# Patient Record
Sex: FEMALE | Race: BLACK OR AFRICAN AMERICAN | Employment: OTHER | URBAN - METROPOLITAN AREA
[De-identification: names, ages, dates, MRNs, and addresses within clinical notes are randomized per-mention and may not be internally consistent; named-entity substitution may affect disease eponyms.]

---

## 2017-01-10 RX ORDER — LANSOPRAZOLE 30 MG/1
CAPSULE, DELAYED RELEASE ORAL
Qty: 60 CAPSULE | Refills: 5 | Status: SHIPPED | OUTPATIENT
Start: 2017-01-10 | End: 2017-01-18

## 2017-01-18 ENCOUNTER — TELEPHONE (OUTPATIENT)
Dept: FAMILY MEDICINE CLINIC | Age: 57
End: 2017-01-18

## 2017-01-18 RX ORDER — OMEPRAZOLE 20 MG/1
20 CAPSULE, DELAYED RELEASE ORAL DAILY
Qty: 30 CAPSULE | Refills: 11 | Status: SHIPPED | OUTPATIENT
Start: 2017-01-18 | End: 2017-05-15 | Stop reason: SDUPTHER

## 2017-01-31 DIAGNOSIS — R73.02 IGT (IMPAIRED GLUCOSE TOLERANCE): ICD-10-CM

## 2017-01-31 RX ORDER — ATORVASTATIN CALCIUM 10 MG/1
TABLET, FILM COATED ORAL
Qty: 30 TABLET | Refills: 0 | Status: SHIPPED | OUTPATIENT
Start: 2017-01-31 | End: 2017-02-16 | Stop reason: SDUPTHER

## 2017-01-31 RX ORDER — LEVOTHYROXINE SODIUM 88 MCG
TABLET ORAL
Qty: 30 TABLET | Refills: 0 | Status: SHIPPED | OUTPATIENT
Start: 2017-01-31 | End: 2017-02-16 | Stop reason: SDUPTHER

## 2017-02-16 ENCOUNTER — TELEPHONE (OUTPATIENT)
Dept: FAMILY MEDICINE CLINIC | Age: 57
End: 2017-02-16

## 2017-02-16 DIAGNOSIS — R73.02 IGT (IMPAIRED GLUCOSE TOLERANCE): ICD-10-CM

## 2017-02-16 RX ORDER — ALBUTEROL SULFATE 90 UG/1
AEROSOL, METERED RESPIRATORY (INHALATION)
Qty: 1 INHALER | Refills: 5 | Status: SHIPPED | OUTPATIENT
Start: 2017-02-16 | End: 2017-12-07 | Stop reason: SDUPTHER

## 2017-02-16 RX ORDER — LEVOTHYROXINE SODIUM 88 MCG
TABLET ORAL
Qty: 30 TABLET | Refills: 5 | Status: SHIPPED | OUTPATIENT
Start: 2017-02-16 | End: 2017-08-28 | Stop reason: SDUPTHER

## 2017-02-16 RX ORDER — ATORVASTATIN CALCIUM 10 MG/1
TABLET, FILM COATED ORAL
Qty: 30 TABLET | Refills: 5 | Status: SHIPPED | OUTPATIENT
Start: 2017-02-16 | End: 2017-09-01 | Stop reason: SDUPTHER

## 2017-03-27 RX ORDER — HYOSCYAMINE SULFATE EXTENDED-RELEASE 0.38 MG/1
TABLET ORAL
Qty: 60 TABLET | Refills: 2 | Status: SHIPPED | OUTPATIENT
Start: 2017-03-27 | End: 2017-04-17 | Stop reason: HOSPADM

## 2017-04-24 RX ORDER — FLUOCINONIDE TOPICAL SOLUTION USP, 0.05% 0.5 MG/ML
SOLUTION TOPICAL
Qty: 60 ML | Refills: 0 | Status: SHIPPED | OUTPATIENT
Start: 2017-04-24 | End: 2017-05-23 | Stop reason: SDUPTHER

## 2017-05-15 ENCOUNTER — TELEPHONE (OUTPATIENT)
Dept: FAMILY MEDICINE CLINIC | Age: 57
End: 2017-05-15

## 2017-05-15 RX ORDER — OMEPRAZOLE 20 MG/1
20 CAPSULE, DELAYED RELEASE ORAL 2 TIMES DAILY
Qty: 60 CAPSULE | Refills: 11 | Status: SHIPPED | OUTPATIENT
Start: 2017-05-15 | End: 2017-08-28

## 2017-05-23 ENCOUNTER — OFFICE VISIT (OUTPATIENT)
Dept: FAMILY MEDICINE CLINIC | Age: 57
End: 2017-05-23

## 2017-05-23 VITALS
RESPIRATION RATE: 14 BRPM | BODY MASS INDEX: 37.73 KG/M2 | HEIGHT: 62 IN | HEART RATE: 80 BPM | SYSTOLIC BLOOD PRESSURE: 124 MMHG | DIASTOLIC BLOOD PRESSURE: 68 MMHG | WEIGHT: 205 LBS | OXYGEN SATURATION: 96 %

## 2017-05-23 DIAGNOSIS — M79.7 FIBROMYALGIA: ICD-10-CM

## 2017-05-23 DIAGNOSIS — J44.9 CHRONIC OBSTRUCTIVE PULMONARY DISEASE, UNSPECIFIED COPD TYPE (HCC): ICD-10-CM

## 2017-05-23 DIAGNOSIS — Q07.00 ARNOLD-CHIARI MALFORMATION (HCC): ICD-10-CM

## 2017-05-23 DIAGNOSIS — E78.5 HYPERLIPIDEMIA, UNSPECIFIED HYPERLIPIDEMIA TYPE: ICD-10-CM

## 2017-05-23 DIAGNOSIS — E03.9 HYPOTHYROIDISM, UNSPECIFIED TYPE: Primary | ICD-10-CM

## 2017-05-23 DIAGNOSIS — E66.9 OBESITY, UNSPECIFIED OBESITY SEVERITY, UNSPECIFIED OBESITY TYPE: ICD-10-CM

## 2017-05-23 DIAGNOSIS — K21.9 GASTROESOPHAGEAL REFLUX DISEASE, ESOPHAGITIS PRESENCE NOT SPECIFIED: ICD-10-CM

## 2017-05-23 DIAGNOSIS — R73.02 IGT (IMPAIRED GLUCOSE TOLERANCE): ICD-10-CM

## 2017-05-23 DIAGNOSIS — Z72.0 TOBACCO ABUSE: ICD-10-CM

## 2017-05-23 DIAGNOSIS — E55.9 VITAMIN D DEFICIENCY: ICD-10-CM

## 2017-05-23 PROCEDURE — 99214 OFFICE O/P EST MOD 30 MIN: CPT | Performed by: FAMILY MEDICINE

## 2017-05-23 RX ORDER — FLUOCINONIDE TOPICAL SOLUTION USP, 0.05% 0.5 MG/ML
SOLUTION TOPICAL
Qty: 60 ML | Refills: 2 | Status: SHIPPED | OUTPATIENT
Start: 2017-05-23 | End: 2017-10-02 | Stop reason: SDUPTHER

## 2017-05-23 RX ORDER — ALBUTEROL SULFATE 90 MCG
2 HFA AEROSOL WITH ADAPTER (GRAM) INHALATION EVERY 6 HOURS PRN
Qty: 1 INHALER | Refills: 3 | Status: SHIPPED | OUTPATIENT
Start: 2017-05-23 | End: 2019-09-12 | Stop reason: SDUPTHER

## 2017-05-23 ASSESSMENT — ENCOUNTER SYMPTOMS
RESPIRATORY NEGATIVE: 1
GASTROINTESTINAL NEGATIVE: 1

## 2017-05-23 ASSESSMENT — PATIENT HEALTH QUESTIONNAIRE - PHQ9
1. LITTLE INTEREST OR PLEASURE IN DOING THINGS: 0
SUM OF ALL RESPONSES TO PHQ9 QUESTIONS 1 & 2: 0
SUM OF ALL RESPONSES TO PHQ QUESTIONS 1-9: 0
2. FEELING DOWN, DEPRESSED OR HOPELESS: 0

## 2017-08-08 ENCOUNTER — TELEPHONE (OUTPATIENT)
Dept: FAMILY MEDICINE CLINIC | Age: 57
End: 2017-08-08

## 2017-08-28 ENCOUNTER — TELEPHONE (OUTPATIENT)
Dept: FAMILY MEDICINE CLINIC | Age: 57
End: 2017-08-28

## 2017-08-28 RX ORDER — LANSOPRAZOLE 30 MG/1
30 CAPSULE, DELAYED RELEASE ORAL DAILY
Qty: 90 CAPSULE | Refills: 3 | Status: SHIPPED | OUTPATIENT
Start: 2017-08-28 | End: 2017-09-05 | Stop reason: SDUPTHER

## 2017-08-28 RX ORDER — PANTOPRAZOLE SODIUM 40 MG/1
40 TABLET, DELAYED RELEASE ORAL DAILY
Qty: 90 TABLET | Refills: 3 | Status: SHIPPED | OUTPATIENT
Start: 2017-08-28 | End: 2017-08-28

## 2017-08-28 RX ORDER — LEVOTHYROXINE SODIUM 88 MCG
TABLET ORAL
Qty: 30 TABLET | Refills: 5 | Status: SHIPPED | OUTPATIENT
Start: 2017-08-28 | End: 2018-03-13 | Stop reason: SDUPTHER

## 2017-08-28 RX ORDER — LANSOPRAZOLE 30 MG/1
30 CAPSULE, DELAYED RELEASE ORAL DAILY
Qty: 90 CAPSULE | Refills: 3 | Status: SHIPPED | OUTPATIENT
Start: 2017-08-28 | End: 2017-08-28 | Stop reason: SDUPTHER

## 2017-09-01 DIAGNOSIS — R73.02 IGT (IMPAIRED GLUCOSE TOLERANCE): ICD-10-CM

## 2017-09-05 ENCOUNTER — TELEPHONE (OUTPATIENT)
Dept: FAMILY MEDICINE CLINIC | Age: 57
End: 2017-09-05

## 2017-09-05 RX ORDER — LANSOPRAZOLE 30 MG/1
30 CAPSULE, DELAYED RELEASE ORAL 2 TIMES DAILY
Qty: 180 CAPSULE | Refills: 3 | Status: SHIPPED | OUTPATIENT
Start: 2017-09-05 | End: 2018-08-10

## 2017-09-05 RX ORDER — ATORVASTATIN CALCIUM 10 MG/1
TABLET, FILM COATED ORAL
Qty: 30 TABLET | Refills: 0 | Status: SHIPPED | OUTPATIENT
Start: 2017-09-05 | End: 2017-09-27 | Stop reason: SDUPTHER

## 2017-09-27 DIAGNOSIS — R73.02 IGT (IMPAIRED GLUCOSE TOLERANCE): ICD-10-CM

## 2017-09-27 RX ORDER — ATORVASTATIN CALCIUM 10 MG/1
TABLET, FILM COATED ORAL
Qty: 30 TABLET | Refills: 0 | Status: SHIPPED | OUTPATIENT
Start: 2017-09-27 | End: 2017-10-02 | Stop reason: SDUPTHER

## 2017-09-27 RX ORDER — HYOSCYAMINE SULFATE EXTENDED-RELEASE 0.38 MG/1
TABLET ORAL
Qty: 60 TABLET | Refills: 0 | Status: SHIPPED | OUTPATIENT
Start: 2017-09-27 | End: 2017-10-02 | Stop reason: SDUPTHER

## 2017-10-02 ENCOUNTER — TELEPHONE (OUTPATIENT)
Dept: FAMILY MEDICINE CLINIC | Age: 57
End: 2017-10-02

## 2017-10-02 DIAGNOSIS — R73.02 IGT (IMPAIRED GLUCOSE TOLERANCE): ICD-10-CM

## 2017-10-02 RX ORDER — HYOSCYAMINE SULFATE EXTENDED-RELEASE 0.38 MG/1
TABLET ORAL
Qty: 30 TABLET | Refills: 11 | Status: SHIPPED | OUTPATIENT
Start: 2017-10-02 | End: 2020-07-27 | Stop reason: SDUPTHER

## 2017-10-02 RX ORDER — FLUOCINONIDE TOPICAL SOLUTION USP, 0.05% 0.5 MG/ML
SOLUTION TOPICAL
Qty: 60 ML | Refills: 2 | Status: SHIPPED | OUTPATIENT
Start: 2017-10-02 | End: 2017-12-31 | Stop reason: SDUPTHER

## 2017-10-02 RX ORDER — ATORVASTATIN CALCIUM 10 MG/1
TABLET, FILM COATED ORAL
Qty: 30 TABLET | Refills: 11 | Status: SHIPPED | OUTPATIENT
Start: 2017-10-02 | End: 2019-02-21 | Stop reason: SDUPTHER

## 2017-10-02 RX ORDER — ALBUTEROL SULFATE 90 UG/1
AEROSOL, METERED RESPIRATORY (INHALATION)
Qty: 18 G | Refills: 2 | Status: SHIPPED | OUTPATIENT
Start: 2017-10-02 | End: 2018-02-04 | Stop reason: SDUPTHER

## 2017-10-04 ENCOUNTER — OFFICE VISIT (OUTPATIENT)
Dept: FAMILY MEDICINE CLINIC | Age: 57
End: 2017-10-04
Payer: COMMERCIAL

## 2017-10-04 VITALS
SYSTOLIC BLOOD PRESSURE: 120 MMHG | DIASTOLIC BLOOD PRESSURE: 70 MMHG | HEART RATE: 89 BPM | OXYGEN SATURATION: 98 % | TEMPERATURE: 97.9 F | WEIGHT: 200.8 LBS | BODY MASS INDEX: 37.91 KG/M2 | RESPIRATION RATE: 14 BRPM | HEIGHT: 61 IN

## 2017-10-04 DIAGNOSIS — A08.4 VIRAL GASTROENTERITIS: Primary | ICD-10-CM

## 2017-10-04 PROCEDURE — 99213 OFFICE O/P EST LOW 20 MIN: CPT | Performed by: FAMILY MEDICINE

## 2017-10-04 RX ORDER — LIDOCAINE 50 MG/G
OINTMENT TOPICAL PRN
COMMUNITY

## 2017-10-04 RX ORDER — ONDANSETRON 4 MG/1
4 TABLET, FILM COATED ORAL DAILY PRN
Qty: 10 TABLET | Refills: 0 | Status: SHIPPED | OUTPATIENT
Start: 2017-10-04 | End: 2018-01-23 | Stop reason: ALTCHOICE

## 2017-10-04 RX ORDER — MELOXICAM 15 MG/1
15 TABLET ORAL DAILY
COMMUNITY
End: 2019-07-31 | Stop reason: ALTCHOICE

## 2017-10-04 RX ORDER — HYDROCORTISONE ACETATE 25 MG/1
25 SUPPOSITORY RECTAL PRN
COMMUNITY

## 2017-10-04 RX ORDER — FLUTICASONE PROPIONATE 50 MCG
1 SPRAY, SUSPENSION (ML) NASAL DAILY
COMMUNITY
End: 2022-03-02

## 2017-10-04 RX ORDER — LIDOCAINE 50 MG/G
1 PATCH TOPICAL DAILY
COMMUNITY

## 2017-10-04 ASSESSMENT — ENCOUNTER SYMPTOMS
BACK PAIN: 1
COUGH: 1
DIARRHEA: 1
BLOOD IN STOOL: 0
ABDOMINAL PAIN: 1
NAUSEA: 1
VOMITING: 1

## 2017-10-04 NOTE — MR AVS SNAPSHOT
After Visit Summary             Carmen Anderson   10/4/2017 10:15 AM   Office Visit    Description:  Female : 1960   Provider:  Corrie Rea DO   Department:  [facility]              Your Follow-Up and Future Appointments         Below is a list of your follow-up and future appointments. This may not be a complete list as you may have made appointments directly with providers that we are not aware of or your providers may have made some for you. Please call your providers to confirm appointments. It is important to keep your appointments. Please bring your current insurance card, photo ID, co-pay, and all medication bottles to your appointment. If self-pay, payment is expected at the time of service. Your To-Do List     Future Appointments Provider Department Dept Phone    2018 7:45 AM Corrie Rea DO [address] [phone]    Please arrive 15 minutes prior to appointment, bring photo ID and insurance card. Please arrive 15 minutes prior to appointment, bring photo ID and insurance card. Follow-Up    Return if symptoms worsen or fail to improve. Information from Your Visit        Department     Name Address Phone Fax    [facility] [address] [place] [address]      You Were Seen for:         Comments    Viral gastroenteritis   [[id number]]         Vital Signs     Blood Pressure Pulse Temperature Respirations Height Weight    120/70 (Site: Left Arm, Position: Sitting, Cuff Size: Large Adult) 89 97.9 °F (36.6 °C) (Oral) 14 5' 1.42\" (1.56 m) 200 lb 12.8 oz (91.1 kg)    Last Menstrual Period Oxygen Saturation Breastfeeding? Body Mass Index Smoking Status       (Exact Date) 98% No 37.43 kg/m2 Current Every Day Smoker       Additional Information about your Body Mass Index (BMI)           Your BMI as listed above is considered obese (30 or more).  BMI is an estimate of body fat, calculated from your height and weight. The higher your BMI, the greater your risk of heart disease, high blood pressure, type 2 diabetes, stroke, gallstones, arthritis, sleep apnea, and certain cancers. BMI is not perfect. It may overestimate body fat in athletes and people who are more muscular. Even a small weight loss (between 5 and 10 percent of your current weight) by decreasing your calorie intake and becoming more physically active will help lower your risk of developing or worsening diseases associated with obesity. Learn more at: [employer]          Instructions    You may receive a survey about your visit with us today. The feedback from our patients helps us identify what is working well and where the service to all patients can be enhanced. Thank you! Gastroenteritis: Care Instructions  Your Care Instructions  Gastroenteritis is an illness that may cause nausea, vomiting, and diarrhea. It is sometimes called \"stomach flu. \" It can be caused by bacteria or a virus. You will probably begin to feel better in 1 to 2 days. In the meantime, get plenty of rest and make sure you do not become dehydrated. Dehydration occurs when your body loses too much fluid. Follow-up care is a key part of your treatment and safety. Be sure to make and go to all appointments, and call your doctor if you are having problems. Its also a good idea to know your test results and keep a list of the medicines you take. How can you care for yourself at home? · If your doctor prescribed antibiotics, take them as directed. Do not stop taking them just because you feel better. You need to take the full course of antibiotics. · Drink plenty of fluids to prevent dehydration, enough so that your urine is light yellow or clear like water. Choose water and other caffeine-free clear liquids until you feel better.  If you have kidney, heart, or liver disease and have to limit fluids, talk with your doctor before you increase your fluid intake. · Drink fluids slowly, in frequent, small amounts, because drinking too much too fast can cause vomiting. · Begin eating mild foods, such as dry toast, yogurt, applesauce, bananas, and rice. Avoid spicy, hot, or high-fat foods, and do not drink alcohol or caffeine for a day or two. Do not drink milk or eat ice cream until you are feeling better. How to prevent gastroenteritis  · Keep hot foods hot and cold foods cold. · Do not eat meats, dressings, salads, or other foods that have been kept at room temperature for more than 2 hours. · Use a thermometer to check your refrigerator. It should be between 34°F and 40°F.  · Defrost meats in the refrigerator or microwave, not on the kitchen counter. · Keep your hands and your kitchen clean. Wash your hands, cutting boards, and countertops with hot soapy water frequently. · Cook meat until it is well done. · Do not eat raw eggs or uncooked sauces made with raw eggs. · Do not take chances. If food looks or tastes spoiled, throw it out. When should you call for help? Call 911 anytime you think you may need emergency care. For example, call if:  · You vomit blood or what looks like coffee grounds. · You passed out (lost consciousness). · You pass maroon or very bloody stools. Call your doctor now or seek immediate medical care if:  · You have severe belly pain. · You have signs of needing more fluids. You have sunken eyes, a dry mouth, and pass only a little dark urine. · You feel like you are going to faint. · You have increased belly pain that does not go away in 1 to 2 days. · You have new or increased nausea, or you are vomiting. · You have a new or higher fever. · Your stools are black and tarlike or have streaks of blood. Watch closely for changes in your health, and be sure to contact your doctor if:  · You are dizzy or lightheaded. · You urinate less than usual, or your urine is dark yellow or brown. · You do not feel better with each day that goes by. Where can you learn more? Go to https://[employer]. org and sign in to your [employer] account. Enter N142 in the [facility] box to learn more about \"Gastroenteritis: Care Instructions. \"     If you do not have an account, please click on the \"Sign Up Now\" link. Current as of: March 3, 2017  Content Version: 11.3  © 6893-5396 [employer]. Care instructions adapted under license by [facility] ([facility]). If you have questions about a medical condition or this instruction, always ask your healthcare professional. [address] any warranty or liability for your use of this information. Today's Medication Changes          These changes are accurate as of: 10/4/17 11:59 PM.  If you have any questions, ask your nurse or doctor. START taking these medications           ondansetron 4 MG tablet   Commonly known as:  ZOFRAN   Instructions: Take 1 tablet by mouth daily as needed for Nausea or Vomiting   Quantity:  10 tablet   Refills:  0   Started by:  Mark Devi DO         STOP taking these medications           lidocaine   Commonly known as:  LIDODERM   Stopped by:  Mark Devi DO       VOLTAREN 1 % Gel   Generic drug:  diclofenac sodium   Stopped by:  Mark Devi DO            Where to Get Your Medications      These medications were sent to [address] [place] [address],[address] [place] [address]  [id number], [address] [place] [id number]     Phone:  [phone]     ondansetron 4 MG tablet               Your Current Medications Are              lidocaine (XYLOCAINE) 5 % ointment Apply topically as needed for Pain Apply topically as needed. conjugated estrogens (PREMARIN) 0.625 MG/GM vaginal cream Place vaginally as needed Place vaginally daily.

## 2017-10-04 NOTE — PROGRESS NOTES
Subjective:      Patient ID: José Luis Romo is a 60 y.o. female. HPI:    Chief Complaint   Patient presents with    Nausea & Vomiting     present for 6 days     Abdominal Pain    Fatigue    Cough    Chills    Sweats    Generalized Body Aches    Adenopathy     Pt here for above complaints for the last 6 days. The last time she vomited was on the 2nd. Still with fatigue, body aches, cough, chills. Admits to some loose stools at times, not sure if related to [facility] [address]. No fevers. Some swollen glands per pt. Patient Active Problem List   Diagnosis    Hypothyroid    Fibromyalgia    Obesity    Hyperlipemia    Interstitial cystitis    ETD (eustachian tube dysfunction)    Sinusitis, chronic    Vestibular dizziness    Nasal septal deviation    IGT (impaired glucose tolerance)    Vitamin D deficiency    Arnold-Chiari malformation (HCC)    Tobacco abuse    Esophageal reflux    COPD (chronic obstructive pulmonary disease) ([facility] [address]     Past Surgical History:   Procedure Laterality Date    APPENDECTOMY       SECTION      x2    COLONOSCOPY  [id number]    ECTOPIC PREGNANCY SURGERY      HEMICOLECTOMY      HERNIA REPAIR      inguinal     HYSTERECTOMY       Prior to Admission medications    Medication Sig Start Date End Date Taking? Authorizing Provider   lidocaine (XYLOCAINE) 5 % ointment Apply topically as needed for Pain Apply topically as needed. Yes Historical Provider, MD   conjugated estrogens (PREMARIN) 0.625 MG/GM vaginal cream Place vaginally as needed Place vaginally daily. Yes Historical Provider, MD   lidocaine (LIDODERM) 5 % Place 1 patch onto the skin daily 12 hours on, 12 hours off.    Yes Historical Provider, MD   hydrocortisone (ANUSOL-HC) 25 MG suppository Place 25 mg rectally as needed for Hemorrhoids   Yes Historical Provider, MD   fluticasone (FLONASE ALLERGY RELIEF) 50 MCG/ACT nasal spray 1 spray by Nasal route daily   Yes Historical Provider, MD Anuj Chisholm TO FIND    Yes Historical Provider, MD   fluocinonide (LIDEX) 0.05 % external solution APPLY  SOLUTION TOPICALLY TWICE DAILY 10/2/17  Yes Mohamud Reinoso DO   hyoscyamine (LEVBID) 375 MCG extended release tablet TAKE 1/2 TABLET BY MOUTH TWICE DAILY 10/2/17  Yes Mohamud Reinoso DO   albuterol sulfate HFA (VENTOLIN HFA) 108 (90 Base) MCG/ACT inhaler INHALE 2 PUFFS INTO THE LUNGS EVERY 6 HOURS AS NEEDED FOR SHORTNESS OF BREATH OR WHEEZING 10/2/17  Yes Mohamud Reinoso DO   atorvastatin (LIPITOR) 10 MG tablet TAKE 1 TABLET BY MOUTH EVERY DAY 10/2/17  Yes Mohamud Reinoso DO   fluocinonide (LIDEX) 0.05 % cream APPLY CREAM TOIPCALLY TO AFFECTED AREA TWICE DAILY 10/2/17  Yes Mohamud Reinoso DO   metFORMIN (GLUCOPHAGE) 500 MG tablet TAKE 1 TABLET BY MOUTH TWICE DAILY WITH MEALS 10/2/17  Yes Mohamud Reinoso DO   lansoprazole (PREVACID) 30 MG delayed release capsule Take 1 capsule by mouth 2 times daily 9/5/17  Yes Mohamud Reinoso DO   SYNTHROID 88 MCG tablet TAKE ONE TABLET BY MOUTH ONCE DAILY ON EMPTY STOMACH WITH WATER *WAIT 30 MINUTES BEFORE EATING OR TAKING OTHER MEDICATIONS 8/28/17  Yes Mohamud Reinoso DO   PROVENTIL  (90 BASE) MCG/ACT inhaler Inhale 2 puffs into the lungs every 6 hours as needed for Wheezing or Shortness of Breath 5/23/17  Yes Mohamud Reinoso DO   linaclotide Lonn Reaper) 145 MCG capsule Take 1 capsule by mouth every morning (before breakfast) 2/17/17  Yes Mohamud Reinoso DO   albuterol sulfate HFA (VENTOLIN HFA) 108 (90 BASE) MCG/ACT inhaler INHALE TWO PUFFS BY MOUTH EVERY 6 HOURS AS NEEDED FOR WHEEZING OR SHORTNESS OF BREATH 2/16/17  Yes Mohamud Reinoso DO   Fluconazole (DIFLUCAN PO) Take by mouth   Yes Historical Provider, MD   levomilnacipran (FETZIMA) 20 MG CP24 ER capsule Take 20 mg by mouth daily.    Yes Historical Provider, MD   HYDROcodone-acetaminophen (NORCO) 5-325 MG per tablet Take 1 tablet every 4-6 hours as needed for pain   Yes Ashlee Berman MD   Fexofenadine-Pseudoephedrine (ALLEGRA-D 12 HOUR PO) Take  by mouth. Yes Historical Provider, MD   promethazine (PHENERGAN) 25 MG tablet Take 25 mg by mouth every 6 hours as needed. Yes Historical Provider, MD   NONFORMULARY Immunotherapy allergy shot   Yes Historical Provider, MD   zinc 50 MG CAPS Take  by mouth daily. Yes Historical Provider, MD   Ascorbic Acid (VITAMIN C) 500 MG CAPS Take  by mouth. Yes Historical Provider, MD   orphenadrine (NORFLEX) 100 MG tablet Take 100 mg by mouth 2 times daily as needed. Yes Historical Provider, MD   polyethylene glycol (GLYCOLAX) powder Take 17 g by mouth daily. Yes Eugenio Luke CNP   tramadol (ULTRAM) 50 MG tablet Take 50 mg by mouth every 6 hours as needed. Take 1-2 tabs every 6 hrs prn    Yes Historical Provider, MD   trazodone (DESYREL) 150 MG tablet Take 150 mg by mouth nightly. Take 1/2 tab before bed    Yes Historical Provider, MD   gabapentin (NEURONTIN) 100 MG capsule Take 100 mg by mouth 2 times daily. Yes Ashlee Berman MD         Review of Systems   Constitutional: Positive for appetite change, chills and fatigue. Negative for fever. HENT: Negative. Respiratory: Positive for cough. Cardiovascular: Negative. Gastrointestinal: Positive for abdominal pain, diarrhea, nausea and vomiting. Negative for blood in stool. Genitourinary: Negative for difficulty urinating, dysuria, flank pain, frequency and hematuria. Musculoskeletal: Positive for back pain. All other systems reviewed and are negative. Objective:   Physical Exam   Constitutional: She is oriented to person, place, and time. She appears well-developed and well-nourished. HENT:   Head: Normocephalic and atraumatic. Right Ear: Tympanic membrane normal.   Left Ear: Tympanic membrane normal.   Mouth/Throat: Oropharynx is clear and moist and mucous membranes are normal.   Cardiovascular: Normal rate, regular rhythm and normal heart sounds. No murmur heard. Pulmonary/Chest: Effort normal and breath sounds normal.   Abdominal: Soft. Bowel sounds are normal. She exhibits no distension. There is generalized tenderness. There is no rigidity, no rebound, no guarding and no CVA tenderness. Musculoskeletal: She exhibits no edema. Neurological: She is alert and oriented to person, place, and time. Skin: Skin is warm and dry. Psychiatric: She has a normal mood and affect. Her behavior is normal.   Nursing note and vitals reviewed. Assessment:      1.  Viral gastroenteritis  ondansetron (ZOFRAN) 4 MG tablet           Plan:      -  Viral nature discussed  -  Rest, fluids, bland diet  -  rx Zofran prn  -  RTO if worsening symptmos

## 2017-12-07 ENCOUNTER — OFFICE VISIT (OUTPATIENT)
Dept: PSYCHIATRY | Age: 57
End: 2017-12-07
Payer: COMMERCIAL

## 2017-12-07 VITALS
WEIGHT: 205 LBS | SYSTOLIC BLOOD PRESSURE: 132 MMHG | HEART RATE: 80 BPM | HEIGHT: 62 IN | BODY MASS INDEX: 37.73 KG/M2 | DIASTOLIC BLOOD PRESSURE: 88 MMHG

## 2017-12-07 DIAGNOSIS — F33.0 MILD EPISODE OF RECURRENT MAJOR DEPRESSIVE DISORDER (HCC): Primary | ICD-10-CM

## 2017-12-07 DIAGNOSIS — F41.9 ANXIETY: ICD-10-CM

## 2017-12-07 PROCEDURE — 4004F PT TOBACCO SCREEN RCVD TLK: CPT | Performed by: NURSE PRACTITIONER

## 2017-12-07 PROCEDURE — 90792 PSYCH DIAG EVAL W/MED SRVCS: CPT | Performed by: NURSE PRACTITIONER

## 2017-12-07 NOTE — PROGRESS NOTES
ended. She also reports she had some sadness and depression after her mother's death, but it was not disruptive to her achievements in school or her behaviors to her recollection. Patient states she  for the first time in in 1985 or 1986. She was  for 10 years and had her children with her first . She reports she felt happy during this time and did not have any symptoms of depression during the marriage or following the births of her children. They  after 10 years of marriage. Patient states approximately 10-15 years after her first divorce she  her second . The were  for 5 years before their divorce. After 1-2 years she  her 2rd . Patient states they were  for 2-3 years before the divorce, which was finalized in 2017. 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 She states she went to college after her sons were older. She completed her  degree as well as travel management. Patient denies suicidal ideations, intent, plan. No homicidal ideations, intent, plan. No audiovisual hallucinations. HPI      PSYCHIATRIC HISTORY:    Patient has had prior care with the following:    [x] Psychiatrist    [] Psychologist    [x] Other Therapist    [] None    The patient has had 0 lifetime suicide attempts. Patient reports 0 psych hospital admissions    Past psychiatric medications include: \"numerous\" per patient report; she does not recall specific names other than the medications she was on most recently - Fetzima and Trazodone. Refer to Dr. Valenzuela Heads referral letter in media section of chart for list of medications she has tried previously.     Adverse reactions from psychotropic medications:  Doesn't recall      Lifetime Psychiatric Review of Systems         Soo or Hypomania:  no     Panic Attacks:  no     Phobias:  no     Obsessions and Compulsions:  no

## 2017-12-08 ENCOUNTER — TELEPHONE (OUTPATIENT)
Dept: PSYCHIATRY | Age: 57
End: 2017-12-08

## 2017-12-08 NOTE — TELEPHONE ENCOUNTER
PA was initiated for [facility] and determination returned was no PA required due to patient's \"Gold Card Status. \"

## 2018-01-02 RX ORDER — FLUOCINONIDE TOPICAL SOLUTION USP, 0.05% 0.5 MG/ML
SOLUTION TOPICAL
Qty: 60 ML | Refills: 0 | Status: SHIPPED | OUTPATIENT
Start: 2018-01-02 | End: 2018-02-04 | Stop reason: SDUPTHER

## 2018-01-16 ENCOUNTER — HOSPITAL ENCOUNTER (OUTPATIENT)
Age: 58
Discharge: HOME | End: 2018-01-16
Payer: COMMERCIAL

## 2018-01-16 DIAGNOSIS — F33.0 MILD EPISODE OF RECURRENT MAJOR DEPRESSIVE DISORDER (HCC): ICD-10-CM

## 2018-01-16 DIAGNOSIS — F41.9 ANXIETY: ICD-10-CM

## 2018-01-16 LAB
ALBUMIN SERPL-MCNC: 4.6 G/DL (ref 3.5–5.1)
ALP BLD-CCNC: 128 U/L (ref 38–126)
ALT SERPL-CCNC: 11 U/L (ref 11–66)
ANION GAP SERPL CALCULATED.3IONS-SCNC: 17 MEQ/L (ref 8–16)
AST SERPL-CCNC: 14 U/L (ref 5–40)
BASOPHILS # BLD: 1.2 %
BASOPHILS ABSOLUTE: 0.1 THOU/MM3 (ref 0–0.1)
BILIRUB SERPL-MCNC: 0.5 MG/DL (ref 0.3–1.2)
BUN BLDV-MCNC: 9 MG/DL (ref 7–22)
CALCIUM SERPL-MCNC: 10 MG/DL (ref 8.5–10.5)
CHLORIDE BLD-SCNC: 99 MEQ/L (ref 98–111)
CO2: 25 MEQ/L (ref 23–33)
CREAT SERPL-MCNC: 0.5 MG/DL (ref 0.4–1.2)
EOSINOPHIL # BLD: 4.3 %
EOSINOPHILS ABSOLUTE: 0.3 THOU/MM3 (ref 0–0.4)
GFR SERPL CREATININE-BSD FRML MDRD: > 90 ML/MIN/1.73M2
GLUCOSE BLD-MCNC: 101 MG/DL (ref 70–108)
HCT VFR BLD CALC: 42.5 % (ref 37–47)
HEMOGLOBIN: 14.1 GM/DL (ref 12–16)
LYMPHOCYTES # BLD: 30.7 %
LYMPHOCYTES ABSOLUTE: 2.1 THOU/MM3 (ref 1–4.8)
MCH RBC QN AUTO: 30.1 PG (ref 27–31)
MCHC RBC AUTO-ENTMCNC: 33.3 GM/DL (ref 33–37)
MCV RBC AUTO: 90.4 FL (ref 81–99)
MONOCYTES # BLD: 4.3 %
MONOCYTES ABSOLUTE: 0.3 THOU/MM3 (ref 0.4–1.3)
NUCLEATED RED BLOOD CELLS: 0 /100 WBC
PDW BLD-RTO: 14.5 % (ref 11.5–14.5)
PLATELET # BLD: 284 THOU/MM3 (ref 130–400)
PMV BLD AUTO: 9.2 MCM (ref 7.4–10.4)
POTASSIUM SERPL-SCNC: 4.3 MEQ/L (ref 3.5–5.2)
RBC # BLD: 4.7 MILL/MM3 (ref 4.2–5.4)
SEG NEUTROPHILS: 59.5 %
SEGMENTED NEUTROPHILS ABSOLUTE COUNT: 4.2 THOU/MM3 (ref 1.8–7.7)
SODIUM BLD-SCNC: 141 MEQ/L (ref 135–145)
T4 FREE: 1.13 NG/DL (ref 0.93–1.76)
TOTAL PROTEIN: 8.1 G/DL (ref 6.1–8)
TSH SERPL DL<=0.05 MIU/L-ACNC: 1.38 UIU/ML (ref 0.4–4.2)
WBC # BLD: 7 THOU/MM3 (ref 4.8–10.8)

## 2018-01-16 PROCEDURE — 84443 ASSAY THYROID STIM HORMONE: CPT

## 2018-01-16 PROCEDURE — 85025 COMPLETE CBC W/AUTO DIFF WBC: CPT

## 2018-01-16 PROCEDURE — 80053 COMPREHEN METABOLIC PANEL: CPT

## 2018-01-16 PROCEDURE — 84439 ASSAY OF FREE THYROXINE: CPT

## 2018-01-16 PROCEDURE — 36415 COLL VENOUS BLD VENIPUNCTURE: CPT

## 2018-01-17 ENCOUNTER — TELEPHONE (OUTPATIENT)
Dept: FAMILY MEDICINE CLINIC | Age: 58
End: 2018-01-17

## 2018-01-17 NOTE — TELEPHONE ENCOUNTER
Patient called was reviewing her chart with Brent Mcintyre saw on problem list that Gall stones were on it. Patient asking for you to review her chart as she was never told she ever had gall stones. Patient would like a call with advice, ok to leave a message.

## 2018-01-23 ENCOUNTER — OFFICE VISIT (OUTPATIENT)
Dept: PSYCHIATRY | Age: 58
End: 2018-01-23
Payer: COMMERCIAL

## 2018-01-23 VITALS — WEIGHT: 205 LBS | BODY MASS INDEX: 37.73 KG/M2 | HEIGHT: 62 IN

## 2018-01-23 DIAGNOSIS — F41.9 ANXIETY: ICD-10-CM

## 2018-01-23 DIAGNOSIS — F33.0 MILD EPISODE OF RECURRENT MAJOR DEPRESSIVE DISORDER (HCC): Primary | ICD-10-CM

## 2018-01-23 PROCEDURE — 3017F COLORECTAL CA SCREEN DOC REV: CPT | Performed by: NURSE PRACTITIONER

## 2018-01-23 PROCEDURE — 3014F SCREEN MAMMO DOC REV: CPT | Performed by: NURSE PRACTITIONER

## 2018-01-23 PROCEDURE — G8417 CALC BMI ABV UP PARAM F/U: HCPCS | Performed by: NURSE PRACTITIONER

## 2018-01-23 PROCEDURE — G8427 DOCREV CUR MEDS BY ELIG CLIN: HCPCS | Performed by: NURSE PRACTITIONER

## 2018-01-23 PROCEDURE — 99213 OFFICE O/P EST LOW 20 MIN: CPT | Performed by: NURSE PRACTITIONER

## 2018-01-23 PROCEDURE — 4004F PT TOBACCO SCREEN RCVD TLK: CPT | Performed by: NURSE PRACTITIONER

## 2018-01-23 PROCEDURE — G8484 FLU IMMUNIZE NO ADMIN: HCPCS | Performed by: NURSE PRACTITIONER
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
## 2018-02-05 RX ORDER — FLUOCINONIDE TOPICAL SOLUTION USP, 0.05% 0.5 MG/ML
SOLUTION TOPICAL
Qty: 60 ML | Refills: 0 | Status: SHIPPED | OUTPATIENT
Start: 2018-02-05 | End: 2018-03-04 | Stop reason: SDUPTHER

## 2018-02-07 ENCOUNTER — TELEPHONE (OUTPATIENT)
Dept: FAMILY MEDICINE CLINIC | Age: 58
End: 2018-02-07

## 2018-02-07 NOTE — TELEPHONE ENCOUNTER
Rena GORDON fax received PA required for Synthroid 88 mcg. Cover My Meds/ [employer] PA started office will receive determination within the next 24 hours.

## 2018-02-08 ENCOUNTER — OFFICE VISIT (OUTPATIENT)
Dept: FAMILY MEDICINE CLINIC | Age: 58
End: 2018-02-08
Payer: COMMERCIAL

## 2018-02-08 VITALS
SYSTOLIC BLOOD PRESSURE: 136 MMHG | DIASTOLIC BLOOD PRESSURE: 72 MMHG | HEIGHT: 65 IN | RESPIRATION RATE: 20 BRPM | BODY MASS INDEX: 33.92 KG/M2 | WEIGHT: 203.6 LBS | HEART RATE: 100 BPM | TEMPERATURE: 97.7 F

## 2018-02-08 DIAGNOSIS — B34.9 VIRAL ILLNESS: Primary | ICD-10-CM

## 2018-02-08 PROCEDURE — 3014F SCREEN MAMMO DOC REV: CPT | Performed by: FAMILY MEDICINE

## 2018-02-08 PROCEDURE — G8484 FLU IMMUNIZE NO ADMIN: HCPCS | Performed by: FAMILY MEDICINE

## 2018-02-08 PROCEDURE — G8417 CALC BMI ABV UP PARAM F/U: HCPCS | Performed by: FAMILY MEDICINE

## 2018-02-08 PROCEDURE — G8427 DOCREV CUR MEDS BY ELIG CLIN: HCPCS | Performed by: FAMILY MEDICINE

## 2018-02-08 PROCEDURE — 3017F COLORECTAL CA SCREEN DOC REV: CPT | Performed by: FAMILY MEDICINE

## 2018-02-08 PROCEDURE — 99213 OFFICE O/P EST LOW 20 MIN: CPT | Performed by: FAMILY MEDICINE

## 2018-02-08 PROCEDURE — 4004F PT TOBACCO SCREEN RCVD TLK: CPT | Performed by: FAMILY MEDICINE

## 2018-02-08 RX ORDER — DEXTROMETHORPHAN HYDROBROMIDE AND PROMETHAZINE HYDROCHLORIDE 15; 6.25 MG/5ML; MG/5ML
5 SYRUP ORAL 4 TIMES DAILY PRN
Qty: 120 ML | Refills: 0 | Status: SHIPPED | OUTPATIENT
Start: 2018-02-08 | End: 2018-02-15

## 2018-02-08 ASSESSMENT — ENCOUNTER SYMPTOMS
VOICE CHANGE: 1
NAUSEA: 1
VOMITING: 0
RHINORRHEA: 1
WHEEZING: 0
DIARRHEA: 1
SORE THROAT: 1
SINUS PRESSURE: 1
COUGH: 1
SHORTNESS OF BREATH: 0

## 2018-02-08 NOTE — PROGRESS NOTES
Immunotherapy allergy shot      zinc 50 MG CAPS Take  by mouth daily.  Ascorbic Acid (VITAMIN C) 500 MG CAPS Take  by mouth.  orphenadrine (NORFLEX) 100 MG tablet Take 100 mg by mouth 2 times daily as needed.  polyethylene glycol (GLYCOLAX) powder Take 17 g by mouth daily.  tramadol (ULTRAM) 50 MG tablet Take 50 mg by mouth every 6 hours as needed. Take 1-2 tabs every 6 hrs prn       trazodone (DESYREL) 150 MG tablet Take 150 mg by mouth nightly. Take 1/2 tab before bed       gabapentin (NEURONTIN) 100 MG capsule Take 100 mg by mouth 2 times daily.  Fluconazole (DIFLUCAN PO) Take by mouth       No current facility-administered medications on file prior to visit. Review of Systems   Constitutional: Positive for chills and fatigue. Negative for fever. HENT: Positive for congestion, ear pain, rhinorrhea, sinus pressure, sore throat and voice change. Negative for ear discharge. Respiratory: Positive for cough. Negative for shortness of breath and wheezing. Cardiovascular: Negative. Gastrointestinal: Positive for diarrhea and nausea. Negative for vomiting. Musculoskeletal: Negative. All other systems reviewed and are negative. Objective:   Physical Exam   Constitutional: She is oriented to person, place, and time. She appears well-developed and well-nourished. HENT:   Head: Normocephalic and atraumatic. Right Ear: A middle ear effusion is present. Left Ear: A middle ear effusion is present. Nose: Mucosal edema and rhinorrhea present. Mouth/Throat: Oropharynx is clear and moist and mucous membranes are normal. No oropharyngeal exudate or posterior oropharyngeal erythema. Cardiovascular: Normal rate, regular rhythm and normal heart sounds. No murmur heard. Pulmonary/Chest: Effort normal and breath sounds normal. She has no decreased breath sounds. She has no wheezes. She has no rhonchi. Abdominal: Soft.  Bowel sounds are normal.   Musculoskeletal:

## 2018-03-05 ENCOUNTER — TELEPHONE (OUTPATIENT)
Dept: FAMILY MEDICINE CLINIC | Age: 58
End: 2018-03-05

## 2018-03-05 DIAGNOSIS — L21.0 SEBORRHEA CAPITIS: Primary | ICD-10-CM

## 2018-03-05 RX ORDER — FLUOCINONIDE TOPICAL SOLUTION USP, 0.05% 0.5 MG/ML
SOLUTION TOPICAL
Qty: 60 ML | Refills: 0 | Status: SHIPPED | OUTPATIENT
Start: 2018-03-05 | End: 2018-04-01 | Stop reason: SDUPTHER

## 2018-03-05 NOTE — TELEPHONE ENCOUNTER
Would like her to see Dermatology. Have her call [facility] for preferred provider.

## 2018-03-06 NOTE — TELEPHONE ENCOUNTER
Patient called back and said [facility] [phone] is covered by her insurance. Please advise.

## 2018-03-13 RX ORDER — LEVOTHYROXINE SODIUM 88 MCG
TABLET ORAL
Qty: 30 TABLET | Refills: 0 | Status: SHIPPED | OUTPATIENT
Start: 2018-03-13 | End: 2018-04-01 | Stop reason: SDUPTHER

## 2018-04-02 RX ORDER — FLUOCINONIDE TOPICAL SOLUTION USP, 0.05% 0.5 MG/ML
SOLUTION TOPICAL
Qty: 60 ML | Refills: 0 | Status: SHIPPED | OUTPATIENT
Start: 2018-04-02 | End: 2018-05-11 | Stop reason: SDUPTHER

## 2018-04-02 RX ORDER — LEVOTHYROXINE SODIUM 88 MCG
TABLET ORAL
Qty: 30 TABLET | Refills: 0 | Status: SHIPPED | OUTPATIENT
Start: 2018-04-02 | End: 2018-05-06 | Stop reason: SDUPTHER

## 2018-04-09 ENCOUNTER — TELEPHONE (OUTPATIENT)
Dept: FAMILY MEDICINE CLINIC | Age: 58
End: 2018-04-09

## 2018-04-18 ENCOUNTER — OFFICE VISIT (OUTPATIENT)
Dept: PSYCHIATRY | Age: 58
End: 2018-04-18
Payer: COMMERCIAL

## 2018-04-18 VITALS
DIASTOLIC BLOOD PRESSURE: 90 MMHG | WEIGHT: 201 LBS | HEIGHT: 65 IN | SYSTOLIC BLOOD PRESSURE: 144 MMHG | HEART RATE: 89 BPM | BODY MASS INDEX: 33.49 KG/M2

## 2018-04-18 DIAGNOSIS — F33.1 MODERATE EPISODE OF RECURRENT MAJOR DEPRESSIVE DISORDER (HCC): Primary | ICD-10-CM

## 2018-04-18 PROCEDURE — G8427 DOCREV CUR MEDS BY ELIG CLIN: HCPCS | Performed by: NURSE PRACTITIONER

## 2018-04-18 PROCEDURE — 99214 OFFICE O/P EST MOD 30 MIN: CPT | Performed by: NURSE PRACTITIONER

## 2018-04-18 PROCEDURE — G8417 CALC BMI ABV UP PARAM F/U: HCPCS | Performed by: NURSE PRACTITIONER

## 2018-04-18 PROCEDURE — 3017F COLORECTAL CA SCREEN DOC REV: CPT | Performed by: NURSE PRACTITIONER

## 2018-04-18 PROCEDURE — 4004F PT TOBACCO SCREEN RCVD TLK: CPT | Performed by: NURSE PRACTITIONER

## 2018-05-07 RX ORDER — LEVOTHYROXINE SODIUM 88 MCG
TABLET ORAL
Qty: 30 TABLET | Refills: 0 | Status: SHIPPED | OUTPATIENT
Start: 2018-05-07 | End: 2018-06-06 | Stop reason: SDUPTHER

## 2018-05-11 RX ORDER — FLUOCINONIDE TOPICAL SOLUTION USP, 0.05% 0.5 MG/ML
SOLUTION TOPICAL
Qty: 60 ML | Refills: 0 | Status: SHIPPED | OUTPATIENT
Start: 2018-05-11 | End: 2018-05-29 | Stop reason: ALTCHOICE

## 2018-05-14 ENCOUNTER — TELEPHONE (OUTPATIENT)
Dept: FAMILY MEDICINE CLINIC | Age: 58
End: 2018-05-14

## 2018-05-29 ENCOUNTER — TELEPHONE (OUTPATIENT)
Dept: FAMILY MEDICINE CLINIC | Age: 58
End: 2018-05-29

## 2018-05-29 ENCOUNTER — OFFICE VISIT (OUTPATIENT)
Dept: FAMILY MEDICINE CLINIC | Age: 58
End: 2018-05-29
Payer: COMMERCIAL

## 2018-05-29 VITALS
DIASTOLIC BLOOD PRESSURE: 82 MMHG | HEIGHT: 64 IN | RESPIRATION RATE: 24 BRPM | BODY MASS INDEX: 33.43 KG/M2 | HEART RATE: 96 BPM | WEIGHT: 195.8 LBS | OXYGEN SATURATION: 98 % | SYSTOLIC BLOOD PRESSURE: 122 MMHG

## 2018-05-29 DIAGNOSIS — E78.49 OTHER HYPERLIPIDEMIA: ICD-10-CM

## 2018-05-29 DIAGNOSIS — M79.7 FIBROMYALGIA: ICD-10-CM

## 2018-05-29 DIAGNOSIS — E03.9 HYPOTHYROIDISM, UNSPECIFIED TYPE: ICD-10-CM

## 2018-05-29 DIAGNOSIS — Q07.00 ARNOLD-CHIARI MALFORMATION (HCC): ICD-10-CM

## 2018-05-29 DIAGNOSIS — R73.02 IGT (IMPAIRED GLUCOSE TOLERANCE): ICD-10-CM

## 2018-05-29 DIAGNOSIS — E55.9 VITAMIN D DEFICIENCY: ICD-10-CM

## 2018-05-29 DIAGNOSIS — K21.9 GASTROESOPHAGEAL REFLUX DISEASE, ESOPHAGITIS PRESENCE NOT SPECIFIED: ICD-10-CM

## 2018-05-29 DIAGNOSIS — J44.9 CHRONIC OBSTRUCTIVE PULMONARY DISEASE, UNSPECIFIED COPD TYPE (HCC): ICD-10-CM

## 2018-05-29 DIAGNOSIS — E66.9 OBESITY (BMI 30.0-34.9): ICD-10-CM

## 2018-05-29 DIAGNOSIS — I10 ESSENTIAL HYPERTENSION: Primary | ICD-10-CM

## 2018-05-29 PROCEDURE — 4004F PT TOBACCO SCREEN RCVD TLK: CPT | Performed by: FAMILY MEDICINE

## 2018-05-29 PROCEDURE — G8926 SPIRO NO PERF OR DOC: HCPCS | Performed by: FAMILY MEDICINE

## 2018-05-29 PROCEDURE — 3017F COLORECTAL CA SCREEN DOC REV: CPT | Performed by: FAMILY MEDICINE

## 2018-05-29 PROCEDURE — 3023F SPIROM DOC REV: CPT | Performed by: FAMILY MEDICINE

## 2018-05-29 PROCEDURE — G8427 DOCREV CUR MEDS BY ELIG CLIN: HCPCS | Performed by: FAMILY MEDICINE

## 2018-05-29 PROCEDURE — G8417 CALC BMI ABV UP PARAM F/U: HCPCS | Performed by: FAMILY MEDICINE

## 2018-05-29 PROCEDURE — 99214 OFFICE O/P EST MOD 30 MIN: CPT | Performed by: FAMILY MEDICINE

## 2018-05-29 RX ORDER — LISINOPRIL 5 MG/1
5 TABLET ORAL DAILY
Qty: 90 TABLET | Refills: 3 | Status: SHIPPED | OUTPATIENT
Start: 2018-05-29 | End: 2019-05-20 | Stop reason: SINTOL

## 2018-05-29 ASSESSMENT — ENCOUNTER SYMPTOMS
RESPIRATORY NEGATIVE: 1
GASTROINTESTINAL NEGATIVE: 1

## 2018-05-29 ASSESSMENT — PATIENT HEALTH QUESTIONNAIRE - PHQ9
1. LITTLE INTEREST OR PLEASURE IN DOING THINGS: 0
2. FEELING DOWN, DEPRESSED OR HOPELESS: 0
SUM OF ALL RESPONSES TO PHQ QUESTIONS 1-9: 0
SUM OF ALL RESPONSES TO PHQ9 QUESTIONS 1 & 2: 0

## 2018-06-07 RX ORDER — FLUOCINONIDE TOPICAL SOLUTION USP, 0.05% 0.5 MG/ML
SOLUTION TOPICAL
Qty: 60 ML | Refills: 0 | Status: SHIPPED | OUTPATIENT
Start: 2018-06-07 | End: 2018-06-11 | Stop reason: ALTCHOICE

## 2018-06-07 RX ORDER — LEVOTHYROXINE SODIUM 88 MCG
TABLET ORAL
Qty: 30 TABLET | Refills: 0 | Status: SHIPPED | OUTPATIENT
Start: 2018-06-07 | End: 2018-07-08 | Stop reason: SDUPTHER

## 2018-06-11 ENCOUNTER — OFFICE VISIT (OUTPATIENT)
Dept: DERMATOLOGY | Age: 58
End: 2018-06-11
Payer: COMMERCIAL

## 2018-06-11 VITALS
HEIGHT: 64 IN | SYSTOLIC BLOOD PRESSURE: 138 MMHG | WEIGHT: 194 LBS | DIASTOLIC BLOOD PRESSURE: 80 MMHG | BODY MASS INDEX: 33.12 KG/M2 | HEART RATE: 84 BPM | RESPIRATION RATE: 16 BRPM

## 2018-06-11 DIAGNOSIS — L66.9 CENTRAL CENTRIFUGAL SCARRING ALOPECIA: Primary | ICD-10-CM

## 2018-06-11 PROCEDURE — 4004F PT TOBACCO SCREEN RCVD TLK: CPT | Performed by: DERMATOLOGY

## 2018-06-11 PROCEDURE — G8417 CALC BMI ABV UP PARAM F/U: HCPCS | Performed by: DERMATOLOGY

## 2018-06-11 PROCEDURE — G8427 DOCREV CUR MEDS BY ELIG CLIN: HCPCS | Performed by: DERMATOLOGY

## 2018-06-11 PROCEDURE — 99202 OFFICE O/P NEW SF 15 MIN: CPT | Performed by: DERMATOLOGY

## 2018-06-11 PROCEDURE — 3017F COLORECTAL CA SCREEN DOC REV: CPT | Performed by: DERMATOLOGY

## 2018-06-11 RX ORDER — FLUCONAZOLE 150 MG/1
TABLET ORAL
COMMUNITY
Start: 2018-06-08 | End: 2019-07-11 | Stop reason: ALTCHOICE

## 2018-06-11 RX ORDER — CLOBETASOL PROPIONATE 0.46 MG/ML
SOLUTION TOPICAL
Qty: 60 ML | Refills: 3 | Status: SHIPPED | OUTPATIENT
Start: 2018-06-11 | End: 2019-01-21 | Stop reason: SDUPTHER

## 2018-06-11 RX ORDER — MELOXICAM 7.5 MG/1
TABLET ORAL
Refills: 4 | COMMUNITY
Start: 2018-05-12

## 2018-06-11 RX ORDER — LINACLOTIDE 290 UG/1
290 CAPSULE, GELATIN COATED ORAL
COMMUNITY
Start: 2018-06-08

## 2018-06-12 ENCOUNTER — TELEPHONE (OUTPATIENT)
Dept: FAMILY MEDICINE CLINIC | Age: 58
End: 2018-06-12

## 2018-06-12 RX ORDER — ONDANSETRON 4 MG/1
4 TABLET, FILM COATED ORAL EVERY 8 HOURS PRN
Qty: 15 TABLET | Refills: 0 | Status: SHIPPED | OUTPATIENT
Start: 2018-06-12 | End: 2018-07-09 | Stop reason: SDUPTHER

## 2018-06-14 ENCOUNTER — HOSPITAL ENCOUNTER (OUTPATIENT)
Age: 58
Discharge: HOME | End: 2018-06-14
Payer: COMMERCIAL

## 2018-06-14 DIAGNOSIS — E55.9 VITAMIN D DEFICIENCY: ICD-10-CM

## 2018-06-14 DIAGNOSIS — R73.02 IGT (IMPAIRED GLUCOSE TOLERANCE): ICD-10-CM

## 2018-06-14 DIAGNOSIS — E78.49 OTHER HYPERLIPIDEMIA: ICD-10-CM

## 2018-06-14 LAB
AVERAGE GLUCOSE: 114 MG/DL (ref 70–126)
CHOLESTEROL, TOTAL: 160 MG/DL (ref 100–199)
HBA1C MFR BLD: 5.8 % (ref 4.4–6.4)
HDLC SERPL-MCNC: 42 MG/DL
LDL CHOLESTEROL CALCULATED: 85 MG/DL
TRIGL SERPL-MCNC: 165 MG/DL (ref 0–199)
VITAMIN D 25-HYDROXY: 22 NG/ML (ref 30–100)

## 2018-06-14 PROCEDURE — 82306 VITAMIN D 25 HYDROXY: CPT

## 2018-06-14 PROCEDURE — 36415 COLL VENOUS BLD VENIPUNCTURE: CPT

## 2018-06-14 PROCEDURE — 83036 HEMOGLOBIN GLYCOSYLATED A1C: CPT

## 2018-06-14 PROCEDURE — 80061 LIPID PANEL: CPT

## 2018-07-09 RX ORDER — LEVOTHYROXINE SODIUM 88 MCG
TABLET ORAL
Qty: 30 TABLET | Refills: 0 | Status: SHIPPED | OUTPATIENT
Start: 2018-07-09 | End: 2018-07-09 | Stop reason: SDUPTHER

## 2018-07-10 ENCOUNTER — TELEPHONE (OUTPATIENT)
Dept: FAMILY MEDICINE CLINIC | Age: 58
End: 2018-07-10

## 2018-07-10 ENCOUNTER — OFFICE VISIT (OUTPATIENT)
Dept: FAMILY MEDICINE CLINIC | Age: 58
End: 2018-07-10
Payer: COMMERCIAL

## 2018-07-10 VITALS
BODY MASS INDEX: 32.93 KG/M2 | TEMPERATURE: 97.6 F | HEIGHT: 64 IN | WEIGHT: 192.9 LBS | OXYGEN SATURATION: 97 % | HEART RATE: 92 BPM | SYSTOLIC BLOOD PRESSURE: 120 MMHG | RESPIRATION RATE: 14 BRPM | DIASTOLIC BLOOD PRESSURE: 70 MMHG

## 2018-07-10 DIAGNOSIS — B35.3 TINEA PEDIS OF BOTH FEET: ICD-10-CM

## 2018-07-10 DIAGNOSIS — L30.1 DYSHIDROTIC ECZEMA: Primary | ICD-10-CM

## 2018-07-10 PROCEDURE — 3017F COLORECTAL CA SCREEN DOC REV: CPT | Performed by: FAMILY MEDICINE

## 2018-07-10 PROCEDURE — G8427 DOCREV CUR MEDS BY ELIG CLIN: HCPCS | Performed by: FAMILY MEDICINE

## 2018-07-10 PROCEDURE — 4004F PT TOBACCO SCREEN RCVD TLK: CPT | Performed by: FAMILY MEDICINE

## 2018-07-10 PROCEDURE — 99213 OFFICE O/P EST LOW 20 MIN: CPT | Performed by: FAMILY MEDICINE

## 2018-07-10 PROCEDURE — G8417 CALC BMI ABV UP PARAM F/U: HCPCS | Performed by: FAMILY MEDICINE

## 2018-07-10 RX ORDER — LEVOTHYROXINE SODIUM 88 MCG
TABLET ORAL
Qty: 30 TABLET | Refills: 0 | Status: SHIPPED | OUTPATIENT
Start: 2018-07-10 | End: 2018-09-06 | Stop reason: SDUPTHER

## 2018-07-10 RX ORDER — FLUOCINONIDE TOPICAL SOLUTION USP, 0.05% 0.5 MG/ML
SOLUTION TOPICAL
Qty: 60 ML | Refills: 0 | Status: SHIPPED | OUTPATIENT
Start: 2018-07-10 | End: 2018-08-09 | Stop reason: SDUPTHER

## 2018-07-10 RX ORDER — ONDANSETRON 4 MG/1
4 TABLET, FILM COATED ORAL EVERY 8 HOURS PRN
Qty: 15 TABLET | Refills: 0 | Status: SHIPPED | OUTPATIENT
Start: 2018-07-10 | End: 2018-08-09 | Stop reason: SDUPTHER

## 2018-07-10 ASSESSMENT — ENCOUNTER SYMPTOMS
RESPIRATORY NEGATIVE: 1
GASTROINTESTINAL NEGATIVE: 1

## 2018-07-10 NOTE — PATIENT INSTRUCTIONS
respiratory therapist.  The four appointments span over three weeks. The respiratory therapist schedules one of the appointments to occur 48 hours after the patients quit date.  Cost $100 total for the four sessions.   Tobacco cessation products are not included in the cost and are not provided by [facility].     [facility]

## 2018-07-10 NOTE — PROGRESS NOTES
Subjective:      Patient ID: Umair Wild is a 59 y.o. female. HPI:    Chief Complaint   Patient presents with    Other     itching to bilateral feet has this during summer months DC in winter using Lotrimin Spray, Loprox Topical suspension     Rash     redness to bileral hands     Discuss Medications     Lidex can be used for hands and feet      Pt here for rash on hands that comes and goes. Worse in the summer. Very itchy when it flares. Pt also c/o bilateral itching to her feet and toes. Was given Loprox in the past and worked well. Patient Active Problem List   Diagnosis    Hypothyroid    Fibromyalgia    Obesity    Hyperlipemia    Interstitial cystitis    ETD (eustachian tube dysfunction)    Sinusitis, chronic    Vestibular dizziness    Nasal septal deviation    IGT (impaired glucose tolerance)    Vitamin D deficiency    Arnold-Chiari malformation (HCC)    Tobacco abuse    Esophageal reflux    COPD (chronic obstructive pulmonary disease) ([facility] [address]     Past Surgical History:   Procedure Laterality Date    APPENDECTOMY       SECTION      x2    COLONOSCOPY  [id number]    CSF SHUNT  2007    Cervical syrinx to subarachnoid shunt; thoracic syrinx to subarachnoid shunt (2 separate dural openings)    CYST REMOVAL      extradural cysts at thoracic 2-3    ECTOPIC PREGNANCY SURGERY      HEMICOLECTOMY      HERNIA REPAIR      inguinal     HYSTERECTOMY      SPINAL CORD DECOMPRESSION  2007    C4-5-6-7; T1-2-3 laminectomies    TOOTH EXTRACTION  2017     Prior to Admission medications    Medication Sig Start Date End Date Taking? Authorizing Provider   VENTOLIN  (90 Base) MCG/ACT inhaler INHALE 2 PUFFS INTO THE LUNGS EVERY 6 HOURS AS NEEDED FOR SHORTNESS OF BREATH OR WHEEZING 7/10/18  Yes Elysa Hashimoto,    SYNTHROID 88 MCG tablet TAKE 1 TABLET BY MOUTH EVERY DAY ON AN EMPTY STOMACH WITH WATER. WAIT 30 MINUTES BEFORE EATING OR TAKING OTHER MEDICATIONS. 7/10/18  Yes [place] Sycamore, DO   ondansetron (ZOFRAN) 4 MG tablet TAKE 1 TABLET BY MOUTH EVERY 8 HOURS AS NEEDED FOR NAUSEA OR VOMITING 7/10/18  Yes [place] Sycamore, DO   fluocinonide (LIDEX) 0.05 % cream APPLY CREAM TOPICALLY TO AFFECTED AREA TWICE DAILY 7/10/18  Yes [place] Sycamore, DO   fluocinonide (LIDEX) 0.05 % external solution APPLY EXTERNALLY TO THE AFFECTED AREA TWICE DAILY 7/10/18  Yes [place] Sycamore, DO   meloxicam (MOBIC) 7.5 MG tablet TK 1 T PO BID 5/12/18  Yes Historical Provider, MD [address] MCG CAPS capsule  6/8/18  Yes Historical Provider, MD   fluconazole (DIFLUCAN) 150 MG tablet  6/8/18  Yes Historical Provider, MD   clobetasol (TEMOVATE) 0.05 % external solution Apply daily to scaly or itchy areas on scalp 6/11/18  Yes Swati Elizabeth MD   Polyethylene Glycol 3350 (GLYCOLAX PO) Take by mouth   Yes Historical Provider, MD   lisinopril (PRINIVIL;ZESTRIL) 5 MG tablet Take 1 tablet by mouth daily 5/29/18  Yes [place] Sycamore, DO   levomilnacipran VENEGAS [facility]) 40 MG CP24 extended release capsule Take 1 capsule by mouth daily 4/18/18  Yes COREEN Steve CNP   lidocaine (XYLOCAINE) 5 % ointment Apply topically as needed for Pain Apply topically as needed. Yes Historical Provider, MD   conjugated estrogens (PREMARIN) 0.625 MG/GM vaginal cream Place vaginally as needed Place vaginally daily. Yes Historical Provider, MD   lidocaine (LIDODERM) 5 % Place 1 patch onto the skin daily 12 hours on, 12 hours off.    Yes Historical Provider, MD   hydrocortisone (ANUSOL-HC) 25 MG suppository Place 25 mg rectally as needed for Hemorrhoids   Yes Historical Provider, MD   fluticasone (FLONASE ALLERGY RELIEF) 50 MCG/ACT nasal spray 1 spray by Nasal route daily   Yes Historical Provider, MD   UNABLE TO FIND    Yes Historical Provider, MD   meloxicam (MOBIC) 15 MG tablet Take 15 mg by mouth daily   Yes Historical Provider, MD   hyoscyamine (LEVBID) 375 MCG extended release tablet TAKE 1/2 TABLET BY MOUTH TWICE DAILY 10/2/17  Yes Melissa Metz, DO   atorvastatin (LIPITOR) 10 MG tablet TAKE 1 TABLET BY MOUTH EVERY DAY 10/2/17  Yes Melissa Metz, DO   metFORMIN (GLUCOPHAGE) 500 MG tablet TAKE 1 TABLET BY MOUTH TWICE DAILY WITH MEALS 10/2/17  Yes Melissa Button, DO   lansoprazole (PREVACID) 30 MG delayed release capsule Take 1 capsule by mouth 2 times daily 9/5/17  Yes Melissa Metz, DO   PROVENTIL  (90 BASE) MCG/ACT inhaler Inhale 2 puffs into the lungs every 6 hours as needed for Wheezing or Shortness of Breath 5/23/17  Yes Melissa Metz, DO   Fluconazole (DIFLUCAN PO) Take by mouth   Yes Historical Provider, MD   HYDROcodone-acetaminophen (NORCO) 5-325 MG per tablet Take 1 tablet every 4-6 hours as needed for pain   Yes Doron Prabhakar MD   Fexofenadine-Pseudoephedrine (ALLEGRA-D 12 HOUR PO) Take  by mouth. Yes Historical Provider, MD   NONFORMULARY Immunotherapy allergy shot   Yes Historical Provider, MD   zinc 50 MG CAPS Take  by mouth daily. Yes Historical Provider, MD   Ascorbic Acid (VITAMIN C) 500 MG CAPS Take  by mouth. Yes Historical Provider, MD   orphenadrine (NORFLEX) 100 MG tablet Take 100 mg by mouth 2 times daily as needed. Yes Historical Provider, MD   tramadol (ULTRAM) 50 MG tablet Take 50 mg by mouth every 6 hours as needed. Take 1-2 tabs every 6 hrs prn    Yes Historical Provider, MD   trazodone (DESYREL) 150 MG tablet Take 150 mg by mouth nightly. Take 1/2 tab before bed    Yes Historical Provider, MD   gabapentin (NEURONTIN) 100 MG capsule Take 100 mg by mouth 2 times daily. Yes oDron Prabhakar MD         Review of Systems   Constitutional: Negative. HENT: Negative. Respiratory: Negative. Cardiovascular: Negative. Gastrointestinal: Negative. Musculoskeletal: Negative. Skin: Positive for rash (hands and feet). All other systems reviewed and are negative.       Objective:   Physical Exam   Constitutional: She is oriented to person, place, and time. She appears well-developed and well-nourished. HENT:   Head: Normocephalic and atraumatic. Right Ear: Tympanic membrane normal.   Left Ear: Tympanic membrane normal.   Mouth/Throat: Oropharynx is clear and moist and mucous membranes are normal.   Cardiovascular: Normal rate, regular rhythm and normal heart sounds. No murmur heard. Pulmonary/Chest: Effort normal and breath sounds normal.   Abdominal: Soft. Bowel sounds are normal.   Musculoskeletal: She exhibits no edema. Neurological: She is alert and oriented to person, place, and time. Skin: Skin is warm and dry. Psychiatric: She has a normal mood and affect. Her behavior is normal.   Nursing note and vitals reviewed. Assessment:       Diagnosis Orders   1. Dyshidrotic eczema     2.  Tinea pedis of both feet  ciclopirox (LOPROX) 0.77 % cream           Plan:      -  Continue Lidex for #1  -  Aveeno eczema cream  -  rx Loprox prn for #2  -  RTO prn

## 2018-07-16 ENCOUNTER — OFFICE VISIT (OUTPATIENT)
Dept: PSYCHIATRY | Age: 58
End: 2018-07-16
Payer: COMMERCIAL

## 2018-07-16 VITALS — HEIGHT: 64 IN | WEIGHT: 192 LBS | BODY MASS INDEX: 32.78 KG/M2

## 2018-07-16 DIAGNOSIS — F33.1 MODERATE EPISODE OF RECURRENT MAJOR DEPRESSIVE DISORDER (HCC): Primary | ICD-10-CM

## 2018-07-16 PROCEDURE — 4004F PT TOBACCO SCREEN RCVD TLK: CPT | Performed by: NURSE PRACTITIONER

## 2018-07-16 PROCEDURE — G8417 CALC BMI ABV UP PARAM F/U: HCPCS | Performed by: NURSE PRACTITIONER

## 2018-07-16 PROCEDURE — 99213 OFFICE O/P EST LOW 20 MIN: CPT | Performed by: NURSE PRACTITIONER

## 2018-07-16 PROCEDURE — G8427 DOCREV CUR MEDS BY ELIG CLIN: HCPCS | Performed by: NURSE PRACTITIONER

## 2018-07-16 PROCEDURE — 3017F COLORECTAL CA SCREEN DOC REV: CPT | Performed by: NURSE PRACTITIONER

## 2018-07-16 NOTE — PROGRESS NOTES
[facility] PROFESSIONAL SERVS  [facility]  [address]  [address] [place] [address]  Dept: [phone]  Dept Fax: [phone]  Loc: [phone]    Visit Date: 2018    SUBJECTIVE DATA     CHIEF COMPLAINT:    Chief Complaint   Patient presents with    Depression    Medication Refill    3 Month Follow-Up       History obtained from: patient    HISTORY OF PRESENT ILLNESS:    Keri Hawley is a 59 y.o. female who presents to the office for follow-up on her depression symptoms. Mood is good  Pain is well controlled  Keeping busy throuhgout the day  Not as tearful  -easier to control   Able to initiate sleep well  Less distracted  Focus/concentration are better  Not overwhelmed    States she has a difficult time trusting others and states \"I don't like people\"  -Not yet in counseling  -doesn't get along well with most of her family members, but does get along well with her brother    Brother wants her to move to [place] so he can help care for her  -considering moving to his home    Denies suicidal ideations, intent, plan. No homicidal ideations, intent, plan. No audiovisual hallucinations. HPI    Adverse reactions from psychotropic medications:  None at this time. Current Psychiatric Review of Systems         Soo or Hypomania:  no     Panic Attacks:  no     Phobias:  no     Obsessions and Compulsions:  no     Body or Vocal Tics:  no     Hallucinations:  no     Delusions:  no    SOCIAL HISTORY:  Patient was born in [place], [place] and raised by her mother and step-father until mother  when patient was age 4. Then her step-father raised her. Social History     Social History    Marital status:      Spouse name: N/A    Number of children: 2    Years of education: N/A     Occupational History    Not on file.      Social History Main Topics    Smoking status: Current Every Day Smoker     Packs/day: 0.50     Years: 25.00     Types: Cigarettes    Smokeless of complication, not stated as uncontrolled        PAST SURGICAL HISTORY:    Past Surgical History:   Procedure Laterality Date    APPENDECTOMY       SECTION      x2    COLONOSCOPY  [id number]    CSF SHUNT  2007    Cervical syrinx to subarachnoid shunt; thoracic syrinx to subarachnoid shunt (2 separate dural openings)    CYST REMOVAL      extradural cysts at thoracic 2-3    ECTOPIC PREGNANCY SURGERY      HEMICOLECTOMY      HERNIA REPAIR      inguinal     HYSTERECTOMY      SPINAL CORD DECOMPRESSION  2007    C4-5-6-7; T1-2-3 laminectomies    TOOTH EXTRACTION  2017       PREVIOUS MEDICATIONS:  Previous Medications    ASCORBIC ACID (VITAMIN C) 500 MG CAPS    Take  by mouth. ATORVASTATIN (LIPITOR) 10 MG TABLET    TAKE 1 TABLET BY MOUTH EVERY DAY    CHOLECALCIFEROL (D3 HIGH POTENCY) 2000 UNITS CAPS    Take 1 capsule daily    CICLOPIROX (LOPROX) 0.77 % CREAM    Apply topically 2 times daily. CLOBETASOL (TEMOVATE) 0.05 % EXTERNAL SOLUTION    Apply daily to scaly or itchy areas on scalp    CONJUGATED ESTROGENS (PREMARIN) 0.625 MG/GM VAGINAL CREAM    Place vaginally as needed Place vaginally daily. FEXOFENADINE-PSEUDOEPHEDRINE (ALLEGRA-D 12 HOUR PO)    Take  by mouth. FLUCONAZOLE (DIFLUCAN PO)    Take by mouth    FLUCONAZOLE (DIFLUCAN) 150 MG TABLET        FLUOCINONIDE (LIDEX) 0.05 % CREAM    APPLY CREAM TOPICALLY TO AFFECTED AREA TWICE DAILY    FLUOCINONIDE (LIDEX) 0.05 % EXTERNAL SOLUTION    APPLY EXTERNALLY TO THE AFFECTED AREA TWICE DAILY    FLUTICASONE (FLONASE ALLERGY RELIEF) 50 MCG/ACT NASAL SPRAY    1 spray by Nasal route daily    GABAPENTIN (NEURONTIN) 100 MG CAPSULE    Take 100 mg by mouth 2 times daily.     HYDROCODONE-ACETAMINOPHEN (NORCO) 5-325 MG PER TABLET    Take 1 tablet every 4-6 hours as needed for pain    HYDROCORTISONE (ANUSOL-HC) 25 MG SUPPOSITORY    Place 25 mg rectally as needed for Hemorrhoids    HYOSCYAMINE (LEVBID) 375 MCG EXTENDED RELEASE TABLET    TAKE care provider. SPECIALISTS: Dr. Jackie Taylor, pain management; Dr. Blas Cat     Ht 5' 4\" (1.626 m)   Wt 192 lb (87.1 kg)   LMP  (Exact Date)   BMI 32.96 kg/m²         Physical Exam    Mental Status Evaluation:   Orientation: Alert, oriented, thought content appropriate   Mood:. euthymic      Affect:  euthymic      Appearance:  age appropriate and casually dressed   Activity:  Within Normal Limits   Gait/Posture: Normal   Speech:  normal pitch, normal volume and clear, linear, age appropriate   Thought Process:  within normal limits   Thought Content:  within normal limits   Cognition:  grossly intact   Memory: intact   Insight:  good   Judgment: good   Suicidal Ideations: denies suicidal ideation   Homicidal Ideations: Negative for homicidal ideation      Medication Side Effects: absent       Attention Span attention span and concentration were age appropriate     Screenings Completed in This Encounter:     Anxiety and Depression:                    DIAGNOSIS AND ASSESSMENT DATA     DIAGNOSIS:   1. Moderate episode of recurrent major depressive disorder ([facility] [address]      Rule out OCD  PLAN   Follow-up:  Return in about 3 months (around 10/16/2018), or if symptoms worsen or fail to improve, for follow-up and medication management. Prescriptions for this encounter:  New Prescriptions    No medications on file       Orders Placed This Encounter   Medications    levomilnacipran (FETZIMA) 40 MG CP24 extended release capsule     Sig: Take 1 capsule by mouth daily     Dispense:  30 capsule     Refill:  2       Medications Discontinued During This Encounter   Medication Reason    levomilnacipran (FETZIMA) 40 MG CP24 extended release capsule REORDER       Additional orders:  No orders of the defined types were placed in this encounter. Patient is doing well on her current medications. She will continue her medication without changes. She is encouraged to actively participate in counseling.  Sleep hygiene

## 2018-07-18 ENCOUNTER — TELEPHONE (OUTPATIENT)
Dept: PSYCHIATRY | Age: 58
End: 2018-07-18

## 2018-07-31 ENCOUNTER — TELEPHONE (OUTPATIENT)
Dept: PSYCHIATRY | Age: 58
End: 2018-07-31

## 2018-07-31 NOTE — TELEPHONE ENCOUNTER
Gely called the office to report that she has been trying to open her blinds even with activity going on outside and she understands what you mean with, \"the sun coming in versus having my blinds closed. \"  She has found this helpful and is going to try to put this into practice. She notes that this is a big deal for her. She is scheduled to return September 25th.

## 2018-08-10 ENCOUNTER — TELEPHONE (OUTPATIENT)
Dept: FAMILY MEDICINE CLINIC | Age: 58
End: 2018-08-10

## 2018-08-10 RX ORDER — FLUOCINONIDE TOPICAL SOLUTION USP, 0.05% 0.5 MG/ML
SOLUTION TOPICAL
Qty: 60 ML | Refills: 0 | Status: SHIPPED | OUTPATIENT
Start: 2018-08-10 | End: 2019-12-09 | Stop reason: ALTCHOICE

## 2018-08-10 RX ORDER — PANTOPRAZOLE SODIUM 40 MG/1
40 TABLET, DELAYED RELEASE ORAL
Qty: 90 TABLET | Refills: 3 | Status: SHIPPED | OUTPATIENT
Start: 2018-08-10 | End: 2019-09-12

## 2018-08-10 RX ORDER — ONDANSETRON 4 MG/1
4 TABLET, FILM COATED ORAL EVERY 8 HOURS PRN
Qty: 15 TABLET | Refills: 0 | Status: SHIPPED | OUTPATIENT
Start: 2018-08-10 | End: 2018-09-06 | Stop reason: SDUPTHER

## 2018-09-06 ENCOUNTER — TELEPHONE (OUTPATIENT)
Dept: FAMILY MEDICINE CLINIC | Age: 58
End: 2018-09-06

## 2018-09-06 DIAGNOSIS — B35.3 TINEA PEDIS OF BOTH FEET: ICD-10-CM

## 2018-09-06 RX ORDER — ONDANSETRON 4 MG/1
4 TABLET, FILM COATED ORAL EVERY 8 HOURS PRN
Qty: 15 TABLET | Refills: 0 | Status: SHIPPED | OUTPATIENT
Start: 2018-09-06 | End: 2018-10-01 | Stop reason: SDUPTHER

## 2018-09-06 RX ORDER — LEVOTHYROXINE SODIUM 88 MCG
TABLET ORAL
Qty: 30 TABLET | Refills: 11 | Status: SHIPPED | OUTPATIENT
Start: 2018-09-06 | End: 2019-09-20 | Stop reason: SDUPTHER

## 2018-09-06 NOTE — TELEPHONE ENCOUNTER
9/6/18 Patient will be moving in October and is requesting refills for Sept/October until she can get PCP. Requesting Ciclopirox 77% cream, Ventolin Inhaler 90 mcg and Zofran 4 mg prn to [facility] on [address].   Thanks/blm  Dolv: 7/10/18

## 2018-09-25 ENCOUNTER — OFFICE VISIT (OUTPATIENT)
Dept: PSYCHIATRY | Age: 58
End: 2018-09-25
Payer: COMMERCIAL

## 2018-09-25 VITALS — BODY MASS INDEX: 32.78 KG/M2 | WEIGHT: 192 LBS | HEIGHT: 64 IN

## 2018-09-25 DIAGNOSIS — F33.1 MODERATE EPISODE OF RECURRENT MAJOR DEPRESSIVE DISORDER (HCC): Primary | ICD-10-CM

## 2018-09-25 PROCEDURE — G8417 CALC BMI ABV UP PARAM F/U: HCPCS | Performed by: NURSE PRACTITIONER

## 2018-09-25 PROCEDURE — 3017F COLORECTAL CA SCREEN DOC REV: CPT | Performed by: NURSE PRACTITIONER

## 2018-09-25 PROCEDURE — 4004F PT TOBACCO SCREEN RCVD TLK: CPT | Performed by: NURSE PRACTITIONER

## 2018-09-25 PROCEDURE — G8427 DOCREV CUR MEDS BY ELIG CLIN: HCPCS | Performed by: NURSE PRACTITIONER

## 2018-09-25 PROCEDURE — 99213 OFFICE O/P EST LOW 20 MIN: CPT | Performed by: NURSE PRACTITIONER

## 2018-09-25 RX ORDER — TRAZODONE HYDROCHLORIDE 150 MG/1
150 TABLET ORAL NIGHTLY
Qty: 15 TABLET | Refills: 2 | Status: SHIPPED | OUTPATIENT
Start: 2018-09-25 | End: 2019-03-23 | Stop reason: SDUPTHER

## 2018-10-01 RX ORDER — ONDANSETRON 4 MG/1
4 TABLET, FILM COATED ORAL EVERY 8 HOURS PRN
Qty: 15 TABLET | Refills: 0 | Status: SHIPPED | OUTPATIENT
Start: 2018-10-01

## 2019-01-21 ENCOUNTER — OFFICE VISIT (OUTPATIENT)
Dept: FAMILY MEDICINE CLINIC | Age: 59
End: 2019-01-21
Payer: COMMERCIAL

## 2019-01-21 VITALS
WEIGHT: 193.1 LBS | TEMPERATURE: 97.8 F | BODY MASS INDEX: 32.17 KG/M2 | SYSTOLIC BLOOD PRESSURE: 106 MMHG | HEIGHT: 65 IN | DIASTOLIC BLOOD PRESSURE: 60 MMHG | RESPIRATION RATE: 20 BRPM | HEART RATE: 88 BPM

## 2019-01-21 DIAGNOSIS — M79.7 FIBROMYALGIA: ICD-10-CM

## 2019-01-21 DIAGNOSIS — R73.02 IGT (IMPAIRED GLUCOSE TOLERANCE): ICD-10-CM

## 2019-01-21 DIAGNOSIS — R05.9 COUGH: Primary | ICD-10-CM

## 2019-01-21 DIAGNOSIS — Z72.0 TOBACCO ABUSE: ICD-10-CM

## 2019-01-21 DIAGNOSIS — Q07.00 ARNOLD-CHIARI MALFORMATION (HCC): ICD-10-CM

## 2019-01-21 DIAGNOSIS — Z91.09 ENVIRONMENTAL ALLERGIES: ICD-10-CM

## 2019-01-21 DIAGNOSIS — K21.9 GASTROESOPHAGEAL REFLUX DISEASE, ESOPHAGITIS PRESENCE NOT SPECIFIED: ICD-10-CM

## 2019-01-21 DIAGNOSIS — J44.9 CHRONIC OBSTRUCTIVE PULMONARY DISEASE, UNSPECIFIED COPD TYPE (HCC): ICD-10-CM

## 2019-01-21 DIAGNOSIS — E03.9 HYPOTHYROIDISM, UNSPECIFIED TYPE: ICD-10-CM

## 2019-01-21 DIAGNOSIS — E78.00 PURE HYPERCHOLESTEROLEMIA: ICD-10-CM

## 2019-01-21 DIAGNOSIS — M53.3 SACROILIAC PAIN: ICD-10-CM

## 2019-01-21 PROCEDURE — 99214 OFFICE O/P EST MOD 30 MIN: CPT | Performed by: FAMILY MEDICINE

## 2019-01-21 PROCEDURE — 3017F COLORECTAL CA SCREEN DOC REV: CPT | Performed by: FAMILY MEDICINE

## 2019-01-21 PROCEDURE — G8417 CALC BMI ABV UP PARAM F/U: HCPCS | Performed by: FAMILY MEDICINE

## 2019-01-21 PROCEDURE — G8926 SPIRO NO PERF OR DOC: HCPCS | Performed by: FAMILY MEDICINE

## 2019-01-21 PROCEDURE — 3023F SPIROM DOC REV: CPT | Performed by: FAMILY MEDICINE

## 2019-01-21 PROCEDURE — G8427 DOCREV CUR MEDS BY ELIG CLIN: HCPCS | Performed by: FAMILY MEDICINE

## 2019-01-21 PROCEDURE — 4004F PT TOBACCO SCREEN RCVD TLK: CPT | Performed by: FAMILY MEDICINE

## 2019-01-21 PROCEDURE — G8484 FLU IMMUNIZE NO ADMIN: HCPCS | Performed by: FAMILY MEDICINE

## 2019-01-21 RX ORDER — CLOBETASOL PROPIONATE 0.46 MG/ML
SOLUTION TOPICAL
Qty: 60 ML | Refills: 2 | Status: SHIPPED | OUTPATIENT
Start: 2019-01-21 | End: 2019-12-09 | Stop reason: SDUPTHER

## 2019-01-21 ASSESSMENT — ENCOUNTER SYMPTOMS
COUGH: 1
GASTROINTESTINAL NEGATIVE: 1

## 2019-02-21 ENCOUNTER — TELEPHONE (OUTPATIENT)
Dept: FAMILY MEDICINE CLINIC | Age: 59
End: 2019-02-21

## 2019-02-21 DIAGNOSIS — R73.02 IGT (IMPAIRED GLUCOSE TOLERANCE): ICD-10-CM

## 2019-02-21 RX ORDER — ATORVASTATIN CALCIUM 10 MG/1
TABLET, FILM COATED ORAL
Qty: 30 TABLET | Refills: 11 | Status: SHIPPED | OUTPATIENT
Start: 2019-02-21 | End: 2020-02-25

## 2019-03-07 ENCOUNTER — TELEPHONE (OUTPATIENT)
Dept: FAMILY MEDICINE CLINIC | Age: 59
End: 2019-03-07

## 2019-03-07 RX ORDER — PREDNISONE 20 MG/1
20 TABLET ORAL 2 TIMES DAILY
Qty: 10 TABLET | Refills: 0 | Status: SHIPPED | OUTPATIENT
Start: 2019-03-07 | End: 2019-03-12

## 2019-03-25 RX ORDER — TRAZODONE HYDROCHLORIDE 150 MG/1
TABLET ORAL
Qty: 15 TABLET | Refills: 0 | Status: SHIPPED | OUTPATIENT
Start: 2019-03-25 | End: 2019-07-31 | Stop reason: ALTCHOICE

## 2019-04-17 ENCOUNTER — OFFICE VISIT (OUTPATIENT)
Dept: PSYCHIATRY | Age: 59
End: 2019-04-17
Payer: COMMERCIAL

## 2019-04-17 DIAGNOSIS — F33.1 MODERATE EPISODE OF RECURRENT MAJOR DEPRESSIVE DISORDER (HCC): Primary | ICD-10-CM

## 2019-04-17 PROCEDURE — 99213 OFFICE O/P EST LOW 20 MIN: CPT | Performed by: NURSE PRACTITIONER

## 2019-04-17 PROCEDURE — 4004F PT TOBACCO SCREEN RCVD TLK: CPT | Performed by: NURSE PRACTITIONER

## 2019-04-17 PROCEDURE — G8417 CALC BMI ABV UP PARAM F/U: HCPCS | Performed by: NURSE PRACTITIONER

## 2019-04-17 PROCEDURE — 3017F COLORECTAL CA SCREEN DOC REV: CPT | Performed by: NURSE PRACTITIONER

## 2019-04-17 PROCEDURE — G8427 DOCREV CUR MEDS BY ELIG CLIN: HCPCS | Performed by: NURSE PRACTITIONER
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
## 2019-05-20 ENCOUNTER — TELEPHONE (OUTPATIENT)
Dept: FAMILY MEDICINE CLINIC | Age: 59
End: 2019-05-20

## 2019-05-20 RX ORDER — AMLODIPINE BESYLATE 5 MG/1
5 TABLET ORAL DAILY
Qty: 30 TABLET | Refills: 2 | Status: SHIPPED | OUTPATIENT
Start: 2019-05-20 | End: 2019-08-17 | Stop reason: SDUPTHER

## 2019-05-20 NOTE — TELEPHONE ENCOUNTER
Patient calling with c/o hives with lisinopril. Patient has been on this medication for 1 year but just figured out this was causing her hives. She stopped medication on Saturday and hives went away. She is seeing Dr. Nir Patrick office and was taking Allergra D which elevated her B/P. They recently started her on vistaril and had her D/C her Allegra D. Last time her B/P was taken was at [facility] on 5/2/19 by a wrist automatic cuff and reading was 157/98. If she needs an appt she will need to arrange transportation. Pharmacy is [facility] on [address].   Please advise

## 2019-05-22 ENCOUNTER — TELEPHONE (OUTPATIENT)
Dept: FAMILY MEDICINE CLINIC | Age: 59
End: 2019-05-22

## 2019-05-22 DIAGNOSIS — Z91.09 ENVIRONMENTAL ALLERGIES: Primary | ICD-10-CM

## 2019-05-22 NOTE — TELEPHONE ENCOUNTER
Patient is needing to have a referral to allergist/asthma clinic.   Dr Britton Edwards Fax: [phone]

## 2019-05-22 NOTE — TELEPHONE ENCOUNTER
Called Dr. Gin Pitts office and spoke with Reagan Costa, she stated that she had already received referral information and is going to call the patient to get her scheduled.

## 2019-06-06 ENCOUNTER — OFFICE VISIT (OUTPATIENT)
Dept: FAMILY MEDICINE CLINIC | Age: 59
End: 2019-06-06
Payer: COMMERCIAL

## 2019-06-06 VITALS
WEIGHT: 188.1 LBS | BODY MASS INDEX: 31.79 KG/M2 | DIASTOLIC BLOOD PRESSURE: 72 MMHG | RESPIRATION RATE: 14 BRPM | SYSTOLIC BLOOD PRESSURE: 118 MMHG | HEART RATE: 93 BPM | OXYGEN SATURATION: 98 %

## 2019-06-06 DIAGNOSIS — J44.9 CHRONIC OBSTRUCTIVE PULMONARY DISEASE, UNSPECIFIED COPD TYPE (HCC): ICD-10-CM

## 2019-06-06 DIAGNOSIS — H69.81 ETD (EUSTACHIAN TUBE DYSFUNCTION), RIGHT: ICD-10-CM

## 2019-06-06 DIAGNOSIS — Z72.0 TOBACCO ABUSE: ICD-10-CM

## 2019-06-06 DIAGNOSIS — E78.00 PURE HYPERCHOLESTEROLEMIA: ICD-10-CM

## 2019-06-06 DIAGNOSIS — Z91.09 ENVIRONMENTAL ALLERGIES: ICD-10-CM

## 2019-06-06 DIAGNOSIS — R73.02 IGT (IMPAIRED GLUCOSE TOLERANCE): ICD-10-CM

## 2019-06-06 DIAGNOSIS — Q07.00 ARNOLD-CHIARI MALFORMATION (HCC): ICD-10-CM

## 2019-06-06 DIAGNOSIS — E55.9 VITAMIN D INSUFFICIENCY: ICD-10-CM

## 2019-06-06 DIAGNOSIS — K21.9 GASTROESOPHAGEAL REFLUX DISEASE, ESOPHAGITIS PRESENCE NOT SPECIFIED: ICD-10-CM

## 2019-06-06 DIAGNOSIS — E03.9 HYPOTHYROIDISM, UNSPECIFIED TYPE: ICD-10-CM

## 2019-06-06 DIAGNOSIS — H92.01 OTALGIA OF RIGHT EAR: Primary | ICD-10-CM

## 2019-06-06 DIAGNOSIS — M79.7 FIBROMYALGIA: ICD-10-CM

## 2019-06-06 DIAGNOSIS — L85.3 XEROSIS OF SKIN: ICD-10-CM

## 2019-06-06 PROCEDURE — 3017F COLORECTAL CA SCREEN DOC REV: CPT | Performed by: FAMILY MEDICINE

## 2019-06-06 PROCEDURE — G8427 DOCREV CUR MEDS BY ELIG CLIN: HCPCS | Performed by: FAMILY MEDICINE

## 2019-06-06 PROCEDURE — G8417 CALC BMI ABV UP PARAM F/U: HCPCS | Performed by: FAMILY MEDICINE

## 2019-06-06 PROCEDURE — 3023F SPIROM DOC REV: CPT | Performed by: FAMILY MEDICINE

## 2019-06-06 PROCEDURE — G8926 SPIRO NO PERF OR DOC: HCPCS | Performed by: FAMILY MEDICINE

## 2019-06-06 PROCEDURE — 99214 OFFICE O/P EST MOD 30 MIN: CPT | Performed by: FAMILY MEDICINE

## 2019-06-06 PROCEDURE — 4004F PT TOBACCO SCREEN RCVD TLK: CPT | Performed by: FAMILY MEDICINE

## 2019-06-06 RX ORDER — HYDROCORTISONE 25 MG/ML
LOTION TOPICAL
Qty: 120 ML | Refills: 2 | Status: SHIPPED | OUTPATIENT
Start: 2019-06-06 | End: 2020-06-19

## 2019-06-06 ASSESSMENT — ENCOUNTER SYMPTOMS
RHINORRHEA: 1
GASTROINTESTINAL NEGATIVE: 1
SINUS PRESSURE: 1
RESPIRATORY NEGATIVE: 1

## 2019-06-06 NOTE — PROGRESS NOTES
Subjective:      Patient ID: Kianna Benites is a 59 y.o. female. HPI:    Chief Complaint   Patient presents with    Otalgia     right side on going since last October using [employer]     Adenopathy     right side     Other     order for BP Cuff      Annual eval.    Pt c/o right ear pain off and on for the last few months. No drainage. She does have some sinus pressure and congestion. No drainage. Pt sees an Allergist currently, on Flonase and Allegra. BP well controlled. BP Readings from Last 3 Encounters:   19 118/72   19 106/60   07/10/18 120/70     Weight is down a few lbs. Wt Readings from Last 3 Encounters:   19 188 lb 1.6 oz (85.3 kg)   19 193 lb 1.6 oz (87.6 kg)   18 192 lb (87.1 kg)     GERD well controlled on the Protonix. Bilateral feet are dry and bumpy. She is requesting a cream or lotion for this. Sleeping well on the trazodone. Mood controlled on the Fetzima.     Patient Active Problem List   Diagnosis    Hypothyroid    Fibromyalgia    Obesity    Hyperlipemia    Interstitial cystitis    ETD (eustachian tube dysfunction)    Sinusitis, chronic    Vestibular dizziness    Nasal septal deviation    IGT (impaired glucose tolerance)    Vitamin D deficiency    Arnold-Chiari malformation (HCC)    Tobacco abuse    Esophageal reflux    COPD (chronic obstructive pulmonary disease) ([facility] [address]     Past Surgical History:   Procedure Laterality Date    APPENDECTOMY       SECTION      x2    COLONOSCOPY  [id number]    CSF SHUNT  2007    Cervical syrinx to subarachnoid shunt; thoracic syrinx to subarachnoid shunt (2 separate dural openings)    CYST REMOVAL      extradural cysts at thoracic 2-3    ECTOPIC PREGNANCY SURGERY      HEMICOLECTOMY      HERNIA REPAIR      inguinal     HYSTERECTOMY      SPINAL CORD DECOMPRESSION  2007    C4-5-6-7; T1-2-3 laminectomies    TOOTH EXTRACTION  2017     Prior to Admission medications    Medication Sig Start Date End Date Taking? Authorizing Provider   amLODIPine (NORVASC) 5 MG tablet Take 1 tablet by mouth daily 5/20/19  Yes Wendy Fernandez DO   levomilnacipran [facility]) 40 MG CP24 extended release capsule Take 1 capsule by mouth daily 4/17/19  Yes COREEN Galarza CNP   traZODone (DESYREL) 150 MG tablet TAKE 1/2 (ONE-HALF) TABLET BY MOUTH ONCE DAILY BEFORE BEDTIME AS DIRECTED. 3/25/19  Yes COREEN Galarza CNP   metFORMIN (GLUCOPHAGE) 500 MG tablet TAKE 1 TABLET BY MOUTH TWICE DAILY WITH MEALS 2/21/19  Yes Wendy Fernandez DO   atorvastatin (LIPITOR) 10 MG tablet TAKE 1 TABLET BY MOUTH EVERY DAY 2/21/19  Yes Wendy Fernandez DO   BIOTIN 5000 PO Take by mouth   Yes Historical Provider, MD   clobetasol (TEMOVATE) 0.05 % external solution Apply daily to scaly or itchy areas on scalp 1/21/19  Yes Wendy Fernandez DO   fluocinonide (LIDEX) 0.05 % cream APPLY CREAM TOPICALLY TO AFFECTED AREA TWICE DAILY 1/21/19  Yes Wendy Fernandez DO   VENTOLIN  (90 Base) MCG/ACT inhaler INHALE 2 PUFFS INTO THE LUNGS EVERY 6 HOURS AS NEEDED FOR SHORTNESS OF BREATH OR WHEEZING 10/1/18  Yes Wendy Fernandez DO   ondansetron (ZOFRAN) 4 MG tablet TAKE 1 TABLET BY MOUTH EVERY 8 HOURS AS NEEDED FOR NAUSEA OR VOMITING 10/1/18  Yes Wendy Fernandez DO   SYNTHROID 88 MCG tablet TAKE 1 TABLET BY MOUTH EVERY DAY ON AN EMPTY STOMACH WITH WATER. WAIT 30 MINUTES BEFORE EATING OR TAKING OTHER MEDICATIONS. 9/6/18  Yes Wendy Fernandez DO   ciclopirox (LOPROX) 0.77 % cream Apply topically 2 times daily.  9/6/18  Yes Wendy Fernandez DO   fluocinonide (LIDEX) 0.05 % external solution APPLY EXTERNALLY TO THE AFFECTED AREA TWICE DAILY 8/10/18  Yes Wendy Fernandez DO   pantoprazole (PROTONIX) 40 MG tablet Take 1 tablet by mouth daily (with breakfast) 8/10/18  Yes Wendy Fernandez DO   Cholecalciferol (D3 HIGH POTENCY) 2000 units CAPS Take 1 capsule daily 7/10/18  Yes Angelo Nava DO   meloxicam (MOBIC) 7.5 MG tablet TK 1 T PO BID 5/12/18  Yes Historical Provider, MD Carlos Esposito 290 MCG CAPS capsule  6/8/18  Yes Historical Provider, MD   fluconazole (DIFLUCAN) 150 MG tablet  6/8/18  Yes Historical Provider, MD   Polyethylene Glycol 3350 (GLYCOLAX PO) Take by mouth   Yes Historical Provider, MD   lidocaine (XYLOCAINE) 5 % ointment Apply topically as needed for Pain Apply topically as needed. Yes Historical Provider, MD   conjugated estrogens (PREMARIN) 0.625 MG/GM vaginal cream Place vaginally as needed Place vaginally daily. Yes Historical Provider, MD   lidocaine (LIDODERM) 5 % Place 1 patch onto the skin daily 12 hours on, 12 hours off. Yes Historical Provider, MD   hydrocortisone (ANUSOL-HC) 25 MG suppository Place 25 mg rectally as needed for Hemorrhoids   Yes Historical Provider, MD   fluticasone (FLONASE ALLERGY RELIEF) 50 MCG/ACT nasal spray 1 spray by Nasal route daily   Yes Historical Provider, MD   UNABLE TO FIND    Yes Historical Provider, MD   meloxicam (MOBIC) 15 MG tablet Take 15 mg by mouth daily   Yes Historical Provider, MD   hyoscyamine (LEVBID) 375 MCG extended release tablet TAKE 1/2 TABLET BY MOUTH TWICE DAILY 10/2/17  Yes Angelo Nava DO   PROVENTIL  (90 BASE) MCG/ACT inhaler Inhale 2 puffs into the lungs every 6 hours as needed for Wheezing or Shortness of Breath 5/23/17  Yes Angelo Nava DO   HYDROcodone-acetaminophen [facility]) 5-325 MG per tablet Take 1 tablet every 4-6 hours as needed for pain   Yes Marva Vora MD   NONFORMULARY Immunotherapy allergy shot   Yes Historical Provider, MD   zinc 50 MG CAPS Take  by mouth daily. Yes Historical Provider, MD   Ascorbic Acid (VITAMIN C) 500 MG CAPS Take  by mouth. Yes Historical Provider, MD   orphenadrine (NORFLEX) 100 MG tablet Take 100 mg by mouth 2 times daily as needed.      Yes Historical Provider, MD   tramadol (ULTRAM) 50 MG tablet Take 50 mg by mouth every 6 hours as needed. Take 1-2 tabs every 6 hrs prn    Yes Historical Provider, MD   gabapentin (NEURONTIN) 100 MG capsule Take 100 mg by mouth 2 times daily.    Yes Florian Buitrago MD       Lab Results   Component Value Date    LABA1C 5.8 06/14/2018     No results found for: EAG    No components found for: CHLPL  Lab Results   Component Value Date    TRIG 165 06/14/2018    TRIG 283 (H) 06/07/2017    TRIG 203 (H) 06/15/2016     Lab Results   Component Value Date    HDL 42 06/14/2018    HDL 44 06/07/2017    HDL 54 06/15/2016     Lab Results   Component Value Date    LDLCALC 85 06/14/2018    LDLCALC 97 06/07/2017    LDLCALC 79 06/15/2016     No results found for: LABVLDL      Chemistry        Component Value Date/Time     01/16/2018 0827    K 4.3 01/16/2018 0827    CL 99 01/16/2018 0827    CO2 25 01/16/2018 0827    BUN 9 01/16/2018 0827    CREATININE 0.5 01/16/2018 0827        Component Value Date/Time    CALCIUM 10.0 01/16/2018 0827    ALKPHOS 128 (H) 01/16/2018 0827    AST 14 01/16/2018 0827    ALT 11 01/16/2018 0827    BILITOT 0.5 01/16/2018 0827            Lab Results   Component Value Date    TSH 1.380 01/16/2018       Lab Results   Component Value Date    WBC 7.0 01/16/2018    HGB 14.1 01/16/2018    HCT 42.5 01/16/2018    MCV 90.4 01/16/2018     01/16/2018         Health Maintenance   Topic Date Due    DTaP/Tdap/Td vaccine (1 - Tdap) 11/10/1977    Shingles Vaccine (1 of 2) 11/10/2008    Pneumococcal 0-64 years Vaccine (1 of 1 - PPSV23) 06/27/2016    Breast cancer screen  09/23/2017    Cervical cancer screen  09/23/2018    TSH testing  01/16/2019    A1C test (Diabetic or Prediabetic)  06/14/2019    Flu vaccine (Season Ended) 09/01/2019    Lipid screen  06/14/2023    Colon cancer screen colonoscopy  04/17/2027    Hepatitis C screen  Addressed    HIV screen  Addressed       Immunization History   Administered Date(s) Administered    Influenza Virus Vaccine 11/13/2013    Pneumococcal 13-valent Conjugate Wojciech Snyder) 05/02/2016           Review of Systems   Constitutional: Negative. HENT: Positive for congestion, rhinorrhea and sinus pressure. Negative for ear discharge. Respiratory: Negative. Cardiovascular: Negative. Gastrointestinal: Negative. Musculoskeletal: Negative. Skin: Positive for rash (on both feet). All other systems reviewed and are negative. Objective:   Physical Exam   Constitutional: She is oriented to person, place, and time. She appears well-developed and well-nourished. HENT:   Head: Normocephalic and atraumatic. Right Ear: No drainage, swelling or tenderness. Tympanic membrane is retracted. Tympanic membrane is not erythematous. Left Ear: Tympanic membrane normal.   Mouth/Throat: Oropharynx is clear and moist and mucous membranes are normal.   Neck: Carotid bruit is not present. Cardiovascular: Normal rate, regular rhythm and normal heart sounds. No murmur heard. Pulmonary/Chest: Effort normal and breath sounds normal.   Abdominal: Soft. Bowel sounds are normal.   Musculoskeletal: She exhibits no edema. Neurological: She is alert and oriented to person, place, and time. Skin: Skin is warm and dry. Psychiatric: She has a normal mood and affect. Her behavior is normal.   Nursing note and vitals reviewed. Assessment:       Diagnosis Orders   1. Otalgia of right ear     2. ETD (Eustachian tube dysfunction), right     3. Environmental allergies     4. IGT (impaired glucose tolerance)  Comprehensive Metabolic Panel    Hemoglobin A1C   5. Chronic obstructive pulmonary disease, unspecified COPD type ([address]     6. Pure hypercholesterolemia  Lipid Panel    Comprehensive Metabolic Panel   7. Fibromyalgia     8. Hypothyroidism, unspecified type  TSH with Reflex   9. Gastroesophageal reflux disease, esophagitis presence not specified  CBC Auto Differential    Magnesium   10. Arnold-Chiari malformation ([address]     11. Tobacco abuse     12. Xerosis of skin  hydrocortisone (HYTONE) 2.5 % lotion   13.  Vitamin D insufficiency  Vitamin D 25 Hydroxy           Plan:      -  Chronic medical problems stable  -  Continue current medications  -  Follow up with specialists as scheduled  -  Check labs, will call  -  rx for hydrocortisone cream sent  -  RTO annually        Anita Puente DO

## 2019-06-06 NOTE — PATIENT INSTRUCTIONS
You may receive a survey regarding the care you received during your visit. Your input is valuable to us. We encourage you to complete and return your survey. We hope you will choose us in the future for your healthcare needs. Tobacco Cessation Programs     Telephonic behavior modification  Erica Hooker ([phone])   Counseling service for those who are ready to quit using tobacco.     Available for uninsured [place] residents, [place] recipients, pregnant women, or patients whose health plans or employers are members of the [address] [place] [address] behavior modification   http://[place]. Quitlogix. org   Online support program to help patients through each step of the quitting process. Available 24 hours a day 7 days a week. Provides up to date researched based tool, step-by-step guides, and motivational messages. Online behavior modification   www.lungusa.org/stop-smoking/how-to-quit   HelpLine: 1-800-LUNG-[place] (695-6499)   Email questions to:  Guero@[employer]. [employer]    Website offers resources to help tobacco users figure out their reasons for quitting and then take the big step of quitting for good. Hypnosis   Location: [address], [facility], [place]   Contact: Lico Givens, PhD at [phone]   Hypnosis for tobacco cessation   Cost $225 for the initial session and $175 for each session afterwards. Most patients require 6-8 sessions. There is the option to submit through the patients insurance. Hypnosis and behavior modification   Location: [place] [address],  [address] [facility], [place]   Contact: Abdulaziz Villasenor, PhD at [phone]  [facility] and hypnosis for nicotine addition   Cost: For uninsured patients:  Please call above phone number  Cost for insured patients depends on patients insurance plan.     Behavior modification   Location: [facility], [address] [facility], [address]: [place] [address] include four one-on-one

## 2019-06-25 ENCOUNTER — TELEPHONE (OUTPATIENT)
Dept: FAMILY MEDICINE CLINIC | Age: 59
End: 2019-06-25

## 2019-06-25 DIAGNOSIS — H92.01 OTALGIA OF RIGHT EAR: Primary | ICD-10-CM

## 2019-06-25 DIAGNOSIS — H69.81 ETD (EUSTACHIAN TUBE DYSFUNCTION), RIGHT: ICD-10-CM

## 2019-06-25 NOTE — TELEPHONE ENCOUNTER
Patient called back with 2 more symptoms to add to her message. She also has dizziness and watery eyes. Please advise.

## 2019-06-25 NOTE — TELEPHONE ENCOUNTER
Patient has on going right ear and gland/jaw pain, headache, nasal congestion. Medication prescribed by allergist started patient on Allegra (plain), Flonase and Astelin nasal spray. Getting no relief of symptoms. Asking if a referral to ENT is warranted. Patient would like a call back with advice.

## 2019-06-25 NOTE — TELEPHONE ENCOUNTER
Patient states she will be doing labs within the next week or so. Noted for chart. Advised will call with results once received.

## 2019-07-09 ENCOUNTER — OFFICE VISIT (OUTPATIENT)
Dept: ENT CLINIC | Age: 59
End: 2019-07-09
Payer: COMMERCIAL

## 2019-07-09 VITALS
SYSTOLIC BLOOD PRESSURE: 118 MMHG | DIASTOLIC BLOOD PRESSURE: 78 MMHG | RESPIRATION RATE: 14 BRPM | HEIGHT: 65 IN | HEART RATE: 82 BPM | BODY MASS INDEX: 31.64 KG/M2 | WEIGHT: 189.9 LBS | TEMPERATURE: 98.4 F

## 2019-07-09 DIAGNOSIS — J32.9 CHRONIC SINUSITIS, UNSPECIFIED LOCATION: Primary | ICD-10-CM

## 2019-07-09 DIAGNOSIS — M26.609 TMJ (TEMPOROMANDIBULAR JOINT SYNDROME): ICD-10-CM

## 2019-07-09 DIAGNOSIS — T48.5X5A RHINITIS MEDICAMENTOSA: ICD-10-CM

## 2019-07-09 DIAGNOSIS — H69.83 ETD (EUSTACHIAN TUBE DYSFUNCTION), BILATERAL: ICD-10-CM

## 2019-07-09 DIAGNOSIS — J31.0 RHINITIS MEDICAMENTOSA: ICD-10-CM

## 2019-07-09 DIAGNOSIS — J30.9 ALLERGIC RHINITIS, UNSPECIFIED SEASONALITY, UNSPECIFIED TRIGGER: ICD-10-CM

## 2019-07-09 PROCEDURE — 3017F COLORECTAL CA SCREEN DOC REV: CPT | Performed by: PHYSICIAN ASSISTANT

## 2019-07-09 PROCEDURE — 99204 OFFICE O/P NEW MOD 45 MIN: CPT | Performed by: PHYSICIAN ASSISTANT

## 2019-07-09 PROCEDURE — G8427 DOCREV CUR MEDS BY ELIG CLIN: HCPCS | Performed by: PHYSICIAN ASSISTANT

## 2019-07-09 PROCEDURE — 4004F PT TOBACCO SCREEN RCVD TLK: CPT | Performed by: PHYSICIAN ASSISTANT

## 2019-07-09 PROCEDURE — G8417 CALC BMI ABV UP PARAM F/U: HCPCS | Performed by: PHYSICIAN ASSISTANT

## 2019-07-09 RX ORDER — 1.1% SODIUM FLUORIDE PRESCRIPTION DENTAL CREAM 5 MG/G
CREAM DENTAL
Refills: 3 | COMMUNITY
Start: 2019-05-19 | End: 2021-05-24

## 2019-07-09 RX ORDER — FLUTICASONE PROPIONATE 0.05 MG/G
OINTMENT TOPICAL
COMMUNITY
End: 2019-07-31 | Stop reason: ALTCHOICE

## 2019-07-09 RX ORDER — CEFDINIR 300 MG/1
300 CAPSULE ORAL 2 TIMES DAILY
Qty: 56 CAPSULE | Refills: 0 | Status: SHIPPED | OUTPATIENT
Start: 2019-07-09 | End: 2019-08-06

## 2019-07-09 RX ORDER — LIDOCAINE 5 G/100G
CREAM RECTAL; TOPICAL
COMMUNITY
Start: 2016-05-26 | End: 2019-07-31 | Stop reason: ALTCHOICE

## 2019-07-09 RX ORDER — ALBUTEROL SULFATE 90 UG/1
AEROSOL, METERED RESPIRATORY (INHALATION)
COMMUNITY
End: 2019-07-31 | Stop reason: ALTCHOICE

## 2019-07-09 RX ORDER — TIZANIDINE 4 MG/1
TABLET ORAL
COMMUNITY
Start: 2015-02-04 | End: 2019-07-31 | Stop reason: ALTCHOICE

## 2019-07-09 RX ORDER — LISINOPRIL 5 MG/1
TABLET ORAL
COMMUNITY
End: 2019-07-31 | Stop reason: ALTCHOICE

## 2019-07-09 RX ORDER — ACETAMINOPHEN 160 MG
TABLET,DISINTEGRATING ORAL
COMMUNITY
End: 2020-07-27 | Stop reason: SDUPTHER

## 2019-07-09 ASSESSMENT — ENCOUNTER SYMPTOMS
RHINORRHEA: 1
WHEEZING: 0
ABDOMINAL PAIN: 0
APNEA: 0
COLOR CHANGE: 0
SORE THROAT: 0
SHORTNESS OF BREATH: 0
STRIDOR: 0
SINUS PRESSURE: 1
CHEST TIGHTNESS: 0
NAUSEA: 0
DIARRHEA: 0
CHOKING: 0
FACIAL SWELLING: 1
VOICE CHANGE: 0
TROUBLE SWALLOWING: 0
COUGH: 1
VOMITING: 0

## 2019-07-09 NOTE — PROGRESS NOTES
pressure, sneezing and tinnitus. Negative for ear discharge, hearing loss, mouth sores, nosebleeds, sore throat, trouble swallowing and voice change. Eyes: Negative for visual disturbance. Respiratory: Positive for cough. Negative for apnea, choking, chest tightness, shortness of breath, wheezing and stridor. Cardiovascular: Negative for chest pain, palpitations and leg swelling. Gastrointestinal: Negative for abdominal pain, diarrhea, nausea and vomiting. Endocrine: Negative for cold intolerance, heat intolerance, polydipsia and polyuria. Genitourinary: Negative for difficulty urinating, dysuria, enuresis, hematuria and urgency. Musculoskeletal: Positive for gait problem and joint swelling. Negative for arthralgias, neck pain and neck stiffness. Skin: Negative for color change and rash. Allergic/Immunologic: Positive for environmental allergies and food allergies. Negative for immunocompromised state. Neurological: Positive for dizziness, light-headedness and headaches. Negative for syncope, facial asymmetry and speech difficulty. Hematological: Positive for adenopathy. Does not bruise/bleed easily. Psychiatric/Behavioral: Negative for confusion and sleep disturbance. The patient is nervous/anxious. Social History:    TOBACCO:   reports that she has been smoking cigarettes. She has a 12.50 pack-year smoking history. She has never used smokeless tobacco.  ETOH:   reports that she drinks alcohol. DRUGS:   reports that she has current or past drug history. Drug: Marijuana. Family History:       Problem Relation Age of Onset    Cancer Other         colon    Mental Illness Other     High Blood Pressure Other     Diabetes Other     High Blood Pressure Sister        Objective: This is a 59 y.o. female. Patient is alert and oriented to person, place and time. Patient appears well developed, well nourished. Mood is happy. Not obviously hearing impaired.     /78 (Site: Left Upper Arm, Position: Sitting)   Pulse 82   Temp 98.4 °F (36.9 °C) (Oral)   Resp 14   Ht 5' 4.5\" (1.638 m)   Wt 189 lb 14.4 oz (86.1 kg)   LMP  (Exact Date)   BMI 32.09 kg/m²     Head:   Normocephalic, atraumatic. No obvious masses or lesions noted. Ears:  External ears: Normal: no scars, lesions or masses. Mastoid process: No erythema noted. No tenderness to palpation. R External auditory canal: clear and free of any pathology  L External auditory canal: clear and free of any pathology   Tympanic membranes:  R intact, translucent                                                  L intact, translucent  Nose:    External nose: Appears midline. No obvious deformity or masses. Septum:  Deviated to the left. No septal hematoma. No perforation. Mucosa:  inflamed  Turbinates: red and congested            Discharge:  clear    Mouth/Throat:  Lips, tongue and oral cavity: Normal. No masses or lesions noted   Dentition: Poor. No upper teeth and multiple caries on the lower teeth. Reports she is about to get the lower teeth removed  Oral mucosa: moist  Tonsils: present, not acutely enlarged and no erythema or exudates  Oropharynx: normal-appearing mucosa and no pharyngitis, no exudate  Hard and soft palates: symmetrical and intact. Salivary glands: not enlarged and no tenderness to palpation. Uvula: midline, no obvious lesions    Neck: Trachea midline. Thyroid not enlarged, no palpable masses or tenderness  Lymphatic: No cervical lymphadenopathy noted. Eyes: DOMENICA, EOM intact. Conjunctiva moist without discharge. Lungs: Normal effort of breathing, not obviously distressed. Assessment/Plan:     Diagnosis Orders   1. Chronic sinusitis, unspecified location  CT Facial Bones WO Contrast   2. Allergic rhinitis, unspecified seasonality, unspecified trigger  [address] [facility], [place], [address], Allergy, [address]   3. ETD (Eustachian tube dysfunction), bilateral     4.  TMJ (temporomandibular joint syndrome)         The

## 2019-07-11 ENCOUNTER — TELEPHONE (OUTPATIENT)
Dept: ENT CLINIC | Age: 59
End: 2019-07-11

## 2019-07-11 RX ORDER — FLUCONAZOLE 150 MG/1
150 TABLET ORAL ONCE
Qty: 1 TABLET | Refills: 0 | Status: SHIPPED | OUTPATIENT
Start: 2019-07-11 | End: 2019-07-11

## 2019-07-15 RX ORDER — EPINEPHRINE 0.3 MG/.3ML
INJECTION INTRAMUSCULAR
Refills: 0 | COMMUNITY
Start: 2019-06-14 | End: 2021-03-04 | Stop reason: SDUPTHER

## 2019-07-31 ENCOUNTER — OFFICE VISIT (OUTPATIENT)
Dept: ALLERGY | Age: 59
End: 2019-07-31
Payer: COMMERCIAL

## 2019-07-31 VITALS
SYSTOLIC BLOOD PRESSURE: 126 MMHG | HEART RATE: 80 BPM | TEMPERATURE: 98 F | RESPIRATION RATE: 16 BRPM | WEIGHT: 197 LBS | HEIGHT: 65 IN | DIASTOLIC BLOOD PRESSURE: 76 MMHG | BODY MASS INDEX: 32.82 KG/M2

## 2019-07-31 DIAGNOSIS — R51.9 ACHING HEADACHE: ICD-10-CM

## 2019-07-31 DIAGNOSIS — Z88.8 ASPIRIN ALLERGY: ICD-10-CM

## 2019-07-31 DIAGNOSIS — J30.9 ALLERGIC RHINOCONJUNCTIVITIS: ICD-10-CM

## 2019-07-31 DIAGNOSIS — H10.10 ALLERGIC RHINOCONJUNCTIVITIS: ICD-10-CM

## 2019-07-31 DIAGNOSIS — Z91.018 NUT ALLERGY: ICD-10-CM

## 2019-07-31 DIAGNOSIS — Z91.018 ALLERGY TO CHOCOLATE: ICD-10-CM

## 2019-07-31 DIAGNOSIS — J43.1 PANLOBULAR EMPHYSEMA (HCC): ICD-10-CM

## 2019-07-31 DIAGNOSIS — Q07.00 ARNOLD-CHIARI MALFORMATION (HCC): Primary | ICD-10-CM

## 2019-07-31 DIAGNOSIS — J32.4 CHRONIC PANSINUSITIS: ICD-10-CM

## 2019-07-31 DIAGNOSIS — J30.89 NON-SEASONAL ALLERGIC RHINITIS, UNSPECIFIED TRIGGER: ICD-10-CM

## 2019-07-31 DIAGNOSIS — R06.02 SHORT OF BREATH ON EXERTION: ICD-10-CM

## 2019-07-31 PROCEDURE — 4004F PT TOBACCO SCREEN RCVD TLK: CPT | Performed by: NURSE PRACTITIONER

## 2019-07-31 PROCEDURE — G8926 SPIRO NO PERF OR DOC: HCPCS | Performed by: NURSE PRACTITIONER

## 2019-07-31 PROCEDURE — G8417 CALC BMI ABV UP PARAM F/U: HCPCS | Performed by: NURSE PRACTITIONER

## 2019-07-31 PROCEDURE — 99204 OFFICE O/P NEW MOD 45 MIN: CPT | Performed by: NURSE PRACTITIONER

## 2019-07-31 PROCEDURE — 3023F SPIROM DOC REV: CPT | Performed by: NURSE PRACTITIONER

## 2019-07-31 PROCEDURE — 3017F COLORECTAL CA SCREEN DOC REV: CPT | Performed by: NURSE PRACTITIONER

## 2019-07-31 PROCEDURE — G8427 DOCREV CUR MEDS BY ELIG CLIN: HCPCS | Performed by: NURSE PRACTITIONER

## 2019-07-31 RX ORDER — AZELASTINE HCL 205.5 UG/1
2 SPRAY NASAL DAILY
Refills: 6 | COMMUNITY
Start: 2019-07-14 | End: 2019-12-05

## 2019-07-31 ASSESSMENT — ENCOUNTER SYMPTOMS
SINUS PAIN: 1
RHINORRHEA: 1
SINUS PRESSURE: 1
SHORTNESS OF BREATH: 1
EYE REDNESS: 1
EYE ITCHING: 1

## 2019-07-31 NOTE — PROGRESS NOTES
when the last time she had a chest x-ray. She has been told that she has emphysema. Patient does have high blood pressure. She complains of skin infections and frequent heartburn. She is currently see an ENT for a right ear infection. Patient reports that she does have asthma as well. She lives in a house which is rental.  The house is approximately 48years old. It has carpet in the bedrooms. Patient does have central air-conditioning and ceiling fan. She does not have any pets. She is disabled. Patient is . To help with her allergies patient is currently on Astelin, Flonase, and Allegra. She was on Allegra-D which she prefers but she was taken off of that. She states she has had allergies as far back as she can remember they have just progressively gotten worse. She denies any nausea vomiting fever today. Severity of symptoms are severe. Headache is moderate. She does states sometimes that the headache does get better whenever she uses her Flonase nasal spray but it is typically always present      Review of Systems:  Review of Systems   Constitutional: Positive for fatigue. HENT: Positive for congestion, ear pain, postnasal drip, rhinorrhea, sinus pressure, sinus pain and sneezing. Eyes: Positive for redness and itching. Respiratory: Positive for shortness of breath. Allergic/Immunologic: Positive for environmental allergies and food allergies. Neurological: Positive for headaches. All other systems reviewed and are negative.       Past Medical History:    Past Medical History:   Diagnosis Date    Allergic rhinitis     Anxiety     Arnold-Chiari malformation (HCC)     Chronic bronchitis (HCC)     Fibromyalgia     GERD (gastroesophageal reflux disease)     Headache(784.0)     Hyperlipidemia     Neuropathy     Osteoarthritis     Sinusitis     Type II or unspecified type diabetes mellitus without mention of complication, not stated as uncontrolled        Past Metabolic Panel; Future  -     CBC Auto Differential; Future  -     IgA; Future  -     IgE; Future  -     IgG; Future  -     IgM; Future  -     Miscellaneous Sendout 1; Future  -     Macadamia nut IgE; Future  -     Allergen Pistachio IGE; Future  -     Nuts panel IgE; Future  -     Allergen Chocolate IgE; Future  -     ALLERGEN INHALANT MIDWEST COMP 1; Future    Nut allergy  -     GFR Calculated; Future  -     Comprehensive Metabolic Panel; Future  -     CBC Auto Differential; Future  -     IgA; Future  -     IgE; Future  -     IgG; Future  -     IgM; Future  -     Miscellaneous Sendout 1; Future  -     Macadamia nut IgE; Future  -     Allergen Pistachio IGE; Future  -     Nuts panel IgE; Future  -     Allergen Chocolate IgE; Future  -     ALLERGEN INHALANT MIDWEST COMP 1; Future    Allergy to chocolate  -     GFR Calculated; Future  -     Comprehensive Metabolic Panel; Future  -     CBC Auto Differential; Future  -     IgA; Future  -     IgE; Future  -     IgG; Future  -     IgM; Future  -     Miscellaneous Sendout 1; Future  -     Macadamia nut IgE; Future  -     Allergen Pistachio IGE; Future  -     Nuts panel IgE; Future  -     Allergen Chocolate IgE; Future  -     ALLERGEN INHALANT MIDWEST COMP 1; Future    Short of breath on exertion  -     XR Chest PA and Lateral; Future    Panlobular emphysema (HCC)  -     XR Chest PA and Lateral; Future    Aspirin allergy  -     Miscellaneous Sendout 1; Future      Patient to have MRI and MRA of the brain with contrast to evaluate her shunt to find out if this is causing her headaches or if it strictly just allergy related    Will place patient on Singulair at next visit 10 mg at night    Patient to follow-up with me in 2 weeks which gives her time to arrange for transportation to get her blood work and to get her chest x-ray along with her MRI and MRA    Patient to get chest x-ray for probable emphysema.   Patient has smoked three fourths of a pack to pack of cigarettes a

## 2019-08-07 ENCOUNTER — HOSPITAL ENCOUNTER (OUTPATIENT)
Age: 59
Discharge: HOME | End: 2019-08-07
Payer: COMMERCIAL

## 2019-08-07 DIAGNOSIS — Q07.00 ARNOLD-CHIARI MALFORMATION (HCC): ICD-10-CM

## 2019-08-07 DIAGNOSIS — E78.00 PURE HYPERCHOLESTEROLEMIA: ICD-10-CM

## 2019-08-07 DIAGNOSIS — E03.9 HYPOTHYROIDISM, UNSPECIFIED TYPE: ICD-10-CM

## 2019-08-07 DIAGNOSIS — R73.02 IGT (IMPAIRED GLUCOSE TOLERANCE): ICD-10-CM

## 2019-08-07 DIAGNOSIS — Z91.018 NUT ALLERGY: ICD-10-CM

## 2019-08-07 DIAGNOSIS — J30.89 NON-SEASONAL ALLERGIC RHINITIS, UNSPECIFIED TRIGGER: ICD-10-CM

## 2019-08-07 DIAGNOSIS — Z88.8 ASPIRIN ALLERGY: ICD-10-CM

## 2019-08-07 DIAGNOSIS — H10.10 ALLERGIC RHINOCONJUNCTIVITIS: ICD-10-CM

## 2019-08-07 DIAGNOSIS — E55.9 VITAMIN D INSUFFICIENCY: ICD-10-CM

## 2019-08-07 DIAGNOSIS — K21.9 GASTROESOPHAGEAL REFLUX DISEASE, ESOPHAGITIS PRESENCE NOT SPECIFIED: ICD-10-CM

## 2019-08-07 DIAGNOSIS — R51.9 ACHING HEADACHE: ICD-10-CM

## 2019-08-07 DIAGNOSIS — Z91.018 ALLERGY TO CHOCOLATE: ICD-10-CM

## 2019-08-07 DIAGNOSIS — J32.4 CHRONIC PANSINUSITIS: ICD-10-CM

## 2019-08-07 DIAGNOSIS — J30.9 ALLERGIC RHINOCONJUNCTIVITIS: ICD-10-CM

## 2019-08-07 LAB
ALBUMIN SERPL-MCNC: 4.5 G/DL (ref 3.5–5.1)
ALP BLD-CCNC: 123 U/L (ref 38–126)
ALT SERPL-CCNC: 11 U/L (ref 11–66)
ANION GAP SERPL CALCULATED.3IONS-SCNC: 11 MEQ/L (ref 8–16)
AST SERPL-CCNC: 12 U/L (ref 5–40)
AVERAGE GLUCOSE: 114 MG/DL (ref 70–126)
BASOPHILS # BLD: 0.5 %
BASOPHILS ABSOLUTE: 0 THOU/MM3 (ref 0–0.1)
BILIRUB SERPL-MCNC: 0.3 MG/DL (ref 0.3–1.2)
BUN BLDV-MCNC: 8 MG/DL (ref 7–22)
CALCIUM SERPL-MCNC: 9.5 MG/DL (ref 8.5–10.5)
CHLORIDE BLD-SCNC: 105 MEQ/L (ref 98–111)
CHOLESTEROL, TOTAL: 199 MG/DL (ref 100–199)
CO2: 26 MEQ/L (ref 23–33)
CREAT SERPL-MCNC: 0.6 MG/DL (ref 0.4–1.2)
EOSINOPHIL # BLD: 3.4 %
EOSINOPHILS ABSOLUTE: 0.2 THOU/MM3 (ref 0–0.4)
ERYTHROCYTE [DISTWIDTH] IN BLOOD BY AUTOMATED COUNT: 13.6 % (ref 11.5–14.5)
ERYTHROCYTE [DISTWIDTH] IN BLOOD BY AUTOMATED COUNT: 46.3 FL (ref 35–45)
GFR SERPL CREATININE-BSD FRML MDRD: > 90 ML/MIN/1.73M2
GLUCOSE BLD-MCNC: 97 MG/DL (ref 70–108)
HBA1C MFR BLD: 5.8 % (ref 4.4–6.4)
HCT VFR BLD CALC: 39.4 % (ref 37–47)
HDLC SERPL-MCNC: 53 MG/DL
HEMOGLOBIN: 12.6 GM/DL (ref 12–16)
IGA: 163 MG/DL (ref 70–400)
IGE: 266 IU/ML
IGG: 983 MG/DL (ref 700–1600)
IGM: 210 MG/DL (ref 40–230)
IMMATURE GRANS (ABS): 0.02 THOU/MM3 (ref 0–0.07)
IMMATURE GRANULOCYTES: 0 %
LDL CHOLESTEROL CALCULATED: 119 MG/DL
LYMPHOCYTES # BLD: 27 %
LYMPHOCYTES ABSOLUTE: 1.8 THOU/MM3 (ref 1–4.8)
MAGNESIUM: 1.9 MG/DL (ref 1.6–2.4)
MCH RBC QN AUTO: 29.5 PG (ref 26–33)
MCHC RBC AUTO-ENTMCNC: 32 GM/DL (ref 32.2–35.5)
MCV RBC AUTO: 92.3 FL (ref 81–99)
MONOCYTES # BLD: 4.9 %
MONOCYTES ABSOLUTE: 0.3 THOU/MM3 (ref 0.4–1.3)
NUCLEATED RED BLOOD CELLS: 0 /100 WBC
PLATELET # BLD: 320 THOU/MM3 (ref 130–400)
PMV BLD AUTO: 10.1 FL (ref 9.4–12.4)
POTASSIUM SERPL-SCNC: 3.6 MEQ/L (ref 3.5–5.2)
RBC # BLD: 4.27 MILL/MM3 (ref 4.2–5.4)
SEG NEUTROPHILS: 63.9 %
SEGMENTED NEUTROPHILS ABSOLUTE COUNT: 4.2 THOU/MM3 (ref 1.8–7.7)
SODIUM BLD-SCNC: 142 MEQ/L (ref 135–145)
TOTAL PROTEIN: 7.5 G/DL (ref 6.1–8)
TRIGL SERPL-MCNC: 135 MG/DL (ref 0–199)
TSH SERPL DL<=0.05 MIU/L-ACNC: 0.67 UIU/ML (ref 0.4–4.2)
VITAMIN D 25-HYDROXY: 25 NG/ML (ref 30–100)
WBC # BLD: 6.6 THOU/MM3 (ref 4.8–10.8)

## 2019-08-07 PROCEDURE — 83735 ASSAY OF MAGNESIUM: CPT

## 2019-08-07 PROCEDURE — 84443 ASSAY THYROID STIM HORMONE: CPT

## 2019-08-07 PROCEDURE — 80053 COMPREHEN METABOLIC PANEL: CPT

## 2019-08-07 PROCEDURE — 86003 ALLG SPEC IGE CRUDE XTRC EA: CPT

## 2019-08-07 PROCEDURE — 82306 VITAMIN D 25 HYDROXY: CPT

## 2019-08-07 PROCEDURE — 85025 COMPLETE CBC W/AUTO DIFF WBC: CPT

## 2019-08-07 PROCEDURE — 83036 HEMOGLOBIN GLYCOSYLATED A1C: CPT

## 2019-08-07 PROCEDURE — 80061 LIPID PANEL: CPT

## 2019-08-07 PROCEDURE — 36415 COLL VENOUS BLD VENIPUNCTURE: CPT

## 2019-08-07 PROCEDURE — 82784 ASSAY IGA/IGD/IGG/IGM EACH: CPT

## 2019-08-07 PROCEDURE — 82785 ASSAY OF IGE: CPT

## 2019-08-09 LAB
ALLERGEN BERMUDA GRASS IGE: < 0.1 KU/L
ALLERGEN BIRCH IGE: < 0.1 KU/L
ALLERGEN BOX ELDER: < 0.1 KU/L
ALLERGEN CAT DANDER IGE: < 0.1 KU/L
ALLERGEN COMMON SHORT RAGWEED IGE: < 0.1 KU/L
ALLERGEN COTTONWOOD: < 0.1 KU/L
ALLERGEN D. FARINAE (DUST MITE): < 0.1 KU/L
ALLERGEN DOG DANDER IGE: < 0.1 KU/L
ALLERGEN ELM IGE: < 0.1 KU/L
ALLERGEN FUNGI/MOLD A. ALTERNATA IGE: < 0.1 KU/L
ALLERGEN FUNGI/MOLD A. FUMIGATUS IGE: < 0.1 KU/L
ALLERGEN FUNGI/MOLD M.RACEMOSUS IGE: < 0.1 KU/L
ALLERGEN FUNGI/MOLD P. NOTATUM IGE: < 0.1 KU/L
ALLERGEN GERMAN COCKROACH IGE: < 0.1 KU/L
ALLERGEN INTERPRETATION/SCORE: NORMAL
ALLERGEN MILK IGE: < 0.1 KU/L
ALLERGEN MITE DUST PTERONYSSINUS IGE: < 0.1 KU/L
ALLERGEN MOUNTAIN CEDAR: < 0.1 KU/L
ALLERGEN MOUSE EPITHELIA IGE: < 0.1 KU/L
ALLERGEN PECAN TREE IGE: < 0.1 KU/L
ALLERGEN RUSSIAN THISTLE IGE: < 0.1 KU/L
ALLERGEN SHEEP SORREL (W18) IGE: < 0.1 KU/L
ALLERGEN TIMOTHY GRASS: < 0.1 KU/L
ALLERGEN TREE SYCAMORE: < 0.1 KU/L
ALLERGEN WALNUT TREE IGE: < 0.1 KU/L
ALLERGEN WEED, PIGWEED IGE: < 0.1 KU/L
ALLERGEN WHITE ASH: < 0.1 KU/L
ALLERGEN WHITE MULBERRY TREE, IGE: < 0.1 KU/L
ALLERGEN, FUNGI/MOLD: < 0.1 KU/L
ALLERGEN, TREE, OAK: < 0.1 KU/L
ARA H1: < 0.1 KU/L
ARA H2: < 0.1 KU/L
ARA H3: < 0.1 KU/L
ARA H8: < 0.1 KU/L
ARA H9: < 0.1 KU/L
EER PEANUT ALLERGEN: NORMAL
IGE: < 0.1 KU/L
IMMUNOGLOBULIN E: 294 KU/L
INTERPRETATION: NORMAL
MISC #3 REFERENCE REPORT: NORMAL
MISC REFERENCE: NORMAL
MISC. #2 REFERENCE REPORT: NORMAL

## 2019-08-11 LAB — MISC. #1 REFERENCE GROUP TEST: NORMAL

## 2019-08-12 ENCOUNTER — TELEPHONE (OUTPATIENT)
Dept: ENT CLINIC | Age: 59
End: 2019-08-12

## 2019-08-13 ENCOUNTER — HOSPITAL ENCOUNTER (OUTPATIENT)
Dept: GENERAL RADIOLOGY | Age: 59
Discharge: HOME | End: 2019-08-13
Payer: COMMERCIAL

## 2019-08-13 ENCOUNTER — HOSPITAL ENCOUNTER (OUTPATIENT)
Age: 59
Discharge: HOME | End: 2019-08-13
Payer: COMMERCIAL

## 2019-08-13 ENCOUNTER — HOSPITAL ENCOUNTER (OUTPATIENT)
Dept: CT IMAGING | Age: 59
Discharge: HOME | End: 2019-08-13
Payer: COMMERCIAL

## 2019-08-13 DIAGNOSIS — J32.9 CHRONIC SINUSITIS, UNSPECIFIED LOCATION: ICD-10-CM

## 2019-08-13 DIAGNOSIS — R06.02 SHORT OF BREATH ON EXERTION: ICD-10-CM

## 2019-08-13 DIAGNOSIS — J43.1 PANLOBULAR EMPHYSEMA (HCC): ICD-10-CM

## 2019-08-13 PROCEDURE — 71046 X-RAY EXAM CHEST 2 VIEWS: CPT

## 2019-08-13 PROCEDURE — 70486 CT MAXILLOFACIAL W/O DYE: CPT

## 2019-08-14 ENCOUNTER — TELEPHONE (OUTPATIENT)
Dept: ENT CLINIC | Age: 59
End: 2019-08-14

## 2019-08-14 RX ORDER — FLUCONAZOLE 150 MG/1
150 TABLET ORAL ONCE
Qty: 1 TABLET | Refills: 1 | Status: SHIPPED | OUTPATIENT
Start: 2019-08-14 | End: 2019-08-14

## 2019-08-19 RX ORDER — AMLODIPINE BESYLATE 5 MG/1
TABLET ORAL
Qty: 30 TABLET | Refills: 2 | Status: SHIPPED | OUTPATIENT
Start: 2019-08-19 | End: 2019-11-04 | Stop reason: SDUPTHER

## 2019-08-21 ENCOUNTER — PROCEDURE VISIT (OUTPATIENT)
Dept: ALLERGY | Age: 59
End: 2019-08-21
Payer: COMMERCIAL

## 2019-08-21 ENCOUNTER — TELEPHONE (OUTPATIENT)
Dept: ENT CLINIC | Age: 59
End: 2019-08-21

## 2019-08-21 VITALS
HEIGHT: 65 IN | SYSTOLIC BLOOD PRESSURE: 122 MMHG | HEART RATE: 88 BPM | BODY MASS INDEX: 33.09 KG/M2 | DIASTOLIC BLOOD PRESSURE: 76 MMHG | RESPIRATION RATE: 16 BRPM | WEIGHT: 198.6 LBS

## 2019-08-21 DIAGNOSIS — J30.9 ALLERGIC RHINOCONJUNCTIVITIS OF BOTH EYES: ICD-10-CM

## 2019-08-21 DIAGNOSIS — Q07.00 ARNOLD-CHIARI MALFORMATION (HCC): ICD-10-CM

## 2019-08-21 DIAGNOSIS — R06.02 SHORT OF BREATH ON EXERTION: Primary | ICD-10-CM

## 2019-08-21 DIAGNOSIS — J30.9 ALLERGIC RHINITIS, UNSPECIFIED SEASONALITY, UNSPECIFIED TRIGGER: ICD-10-CM

## 2019-08-21 DIAGNOSIS — D72.10 EOSINOPHILIA: ICD-10-CM

## 2019-08-21 DIAGNOSIS — Q30.9: ICD-10-CM

## 2019-08-21 DIAGNOSIS — R76.8 ELEVATED IGE LEVEL: ICD-10-CM

## 2019-08-21 DIAGNOSIS — H10.13 ALLERGIC RHINOCONJUNCTIVITIS OF BOTH EYES: ICD-10-CM

## 2019-08-21 DIAGNOSIS — J45.50 SEVERE PERSISTENT ASTHMA WITHOUT COMPLICATION: ICD-10-CM

## 2019-08-21 DIAGNOSIS — Z71.6 TOBACCO ABUSE COUNSELING: ICD-10-CM

## 2019-08-21 PROCEDURE — 94664 DEMO&/EVAL PT USE INHALER: CPT | Performed by: NURSE PRACTITIONER

## 2019-08-21 PROCEDURE — G8417 CALC BMI ABV UP PARAM F/U: HCPCS | Performed by: NURSE PRACTITIONER

## 2019-08-21 PROCEDURE — 99214 OFFICE O/P EST MOD 30 MIN: CPT | Performed by: NURSE PRACTITIONER

## 2019-08-21 PROCEDURE — 3017F COLORECTAL CA SCREEN DOC REV: CPT | Performed by: NURSE PRACTITIONER

## 2019-08-21 PROCEDURE — G8427 DOCREV CUR MEDS BY ELIG CLIN: HCPCS | Performed by: NURSE PRACTITIONER

## 2019-08-21 PROCEDURE — 94060 EVALUATION OF WHEEZING: CPT | Performed by: NURSE PRACTITIONER

## 2019-08-21 PROCEDURE — 4004F PT TOBACCO SCREEN RCVD TLK: CPT | Performed by: NURSE PRACTITIONER

## 2019-08-21 RX ORDER — ALBUTEROL SULFATE 90 UG/1
2 AEROSOL, METERED RESPIRATORY (INHALATION) 4 TIMES DAILY PRN
Qty: 1 INHALER | Refills: 1 | Status: SHIPPED | OUTPATIENT
Start: 2019-08-21 | End: 2019-10-20 | Stop reason: SDUPTHER

## 2019-08-21 RX ORDER — ALBUTEROL SULFATE 2.5 MG/3ML
2.5 SOLUTION RESPIRATORY (INHALATION) EVERY 6 HOURS PRN
Qty: 120 EACH | Refills: 3 | Status: SHIPPED | OUTPATIENT
Start: 2019-08-21 | End: 2019-12-23

## 2019-08-21 RX ORDER — FLUCONAZOLE 150 MG/1
TABLET ORAL
COMMUNITY
Start: 2019-08-20 | End: 2021-02-10

## 2019-08-21 ASSESSMENT — ENCOUNTER SYMPTOMS
NAUSEA: 0
SORE THROAT: 0
EYE REDNESS: 0
COUGH: 1
RHINORRHEA: 1
WHEEZING: 0
ABDOMINAL PAIN: 0
BACK PAIN: 0
CONSTIPATION: 0
DIARRHEA: 0
STRIDOR: 0
SINUS PAIN: 0
EYE DISCHARGE: 0
EYE PAIN: 0
VOMITING: 0
SHORTNESS OF BREATH: 1
SINUS PRESSURE: 1

## 2019-08-21 NOTE — TELEPHONE ENCOUNTER
Pt. Called and stated that [facility] denied the breathing machine and that her insurance requested that we send the office note and new Rx order to   Fax # [phone]  Ph:# [phone] CASIMIRO Laboy. I told her I would take care of this 1st thing in the morning once I talked to you.      Thanks, Ruben Rhodes

## 2019-08-21 NOTE — PROGRESS NOTES
status: Current Every Day Smoker     Packs/day: 0.50     Years: 25.00     Pack years: 12.50     Types: Cigarettes    Smokeless tobacco: Never Used   Substance Use Topics    Alcohol use: Yes     Comment: social        Allergies:  Bactrim [sulfamethoxazole-trimethoprim]; Morphine; Peanut-containing drug products; Tessalon [benzonatate]; Amoxicillin-pot clavulanate; Ibuprofen [ibuprofen]; Lisinopril; Macrobid [nitrofurantoin monohydrate macrocrystals]; Macrodantin [nitrofurantoin]; and Ranitidine    CurrentMedications:     Current Outpatient Medications:     fluconazole (DIFLUCAN) 150 MG tablet, , Disp: , Rfl:     amLODIPine (NORVASC) 5 MG tablet, TAKE 1 TABLET BY MOUTH ONCE DAILY, Disp: 30 tablet, Rfl: 2    Omeprazole Magnesium (PRILOSEC OTC PO), Take by mouth daily, Disp: , Rfl:     Multiple Vitamins-Minerals (MULTIVITAMIN PO), daily, Disp: , Rfl:     nicotine (NICOTROL) 10 MG inhaler, daily, Disp: , Rfl:     Probiotic Product (PROBIOTIC PO), daily, Disp: , Rfl:     Cyanocobalamin (B-12 PO), daily, Disp: , Rfl:     azelastine HCl 0.15 % SOLN, 2 sprays daily, Disp: , Rfl: 6    EPIPEN 2-VENESSA 0.3 MG/0.3ML SOAJ injection, INJECT 1 PEN IM AS A SINGLE DOSE PRN, Disp: , Rfl: 0    SF 5000 PLUS 1.1 % CREA, BRUSH TEETH TWICE DAILY FOR 2 MINUTES, SPIT EXCESS AFTER BRUSHING.  DO NOT RINSE WITH WATER , WAIT 30 MINUTES  FOR ANY FOOD OR DRINK, Disp: , Rfl: 3    Cholecalciferol (VITAMIN D3) 2000 units CAPS, Vitamin D3 2,000 unit capsule, Disp: , Rfl:     hydrocortisone (HYTONE) 2.5 % lotion, Apply topically daily, Disp: 120 mL, Rfl: 2    levomilnacipran (FETZIMA) 40 MG CP24 extended release capsule, Take 1 capsule by mouth daily, Disp: 30 capsule, Rfl: 2    metFORMIN (GLUCOPHAGE) 500 MG tablet, TAKE 1 TABLET BY MOUTH TWICE DAILY WITH MEALS, Disp: 60 tablet, Rfl: 11    atorvastatin (LIPITOR) 10 MG tablet, TAKE 1 TABLET BY MOUTH EVERY DAY, Disp: 30 tablet, Rfl: 11    BIOTIN 5000 PO, Take by mouth daily , Disp: , Rfl: Skin is warm and dry. Psychiatric: She has a normal mood and affect. Her behavior is normal. Judgment and thought content normal.   Nursing note and vitals reviewed. DATA:  Lab Review:    CBC:   Lab Results   Component Value Date    WBC 6.6 08/07/2019    RBC 4.27 08/07/2019    RBC 4.36 01/17/2012    HGB 12.6 08/07/2019    HCT 39.4 08/07/2019    MCV 92.3 08/07/2019    MCH 29.5 08/07/2019    MCHC 32.0 08/07/2019    RDW 14.5 01/16/2018     08/07/2019          IgE   Date/Time Value Ref Range Status   08/07/2019 09:40  (H) <101 IU/mL Final     Comment:     [address] [phone]   08/07/2019 09:40 AM < 0.10 <=0.34 kU/L Final     Comment:     Allergen results of 0.10-0.34 kU/L for whole peanut are intended  for specialist use as the clinical relevance is undetermined. Even  though increasing ranges are reflective of increasing  concentrations of allergen-specific IgE, these concentrations may  not correlate with the degree of clinical response or skin testing  results when challenged with a specific allergen. The correlation  of allergy laboratory results with clinical history and in vivo  reactivity to specific allergens is essential. A negative test may  not rule out clinical allergy or even anaphylaxis. Immunoglobulin E   Date/Time Value Ref Range Status   08/07/2019 09:40  (H) <=214 kU/L Final     Comment:     REFERENCE INTERVAL: Immunoglobulin E, Serum  Access complete set of age- and/or gender-specific reference  intervals for this test in the [facility] Directory  ([email or web address]).         IgG   Date/Time Value Ref Range Status   08/07/2019 09:40  700 - 1600 mg/dL Final     Comment:     [address] [phone]     IgA   Date/Time Value Ref Range Status   08/07/2019 09:40  70 - 400 mg/dL Final     Comment:     [address] [phone]

## 2019-08-22 ENCOUNTER — TELEPHONE (OUTPATIENT)
Dept: FAMILY MEDICINE CLINIC | Age: 59
End: 2019-08-22

## 2019-08-22 DIAGNOSIS — J45.50 SEVERE PERSISTENT ASTHMA WITHOUT COMPLICATION: ICD-10-CM

## 2019-08-23 ENCOUNTER — TELEPHONE (OUTPATIENT)
Dept: ENT CLINIC | Age: 59
End: 2019-08-23

## 2019-08-23 LAB — MISC REFERENCE: NORMAL

## 2019-08-27 ENCOUNTER — TELEPHONE (OUTPATIENT)
Dept: ENT CLINIC | Age: 59
End: 2019-08-27

## 2019-08-27 ENCOUNTER — OFFICE VISIT (OUTPATIENT)
Dept: ENT CLINIC | Age: 59
End: 2019-08-27
Payer: COMMERCIAL

## 2019-08-27 ENCOUNTER — NURSE ONLY (OUTPATIENT)
Dept: LAB | Age: 59
End: 2019-08-27

## 2019-08-27 VITALS
TEMPERATURE: 97.6 F | HEIGHT: 65 IN | WEIGHT: 199.3 LBS | BODY MASS INDEX: 33.2 KG/M2 | DIASTOLIC BLOOD PRESSURE: 82 MMHG | HEART RATE: 84 BPM | RESPIRATION RATE: 16 BRPM | SYSTOLIC BLOOD PRESSURE: 146 MMHG

## 2019-08-27 DIAGNOSIS — D72.10 EOSINOPHILIA: ICD-10-CM

## 2019-08-27 DIAGNOSIS — R06.02 SHORT OF BREATH ON EXERTION: ICD-10-CM

## 2019-08-27 DIAGNOSIS — M26.629 TMJ SYNDROME: ICD-10-CM

## 2019-08-27 DIAGNOSIS — H69.83 ETD (EUSTACHIAN TUBE DYSFUNCTION), BILATERAL: ICD-10-CM

## 2019-08-27 DIAGNOSIS — J30.9 ALLERGIC RHINITIS, UNSPECIFIED SEASONALITY, UNSPECIFIED TRIGGER: Primary | ICD-10-CM

## 2019-08-27 DIAGNOSIS — R76.8 ELEVATED IGE LEVEL: ICD-10-CM

## 2019-08-27 LAB
BACTERIA: NORMAL
BILIRUBIN URINE: NEGATIVE
BLOOD, URINE: NEGATIVE
CASTS: NORMAL /LPF
CASTS: NORMAL /LPF
CHARACTER, URINE: CLEAR
COLOR: YELLOW
CRYSTALS: NORMAL
EPITHELIAL CELLS, UA: NORMAL /HPF
GLUCOSE, URINE: NEGATIVE MG/DL
KETONES, URINE: NEGATIVE
LEUKOCYTE ESTERASE, URINE: NEGATIVE
MISCELLANEOUS LAB TEST RESULT: NORMAL
NITRITE, URINE: NEGATIVE
PH UA: 7 (ref 5–9)
PROTEIN UA: NEGATIVE MG/DL
RBC URINE: NORMAL /HPF
RENAL EPITHELIAL, UA: NORMAL
SPECIFIC GRAVITY UA: 1.01 (ref 1–1.03)
UROBILINOGEN, URINE: 1 EU/DL (ref 0–1)
WBC UA: NORMAL /HPF
YEAST: NORMAL

## 2019-08-27 PROCEDURE — 3017F COLORECTAL CA SCREEN DOC REV: CPT | Performed by: OTOLARYNGOLOGY

## 2019-08-27 PROCEDURE — G8417 CALC BMI ABV UP PARAM F/U: HCPCS | Performed by: OTOLARYNGOLOGY

## 2019-08-27 PROCEDURE — 4004F PT TOBACCO SCREEN RCVD TLK: CPT | Performed by: OTOLARYNGOLOGY

## 2019-08-27 PROCEDURE — G8427 DOCREV CUR MEDS BY ELIG CLIN: HCPCS | Performed by: OTOLARYNGOLOGY

## 2019-08-27 PROCEDURE — 99213 OFFICE O/P EST LOW 20 MIN: CPT | Performed by: OTOLARYNGOLOGY

## 2019-08-27 RX ORDER — FEXOFENADINE HYDROCHLORIDE 180 MG/1
TABLET, FILM COATED ORAL
Refills: 3 | COMMUNITY
Start: 2019-08-21 | End: 2019-09-12

## 2019-08-27 ASSESSMENT — ENCOUNTER SYMPTOMS
VOMITING: 0
ABDOMINAL PAIN: 0
TROUBLE SWALLOWING: 0
FACIAL SWELLING: 0
SORE THROAT: 1
NAUSEA: 0
COUGH: 0
RHINORRHEA: 1
SINUS PRESSURE: 0
CHEST TIGHTNESS: 0
SHORTNESS OF BREATH: 0
APNEA: 0
STRIDOR: 0
VOICE CHANGE: 0
DIARRHEA: 0
CHOKING: 0
WHEEZING: 0
COLOR CHANGE: 0

## 2019-08-27 NOTE — TELEPHONE ENCOUNTER
I do not deal with the prior authorizations. Please check with , or maybe [id number]? Patient is here today for appt with Dr Irish Cortez.

## 2019-08-29 ENCOUNTER — NURSE ONLY (OUTPATIENT)
Dept: LAB | Age: 59
End: 2019-08-29

## 2019-08-29 DIAGNOSIS — R06.02 SHORT OF BREATH ON EXERTION: ICD-10-CM

## 2019-08-29 DIAGNOSIS — R76.8 ELEVATED IGE LEVEL: ICD-10-CM

## 2019-08-29 DIAGNOSIS — D72.10 EOSINOPHILIA: ICD-10-CM

## 2019-08-29 LAB
ANA SCREEN: NORMAL
NEUTROPHIL CYTOPLASMIC AB IGG: NORMAL

## 2019-08-30 LAB
ASPERGILLUS ANTIBODY CF: NORMAL
MISC. #1 REFERENCE GROUP TEST: ABNORMAL
OVA AND PARASITES: NORMAL

## 2019-09-03 ENCOUNTER — TELEPHONE (OUTPATIENT)
Dept: ENT CLINIC | Age: 59
End: 2019-09-03

## 2019-09-03 NOTE — TELEPHONE ENCOUNTER
Patient called again asking us to fax the letter to [phone]: Ruth Serra. Informed patient Yara Cadena said we could do the letter and he would sign it. Informed patient we would get it faxed tomorrow. Patient verbalized understanding and thanked me.

## 2019-09-04 ENCOUNTER — OFFICE VISIT (OUTPATIENT)
Dept: ALLERGY | Age: 59
End: 2019-09-04
Payer: COMMERCIAL

## 2019-09-04 VITALS
WEIGHT: 193 LBS | SYSTOLIC BLOOD PRESSURE: 132 MMHG | BODY MASS INDEX: 32.15 KG/M2 | HEIGHT: 65 IN | HEART RATE: 80 BPM | DIASTOLIC BLOOD PRESSURE: 82 MMHG | RESPIRATION RATE: 16 BRPM

## 2019-09-04 DIAGNOSIS — J30.1 ALLERGY TO TREES: ICD-10-CM

## 2019-09-04 DIAGNOSIS — J30.89 ALLERGY TO DUST: ICD-10-CM

## 2019-09-04 DIAGNOSIS — H10.10 SEASONAL AND PERENNIAL ALLERGIC RHINOCONJUNCTIVITIS: ICD-10-CM

## 2019-09-04 DIAGNOSIS — J45.50 ASTHMA IN ADULT, SEVERE PERSISTENT, UNCOMPLICATED: Primary | ICD-10-CM

## 2019-09-04 DIAGNOSIS — H10.10 ALLERGIC RHINOCONJUNCTIVITIS: ICD-10-CM

## 2019-09-04 DIAGNOSIS — Z91.038 ALLERGY TO COCKROACHES: ICD-10-CM

## 2019-09-04 DIAGNOSIS — J30.9 ALLERGIC RHINOCONJUNCTIVITIS: ICD-10-CM

## 2019-09-04 DIAGNOSIS — J45.40 MODERATE PERSISTENT EXTRINSIC ASTHMA WITHOUT COMPLICATION: ICD-10-CM

## 2019-09-04 DIAGNOSIS — Z91.048 ALLERGY TO MOLD: ICD-10-CM

## 2019-09-04 DIAGNOSIS — J82.83 EOSINOPHILIC ASTHMA: ICD-10-CM

## 2019-09-04 DIAGNOSIS — J30.89 SEASONAL AND PERENNIAL ALLERGIC RHINOCONJUNCTIVITIS: ICD-10-CM

## 2019-09-04 DIAGNOSIS — J30.1 ACUTE SEASONAL ALLERGIC RHINITIS DUE TO POLLEN: ICD-10-CM

## 2019-09-04 DIAGNOSIS — Z51.6 ALLERGY DESENSITIZATION THERAPY: ICD-10-CM

## 2019-09-04 DIAGNOSIS — R76.8 ELEVATED IGE LEVEL: ICD-10-CM

## 2019-09-04 DIAGNOSIS — J30.2 SEASONAL AND PERENNIAL ALLERGIC RHINOCONJUNCTIVITIS: ICD-10-CM

## 2019-09-04 PROCEDURE — S8096 PORTABLE PEAK FLOW METER: HCPCS | Performed by: NURSE PRACTITIONER

## 2019-09-04 PROCEDURE — 4004F PT TOBACCO SCREEN RCVD TLK: CPT | Performed by: NURSE PRACTITIONER

## 2019-09-04 PROCEDURE — 3017F COLORECTAL CA SCREEN DOC REV: CPT | Performed by: NURSE PRACTITIONER

## 2019-09-04 PROCEDURE — G8417 CALC BMI ABV UP PARAM F/U: HCPCS | Performed by: NURSE PRACTITIONER

## 2019-09-04 PROCEDURE — 99213 OFFICE O/P EST LOW 20 MIN: CPT | Performed by: NURSE PRACTITIONER

## 2019-09-04 PROCEDURE — G8427 DOCREV CUR MEDS BY ELIG CLIN: HCPCS | Performed by: NURSE PRACTITIONER

## 2019-09-04 PROCEDURE — 95165 ANTIGEN THERAPY SERVICES: CPT | Performed by: NURSE PRACTITIONER

## 2019-09-04 PROCEDURE — 95004 PERQ TESTS W/ALRGNC XTRCS: CPT | Performed by: NURSE PRACTITIONER

## 2019-09-04 ASSESSMENT — ENCOUNTER SYMPTOMS
VOMITING: 0
SINUS PAIN: 0
CONSTIPATION: 0
NAUSEA: 0
RHINORRHEA: 1
ABDOMINAL PAIN: 0
EYE ITCHING: 1
DIARRHEA: 0
EYE DISCHARGE: 0
SHORTNESS OF BREATH: 1
STRIDOR: 0
EYE REDNESS: 1
EYE PAIN: 0
SORE THROAT: 0
WHEEZING: 0
BACK PAIN: 0
COUGH: 0

## 2019-09-04 NOTE — PROGRESS NOTES
dizziness and headaches. Hematological: Does not bruise/bleed easily. Psychiatric/Behavioral: The patient is not nervous/anxious. All other systems reviewed and are negative. Past MedicalHistory:    Past Medical History:   Diagnosis Date    Allergic rhinitis     Anxiety     Arnold-Chiari malformation (HCC)     Asthma     Chronic bronchitis (HCC)     Fibromyalgia     GERD (gastroesophageal reflux disease)     Headache(784.0)     Hyperlipidemia     Neuropathy     Osteoarthritis     Sinusitis     Type II or unspecified type diabetes mellitus without mention of complication, not stated as uncontrolled        Past Surgical History:  Past Surgical History:   Procedure Laterality Date    APPENDECTOMY       SECTION      x2    COLONOSCOPY  [id number]    CSF SHUNT  2007    Cervical syrinx to subarachnoid shunt; thoracic syrinx to subarachnoid shunt (2 separate dural openings)    CYST REMOVAL      extradural cysts at thoracic 2-3    ECTOPIC PREGNANCY SURGERY      HEMICOLECTOMY      HERNIA REPAIR      inguinal     HYSTERECTOMY      SPINAL CORD DECOMPRESSION  2007    C4-5-6-7; T1-2-3 laminectomies    TOOTH EXTRACTION  2017       Family History:   Family History   Problem Relation Age of Onset    Cancer Other         colon    Mental Illness Other     High Blood Pressure Other     Diabetes Other     High Blood Pressure Sister        Social History:   Social History     Tobacco Use    Smoking status: Current Every Day Smoker     Packs/day: 0.50     Years: 25.00     Pack years: 12.50     Types: Cigarettes    Smokeless tobacco: Never Used   Substance Use Topics    Alcohol use: Yes     Comment: social        Allergies:  Bactrim [sulfamethoxazole-trimethoprim]; Morphine; Peanut-containing drug products; Tessalon [benzonatate]; Amoxicillin-pot clavulanate; Ibuprofen [ibuprofen]; Lisinopril; Macrobid [nitrofurantoin monohydrate macrocrystals];  Macrodantin may not correlate with   the degree of clinical response or skin testing results when   challenged with a specific allergen. The correlation of allergy   laboratory results with clinical history and in vivo reactivity to   specific allergens is essential. A negative test may not rule out   clinical allergy or even anaphylaxis. Performed by Isaac Ricketts , [address] [facility] [phone]   www. Bette Ybarra MD - Lab.  Director    Testing Performed By     [facility] [place] Name Director Address Valid Date Range   [address]  MD Isaac Shirley [address] [place] [address] Inc [id number] 08/31/17 0933-Present   Lab and Collection     Miscellaneous Reference Test - 8/7/2019   Result History     Miscellaneous Reference Test on 8/9/2019   Result Information     Status: Final result (Collected: 8/7/2019 09:40) Provider Status: Reviewed   All Reviewers List     Sandra Prescott DO on 8/12/2019 07:40   Sandra Prescott DO on 8/12/2019 07:40   Routing History     Priority Sent On From To Message Type    8/23/2019  9:38 AM Edson, Yuridia Hall Incoming Lab Results From [address], APRN - CNP Results    8/23/2019  9:38 AM Edson, Yuridia Hall Incoming Lab Results From [address] [place] [address], DO CC'd Results    8/7/2019 10:12 AM Edson, Yuridia Hall Incoming Lab Results From [address] [place] [address], DO Results    8/7/2019 10:01 AM Edson, Yuridia Hall Incoming Lab Results From [address], APRN - CNP Results    8/7/2019 10:01 AM Edson, Yuridia Hall Incoming Lab Results From Soft Sandra Prescott DO CC'd Results   Click to Print Result   View [address]     Miscellaneous Reference Test (Order #[id number]) on 8/7/19   Order Report     Order Details   8/29/2019  9:14 AM - EdsonYuridia Incoming Lab Results From Soft     Component Value Ref Range & Units Status Collected Lab   LOSI SCREEN None Detected  None Detec Final 08/27/2019 11:17 AM [facility]   If suspicion of connective tissue disease is strong and LOIS EIA is   negative, COREEN Monroe CNP Results   Click to Print Result   View [email or web address] Info     Miscellaneous Lab Test #1 ([facility] #[id number]) on 8/27/19   Order Report     Order Details             PROCEDURES:       PFT PERFORMED VIA [facility] SPIR[employer]ETER FOR SHORTNESS OF BREATH SYMPTOMS   8/21/2019   FVC PREDICTED:87%                          FEV1 PREDICTED:67%                          FEV1/FVC % PREDICTED: 51%                               ALBUTEROL 0.083% BREATHING TREATMENT GIVEN. WAITED 20 MINUTES AND REPEATED PFT  8/21/2019   FVC PREDICTED:88%                          FEV1 PREDICTED:96%                          FEV1/FVC % PREDICTED: 83%     Improvement of greater than 12%    Skin Testing performed on: 09/04/19        Last use of antihistamine (or other medication affecting response to histamine): greater than one week    Patient informed of risks of skin allergy testing mild, moderate, and severe including potential for anaphylaxis. Patient wishes to proceed. Consent signed: Yes    Location: Back  Testing preformed: Intradermal    Panel A Epictuaneous   Panel A  Epictuaneous    Site Allergen  W (mm) F  Site Allergen  W (mm) F   A1 Histamine postive control 9.6 14  A6 Cockroach Mix 5.4 6.5   A2 Glycerin negative control 0 3  A7 Feather Mix 0 3   A3 Dust mite mix 0 4  A8 Mouse epithelia 0 3   A4 Cat hair 0 3  A9 [place].  Red/green Tree 0 3   A5 Dog epithelia 0 3  A10 [place], east Tree 6.2 8              Panel B Epictuaneous   Panel B  Epictuaneous    Site Allergen  W (mm) F  Site Allergen  W (mm) F   B11 Birch mix tree 0 3  B16 [place], red Tree 4.4 6   B12 [place] mix, [place] Tree 0 3  B17 [place], white/eastern Tree 0 3   B13 Elm mix tree 0 3  B18 [place], eastern Tree 0 3   B14 [place] Tree 0 3  B19 [place], black Tree 0 3   B15 [place], shagbark Tree 0 3  B20 [place], black Tree 4.6 7              Panel C Epictuaneous   Panel C  Epictuaneous    Site Allergen  W (mm) F  Site Allergen  W (mm) F   C21 [place] 5.6 6  C26 This will not decrease the effectiveness of the injection. It is important that you carry a bottle of liquid antihistamine with you when you come for an injection-in case you start to have a reaction on the way home. Zyrtec (cetirizine) and Claritin (loratidine) syrup are available over the counter. 7. You should not participate in any strenuous physical activity 60 minutes before or after an injection. 8. After your first allergy injection, you need to make a 3 month follow appointment with Enrique Ontiveros DNP, APRN-BC and then been seen and have labs done at least annually and as needed for follow up. The total length of time you will receive allergy injections will be a minimum of three to five years. Some allergic patients may be advised to stay on shots indefinitely depending upon your symptoms and the changes in your labs over time. 9.  You will begin allergy shots by doing a \"building phase. \"  Once the building phase is completed you will then be placed on maintenance injections. Maintenance injections are given every 1-4 weeks depending on symptoms. 10.  Missed or delayed injections will delay reaching and maintaining maintenance injections. 11.  You must notify this office should you become pregnant or nursing. Allergy injections will be stopped at that time until after delivery and/or cessation of breast feeding. 12.  Epi-pens are prescribed and it is the patient's responsibility to always have an epi-pen available incase of anaphylaxis during or after immunotherapy. These medications typically have a one year expiration. Inform the provider you need another prescription prior to the medication expiring. Patient was instructed on use of EpiPen. In case of severe allergic reaction the patient had any angioedema, scratchy throat, trouble breathing, chest tightness they're to use an EpiPen.   Patient to use the EpiPen by injecting the vial into the lateral thigh the green vial and placed in the Silver vial with dilutant to achieve a 1:10,000 concentration. TOTAL DOSES 50     Patient vials were labeled with name, date-of-birth, vial number, concentration, and expiration. When injecting from a new  vial the first injections is 0.05 ml and are increased by 0.05 increments until 0.5ml from the vial is achieved or the patient reaches maximum tolerated dose. Injections are given once ot three times weekly and at least 24 hours apart, according to provider orders. If patient has complications or \"knots\" or maximum tolerance the dose building is reduced, stopped, or maintained according to patient reaction and provider orders. This is documented on the injection log. Patients on build-up plan should be seen every 3 months or sooner if necessary. The injection record and serum is reviewed and documented at every injection appointment. When down to the last 1/3 of the bottle of the red 1:1 concentration the patient should be scheduled an appointment for new orders for allergy maintenance concentrations. If it is the patients preference to receive and injection at an outside medical office, is is allowed only after the patient receives the first dose from each vial at this practice. Patients requesting refills that receive injections at outside medical facilities must include the patient's demographic sheet, current insurance information and the injection records. Allow 2-3 weeks for delivery after the refill has been requested. PROTOCOL FOR LATE INJECTIONS  Days late determined from the due date of the injection    Shot Frequency 5-10 Days Late 11-16 Days Late 17-30 Days Late 31-60 Days Late 61+ Days Late   Twice Weekly Repeat last dose Reduce last dose . 2 Reduce last dose . 4 Dilute back 1 vial, start at .05 Patient must start over     Weekly Repeat last dose Reduce last dose . 2 Reduce last dose . 4 Dilute back 1 vial, start at .05 Patient must start over Every 2 Weeks Repeat last dose Reduce last dose . 2. Reduce last dose . 4 Ask provider for instruction Ask provider for instruction   Every 3 Weeks Repeat last dose Reduce last dose . 2 Reduce last dose . 4 Ask provider for instruction Ask provider for instruction   Every 4 Weeks Repeat last dose Reduce last dose . 2 Reduce last dose . 4 Ask provider for instruction Ask provider for instruction     Patient/gaurdian made aware of potential anaphylaxis risks associated with receiving allergy injections and wishes to proceed. SHOT REACTION TREATMENT INSTRUCTIONS    During the 30 minute wait after an allergy injection the following symptoms should be reported:    Itching other than at the injection site  Hives or swelling other than at the injection site  Redness other than at the injection site  Difficulty breathing  Chest tightness  Difficulty swallowing  Throat tightness    If these symptoms occur, NOTIFY PROVIDER and the following treatment should be administered:    1. Epinephrine 1:1000 IM - 0.3 ml if > 66 lbs or more, 0.15 ml if 33 - 63 lbs, or 0.1 ml if <33 lbs   2. Diphenhydramine - give all intramuscular:     2 to <6 years (off-label use): 6.25 mg,    6 to <12 years: 12.5 to 25 mg;    ?12 years: 25-50 mg.  3.  Famotidine:  Adults 40 mg oral    Adolescents age 12 years and >88 lbs: 40 mg    Children and Adolescents ? 12years of age: Initial: 0.25 mg/kg/dose   every 12 hours (maximum daily dose: 40 mg/day)    Epi dose may me repeated in 5-15 minutes if adequate resolution of symptoms does not occur    Patient should be observed for at least one hour after final epi dose and must be seen by provider. Patients cannot drive themselves if they have received diphenhydramine.

## 2019-09-04 NOTE — PATIENT INSTRUCTIONS
home?  · Do not smoke or allow others to smoke around you. Smoking makes allergies worse. If you need help quitting, talk to your doctor about stop-smoking programs and medicines. These can increase your chances of quitting for good. · If there is a lot of pollution, pollen, or dust outside, stay inside and keep the windows closed. Use an air conditioner when it's hot outside, and use an air filter in your home. · If dust or dust mites trigger your asthma, decrease the dust around your bed:  ? Wash sheets, pillowcases, and other bedding in hot water every week. ? Use dust-proof covers for pillows, duvets, and mattresses. Avoid plastic covers, because they tear easily and do not \"breathe. \" Wash as instructed on the label. ? Do not use any blankets and pillows that you do not need. ? Use blankets that you can wash in your washing machine. ? Consider removing drapes and carpets, which attract and hold dust, from your bedroom. · If mold triggers your allergies, get rid of furniture, rugs, and drapes that smell musty. Check for mold under sinks and in the bathroom, attic, and basement. Use a dehumidifier to control mold in these areas. · If pet dander triggers your allergies, keep pets outside or out of your bedroom. Old carpet and cloth furniture can hold a lot of animal dander. You may need to replace them. · If your allergies are triggered by cold air, wear a scarf around your face, and breathe through your nose. · Avoid colds and flu. Get a pneumococcal vaccine shot. If you have had one before, ask your doctor whether you need another dose. Get a flu vaccine every year. If you must be around people with colds or the flu, wash your hands often. When should you call for help? Give an epinephrine shot if:    · You think you are having a severe allergic reaction.    After giving an epinephrine shot call 911, even if you feel better.   Call 911 if:    · You have symptoms of a severe allergic reaction.  These

## 2019-09-09 ENCOUNTER — NURSE ONLY (OUTPATIENT)
Dept: ALLERGY | Age: 59
End: 2019-09-09
Payer: COMMERCIAL

## 2019-09-09 VITALS
HEART RATE: 80 BPM | RESPIRATION RATE: 16 BRPM | SYSTOLIC BLOOD PRESSURE: 126 MMHG | WEIGHT: 197.8 LBS | TEMPERATURE: 98.1 F | DIASTOLIC BLOOD PRESSURE: 72 MMHG | BODY MASS INDEX: 33.43 KG/M2

## 2019-09-09 DIAGNOSIS — Z91.038 ALLERGY TO COCKROACHES: ICD-10-CM

## 2019-09-09 DIAGNOSIS — Z91.09 ENCOUNTER FOR DESENSITIZATION TO POLLEN ALLERGEN: Primary | ICD-10-CM

## 2019-09-09 DIAGNOSIS — J30.89 ALLERGY TO DUST: ICD-10-CM

## 2019-09-09 DIAGNOSIS — Z51.6 ENCOUNTER FOR DESENSITIZATION TO POLLEN ALLERGEN: Primary | ICD-10-CM

## 2019-09-09 PROBLEM — J82.83 EOSINOPHILIC ASTHMA: Status: ACTIVE | Noted: 2019-09-09

## 2019-09-09 PROCEDURE — 95117 IMMUNOTHERAPY INJECTIONS: CPT | Performed by: NURSE PRACTITIONER

## 2019-09-12 ENCOUNTER — NURSE ONLY (OUTPATIENT)
Dept: ALLERGY | Age: 59
End: 2019-09-12
Payer: COMMERCIAL

## 2019-09-12 ENCOUNTER — OFFICE VISIT (OUTPATIENT)
Dept: PSYCHIATRY | Age: 59
End: 2019-09-12
Payer: COMMERCIAL

## 2019-09-12 VITALS
HEART RATE: 76 BPM | SYSTOLIC BLOOD PRESSURE: 112 MMHG | TEMPERATURE: 97.5 F | HEIGHT: 65 IN | RESPIRATION RATE: 14 BRPM | DIASTOLIC BLOOD PRESSURE: 68 MMHG | BODY MASS INDEX: 32.24 KG/M2 | WEIGHT: 193.5 LBS

## 2019-09-12 DIAGNOSIS — F33.1 MODERATE EPISODE OF RECURRENT MAJOR DEPRESSIVE DISORDER (HCC): Primary | ICD-10-CM

## 2019-09-12 DIAGNOSIS — J45.40 MODERATE PERSISTENT EXTRINSIC ASTHMA WITHOUT COMPLICATION: ICD-10-CM

## 2019-09-12 DIAGNOSIS — Z91.09 ENCOUNTER FOR DESENSITIZATION TO POLLEN ALLERGEN: Primary | ICD-10-CM

## 2019-09-12 DIAGNOSIS — Z51.6 ENCOUNTER FOR DESENSITIZATION TO POLLEN ALLERGEN: Primary | ICD-10-CM

## 2019-09-12 DIAGNOSIS — J30.1 ACUTE SEASONAL ALLERGIC RHINITIS DUE TO POLLEN: ICD-10-CM

## 2019-09-12 DIAGNOSIS — J45.50 ASTHMA IN ADULT, SEVERE PERSISTENT, UNCOMPLICATED: ICD-10-CM

## 2019-09-12 DIAGNOSIS — Z51.6 ALLERGY DESENSITIZATION THERAPY: ICD-10-CM

## 2019-09-12 DIAGNOSIS — J30.9 ALLERGIC RHINITIS, UNSPECIFIED SEASONALITY, UNSPECIFIED TRIGGER: ICD-10-CM

## 2019-09-12 DIAGNOSIS — F17.200 TOBACCO DEPENDENCY: ICD-10-CM

## 2019-09-12 DIAGNOSIS — J82.83 EOSINOPHILIC ASTHMA: ICD-10-CM

## 2019-09-12 PROCEDURE — 95117 IMMUNOTHERAPY INJECTIONS: CPT | Performed by: NURSE PRACTITIONER

## 2019-09-12 PROCEDURE — 4004F PT TOBACCO SCREEN RCVD TLK: CPT | Performed by: NURSE PRACTITIONER

## 2019-09-12 PROCEDURE — G8427 DOCREV CUR MEDS BY ELIG CLIN: HCPCS | Performed by: NURSE PRACTITIONER

## 2019-09-12 PROCEDURE — 3017F COLORECTAL CA SCREEN DOC REV: CPT | Performed by: NURSE PRACTITIONER

## 2019-09-12 PROCEDURE — G8417 CALC BMI ABV UP PARAM F/U: HCPCS | Performed by: NURSE PRACTITIONER

## 2019-09-12 PROCEDURE — 99213 OFFICE O/P EST LOW 20 MIN: CPT | Performed by: NURSE PRACTITIONER
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
## 2019-09-16 ENCOUNTER — NURSE ONLY (OUTPATIENT)
Dept: ALLERGY | Age: 59
End: 2019-09-16
Payer: COMMERCIAL

## 2019-09-16 VITALS
HEIGHT: 65 IN | BODY MASS INDEX: 33.42 KG/M2 | RESPIRATION RATE: 16 BRPM | SYSTOLIC BLOOD PRESSURE: 122 MMHG | DIASTOLIC BLOOD PRESSURE: 86 MMHG | WEIGHT: 200.6 LBS | TEMPERATURE: 98.6 F | HEART RATE: 64 BPM

## 2019-09-16 DIAGNOSIS — Z91.038 ALLERGY TO COCKROACHES: ICD-10-CM

## 2019-09-16 DIAGNOSIS — Z91.018 ALLERGY TO CHOCOLATE: ICD-10-CM

## 2019-09-16 DIAGNOSIS — Z91.09 ENCOUNTER FOR DESENSITIZATION TO POLLEN ALLERGEN: Primary | ICD-10-CM

## 2019-09-16 DIAGNOSIS — Z91.048 ALLERGY TO MOLD: ICD-10-CM

## 2019-09-16 DIAGNOSIS — J30.1 ALLERGY TO TREES: ICD-10-CM

## 2019-09-16 DIAGNOSIS — Z51.6 ENCOUNTER FOR DESENSITIZATION TO POLLEN ALLERGEN: Primary | ICD-10-CM

## 2019-09-16 DIAGNOSIS — Z91.018 NUT ALLERGY: ICD-10-CM

## 2019-09-16 DIAGNOSIS — J30.89 ALLERGY TO DUST: ICD-10-CM

## 2019-09-16 PROCEDURE — 95117 IMMUNOTHERAPY INJECTIONS: CPT | Performed by: NURSE PRACTITIONER

## 2019-09-18 ENCOUNTER — TELEPHONE (OUTPATIENT)
Dept: FAMILY MEDICINE CLINIC | Age: 59
End: 2019-09-18

## 2019-09-19 ENCOUNTER — NURSE ONLY (OUTPATIENT)
Dept: ALLERGY | Age: 59
End: 2019-09-19
Payer: COMMERCIAL

## 2019-09-19 VITALS
DIASTOLIC BLOOD PRESSURE: 80 MMHG | RESPIRATION RATE: 20 BRPM | SYSTOLIC BLOOD PRESSURE: 120 MMHG | BODY MASS INDEX: 33.19 KG/M2 | WEIGHT: 199.2 LBS | HEIGHT: 65 IN | TEMPERATURE: 97.4 F | HEART RATE: 88 BPM

## 2019-09-19 DIAGNOSIS — Z51.6 NEED FOR DESENSITIZATION TO ALLERGENS: ICD-10-CM

## 2019-09-19 PROCEDURE — 95117 IMMUNOTHERAPY INJECTIONS: CPT | Performed by: NURSE PRACTITIONER

## 2019-09-20 RX ORDER — LEVOTHYROXINE SODIUM 88 MCG
TABLET ORAL
Qty: 30 TABLET | Refills: 9 | Status: SHIPPED | OUTPATIENT
Start: 2019-09-20 | End: 2020-08-26

## 2019-09-23 ENCOUNTER — NURSE ONLY (OUTPATIENT)
Dept: ALLERGY | Age: 59
End: 2019-09-23
Payer: COMMERCIAL

## 2019-09-23 VITALS
SYSTOLIC BLOOD PRESSURE: 112 MMHG | WEIGHT: 202 LBS | BODY MASS INDEX: 33.66 KG/M2 | TEMPERATURE: 98 F | RESPIRATION RATE: 16 BRPM | HEIGHT: 65 IN | DIASTOLIC BLOOD PRESSURE: 76 MMHG | HEART RATE: 68 BPM

## 2019-09-23 DIAGNOSIS — Z91.018 NUT ALLERGY: ICD-10-CM

## 2019-09-23 DIAGNOSIS — Z91.038 ALLERGY TO COCKROACHES: ICD-10-CM

## 2019-09-23 DIAGNOSIS — Z91.048 ALLERGY TO MOLD: ICD-10-CM

## 2019-09-23 DIAGNOSIS — J30.1 ALLERGY TO TREES: ICD-10-CM

## 2019-09-23 DIAGNOSIS — Z91.018 ALLERGY TO CHOCOLATE: ICD-10-CM

## 2019-09-23 DIAGNOSIS — J30.89 ALLERGY TO DUST: ICD-10-CM

## 2019-09-23 DIAGNOSIS — Z51.6 NEED FOR DESENSITIZATION TO ALLERGENS: Primary | ICD-10-CM

## 2019-09-23 PROCEDURE — 95117 IMMUNOTHERAPY INJECTIONS: CPT | Performed by: NURSE PRACTITIONER

## 2019-10-03 ENCOUNTER — NURSE ONLY (OUTPATIENT)
Dept: ALLERGY | Age: 59
End: 2019-10-03
Payer: COMMERCIAL

## 2019-10-03 VITALS
TEMPERATURE: 98.1 F | DIASTOLIC BLOOD PRESSURE: 76 MMHG | HEART RATE: 84 BPM | WEIGHT: 200 LBS | BODY MASS INDEX: 33.8 KG/M2 | SYSTOLIC BLOOD PRESSURE: 124 MMHG | RESPIRATION RATE: 16 BRPM

## 2019-10-03 DIAGNOSIS — Z51.6 ENCOUNTER FOR DESENSITIZATION TO POLLEN ALLERGEN: Primary | ICD-10-CM

## 2019-10-03 DIAGNOSIS — Z91.018 NUT ALLERGY: ICD-10-CM

## 2019-10-03 DIAGNOSIS — J30.89 ALLERGY TO DUST: ICD-10-CM

## 2019-10-03 DIAGNOSIS — J30.1 ALLERGY TO TREES: ICD-10-CM

## 2019-10-03 DIAGNOSIS — Z91.09 ENCOUNTER FOR DESENSITIZATION TO POLLEN ALLERGEN: Primary | ICD-10-CM

## 2019-10-03 PROCEDURE — 95117 IMMUNOTHERAPY INJECTIONS: CPT | Performed by: NURSE PRACTITIONER

## 2019-10-03 RX ORDER — LEVOCETIRIZINE DIHYDROCHLORIDE 5 MG/1
5 TABLET, FILM COATED ORAL NIGHTLY
Qty: 30 TABLET | Refills: 11 | Status: SHIPPED | OUTPATIENT
Start: 2019-10-03 | End: 2019-12-05 | Stop reason: SDUPTHER

## 2019-10-07 ENCOUNTER — NURSE ONLY (OUTPATIENT)
Dept: ALLERGY | Age: 59
End: 2019-10-07
Payer: COMMERCIAL

## 2019-10-07 VITALS
RESPIRATION RATE: 16 BRPM | SYSTOLIC BLOOD PRESSURE: 132 MMHG | DIASTOLIC BLOOD PRESSURE: 80 MMHG | BODY MASS INDEX: 33.41 KG/M2 | TEMPERATURE: 97.5 F | HEART RATE: 64 BPM | WEIGHT: 197.7 LBS

## 2019-10-07 DIAGNOSIS — J30.89 ALLERGY TO DUST: ICD-10-CM

## 2019-10-07 DIAGNOSIS — J30.1 ALLERGY TO TREES: ICD-10-CM

## 2019-10-07 DIAGNOSIS — Z91.09 ENCOUNTER FOR DESENSITIZATION TO POLLEN ALLERGEN: Primary | ICD-10-CM

## 2019-10-07 DIAGNOSIS — Z91.048 ALLERGY TO MOLD: ICD-10-CM

## 2019-10-07 DIAGNOSIS — Z51.6 ENCOUNTER FOR DESENSITIZATION TO POLLEN ALLERGEN: Primary | ICD-10-CM

## 2019-10-07 PROCEDURE — 95117 IMMUNOTHERAPY INJECTIONS: CPT | Performed by: NURSE PRACTITIONER

## 2019-10-10 ENCOUNTER — TELEPHONE (OUTPATIENT)
Dept: PSYCHIATRY | Age: 59
End: 2019-10-10

## 2019-10-10 ENCOUNTER — NURSE ONLY (OUTPATIENT)
Dept: ALLERGY | Age: 59
End: 2019-10-10
Payer: COMMERCIAL

## 2019-10-10 VITALS
BODY MASS INDEX: 32.64 KG/M2 | HEART RATE: 64 BPM | WEIGHT: 195.9 LBS | DIASTOLIC BLOOD PRESSURE: 80 MMHG | RESPIRATION RATE: 16 BRPM | TEMPERATURE: 98 F | HEIGHT: 65 IN | SYSTOLIC BLOOD PRESSURE: 122 MMHG

## 2019-10-10 DIAGNOSIS — Z91.038 ALLERGY TO COCKROACHES: ICD-10-CM

## 2019-10-10 DIAGNOSIS — Z91.018 ALLERGY TO CHOCOLATE: ICD-10-CM

## 2019-10-10 DIAGNOSIS — Z51.6 NEED FOR DESENSITIZATION TO ALLERGENS: Primary | ICD-10-CM

## 2019-10-10 DIAGNOSIS — J45.50 ASTHMA IN ADULT, SEVERE PERSISTENT, UNCOMPLICATED: ICD-10-CM

## 2019-10-10 PROCEDURE — 95117 IMMUNOTHERAPY INJECTIONS: CPT | Performed by: NURSE PRACTITIONER

## 2019-10-14 ENCOUNTER — NURSE ONLY (OUTPATIENT)
Dept: ALLERGY | Age: 59
End: 2019-10-14
Payer: COMMERCIAL

## 2019-10-14 VITALS
BODY MASS INDEX: 33.15 KG/M2 | HEIGHT: 65 IN | DIASTOLIC BLOOD PRESSURE: 88 MMHG | WEIGHT: 199 LBS | TEMPERATURE: 97.6 F | RESPIRATION RATE: 14 BRPM | HEART RATE: 66 BPM | SYSTOLIC BLOOD PRESSURE: 140 MMHG

## 2019-10-14 DIAGNOSIS — Z51.6 ENCOUNTER FOR DESENSITIZATION TO POLLEN ALLERGEN: ICD-10-CM

## 2019-10-14 DIAGNOSIS — Z91.038 ALLERGY TO COCKROACHES: ICD-10-CM

## 2019-10-14 DIAGNOSIS — J45.50 ASTHMA IN ADULT, SEVERE PERSISTENT, UNCOMPLICATED: ICD-10-CM

## 2019-10-14 DIAGNOSIS — Z51.6 NEED FOR DESENSITIZATION TO ALLERGENS: Primary | ICD-10-CM

## 2019-10-14 DIAGNOSIS — J82.83 EOSINOPHILIC ASTHMA: ICD-10-CM

## 2019-10-14 DIAGNOSIS — Z91.09 ENCOUNTER FOR DESENSITIZATION TO POLLEN ALLERGEN: ICD-10-CM

## 2019-10-14 DIAGNOSIS — Z51.6 ALLERGY DESENSITIZATION THERAPY: ICD-10-CM

## 2019-10-14 PROCEDURE — 95117 IMMUNOTHERAPY INJECTIONS: CPT | Performed by: NURSE PRACTITIONER

## 2019-10-17 ENCOUNTER — NURSE ONLY (OUTPATIENT)
Dept: ALLERGY | Age: 59
End: 2019-10-17
Payer: COMMERCIAL

## 2019-10-17 ENCOUNTER — TELEPHONE (OUTPATIENT)
Dept: ENT CLINIC | Age: 59
End: 2019-10-17

## 2019-10-17 VITALS
SYSTOLIC BLOOD PRESSURE: 126 MMHG | DIASTOLIC BLOOD PRESSURE: 72 MMHG | BODY MASS INDEX: 33.99 KG/M2 | TEMPERATURE: 98 F | RESPIRATION RATE: 16 BRPM | HEART RATE: 72 BPM | WEIGHT: 201.1 LBS

## 2019-10-17 DIAGNOSIS — Z51.6 ENCOUNTER FOR DESENSITIZATION TO POLLEN ALLERGEN: Primary | ICD-10-CM

## 2019-10-17 DIAGNOSIS — Z91.09 ENCOUNTER FOR DESENSITIZATION TO POLLEN ALLERGEN: Primary | ICD-10-CM

## 2019-10-17 DIAGNOSIS — Z91.048 ALLERGY TO MOLD: ICD-10-CM

## 2019-10-17 DIAGNOSIS — J30.89 ALLERGY TO DUST: ICD-10-CM

## 2019-10-17 DIAGNOSIS — J30.1 ALLERGY TO TREES: ICD-10-CM

## 2019-10-17 PROCEDURE — 95117 IMMUNOTHERAPY INJECTIONS: CPT | Performed by: NURSE PRACTITIONER

## 2019-10-20 DIAGNOSIS — J45.50 SEVERE PERSISTENT ASTHMA WITHOUT COMPLICATION: ICD-10-CM

## 2019-10-21 ENCOUNTER — NURSE ONLY (OUTPATIENT)
Dept: ALLERGY | Age: 59
End: 2019-10-21
Payer: COMMERCIAL

## 2019-10-21 VITALS
SYSTOLIC BLOOD PRESSURE: 122 MMHG | HEART RATE: 68 BPM | RESPIRATION RATE: 16 BRPM | DIASTOLIC BLOOD PRESSURE: 76 MMHG | WEIGHT: 200.3 LBS | TEMPERATURE: 97.6 F | BODY MASS INDEX: 33.85 KG/M2

## 2019-10-21 DIAGNOSIS — J30.89 ALLERGY TO DUST: ICD-10-CM

## 2019-10-21 DIAGNOSIS — Z51.6 ENCOUNTER FOR DESENSITIZATION TO POLLEN ALLERGEN: Primary | ICD-10-CM

## 2019-10-21 DIAGNOSIS — J30.1 ALLERGY TO TREES: ICD-10-CM

## 2019-10-21 DIAGNOSIS — Z91.09 ENCOUNTER FOR DESENSITIZATION TO POLLEN ALLERGEN: Primary | ICD-10-CM

## 2019-10-21 PROCEDURE — 95117 IMMUNOTHERAPY INJECTIONS: CPT | Performed by: NURSE PRACTITIONER

## 2019-10-21 RX ORDER — PANTOPRAZOLE SODIUM 40 MG/1
TABLET, DELAYED RELEASE ORAL
Qty: 30 TABLET | Refills: 8 | Status: SHIPPED | OUTPATIENT
Start: 2019-10-21 | End: 2019-11-04 | Stop reason: SDUPTHER

## 2019-10-21 RX ORDER — ALBUTEROL SULFATE 90 UG/1
AEROSOL, METERED RESPIRATORY (INHALATION)
Qty: 1 INHALER | Refills: 5 | Status: SHIPPED | OUTPATIENT
Start: 2019-10-21 | End: 2019-12-05 | Stop reason: SDUPTHER

## 2019-10-24 ENCOUNTER — NURSE ONLY (OUTPATIENT)
Dept: ALLERGY | Age: 59
End: 2019-10-24
Payer: COMMERCIAL

## 2019-10-24 VITALS
BODY MASS INDEX: 33.77 KG/M2 | DIASTOLIC BLOOD PRESSURE: 72 MMHG | HEART RATE: 80 BPM | RESPIRATION RATE: 16 BRPM | WEIGHT: 199.8 LBS | SYSTOLIC BLOOD PRESSURE: 134 MMHG | TEMPERATURE: 97.6 F

## 2019-10-24 DIAGNOSIS — Z91.048 ALLERGY TO MOLD: ICD-10-CM

## 2019-10-24 DIAGNOSIS — Z91.09 ENCOUNTER FOR DESENSITIZATION TO POLLEN ALLERGEN: Primary | ICD-10-CM

## 2019-10-24 DIAGNOSIS — J30.89 ALLERGY TO DUST: ICD-10-CM

## 2019-10-24 DIAGNOSIS — J30.1 ALLERGY TO TREES: ICD-10-CM

## 2019-10-24 DIAGNOSIS — Z51.6 ENCOUNTER FOR DESENSITIZATION TO POLLEN ALLERGEN: Primary | ICD-10-CM

## 2019-10-24 PROCEDURE — 95117 IMMUNOTHERAPY INJECTIONS: CPT | Performed by: NURSE PRACTITIONER

## 2019-10-28 ENCOUNTER — NURSE ONLY (OUTPATIENT)
Dept: ALLERGY | Age: 59
End: 2019-10-28
Payer: COMMERCIAL

## 2019-10-28 ENCOUNTER — PATIENT MESSAGE (OUTPATIENT)
Dept: FAMILY MEDICINE CLINIC | Age: 59
End: 2019-10-28

## 2019-10-28 VITALS
TEMPERATURE: 97.6 F | BODY MASS INDEX: 34.36 KG/M2 | WEIGHT: 203.3 LBS | HEART RATE: 68 BPM | DIASTOLIC BLOOD PRESSURE: 76 MMHG | SYSTOLIC BLOOD PRESSURE: 118 MMHG | RESPIRATION RATE: 12 BRPM

## 2019-10-28 DIAGNOSIS — Z91.048 ALLERGY TO MOLD: ICD-10-CM

## 2019-10-28 DIAGNOSIS — Z51.6 ENCOUNTER FOR DESENSITIZATION TO POLLEN ALLERGEN: Primary | ICD-10-CM

## 2019-10-28 DIAGNOSIS — J30.89 ALLERGY TO DUST: ICD-10-CM

## 2019-10-28 DIAGNOSIS — J30.1 ALLERGY TO TREES: ICD-10-CM

## 2019-10-28 DIAGNOSIS — Z91.09 ENCOUNTER FOR DESENSITIZATION TO POLLEN ALLERGEN: Primary | ICD-10-CM

## 2019-10-28 PROCEDURE — 95117 IMMUNOTHERAPY INJECTIONS: CPT | Performed by: NURSE PRACTITIONER

## 2019-10-31 ENCOUNTER — NURSE ONLY (OUTPATIENT)
Dept: ALLERGY | Age: 59
End: 2019-10-31
Payer: COMMERCIAL

## 2019-10-31 VITALS
RESPIRATION RATE: 12 BRPM | TEMPERATURE: 98.3 F | SYSTOLIC BLOOD PRESSURE: 124 MMHG | HEART RATE: 76 BPM | DIASTOLIC BLOOD PRESSURE: 80 MMHG | WEIGHT: 202.6 LBS | BODY MASS INDEX: 34.24 KG/M2

## 2019-10-31 DIAGNOSIS — Z91.09 ENCOUNTER FOR DESENSITIZATION TO POLLEN ALLERGEN: Primary | ICD-10-CM

## 2019-10-31 DIAGNOSIS — J30.89 ALLERGY TO DUST: ICD-10-CM

## 2019-10-31 DIAGNOSIS — Z91.048 ALLERGY TO MOLD: ICD-10-CM

## 2019-10-31 DIAGNOSIS — Z51.6 ENCOUNTER FOR DESENSITIZATION TO POLLEN ALLERGEN: Primary | ICD-10-CM

## 2019-10-31 DIAGNOSIS — J30.1 ALLERGY TO TREES: ICD-10-CM

## 2019-10-31 PROCEDURE — 95117 IMMUNOTHERAPY INJECTIONS: CPT | Performed by: NURSE PRACTITIONER

## 2019-11-04 ENCOUNTER — NURSE ONLY (OUTPATIENT)
Dept: ALLERGY | Age: 59
End: 2019-11-04
Payer: COMMERCIAL

## 2019-11-04 VITALS
WEIGHT: 204.3 LBS | SYSTOLIC BLOOD PRESSURE: 122 MMHG | RESPIRATION RATE: 16 BRPM | BODY MASS INDEX: 34.53 KG/M2 | HEART RATE: 76 BPM | DIASTOLIC BLOOD PRESSURE: 80 MMHG | TEMPERATURE: 97.7 F

## 2019-11-04 DIAGNOSIS — Z91.048 ALLERGY TO MOLD: ICD-10-CM

## 2019-11-04 DIAGNOSIS — J30.1 ALLERGY TO TREES: ICD-10-CM

## 2019-11-04 DIAGNOSIS — J30.89 ALLERGY TO DUST: ICD-10-CM

## 2019-11-04 DIAGNOSIS — Z91.09 ENCOUNTER FOR DESENSITIZATION TO POLLEN ALLERGEN: Primary | ICD-10-CM

## 2019-11-04 DIAGNOSIS — Z51.6 ENCOUNTER FOR DESENSITIZATION TO POLLEN ALLERGEN: Primary | ICD-10-CM

## 2019-11-04 PROCEDURE — 95117 IMMUNOTHERAPY INJECTIONS: CPT | Performed by: NURSE PRACTITIONER

## 2019-11-04 RX ORDER — AMLODIPINE BESYLATE 5 MG/1
TABLET ORAL
Qty: 90 TABLET | Refills: 3 | Status: SHIPPED | OUTPATIENT
Start: 2019-11-04 | End: 2021-01-18 | Stop reason: SDUPTHER

## 2019-11-04 RX ORDER — PANTOPRAZOLE SODIUM 40 MG/1
TABLET, DELAYED RELEASE ORAL
Qty: 90 TABLET | Refills: 3 | Status: SHIPPED | OUTPATIENT
Start: 2019-11-04 | End: 2019-12-23

## 2019-11-07 ENCOUNTER — NURSE ONLY (OUTPATIENT)
Dept: ALLERGY | Age: 59
End: 2019-11-07
Payer: COMMERCIAL

## 2019-11-07 VITALS — HEART RATE: 68 BPM | SYSTOLIC BLOOD PRESSURE: 128 MMHG | RESPIRATION RATE: 16 BRPM | DIASTOLIC BLOOD PRESSURE: 78 MMHG

## 2019-11-07 DIAGNOSIS — Z51.6 ENCOUNTER FOR DESENSITIZATION TO POLLEN ALLERGEN: Primary | ICD-10-CM

## 2019-11-07 DIAGNOSIS — Z91.09 ENCOUNTER FOR DESENSITIZATION TO POLLEN ALLERGEN: Primary | ICD-10-CM

## 2019-11-07 DIAGNOSIS — J30.1 ALLERGY TO TREES: ICD-10-CM

## 2019-11-07 DIAGNOSIS — J30.89 ALLERGY TO DUST: ICD-10-CM

## 2019-11-07 DIAGNOSIS — Z91.048 ALLERGY TO MOLD: ICD-10-CM

## 2019-11-07 PROCEDURE — 95117 IMMUNOTHERAPY INJECTIONS: CPT | Performed by: NURSE PRACTITIONER

## 2019-11-14 ENCOUNTER — NURSE ONLY (OUTPATIENT)
Dept: ALLERGY | Age: 59
End: 2019-11-14
Payer: COMMERCIAL

## 2019-11-14 ENCOUNTER — TELEPHONE (OUTPATIENT)
Dept: ENT CLINIC | Age: 59
End: 2019-11-14

## 2019-11-14 VITALS — RESPIRATION RATE: 16 BRPM | HEART RATE: 72 BPM | SYSTOLIC BLOOD PRESSURE: 128 MMHG | DIASTOLIC BLOOD PRESSURE: 82 MMHG

## 2019-11-14 DIAGNOSIS — J30.1 ALLERGY TO TREES: ICD-10-CM

## 2019-11-14 DIAGNOSIS — Z91.048 ALLERGY TO MOLD: ICD-10-CM

## 2019-11-14 DIAGNOSIS — J30.89 ALLERGY TO DUST: ICD-10-CM

## 2019-11-14 DIAGNOSIS — Z91.09 ENCOUNTER FOR DESENSITIZATION TO POLLEN ALLERGEN: Primary | ICD-10-CM

## 2019-11-14 DIAGNOSIS — Z51.6 ENCOUNTER FOR DESENSITIZATION TO POLLEN ALLERGEN: Primary | ICD-10-CM

## 2019-11-14 PROCEDURE — 95117 IMMUNOTHERAPY INJECTIONS: CPT | Performed by: NURSE PRACTITIONER

## 2019-11-20 ENCOUNTER — TELEPHONE (OUTPATIENT)
Dept: ENT CLINIC | Age: 59
End: 2019-11-20

## 2019-11-25 ENCOUNTER — NURSE ONLY (OUTPATIENT)
Dept: ALLERGY | Age: 59
End: 2019-11-25
Payer: COMMERCIAL

## 2019-11-25 VITALS — SYSTOLIC BLOOD PRESSURE: 124 MMHG | DIASTOLIC BLOOD PRESSURE: 70 MMHG | HEART RATE: 72 BPM | RESPIRATION RATE: 16 BRPM

## 2019-11-25 DIAGNOSIS — Z51.6 ENCOUNTER FOR DESENSITIZATION TO POLLEN ALLERGEN: Primary | ICD-10-CM

## 2019-11-25 DIAGNOSIS — Z91.048 ALLERGY TO MOLD: ICD-10-CM

## 2019-11-25 DIAGNOSIS — Z91.09 ENCOUNTER FOR DESENSITIZATION TO POLLEN ALLERGEN: Primary | ICD-10-CM

## 2019-11-25 DIAGNOSIS — J30.1 ALLERGY TO TREES: ICD-10-CM

## 2019-11-25 DIAGNOSIS — J30.89 ALLERGY TO DUST: ICD-10-CM

## 2019-11-25 PROCEDURE — 95117 IMMUNOTHERAPY INJECTIONS: CPT | Performed by: NURSE PRACTITIONER

## 2019-11-26 ENCOUNTER — TELEPHONE (OUTPATIENT)
Dept: ENT CLINIC | Age: 59
End: 2019-11-26

## 2019-12-02 ENCOUNTER — NURSE ONLY (OUTPATIENT)
Dept: ALLERGY | Age: 59
End: 2019-12-02
Payer: COMMERCIAL

## 2019-12-02 VITALS — HEART RATE: 68 BPM | DIASTOLIC BLOOD PRESSURE: 80 MMHG | RESPIRATION RATE: 12 BRPM | SYSTOLIC BLOOD PRESSURE: 134 MMHG

## 2019-12-02 DIAGNOSIS — J30.89 ALLERGY TO DUST: ICD-10-CM

## 2019-12-02 DIAGNOSIS — J30.1 ALLERGY TO TREES: ICD-10-CM

## 2019-12-02 DIAGNOSIS — Z51.6 ENCOUNTER FOR DESENSITIZATION TO POLLEN ALLERGEN: Primary | ICD-10-CM

## 2019-12-02 DIAGNOSIS — Z91.048 ALLERGY TO MOLD: ICD-10-CM

## 2019-12-02 DIAGNOSIS — Z91.09 ENCOUNTER FOR DESENSITIZATION TO POLLEN ALLERGEN: Primary | ICD-10-CM

## 2019-12-02 PROCEDURE — 95117 IMMUNOTHERAPY INJECTIONS: CPT | Performed by: NURSE PRACTITIONER

## 2019-12-05 ENCOUNTER — OFFICE VISIT (OUTPATIENT)
Dept: ALLERGY | Age: 59
End: 2019-12-05
Payer: COMMERCIAL

## 2019-12-05 VITALS
DIASTOLIC BLOOD PRESSURE: 76 MMHG | SYSTOLIC BLOOD PRESSURE: 124 MMHG | BODY MASS INDEX: 34.34 KG/M2 | WEIGHT: 203.2 LBS | HEART RATE: 68 BPM | RESPIRATION RATE: 12 BRPM

## 2019-12-05 DIAGNOSIS — Z72.0 TOBACCO USE: Primary | ICD-10-CM

## 2019-12-05 DIAGNOSIS — Z91.038 ALLERGY TO COCKROACHES: ICD-10-CM

## 2019-12-05 DIAGNOSIS — J30.1 ALLERGY TO TREES: ICD-10-CM

## 2019-12-05 DIAGNOSIS — Z91.048 ALLERGY TO MOLD: ICD-10-CM

## 2019-12-05 DIAGNOSIS — Z51.6 NEED FOR DESENSITIZATION TO ALLERGENS: ICD-10-CM

## 2019-12-05 DIAGNOSIS — J45.41 MODERATE PERSISTENT ASTHMA WITH EXACERBATION: ICD-10-CM

## 2019-12-05 DIAGNOSIS — Z51.6 ENCOUNTER FOR DESENSITIZATION TO POLLEN ALLERGEN: ICD-10-CM

## 2019-12-05 DIAGNOSIS — D72.10 EOSINOPHILIA: ICD-10-CM

## 2019-12-05 DIAGNOSIS — Z91.09 ENCOUNTER FOR DESENSITIZATION TO POLLEN ALLERGEN: ICD-10-CM

## 2019-12-05 DIAGNOSIS — J45.50 SEVERE PERSISTENT ASTHMA WITHOUT COMPLICATION: ICD-10-CM

## 2019-12-05 DIAGNOSIS — Z51.6 ALLERGY DESENSITIZATION THERAPY: ICD-10-CM

## 2019-12-05 DIAGNOSIS — R76.8 ELEVATED IGE LEVEL: ICD-10-CM

## 2019-12-05 DIAGNOSIS — J30.89 ALLERGY TO DUST: ICD-10-CM

## 2019-12-05 PROCEDURE — 4004F PT TOBACCO SCREEN RCVD TLK: CPT | Performed by: NURSE PRACTITIONER

## 2019-12-05 PROCEDURE — 3017F COLORECTAL CA SCREEN DOC REV: CPT | Performed by: NURSE PRACTITIONER

## 2019-12-05 PROCEDURE — 99213 OFFICE O/P EST LOW 20 MIN: CPT | Performed by: NURSE PRACTITIONER

## 2019-12-05 PROCEDURE — G8482 FLU IMMUNIZE ORDER/ADMIN: HCPCS | Performed by: NURSE PRACTITIONER

## 2019-12-05 PROCEDURE — G8427 DOCREV CUR MEDS BY ELIG CLIN: HCPCS | Performed by: NURSE PRACTITIONER

## 2019-12-05 PROCEDURE — 94010 BREATHING CAPACITY TEST: CPT | Performed by: NURSE PRACTITIONER

## 2019-12-05 PROCEDURE — G8417 CALC BMI ABV UP PARAM F/U: HCPCS | Performed by: NURSE PRACTITIONER

## 2019-12-05 PROCEDURE — 95117 IMMUNOTHERAPY INJECTIONS: CPT | Performed by: NURSE PRACTITIONER

## 2019-12-05 RX ORDER — ALBUTEROL SULFATE 90 UG/1
2 AEROSOL, METERED RESPIRATORY (INHALATION) EVERY 4 HOURS PRN
Qty: 1 INHALER | Refills: 11 | Status: SHIPPED | OUTPATIENT
Start: 2019-12-05 | End: 2019-12-05 | Stop reason: SDUPTHER

## 2019-12-05 RX ORDER — LEVOCETIRIZINE DIHYDROCHLORIDE 5 MG/1
5 TABLET, FILM COATED ORAL NIGHTLY
Qty: 30 TABLET | Refills: 11 | Status: SHIPPED | OUTPATIENT
Start: 2019-12-05 | End: 2020-11-09 | Stop reason: ALTCHOICE

## 2019-12-05 RX ORDER — AZELASTINE 1 MG/ML
1 SPRAY, METERED NASAL 2 TIMES DAILY
Qty: 1 BOTTLE | Refills: 11 | Status: SHIPPED | OUTPATIENT
Start: 2019-12-05 | End: 2020-11-09 | Stop reason: SDUPTHER

## 2019-12-05 RX ORDER — AZELASTINE HYDROCHLORIDE 0.5 MG/ML
1 SOLUTION/ DROPS OPHTHALMIC 2 TIMES DAILY
Qty: 1 BOTTLE | Refills: 11 | Status: SHIPPED | OUTPATIENT
Start: 2019-12-05 | End: 2020-11-09 | Stop reason: SDUPTHER

## 2019-12-05 RX ORDER — METHYLPREDNISOLONE 4 MG/1
TABLET ORAL
Qty: 21 TABLET | Refills: 0 | Status: SHIPPED | OUTPATIENT
Start: 2019-12-05 | End: 2020-01-27

## 2019-12-05 RX ORDER — ALBUTEROL SULFATE 90 UG/1
2 AEROSOL, METERED RESPIRATORY (INHALATION) EVERY 4 HOURS PRN
Qty: 1 INHALER | Refills: 11 | Status: SHIPPED | OUTPATIENT
Start: 2019-12-05 | End: 2020-06-22

## 2019-12-05 ASSESSMENT — ENCOUNTER SYMPTOMS
SHORTNESS OF BREATH: 1
DIARRHEA: 0
SORE THROAT: 0
CONSTIPATION: 0
EYE REDNESS: 1
BACK PAIN: 0
SINUS PAIN: 0
EYE ITCHING: 1
NAUSEA: 0
ABDOMINAL PAIN: 0
RHINORRHEA: 1
VOMITING: 0
SINUS PRESSURE: 1
WHEEZING: 0
COUGH: 0
EYE DISCHARGE: 1
EYE PAIN: 0
STRIDOR: 0

## 2019-12-09 ENCOUNTER — TELEPHONE (OUTPATIENT)
Dept: ENT CLINIC | Age: 59
End: 2019-12-09

## 2019-12-09 ENCOUNTER — OFFICE VISIT (OUTPATIENT)
Dept: DERMATOLOGY | Age: 59
End: 2019-12-09
Payer: COMMERCIAL

## 2019-12-09 VITALS
DIASTOLIC BLOOD PRESSURE: 74 MMHG | HEIGHT: 65 IN | SYSTOLIC BLOOD PRESSURE: 124 MMHG | WEIGHT: 202 LBS | BODY MASS INDEX: 33.66 KG/M2

## 2019-12-09 DIAGNOSIS — L92.0 GRANULOMA ANNULARE: ICD-10-CM

## 2019-12-09 DIAGNOSIS — L66.9 CENTRAL CENTRIFUGAL SCARRING ALOPECIA: Primary | ICD-10-CM

## 2019-12-09 PROCEDURE — G8417 CALC BMI ABV UP PARAM F/U: HCPCS | Performed by: DERMATOLOGY

## 2019-12-09 PROCEDURE — 4004F PT TOBACCO SCREEN RCVD TLK: CPT | Performed by: DERMATOLOGY

## 2019-12-09 PROCEDURE — 3017F COLORECTAL CA SCREEN DOC REV: CPT | Performed by: DERMATOLOGY

## 2019-12-09 PROCEDURE — 99214 OFFICE O/P EST MOD 30 MIN: CPT | Performed by: DERMATOLOGY

## 2019-12-09 PROCEDURE — G8482 FLU IMMUNIZE ORDER/ADMIN: HCPCS | Performed by: DERMATOLOGY

## 2019-12-09 PROCEDURE — G8427 DOCREV CUR MEDS BY ELIG CLIN: HCPCS | Performed by: DERMATOLOGY

## 2019-12-09 RX ORDER — CLOBETASOL PROPIONATE 0.46 MG/ML
SOLUTION TOPICAL
Qty: 60 ML | Refills: 11 | Status: SHIPPED | OUTPATIENT
Start: 2019-12-09

## 2019-12-09 RX ORDER — KETOCONAZOLE 20 MG/ML
SHAMPOO TOPICAL
Qty: 120 ML | Refills: 11 | Status: SHIPPED | OUTPATIENT
Start: 2019-12-09 | End: 2021-03-22 | Stop reason: SDUPTHER

## 2019-12-09 NOTE — TELEPHONE ENCOUNTER
Dai from [facility] left message on clinical voicemail stating that [facility] MUST be listed on the PA in order to them to fill the Rx. She states we need to call [facility] and have them update the PA to list [facility]. Call back number for [facility] is [phone] EXT: [phone].

## 2019-12-11 ENCOUNTER — NURSE ONLY (OUTPATIENT)
Dept: ALLERGY | Age: 59
End: 2019-12-11
Payer: COMMERCIAL

## 2019-12-11 VITALS — SYSTOLIC BLOOD PRESSURE: 120 MMHG | DIASTOLIC BLOOD PRESSURE: 76 MMHG | RESPIRATION RATE: 12 BRPM | HEART RATE: 68 BPM

## 2019-12-11 DIAGNOSIS — J30.1 ALLERGY TO TREES: ICD-10-CM

## 2019-12-11 DIAGNOSIS — Z51.6 ENCOUNTER FOR DESENSITIZATION TO POLLEN ALLERGEN: Primary | ICD-10-CM

## 2019-12-11 DIAGNOSIS — Z91.09 ENCOUNTER FOR DESENSITIZATION TO POLLEN ALLERGEN: Primary | ICD-10-CM

## 2019-12-11 DIAGNOSIS — Z91.048 ALLERGY TO MOLD: ICD-10-CM

## 2019-12-11 DIAGNOSIS — J30.89 ALLERGY TO DUST: ICD-10-CM

## 2019-12-11 PROCEDURE — 95117 IMMUNOTHERAPY INJECTIONS: CPT | Performed by: NURSE PRACTITIONER

## 2019-12-16 ENCOUNTER — NURSE ONLY (OUTPATIENT)
Dept: ALLERGY | Age: 59
End: 2019-12-16
Payer: COMMERCIAL

## 2019-12-16 VITALS — DIASTOLIC BLOOD PRESSURE: 68 MMHG | SYSTOLIC BLOOD PRESSURE: 124 MMHG | HEART RATE: 72 BPM | RESPIRATION RATE: 16 BRPM

## 2019-12-16 DIAGNOSIS — Z51.6 ENCOUNTER FOR DESENSITIZATION TO POLLEN ALLERGEN: Primary | ICD-10-CM

## 2019-12-16 DIAGNOSIS — J30.1 ALLERGY TO TREES: ICD-10-CM

## 2019-12-16 DIAGNOSIS — J30.89 ALLERGY TO DUST: ICD-10-CM

## 2019-12-16 DIAGNOSIS — Z91.09 ENCOUNTER FOR DESENSITIZATION TO POLLEN ALLERGEN: Primary | ICD-10-CM

## 2019-12-16 DIAGNOSIS — Z91.048 ALLERGY TO MOLD: ICD-10-CM

## 2019-12-16 PROCEDURE — 95117 IMMUNOTHERAPY INJECTIONS: CPT | Performed by: NURSE PRACTITIONER

## 2019-12-19 ENCOUNTER — NURSE ONLY (OUTPATIENT)
Dept: ALLERGY | Age: 59
End: 2019-12-19
Payer: COMMERCIAL

## 2019-12-19 VITALS — SYSTOLIC BLOOD PRESSURE: 114 MMHG | RESPIRATION RATE: 16 BRPM | HEART RATE: 70 BPM | DIASTOLIC BLOOD PRESSURE: 74 MMHG

## 2019-12-19 DIAGNOSIS — J30.89 ALLERGY TO DUST: ICD-10-CM

## 2019-12-19 DIAGNOSIS — J30.1 ALLERGY TO TREES: ICD-10-CM

## 2019-12-19 DIAGNOSIS — Z91.048 ALLERGY TO MOLD: ICD-10-CM

## 2019-12-19 DIAGNOSIS — Z91.09 ENCOUNTER FOR DESENSITIZATION TO POLLEN ALLERGEN: Primary | ICD-10-CM

## 2019-12-19 DIAGNOSIS — Z51.6 ENCOUNTER FOR DESENSITIZATION TO POLLEN ALLERGEN: Primary | ICD-10-CM

## 2019-12-19 PROCEDURE — 95117 IMMUNOTHERAPY INJECTIONS: CPT | Performed by: NURSE PRACTITIONER

## 2019-12-23 ENCOUNTER — TELEPHONE (OUTPATIENT)
Dept: FAMILY MEDICINE CLINIC | Age: 59
End: 2019-12-23

## 2019-12-23 ENCOUNTER — PATIENT MESSAGE (OUTPATIENT)
Dept: FAMILY MEDICINE CLINIC | Age: 59
End: 2019-12-23

## 2019-12-23 RX ORDER — ESOMEPRAZOLE MAGNESIUM 40 MG/1
40 CAPSULE, DELAYED RELEASE ORAL DAILY
Qty: 90 CAPSULE | Refills: 3 | Status: SHIPPED | OUTPATIENT
Start: 2019-12-23 | End: 2020-01-20 | Stop reason: SDUPTHER

## 2019-12-24 ENCOUNTER — HOSPITAL ENCOUNTER (OUTPATIENT)
Dept: CT IMAGING | Age: 59
Discharge: HOME | End: 2019-12-24
Payer: COMMERCIAL

## 2019-12-24 DIAGNOSIS — Z72.0 TOBACCO USE: ICD-10-CM

## 2019-12-24 PROCEDURE — G0297 LDCT FOR LUNG CA SCREEN: HCPCS

## 2020-01-06 ENCOUNTER — CLINICAL DOCUMENTATION (OUTPATIENT)
Dept: ALLERGY | Age: 60
End: 2020-01-06

## 2020-01-06 ENCOUNTER — NURSE ONLY (OUTPATIENT)
Dept: ALLERGY | Age: 60
End: 2020-01-06
Payer: COMMERCIAL

## 2020-01-06 VITALS — SYSTOLIC BLOOD PRESSURE: 118 MMHG | DIASTOLIC BLOOD PRESSURE: 74 MMHG | RESPIRATION RATE: 16 BRPM | HEART RATE: 70 BPM

## 2020-01-06 PROCEDURE — 95117 IMMUNOTHERAPY INJECTIONS: CPT | Performed by: NURSE PRACTITIONER

## 2020-01-09 ENCOUNTER — NURSE ONLY (OUTPATIENT)
Dept: ALLERGY | Age: 60
End: 2020-01-09
Payer: COMMERCIAL

## 2020-01-09 VITALS
SYSTOLIC BLOOD PRESSURE: 124 MMHG | BODY MASS INDEX: 34.14 KG/M2 | HEART RATE: 86 BPM | RESPIRATION RATE: 14 BRPM | DIASTOLIC BLOOD PRESSURE: 78 MMHG | HEIGHT: 65 IN

## 2020-01-09 PROCEDURE — 95117 IMMUNOTHERAPY INJECTIONS: CPT | Performed by: NURSE PRACTITIONER

## 2020-01-13 ENCOUNTER — NURSE ONLY (OUTPATIENT)
Dept: ALLERGY | Age: 60
End: 2020-01-13
Payer: COMMERCIAL

## 2020-01-13 VITALS — RESPIRATION RATE: 14 BRPM | DIASTOLIC BLOOD PRESSURE: 74 MMHG | SYSTOLIC BLOOD PRESSURE: 122 MMHG | HEART RATE: 72 BPM

## 2020-01-13 PROCEDURE — 95117 IMMUNOTHERAPY INJECTIONS: CPT | Performed by: NURSE PRACTITIONER

## 2020-01-20 ENCOUNTER — PATIENT MESSAGE (OUTPATIENT)
Dept: FAMILY MEDICINE CLINIC | Age: 60
End: 2020-01-20

## 2020-01-20 RX ORDER — ESOMEPRAZOLE MAGNESIUM 40 MG/1
40 CAPSULE, DELAYED RELEASE ORAL 2 TIMES DAILY
Qty: 180 CAPSULE | Refills: 3 | Status: SHIPPED | OUTPATIENT
Start: 2020-01-20 | End: 2021-04-01 | Stop reason: SDUPTHER

## 2020-01-20 NOTE — TELEPHONE ENCOUNTER
From: Vergia Severance  To: Maya Nugent DO  Sent: 1/20/2020 9:08 AM EST  Subject: Non-Urgent Medical Question    Hi Dr. Cindi Alegria,  May I try 2 capsules of ESOMEPRAZOLES instead of 1? The RX is written for 1 capsule a day. I do not know who put me on LANSOPRAZOLE initially but I was required to take 2 capsules per day. Thank you Sir. Sommer Bates

## 2020-01-23 ENCOUNTER — NURSE ONLY (OUTPATIENT)
Dept: ALLERGY | Age: 60
End: 2020-01-23
Payer: COMMERCIAL

## 2020-01-23 VITALS — RESPIRATION RATE: 14 BRPM | DIASTOLIC BLOOD PRESSURE: 72 MMHG | SYSTOLIC BLOOD PRESSURE: 118 MMHG | HEART RATE: 68 BPM

## 2020-01-23 PROCEDURE — 95117 IMMUNOTHERAPY INJECTIONS: CPT | Performed by: NURSE PRACTITIONER

## 2020-01-27 ENCOUNTER — TELEPHONE (OUTPATIENT)
Dept: PULMONOLOGY | Age: 60
End: 2020-01-27

## 2020-01-27 ENCOUNTER — OFFICE VISIT (OUTPATIENT)
Dept: ALLERGY | Age: 60
End: 2020-01-27
Payer: COMMERCIAL

## 2020-01-27 VITALS — DIASTOLIC BLOOD PRESSURE: 76 MMHG | RESPIRATION RATE: 14 BRPM | SYSTOLIC BLOOD PRESSURE: 118 MMHG | HEART RATE: 70 BPM

## 2020-01-27 PROCEDURE — G8427 DOCREV CUR MEDS BY ELIG CLIN: HCPCS | Performed by: NURSE PRACTITIONER

## 2020-01-27 PROCEDURE — 4004F PT TOBACCO SCREEN RCVD TLK: CPT | Performed by: NURSE PRACTITIONER

## 2020-01-27 PROCEDURE — G8482 FLU IMMUNIZE ORDER/ADMIN: HCPCS | Performed by: NURSE PRACTITIONER

## 2020-01-27 PROCEDURE — 99214 OFFICE O/P EST MOD 30 MIN: CPT | Performed by: NURSE PRACTITIONER

## 2020-01-27 PROCEDURE — G8417 CALC BMI ABV UP PARAM F/U: HCPCS | Performed by: NURSE PRACTITIONER

## 2020-01-27 PROCEDURE — 3023F SPIROM DOC REV: CPT | Performed by: NURSE PRACTITIONER

## 2020-01-27 PROCEDURE — 95117 IMMUNOTHERAPY INJECTIONS: CPT | Performed by: NURSE PRACTITIONER

## 2020-01-27 PROCEDURE — 3017F COLORECTAL CA SCREEN DOC REV: CPT | Performed by: NURSE PRACTITIONER

## 2020-01-27 PROCEDURE — G8926 SPIRO NO PERF OR DOC: HCPCS | Performed by: NURSE PRACTITIONER

## 2020-01-27 RX ORDER — NICOTINE 21 MG/24HR
1 PATCH, TRANSDERMAL 24 HOURS TRANSDERMAL EVERY 24 HOURS
Qty: 30 PATCH | Refills: 3 | Status: SHIPPED | OUTPATIENT
Start: 2020-01-27 | End: 2020-06-22

## 2020-01-27 RX ORDER — BUPROPION HYDROCHLORIDE 150 MG/1
150 TABLET ORAL EVERY MORNING
Qty: 30 TABLET | Refills: 3 | Status: SHIPPED | OUTPATIENT
Start: 2020-01-27 | End: 2021-02-10

## 2020-01-27 RX ORDER — BUDESONIDE AND FORMOTEROL FUMARATE DIHYDRATE 160; 4.5 UG/1; UG/1
AEROSOL RESPIRATORY (INHALATION)
Qty: 1 INHALER | Refills: 2 | Status: SHIPPED | OUTPATIENT
Start: 2020-01-27 | End: 2020-02-13 | Stop reason: SDUPTHER

## 2020-01-27 ASSESSMENT — ENCOUNTER SYMPTOMS
SHORTNESS OF BREATH: 1
WHEEZING: 0
EYE REDNESS: 1
SINUS PAIN: 0
EYE PAIN: 0
EYE ITCHING: 1
STRIDOR: 0
DIARRHEA: 0
SINUS PRESSURE: 1
CONSTIPATION: 0
NAUSEA: 0
RHINORRHEA: 1
VOMITING: 0
SORE THROAT: 0
BACK PAIN: 0
ABDOMINAL PAIN: 0
EYE DISCHARGE: 0
COUGH: 1

## 2020-01-27 NOTE — PROGRESS NOTES
[email or web address]    Allergy & Asthma   [address] [facility], [address]  Ph:   [phone]  Fax:[phone]    Provider:  Dr. Colton Wheat:   Chief Complaint   Patient presents with    Follow-up     Patient is here for CT results and allergy shots    Immunotherapy           HISTORY OF PRESENT ILLNESS: ESTABLISHED PATIENT HERE FOR EVALUATION   47-year-old female here today for evaluation of allergies. Patient brought in her inhalers including her Asmanex and her albuterol. Patient peak flow average 300 or in the yellow. Has been performing her asthma action control plan. Patient has been having some difficulty on using her inhalers. Patient states she continues to have allergy and hayfever-like symptoms including allergic rhinoconjunctivitis. She also has some shortness of breath. Patient would like to get results from a recent CT scan. Patient has had asthma for several years. She has recently progressed over the last year and gotten more severe. Patient rates it as moderate to severe in severity. She denies any nausea vomiting fever today. Patient states the inhalers have helped her some. She states that she has been off her allergy medicine for allergy testing without the medication her symptoms of congestion postnasal drip and rhinorrhea have gotten a lot worse. Patient to start on Xolair Thursday for allergies and asthma             Review of Systems:  Review of Systems   Constitutional: Negative for fever. HENT: Positive for congestion, postnasal drip, rhinorrhea and sinus pressure. Negative for ear discharge, ear pain, hearing loss, sinus pain, sore throat and tinnitus. Eyes: Positive for redness and itching. Negative for pain and discharge. Respiratory: Positive for cough and shortness of breath. Negative for wheezing and stridor. Cardiovascular: Negative for chest pain and palpitations.    Gastrointestinal: Negative for abdominal pain, constipation, diarrhea, nausea and vomiting. Musculoskeletal: Negative for back pain, myalgias and neck pain. Skin: Negative for rash. Allergic/Immunologic: Positive for environmental allergies and immunocompromised state. Neurological: Negative for dizziness and headaches. Hematological: Does not bruise/bleed easily. Psychiatric/Behavioral: The patient is not nervous/anxious. All other systems reviewed and are negative.         Past MedicalHistory:    Past Medical History:   Diagnosis Date    Allergic rhinitis     Anxiety     Arnold-Chiari malformation (HCC)     Asthma     Chronic bronchitis (HCC)     Fibromyalgia     GERD (gastroesophageal reflux disease)     Headache(784.0)     Hyperlipidemia     Neuropathy     Osteoarthritis     Sinusitis     Type II or unspecified type diabetes mellitus without mention of complication, not stated as uncontrolled        Past Surgical History:  Past Surgical History:   Procedure Laterality Date    APPENDECTOMY       SECTION      x2    COLONOSCOPY  [id number]    CSF SHUNT  2007    Cervical syrinx to subarachnoid shunt; thoracic syrinx to subarachnoid shunt (2 separate dural openings)    CYST REMOVAL      extradural cysts at thoracic 2-3    ECTOPIC PREGNANCY SURGERY      HEMICOLECTOMY      HERNIA REPAIR      inguinal     HYSTERECTOMY      SPINAL CORD DECOMPRESSION  2007    C4-5-6-7; T1-2-3 laminectomies    TOOTH EXTRACTION  2017       Family History:   Family History   Problem Relation Age of Onset    Cancer Other         colon    Mental Illness Other     High Blood Pressure Other     Diabetes Other     High Blood Pressure Sister        Social History:   Social History     Tobacco Use    Smoking status: Current Every Day Smoker     Packs/day: 0.75     Years: 41.00     Pack years: 30.75     Types: Cigarettes    Smokeless tobacco: Never Used   Substance Use Topics    Alcohol use: Yes     Comment: social Place 1 drop into both eyes 2 times daily, Disp: 1 Bottle, Rfl: 11    methylPREDNISolone (MEDROL DOSEPACK) 4 MG tablet, Take by mouth., Disp: 21 tablet, Rfl: 0    amLODIPine (NORVASC) 5 MG tablet, TAKE 1 TABLET BY MOUTH ONCE DAILY, Disp: 90 tablet, Rfl: 3    SYNTHROID 88 MCG tablet, TAKE 1 TABLET BY MOUTH ONCE DAILY ON AN EMPTY STOMACH WITH WATER. DO NOT EAT OR TAKE ANY OTHER MEDICATIONS FOR 30 MINUTES., Disp: 30 tablet, Rfl: 9    levomilnacipran (FETZIMA) 40 MG CP24 extended release capsule, Take 1 capsule by mouth daily, Disp: 30 capsule, Rfl: 2    fluconazole (DIFLUCAN) 150 MG tablet, , Disp: , Rfl:     Omeprazole Magnesium (PRILOSEC OTC PO), Take by mouth daily, Disp: , Rfl:     Multiple Vitamins-Minerals (MULTIVITAMIN PO), daily, Disp: , Rfl:     Probiotic Product (PROBIOTIC PO), daily, Disp: , Rfl:     Cyanocobalamin (B-12 PO), daily, Disp: , Rfl:     EPIPEN 2-VENESSA 0.3 MG/0.3ML SOAJ injection, INJECT 1 PEN IM AS A SINGLE DOSE PRN, Disp: , Rfl: 0    SF 5000 PLUS 1.1 % CREA, BRUSH TEETH TWICE DAILY FOR 2 MINUTES, SPIT EXCESS AFTER BRUSHING.  DO NOT RINSE WITH WATER , WAIT 30 MINUTES  FOR ANY FOOD OR DRINK, Disp: , Rfl: 3    Cholecalciferol (VITAMIN D3) 2000 units CAPS, Vitamin D3 2,000 unit capsule, Disp: , Rfl:     hydrocortisone (HYTONE) 2.5 % lotion, Apply topically daily, Disp: 120 mL, Rfl: 2    metFORMIN (GLUCOPHAGE) 500 MG tablet, TAKE 1 TABLET BY MOUTH TWICE DAILY WITH MEALS, Disp: 60 tablet, Rfl: 11    atorvastatin (LIPITOR) 10 MG tablet, TAKE 1 TABLET BY MOUTH EVERY DAY, Disp: 30 tablet, Rfl: 11    BIOTIN 5000 PO, Take by mouth daily , Disp: , Rfl:     ondansetron (ZOFRAN) 4 MG tablet, TAKE 1 TABLET BY MOUTH EVERY 8 HOURS AS NEEDED FOR NAUSEA OR VOMITING, Disp: 15 tablet, Rfl: 0    meloxicam (MOBIC) 7.5 MG tablet, TK 1 T PO BID, Disp: , Rfl: 4    LINZESS 290 MCG CAPS capsule, , Disp: , Rfl:     lidocaine (XYLOCAINE) 5 % ointment, Apply topically as needed for Pain Apply topically as needed. , Disp: , Rfl:     conjugated estrogens (PREMARIN) 0.625 MG/GM vaginal cream, Place vaginally as needed Place vaginally daily. , Disp: , Rfl:     lidocaine (LIDODERM) 5 %, Place 1 patch onto the skin daily 12 hours on, 12 hours off., Disp: , Rfl:     hydrocortisone (ANUSOL-HC) 25 MG suppository, Place 25 mg rectally as needed for Hemorrhoids, Disp: , Rfl:     fluticasone (FLONASE ALLERGY RELIEF) 50 MCG/ACT nasal spray, 1 spray by Nasal route daily, Disp: , Rfl:     hyoscyamine (LEVBID) 375 MCG extended release tablet, TAKE 1/2 TABLET BY MOUTH TWICE DAILY, Disp: 30 tablet, Rfl: 11    HYDROcodone-acetaminophen (NORCO) 5-325 MG per tablet, 1 tablet nightly as needed for Pain. Take 1 tablet every 4-6 hours as needed for pain , Disp: , Rfl:     NONFORMULARY, Immunotherapy allergy shot, Disp: , Rfl:     zinc 50 MG CAPS, Take  by mouth daily. , Disp: , Rfl:     Ascorbic Acid (VITAMIN C) 500 MG CAPS, Take  by mouth., Disp: , Rfl:     orphenadrine (NORFLEX) 100 MG tablet, Take 100 mg by mouth 2 times daily as needed. , Disp: , Rfl:     tramadol (ULTRAM) 50 MG tablet, Take 50 mg by mouth every 6 hours as needed. Take 1-2 tabs every 6 hrs prn , Disp: , Rfl:     gabapentin (NEURONTIN) 100 MG capsule, Take 100 mg by mouth 2 times daily. , Disp: , Rfl:     Respiratory Therapy Supplies (NEBULIZER COMPRESSOR) KIT, 1 kit by Does not apply route once for 1 dose, Disp: 360 kit, Rfl: 0      Physical Exam:      Vitals:    Vitals:    01/27/20 0804   BP: 118/76   Pulse: 70   Resp: 14                   I @FLOWSTAT(6)@ IPulse: 70 I @FLOWSTAT(8)@ I BP: 118/76 I @MQXRIW(29)@; @YBEGUC(89)@ I Resp: 14 I @FLOWSTAT(9)@ I   I @FLOWSTAT(10)@ I   I   I   I Facility age limit for growth percentiles is 20 years. I     Facility age limit for growth percentiles is 20 years. Facility age limit for growth percentiles is 20 years. Facility age limit for growth percentiles is 20 years.   No height and weight on file for this encounter. Physical Exam:    Physical Exam  Vitals signs and nursing note reviewed. Constitutional:       Appearance: Normal appearance. HENT:      Head: Normocephalic and atraumatic. Right Ear: Tympanic membrane, ear canal and external ear normal.      Left Ear: Tympanic membrane, ear canal and external ear normal.      Nose: Congestion and rhinorrhea present. Mouth/Throat:      Mouth: Mucous membranes are moist.   Eyes:      Extraocular Movements: Extraocular movements intact. Conjunctiva/sclera: Conjunctivae normal.      Pupils: Pupils are equal, round, and reactive to light. Neck:      Musculoskeletal: Normal range of motion and neck supple. Cardiovascular:      Rate and Rhythm: Normal rate and regular rhythm. Heart sounds: Normal heart sounds. Pulmonary:      Effort: Pulmonary effort is normal.      Breath sounds: Normal breath sounds. Comments: Breath sounds diminished lower bases  Musculoskeletal: Normal range of motion. Skin:     General: Skin is warm and dry. Neurological:      General: No focal deficit present. Mental Status: She is alert and oriented to person, place, and time. Mental status is at baseline. Psychiatric:         Mood and Affect: Mood normal.         Behavior: Behavior normal.         Thought Content:  Thought content normal.         Judgment: Judgment normal.             DATA:  Lab Review:    CBC:   Lab Results   Component Value Date    WBC 6.6 08/07/2019    RBC 4.27 08/07/2019    RBC 4.36 01/17/2012    HGB 12.6 08/07/2019    HCT 39.4 08/07/2019    MCV 92.3 08/07/2019    MCH 29.5 08/07/2019    MCHC 32.0 08/07/2019    RDW 14.5 01/16/2018     08/07/2019          IgE   Date/Time Value Ref Range Status   08/07/2019 09:40  (H) <101 IU/mL Final     Comment:     [facility] [address] [facility], [address] [place] [address] [phone]   08/07/2019 09:40 AM < 0.10 <=0.34 kU/L Final     Comment:     Allergen results of 0.10-0.34 kU/L for whole

## 2020-01-28 NOTE — TELEPHONE ENCOUNTER
Phoned patient. Appointments and testing scheduled. Patient reported not able to come in before 3-10-20.

## 2020-01-28 NOTE — TELEPHONE ENCOUNTER
Patient called back to schedule but unable to reach office. She will call back. She also has a referral for sleep.

## 2020-01-30 ENCOUNTER — NURSE ONLY (OUTPATIENT)
Dept: ALLERGY | Age: 60
End: 2020-01-30
Payer: COMMERCIAL

## 2020-01-30 VITALS — SYSTOLIC BLOOD PRESSURE: 118 MMHG | DIASTOLIC BLOOD PRESSURE: 80 MMHG | RESPIRATION RATE: 14 BRPM | HEART RATE: 70 BPM

## 2020-01-30 PROCEDURE — 96372 THER/PROPH/DIAG INJ SC/IM: CPT | Performed by: NURSE PRACTITIONER

## 2020-01-30 PROCEDURE — 96401 CHEMO ANTI-NEOPL SQ/IM: CPT | Performed by: NURSE PRACTITIONER

## 2020-01-30 PROCEDURE — 95117 IMMUNOTHERAPY INJECTIONS: CPT | Performed by: NURSE PRACTITIONER

## 2020-02-13 RX ORDER — BUDESONIDE AND FORMOTEROL FUMARATE DIHYDRATE 160; 4.5 UG/1; UG/1
AEROSOL RESPIRATORY (INHALATION)
Qty: 1 INHALER | Refills: 2 | Status: SHIPPED | OUTPATIENT
Start: 2020-02-13 | End: 2020-08-26 | Stop reason: SDUPTHER

## 2020-02-25 RX ORDER — ATORVASTATIN CALCIUM 10 MG/1
TABLET, FILM COATED ORAL
Qty: 30 TABLET | Refills: 0 | Status: SHIPPED | OUTPATIENT
Start: 2020-02-25 | End: 2020-03-24

## 2020-03-02 ENCOUNTER — NURSE ONLY (OUTPATIENT)
Dept: ALLERGY | Age: 60
End: 2020-03-02
Payer: COMMERCIAL

## 2020-03-02 VITALS — HEART RATE: 72 BPM | DIASTOLIC BLOOD PRESSURE: 78 MMHG | RESPIRATION RATE: 16 BRPM | SYSTOLIC BLOOD PRESSURE: 128 MMHG

## 2020-03-02 PROCEDURE — 95117 IMMUNOTHERAPY INJECTIONS: CPT | Performed by: NURSE PRACTITIONER

## 2020-03-03 ENCOUNTER — HOSPITAL ENCOUNTER (OUTPATIENT)
Dept: PULMONOLOGY | Age: 60
Discharge: HOME | End: 2020-03-03
Payer: COMMERCIAL

## 2020-03-03 ENCOUNTER — HOSPITAL ENCOUNTER (OUTPATIENT)
Dept: RESPIRATORY THERAPY | Age: 60
Discharge: HOME | End: 2020-03-03
Payer: COMMERCIAL

## 2020-03-03 ENCOUNTER — TELEPHONE (OUTPATIENT)
Dept: ENT CLINIC | Age: 60
End: 2020-03-03

## 2020-03-03 PROCEDURE — 94729 DIFFUSING CAPACITY: CPT

## 2020-03-03 PROCEDURE — 94060 EVALUATION OF WHEEZING: CPT

## 2020-03-03 PROCEDURE — 94726 PLETHYSMOGRAPHY LUNG VOLUMES: CPT

## 2020-03-03 PROCEDURE — 94761 N-INVAS EAR/PLS OXIMETRY MLT: CPT

## 2020-03-03 NOTE — PROGRESS NOTES
[facility], informed that if she wants overnight nocturnal sleep study as third step of home O2 eval that she should contact us

## 2020-03-03 NOTE — TELEPHONE ENCOUNTER
Sam Mendez called and stated that patient had a home O2 eval completed and she was 96% at rest and 97% with walking. She stated that she did not send patient home with the over night pulse ox as her oxygen levels were good. She stated however if we would like the patient to have the over night then we can call Sam Mendez and let her know.  [phone]

## 2020-03-05 ENCOUNTER — NURSE ONLY (OUTPATIENT)
Dept: ALLERGY | Age: 60
End: 2020-03-05
Payer: COMMERCIAL

## 2020-03-05 VITALS — HEART RATE: 70 BPM | RESPIRATION RATE: 14 BRPM | DIASTOLIC BLOOD PRESSURE: 76 MMHG | SYSTOLIC BLOOD PRESSURE: 124 MMHG

## 2020-03-05 PROCEDURE — 95117 IMMUNOTHERAPY INJECTIONS: CPT | Performed by: NURSE PRACTITIONER

## 2020-03-09 ENCOUNTER — NURSE ONLY (OUTPATIENT)
Dept: ALLERGY | Age: 60
End: 2020-03-09
Payer: COMMERCIAL

## 2020-03-09 VITALS — RESPIRATION RATE: 16 BRPM | SYSTOLIC BLOOD PRESSURE: 120 MMHG | HEART RATE: 80 BPM | DIASTOLIC BLOOD PRESSURE: 72 MMHG

## 2020-03-09 PROCEDURE — 95117 IMMUNOTHERAPY INJECTIONS: CPT | Performed by: NURSE PRACTITIONER

## 2020-03-09 PROCEDURE — 96372 THER/PROPH/DIAG INJ SC/IM: CPT | Performed by: NURSE PRACTITIONER

## 2020-03-10 NOTE — PROGRESS NOTES
DECOMPRESSION  01/09/2007    C4-5-6-7; T1-2-3 laminectomies    TOOTH EXTRACTION  09/2017       Allergies   Allergen Reactions    Bactrim [Sulfamethoxazole-Trimethoprim]     Morphine Other (See Comments)     \"I hallucinate\"    Peanut-Containing Drug Products     Tessalon [Benzonatate] Hives    Amoxicillin-Pot Clavulanate Rash    Ibuprofen [Ibuprofen] Rash    Lisinopril Rash    Macrobid [Nitrofurantoin Monohydrate Macrocrystals] Rash    Macrodantin [Nitrofurantoin] Rash    Ranitidine Rash       Current Outpatient Medications   Medication Sig Dispense Refill    metFORMIN (GLUCOPHAGE) 500 MG tablet TAKE 1 TABLET BY MOUTH TWICE DAILY WITH MEALS 180 tablet 3    atorvastatin (LIPITOR) 10 MG tablet Take 1 tablet by mouth once daily 90 tablet 3    budesonide-formoterol (SYMBICORT) 160-4.5 MCG/ACT AERO 2 puffs inhaled twice daily. Rinse mouth well after use.  1 Inhaler 2    nicotine (NICODERM CQ) 14 MG/24HR Place 1 patch onto the skin every 24 hours 30 patch 3    buPROPion (WELLBUTRIN XL) 150 MG extended release tablet Take 1 tablet by mouth every morning 30 tablet 3    esomeprazole (NEXIUM) 40 MG delayed release capsule Take 1 capsule by mouth 2 times daily 180 capsule 3    albuterol (PROVENTIL) (2.5 MG/3ML) 0.083% nebulizer solution USE 1 VIAL IN NEBULIZER EVERY 6 HOURS AS NEEDED FOR WHEEZING (FOR ASTHMA) 120 vial 11    omalizumab (OMALIZUMAB) 150 MG injection Inject 300 mg into the skin every 28 days Weight 92 kg 300 mg 11    clobetasol (TEMOVATE) 0.05 % external solution Apply daily to scaly or itchy areas on scalp 60 mL 11    ketoconazole (NIZORAL) 2 % shampoo Apply 3-4 times weekly to scalp, leave on for five minutes prior to washing off 120 mL 11    fluocinonide (LIDEX) 0.05 % cream Apply topically 2 times daily to rash on hands a needed 30 g 11    levocetirizine (XYZAL ALLERGY 24HR) 5 MG tablet Take 1 tablet by mouth nightly 30 tablet 11    albuterol sulfate  (90 Base) MCG/ACT inhaler needed for Hemorrhoids      fluticasone (FLONASE ALLERGY RELIEF) 50 MCG/ACT nasal spray 1 spray by Nasal route daily      hyoscyamine (LEVBID) 375 MCG extended release tablet TAKE 1/2 TABLET BY MOUTH TWICE DAILY 30 tablet 11    HYDROcodone-acetaminophen (NORCO) 5-325 MG per tablet 1 tablet nightly as needed for Pain. Take 1 tablet every 4-6 hours as needed for pain       NONFORMULARY Immunotherapy allergy shot      zinc 50 MG CAPS Take  by mouth daily.  Ascorbic Acid (VITAMIN C) 500 MG CAPS Take  by mouth.  orphenadrine (NORFLEX) 100 MG tablet Take 100 mg by mouth 2 times daily as needed.  tramadol (ULTRAM) 50 MG tablet Take 50 mg by mouth every 6 hours as needed. Take 1-2 tabs every 6 hrs prn       gabapentin (NEURONTIN) 100 MG capsule Take 100 mg by mouth 2 times daily.       Respiratory Therapy Supplies (NEBULIZER COMPRESSOR) KIT 1 kit by Does not apply route once for 1 dose 360 kit 0     Current Facility-Administered Medications   Medication Dose Route Frequency Provider Last Rate Last Dose    omalizumab Brittani Gruber) injection 150 mg  150 mg Subcutaneous Q28 Days Ursula Rodrigues APRN - CNP   150 mg at 03/09/20 1603    omalizumab (XOLAIR) injection 150 mg  150 mg Subcutaneous Q28 Days COREEN Alvarez - CNP   150 mg at 03/09/20 1604    omalizumab (XOLAIR) injection 150 mg  150 mg Subcutaneous Q28 Days Ursula Rodrigues APRN - CNP   150 mg at 01/30/20 5728    omalizumab Brittani Gruber) injection 150 mg  150 mg Subcutaneous Q28 Days Ursula Rodrigues APRN - CNP   150 mg at 01/30/20 2511       Family History   Problem Relation Age of Onset    Cancer Other         colon    Mental Illness Other     High Blood Pressure Other     Diabetes Other     High Blood Pressure Sister            Physical Exam:    VITALS:  /76 (Site: Right Upper Arm, Position: Sitting)   Pulse 92   Temp 96.4 °F (35.8 °C)   Ht 5' 4\" (1.626 m)   Wt 205 lb (93 kg)   LMP  (Exact Date)   SpO2 98% Comment: on RA  BMI 35.19 back)  -Fibromyalgia. -GERD (gastroesophageal reflux disease)  -Anxiety.  -Allergic rhinitis. -Type II or unspecified type diabetes mellitus without mention of complication, not stated as uncontrolled      Recommendations/Plan:  - Patient educated to quit smoking as soon as possible. Patient verbalizes understanding of adverse consequences of tobacco smoking (3 minutes). -Schedule patient for low dose CT scan of chest with out IV contrast as recommended by the  U.S. Preventive Services Task Force and the NCCN for lung Cancer Screening in December, 2020.  -Send Serum for Antinuclear antibody (LOIS), Rheumatoid factor (RA), Angiotensin converting enzyme level (ACE) and ESR (Erythrocyte sedimentation rate). -Send serum for fungal serologies.  -Send serum for Quantiferon gold test to evaluate for ? Latent TB infection.  -She was advised to keep scheduled follow up appointment with Ms. Narcisa Hsieh CNP regarding management of her asthma.  - Schedule patient for nocturnal polysomnogram (Sleep study) at [facility] sleep lab. Patient educated to not to drive any motor vehicles or operate heavy equipment until sleep symptoms are under control. Patient verbalizes understanding. Patient to follow with my clinic at [address] [place] [address] 1week after sleep study. - Schedule patient for follow up with my clinic in 2months to go over the above labs and 9months with low dose CT chest. Patient advised to make early appointment if needed.  -She was advised to loose weight by controlling diet and doing exercise once cleared by her family physician.   -Mk Lowe was advised to make early appointment with my clinic if she develops any constitutional symptoms including loss of weight, poor appetite or hemoptysis.  She verbalizes under standing.  - Patient educated to update his pneumococcal vaccine with family physician and take influenza vaccine in coming season with out fail.  -Mk Lowe educated about environmental safety precautions she need to practice to prevent exacerbation ofher Asthma. Gely Archer verbalizes understanding. Low Dose CT (LDCT) Lung Screening criteria met   Age 50-69   Pack year smoking >30   Still smoking or less than 15 year since quit   No sign or symptoms of lung cancer   > 11 months since last LDCT     Risks and benefits of lung cancer screening with LDCT scans discussed:    Significance of positive screen - False-positive LDCT results often occur. 95% of all positive results do not lead to a diagnosis of cancer. Usually further imaging can resolve most false-positive results; however, some patients may require invasive procedures. Over diagnosis risk - 10% to 12% of screen-detected lung cancer cases are over diagnosed--that is, the cancer would not have been detected in the patient's lifetime without the screening. Need for follow up screens annually to continue lung cancer screening effectiveness     Risks associated with radiation from annual LDCT- Radiation exposure is about the same as for a mammogram, which is about 1/3 of the annual background radiation exposure from everyday life. Starting screening at age 54 is not likely to increase cancer risk from radiation exposure. Patients with comorbidities resulting in life expectancy of < 10 years, or that would preclude treatment of an abnormality identified on CT, should not be screened due to lack of benefit.     To obtain maximal benefit from this screening, smoking cessation and long-term abstinence from smoking is critical

## 2020-03-12 ENCOUNTER — NURSE ONLY (OUTPATIENT)
Dept: ALLERGY | Age: 60
End: 2020-03-12
Payer: COMMERCIAL

## 2020-03-12 VITALS — SYSTOLIC BLOOD PRESSURE: 110 MMHG | DIASTOLIC BLOOD PRESSURE: 80 MMHG | RESPIRATION RATE: 24 BRPM | HEART RATE: 88 BPM

## 2020-03-12 PROCEDURE — 95117 IMMUNOTHERAPY INJECTIONS: CPT | Performed by: NURSE PRACTITIONER

## 2020-03-16 ENCOUNTER — NURSE ONLY (OUTPATIENT)
Dept: ALLERGY | Age: 60
End: 2020-03-16
Payer: COMMERCIAL

## 2020-03-16 VITALS
SYSTOLIC BLOOD PRESSURE: 126 MMHG | HEART RATE: 80 BPM | TEMPERATURE: 97.7 F | DIASTOLIC BLOOD PRESSURE: 78 MMHG | RESPIRATION RATE: 16 BRPM

## 2020-03-16 PROCEDURE — 95117 IMMUNOTHERAPY INJECTIONS: CPT | Performed by: NURSE PRACTITIONER

## 2020-04-06 ENCOUNTER — NURSE ONLY (OUTPATIENT)
Dept: ALLERGY | Age: 60
End: 2020-04-06
Payer: COMMERCIAL

## 2020-04-06 VITALS
RESPIRATION RATE: 14 BRPM | HEART RATE: 70 BPM | DIASTOLIC BLOOD PRESSURE: 80 MMHG | TEMPERATURE: 98 F | SYSTOLIC BLOOD PRESSURE: 122 MMHG

## 2020-04-06 PROCEDURE — 95117 IMMUNOTHERAPY INJECTIONS: CPT | Performed by: NURSE PRACTITIONER

## 2020-04-07 ENCOUNTER — OFFICE VISIT (OUTPATIENT)
Dept: PULMONOLOGY | Age: 60
End: 2020-04-07
Payer: COMMERCIAL

## 2020-04-07 ENCOUNTER — TELEPHONE (OUTPATIENT)
Dept: ENT CLINIC | Age: 60
End: 2020-04-07

## 2020-04-07 ENCOUNTER — NURSE ONLY (OUTPATIENT)
Dept: LAB | Age: 60
End: 2020-04-07

## 2020-04-07 VITALS
DIASTOLIC BLOOD PRESSURE: 76 MMHG | WEIGHT: 205 LBS | BODY MASS INDEX: 35 KG/M2 | SYSTOLIC BLOOD PRESSURE: 134 MMHG | HEART RATE: 92 BPM | OXYGEN SATURATION: 98 % | HEIGHT: 64 IN | TEMPERATURE: 96.4 F

## 2020-04-07 LAB
RHEUMATOID FACTOR: 13 IU/ML (ref 0–13)
SEDIMENTATION RATE, ERYTHROCYTE: 17 MM/HR (ref 0–20)

## 2020-04-07 PROCEDURE — 99205 OFFICE O/P NEW HI 60 MIN: CPT | Performed by: INTERNAL MEDICINE

## 2020-04-07 PROCEDURE — G8427 DOCREV CUR MEDS BY ELIG CLIN: HCPCS | Performed by: INTERNAL MEDICINE

## 2020-04-07 PROCEDURE — G8417 CALC BMI ABV UP PARAM F/U: HCPCS | Performed by: INTERNAL MEDICINE

## 2020-04-07 PROCEDURE — 4004F PT TOBACCO SCREEN RCVD TLK: CPT | Performed by: INTERNAL MEDICINE

## 2020-04-07 PROCEDURE — G0296 VISIT TO DETERM LDCT ELIG: HCPCS | Performed by: INTERNAL MEDICINE

## 2020-04-07 PROCEDURE — 3017F COLORECTAL CA SCREEN DOC REV: CPT | Performed by: INTERNAL MEDICINE

## 2020-04-07 NOTE — PATIENT INSTRUCTIONS
The chance is 20%-60% that the LDCT result will show abnormalities. This would require additional testing which could include repeat imaging or even invasive procedures. Most (about 95%) of \"abnormal\" LDCT results are false in the sense that no lung cancer is ultimately found. Additionally, some (about 10%) of the cancers found would not affect your life expectancy, even if undetected and untreated. If you are still smoking, the single most important thing that you can do to reduce your risk of dying of lung cancer is to quit. For this screening to be covered by Medicare and most other insurers, strict criteria must be met. If you do not meet these criteria, but still wish to undergo LDCT testing, you will be required to sign a waiver indicating your willingness to pay for the scan.

## 2020-04-08 LAB
ANA SCREEN: NORMAL
ANGIOTENSIN CONVERTING ENZYME: 23 U/L (ref 9–67)

## 2020-04-09 LAB
QUANTI TB GOLD PLUS: NEGATIVE
QUANTI TB1 MINUS NIL: 0 IU/ML (ref 0–0.34)
QUANTI TB2 MINUS NIL: 0 IU/ML (ref 0–0.34)
QUANTIFERON MITOGEN MINUS NIL: 7.96 IU/ML
QUANTIFERON NIL: 0.01 IU/ML

## 2020-04-10 LAB
ASPERGILLUS ANTIBODY CF: NORMAL
BLASTOMYCES ANTIBODY CF: 0.2 IV
COCCIDIODES AB CF: NORMAL
HISTOPLASMA ANTIBODY MYCELIAL CF: NORMAL
HISTOPLASMA ANTIBODY YEAST CF: NORMAL

## 2020-04-13 ENCOUNTER — NURSE ONLY (OUTPATIENT)
Dept: ALLERGY | Age: 60
End: 2020-04-13
Payer: COMMERCIAL

## 2020-04-13 ENCOUNTER — TELEPHONE (OUTPATIENT)
Dept: ENT CLINIC | Age: 60
End: 2020-04-13

## 2020-04-13 VITALS
RESPIRATION RATE: 14 BRPM | TEMPERATURE: 98.2 F | DIASTOLIC BLOOD PRESSURE: 76 MMHG | HEART RATE: 70 BPM | SYSTOLIC BLOOD PRESSURE: 120 MMHG

## 2020-04-13 PROCEDURE — 95117 IMMUNOTHERAPY INJECTIONS: CPT | Performed by: NURSE PRACTITIONER

## 2020-04-15 ENCOUNTER — VIRTUAL VISIT (OUTPATIENT)
Dept: PSYCHIATRY | Age: 60
End: 2020-04-15
Payer: COMMERCIAL

## 2020-04-15 PROCEDURE — 99443 PR PHYS/QHP TELEPHONE EVALUATION 21-30 MIN: CPT | Performed by: NURSE PRACTITIONER

## 2020-04-15 PROCEDURE — 95165 ANTIGEN THERAPY SERVICES: CPT | Performed by: NURSE PRACTITIONER

## 2020-04-15 NOTE — PROGRESS NOTES
[facility] PROFESSIONAL SERVS  [facility]  [address] [facility]  [address]  Dept: [phone]  Dept Fax: [phone]: [phone]    Visit Date: 4/15/2020      TELEPSYCHIATRY VISIT -- Audio Only (During XWTSF-17 public health emergency)     This session was conducted as a telepsychiatry visit due to one or more of the following COVID-19 risk factors being present in this patient:  · The patient is aged 61 or older  · The patient reports chronic health problems  · The patient reports immune suppressed or immune compromised status  · The patient reports being at risk or potentially exposed to the virus     Meredith Julio is a 62 y.o. female being evaluated by a Virtual Visit (audio only visit) encounter to address concerns as mentioned  below. A caregiver was present when appropriate. Pursuant to the emergency declaration under the [address] [place] [address] and the [employer] and [employer] Act, this Virtual Visit was conducted with patient's (and/or legal guardian's) consent, to reduce the patient's risk of exposure to COVID-19 and provide necessary medical care. The patient (and/or legal guardian) has also been advised to contact this office for worsening conditions or problems, and seek emergency medical treatment and/or call 911 if deemed necessary. Services were provided through a audio synchronous discussion virtually to substitute for in-person clinic visit. Patient and provider were located at their individual homes. --Nani Biswas, COREEN - CNP on 4/15/2020 at 8:46 AM    An electronic signature was used to authenticate this note.       SUBJECTIVE DATA     CHIEF COMPLAINT:    Chief Complaint   Patient presents with   [address] Problem    Follow-up       History obtained from: patient    HISTORY OF PRESENT ILLNESS:    Meredith Julio is a 62 y.o. female who presents via Denies feeling down, sad, depressed. Denies anxiety. Denies suicidal ideations, intent, plan. No homicidal ideations, intent, plan. No audiovisual hallucinations. HPI    Adverse reactions from psychotropic medications:  None at this time. Current Psychiatric Review of Systems         Soo or Hypomania:  no     Panic Attacks:  no     Phobias:  no     Obsessions and Compulsions:  no     Body or Vocal Tics:  no     Hallucinations:  no     Delusions:  no    SOCIAL HISTORY:  Patient was born in [place], [address] [facility] [address] [facility] and raised by her mother and step-father until mother  when patient was age 4. Then her step-father raised her.       Social History     Socioeconomic History    Marital status:      Spouse name: Not on file    Number of children: 2    Years of education: Not on file    Highest education level: Not on file   Occupational History    Not on file   Social Needs    Financial resource strain: Not on file    Food insecurity     Worry: Not on file     Inability: Not on file   [employer] needs     Medical: Not on file     Non-medical: Not on file   Tobacco Use    Smoking status: Current Every Day Smoker     Packs/day: 0.75     Years: 41.00     Pack years: 30.75     Types: Cigarettes    Smokeless tobacco: Never Used   Substance and Sexual Activity    Alcohol use: Yes     Comment: social    Drug use: Yes     Types: Marijuana     Comment: smokes marijuana occasionally    Sexual activity: Never   Lifestyle    Physical activity     Days per week: Not on file     Minutes per session: Not on file    Stress: Not on file   Relationships    Social connections     Talks on phone: Not on file     Gets together: Not on file     Attends Sabianism service: Not on file     Active member of club or organization: Not on file     Attends meetings of clubs or organizations: Not on file     Relationship status: Not on file    Intimate partner violence     Fear of current or ex partner: Not on file HISTORY:    Past Surgical History:   Procedure Laterality Date    APPENDECTOMY       SECTION      x2    COLONOSCOPY  [id number]    CSF SHUNT  2007    Cervical syrinx to subarachnoid shunt; thoracic syrinx to subarachnoid shunt (2 separate dural openings)    CYST REMOVAL      extradural cysts at thoracic 2-3    ECTOPIC PREGNANCY SURGERY      HEMICOLECTOMY      HERNIA REPAIR      inguinal     HYSTERECTOMY      SPINAL CORD DECOMPRESSION  2007    C4-5-6-7; T1-2-3 laminectomies    TOOTH EXTRACTION  2017       PREVIOUS MEDICATIONS:  Previous Medications    ALBUTEROL (PROVENTIL) (2.5 MG/3ML) 0.083% NEBULIZER SOLUTION    USE 1 VIAL IN NEBULIZER EVERY 6 HOURS AS NEEDED FOR WHEEZING (FOR ASTHMA)    ALBUTEROL SULFATE  (90 BASE) MCG/ACT INHALER    Inhale 2 puffs into the lungs every 4 hours as needed for Wheezing    AMLODIPINE (NORVASC) 5 MG TABLET    TAKE 1 TABLET BY MOUTH ONCE DAILY    ASCORBIC ACID (VITAMIN C) 500 MG CAPS    Take  by mouth. ATORVASTATIN (LIPITOR) 10 MG TABLET    Take 1 tablet by mouth once daily    AZELASTINE (ASTELIN) 0.1 % NASAL SPRAY    1 spray by Nasal route 2 times daily Use in each nostril as directed    AZELASTINE (OPTIVAR) 0.05 % OPHTHALMIC SOLUTION    Place 1 drop into both eyes 2 times daily    BIOTIN 5000 PO    Take by mouth daily     BUDESONIDE-FORMOTEROL (SYMBICORT) 160-4.5 MCG/ACT AERO    2 puffs inhaled twice daily. Rinse mouth well after use. BUPROPION (WELLBUTRIN XL) 150 MG EXTENDED RELEASE TABLET    Take 1 tablet by mouth every morning    CHOLECALCIFEROL (VITAMIN D3) 2000 UNITS CAPS    Vitamin D3 2,000 unit capsule    CLOBETASOL (TEMOVATE) 0.05 % EXTERNAL SOLUTION    Apply daily to scaly or itchy areas on scalp    CONJUGATED ESTROGENS (PREMARIN) 0.625 MG/GM VAGINAL CREAM    Place vaginally as needed Place vaginally daily.     CYANOCOBALAMIN (B-12 PO)    daily    EPIPEN 2-VENESSA 0.3 MG/0.3ML SOAJ INJECTION    INJECT 1 PEN IM AS A SINGLE DOSE PRN

## 2020-04-20 ENCOUNTER — NURSE ONLY (OUTPATIENT)
Dept: ALLERGY | Age: 60
End: 2020-04-20
Payer: COMMERCIAL

## 2020-04-20 VITALS
TEMPERATURE: 98.6 F | DIASTOLIC BLOOD PRESSURE: 80 MMHG | HEART RATE: 72 BPM | SYSTOLIC BLOOD PRESSURE: 120 MMHG | RESPIRATION RATE: 14 BRPM

## 2020-04-20 PROCEDURE — 95117 IMMUNOTHERAPY INJECTIONS: CPT | Performed by: NURSE PRACTITIONER

## 2020-04-27 ENCOUNTER — VIRTUAL VISIT (OUTPATIENT)
Dept: ALLERGY | Age: 60
End: 2020-04-27
Payer: COMMERCIAL

## 2020-04-27 PROCEDURE — 99212 OFFICE O/P EST SF 10 MIN: CPT | Performed by: NURSE PRACTITIONER

## 2020-04-27 PROCEDURE — G8427 DOCREV CUR MEDS BY ELIG CLIN: HCPCS | Performed by: NURSE PRACTITIONER

## 2020-04-27 PROCEDURE — 3017F COLORECTAL CA SCREEN DOC REV: CPT | Performed by: NURSE PRACTITIONER

## 2020-04-27 ASSESSMENT — ENCOUNTER SYMPTOMS
RHINORRHEA: 1
COUGH: 0
EYE REDNESS: 1
EYE ITCHING: 1
DIARRHEA: 0
NAUSEA: 0
SHORTNESS OF BREATH: 1
CHOKING: 0

## 2020-04-27 NOTE — PROGRESS NOTES
chronic depression        Review of Systems:  Review of Systems   HENT: Positive for congestion, postnasal drip and rhinorrhea. Eyes: Positive for redness and itching. Respiratory: Positive for shortness of breath. Negative for cough and choking. Cardiovascular: Negative for chest pain and leg swelling. Gastrointestinal: Negative for diarrhea and nausea. Allergic/Immunologic: Positive for environmental allergies. All other systems reviewed and are negative.         Past MedicalHistory:    Past Medical History:   Diagnosis Date    Allergic rhinitis     Anxiety     Arnold-Chiari malformation (HCC)     Asthma     Chronic bronchitis (HCC)     Fibromyalgia     GERD (gastroesophageal reflux disease)     Headache(784.0)     Hyperlipidemia     Neuropathy     Osteoarthritis     Sinusitis     Type II or unspecified type diabetes mellitus without mention of complication, not stated as uncontrolled        Past Surgical History:  Past Surgical History:   Procedure Laterality Date    APPENDECTOMY       SECTION      x2    COLONOSCOPY  [id number]    CSF SHUNT  2007    Cervical syrinx to subarachnoid shunt; thoracic syrinx to subarachnoid shunt (2 separate dural openings)    CYST REMOVAL      extradural cysts at thoracic 2-3    ECTOPIC PREGNANCY SURGERY      HEMICOLECTOMY      HERNIA REPAIR      inguinal     HYSTERECTOMY      SPINAL CORD DECOMPRESSION  2007    C4-5-6-7; T1-2-3 laminectomies    TOOTH EXTRACTION  2017       Family History:   Family History   Problem Relation Age of Onset    Cancer Other         colon    Mental Illness Other     High Blood Pressure Other     Diabetes Other     High Blood Pressure Sister        Social History:   Social History     Tobacco Use    Smoking status: Current Every Day Smoker     Packs/day: 0.75     Years: 41.00     Pack years: 30.75     Types: Cigarettes    Smokeless tobacco: Never Used   Substance Use Topics    Alcohol use: follow-up with their office for further discussion of the labs. Patient expressed understanding. She is also going to see psychiatry May the night to discuss Wellbutrin with them versus going on a former antidepressant. Patient has exhibited no signs and symptoms of seizure since she has been on Wellbutrin for the last month        Sarah Crouch is a 62 y.o. female being evaluated by a Virtual Visit (video visit) encounter to address concerns as mentioned above. A caregiver was present when appropriate. Due to this being a TeleHealth encounter (During IVZNQ-08 public health emergency), evaluation of the following organ systems was limited: Vitals/Constitutional/EENT/Resp/CV/GI//MS/Neuro/Skin/Heme-Lymph-Imm. Pursuant to the emergency declaration under the [address] [place] [address], [address] [place] [address] authority and the [employer] and [employer] Act, this Virtual Visit was conducted with patient's (and/or legal guardian's) consent, to reduce the patient's risk of exposure to COVID-19 and provide necessary medical care. The patient (and/or legal guardian) has also been advised to contact this office for worsening conditions or problems, and seek emergency medical treatment and/or call 911 if deemed necessary. Services were provided through a video synchronous discussion virtually to substitute for in-person clinic visit. Patient was located at their home while the provider was in their medical practice office. The patient gave verbal consent to participate in the telehealth visit. Total time of the visit was approximately 20 minutes. --COREEN Downs CNP on 4/27/2020 at 11:01 AM    An electronic signature was used to authenticate this note.     Total time sent with patient 30 minutes with greater than 50% in patient education    (Please note that portions of this note may have been completed with a voice recognition program.  Efforts were

## 2020-05-04 ENCOUNTER — TELEPHONE (OUTPATIENT)
Dept: ENT CLINIC | Age: 60
End: 2020-05-04

## 2020-05-04 ENCOUNTER — NURSE ONLY (OUTPATIENT)
Dept: ALLERGY | Age: 60
End: 2020-05-04
Payer: COMMERCIAL

## 2020-05-04 VITALS
SYSTOLIC BLOOD PRESSURE: 118 MMHG | TEMPERATURE: 98.1 F | DIASTOLIC BLOOD PRESSURE: 72 MMHG | HEART RATE: 60 BPM | RESPIRATION RATE: 14 BRPM

## 2020-05-04 PROCEDURE — 95117 IMMUNOTHERAPY INJECTIONS: CPT | Performed by: NURSE PRACTITIONER

## 2020-05-07 ENCOUNTER — TELEPHONE (OUTPATIENT)
Dept: ENT CLINIC | Age: 60
End: 2020-05-07

## 2020-05-11 ENCOUNTER — NURSE ONLY (OUTPATIENT)
Dept: ALLERGY | Age: 60
End: 2020-05-11
Payer: COMMERCIAL

## 2020-05-11 VITALS
SYSTOLIC BLOOD PRESSURE: 124 MMHG | TEMPERATURE: 98.1 F | HEART RATE: 66 BPM | RESPIRATION RATE: 12 BRPM | DIASTOLIC BLOOD PRESSURE: 72 MMHG

## 2020-05-11 PROCEDURE — 96401 CHEMO ANTI-NEOPL SQ/IM: CPT | Performed by: NURSE PRACTITIONER

## 2020-05-11 PROCEDURE — 95117 IMMUNOTHERAPY INJECTIONS: CPT | Performed by: NURSE PRACTITIONER

## 2020-05-11 PROCEDURE — 96372 THER/PROPH/DIAG INJ SC/IM: CPT | Performed by: NURSE PRACTITIONER

## 2020-05-13 ENCOUNTER — HOSPITAL ENCOUNTER (OUTPATIENT)
Dept: SLEEP CENTER | Age: 60
Discharge: HOME | End: 2020-05-15
Payer: COMMERCIAL

## 2020-05-13 PROCEDURE — 95810 POLYSOM 6/> YRS 4/> PARAM: CPT

## 2020-05-15 LAB — STATUS: NORMAL

## 2020-05-16 NOTE — PROGRESS NOTES
Procedure Laterality Date    APPENDECTOMY       SECTION      x2    COLONOSCOPY  [id number]    CSF SHUNT  2007    Cervical syrinx to subarachnoid shunt; thoracic syrinx to subarachnoid shunt (2 separate dural openings)    CYST REMOVAL      extradural cysts at thoracic 2-3    ECTOPIC PREGNANCY SURGERY      HEMICOLECTOMY      HERNIA REPAIR      inguinal     HYSTERECTOMY      SPINAL CORD DECOMPRESSION  2007    C4-5-6-7; T1-2-3 laminectomies    TOOTH EXTRACTION  2017       Allergies   Allergen Reactions    Bactrim [Sulfamethoxazole-Trimethoprim]     Morphine Other (See Comments)     \"I hallucinate\"    Peanut-Containing Drug Products     Tessalon [Benzonatate] Hives    Amoxicillin-Pot Clavulanate Rash    Ibuprofen [Ibuprofen] Rash    Lisinopril Rash    Macrobid [Nitrofurantoin Monohydrate Macrocrystals] Rash    Macrodantin [Nitrofurantoin] Rash    Ranitidine Rash       Current Outpatient Medications   Medication Sig Dispense Refill    albuterol (PROVENTIL) (2.5 MG/3ML) 0.083% nebulizer solution USE 1 VIAL IN NEBULIZER EVERY 6 HOURS AS NEEDED FOR WHEEZING (FOR ASTHMA) 375 mL 5    omalizumab (OMALIZUMAB) 150 MG injection Inject 300 mg into the skin every 28 days Weight 92 kg 300 mg 11    metFORMIN (GLUCOPHAGE) 500 MG tablet TAKE 1 TABLET BY MOUTH TWICE DAILY WITH MEALS 180 tablet 3    atorvastatin (LIPITOR) 10 MG tablet Take 1 tablet by mouth once daily 90 tablet 3    budesonide-formoterol (SYMBICORT) 160-4.5 MCG/ACT AERO 2 puffs inhaled twice daily. Rinse mouth well after use.  1 Inhaler 2    nicotine (NICODERM CQ) 14 MG/24HR Place 1 patch onto the skin every 24 hours 30 patch 3    buPROPion (WELLBUTRIN XL) 150 MG extended release tablet Take 1 tablet by mouth every morning 30 tablet 3    esomeprazole (NEXIUM) 40 MG delayed release capsule Take 1 capsule by mouth 2 times daily 180 capsule 3    clobetasol (TEMOVATE) 0.05 % external solution Apply daily to scaly or itchy 290 MCG CAPS capsule       lidocaine (XYLOCAINE) 5 % ointment Apply topically as needed for Pain Apply topically as needed.  conjugated estrogens (PREMARIN) 0.625 MG/GM vaginal cream Place vaginally as needed Place vaginally daily.  lidocaine (LIDODERM) 5 % Place 1 patch onto the skin daily 12 hours on, 12 hours off.  hydrocortisone (ANUSOL-HC) 25 MG suppository Place 25 mg rectally as needed for Hemorrhoids      fluticasone (FLONASE ALLERGY RELIEF) 50 MCG/ACT nasal spray 1 spray by Nasal route daily      hyoscyamine (LEVBID) 375 MCG extended release tablet TAKE 1/2 TABLET BY MOUTH TWICE DAILY 30 tablet 11    HYDROcodone-acetaminophen (NORCO) 5-325 MG per tablet 1 tablet nightly as needed for Pain. Take 1 tablet every 4-6 hours as needed for pain       NONFORMULARY Immunotherapy allergy shot      zinc 50 MG CAPS Take  by mouth daily.  Ascorbic Acid (VITAMIN C) 500 MG CAPS Take  by mouth.  orphenadrine (NORFLEX) 100 MG tablet Take 100 mg by mouth 2 times daily as needed.  tramadol (ULTRAM) 50 MG tablet Take 50 mg by mouth every 6 hours as needed. Take 1-2 tabs every 6 hrs prn       gabapentin (NEURONTIN) 100 MG capsule Take 100 mg by mouth 2 times daily.       Respiratory Therapy Supplies (NEBULIZER COMPRESSOR) KIT 1 kit by Does not apply route once for 1 dose 360 kit 0     Current Facility-Administered Medications   Medication Dose Route Frequency Provider Last Rate Last Dose    omalizumab Evan Crowe injection 150 mg  150 mg Subcutaneous Q28 Days Pervis Records, APRN - CNP   150 mg at 05/11/20 5774    omalizumab Evan Lyons) injection 150 mg  150 mg Subcutaneous Q28 Days Pervis Records, APRN - CNP   150 mg at 05/11/20 8592       Family History   Problem Relation Age of Onset    Cancer Other         colon    Mental Illness Other     High Blood Pressure Other     Diabetes Other     High Blood Pressure Sister            Physical Exam:    VITALS:  /66 (Site: Right Upper Arm,

## 2020-05-18 ENCOUNTER — NURSE ONLY (OUTPATIENT)
Dept: ALLERGY | Age: 60
End: 2020-05-18
Payer: COMMERCIAL

## 2020-05-18 ENCOUNTER — TELEPHONE (OUTPATIENT)
Dept: ENT CLINIC | Age: 60
End: 2020-05-18

## 2020-05-18 VITALS
SYSTOLIC BLOOD PRESSURE: 124 MMHG | TEMPERATURE: 98.1 F | DIASTOLIC BLOOD PRESSURE: 82 MMHG | HEART RATE: 70 BPM | RESPIRATION RATE: 12 BRPM

## 2020-05-18 PROCEDURE — 95117 IMMUNOTHERAPY INJECTIONS: CPT | Performed by: NURSE PRACTITIONER

## 2020-05-19 ENCOUNTER — OFFICE VISIT (OUTPATIENT)
Dept: PULMONOLOGY | Age: 60
End: 2020-05-19
Payer: COMMERCIAL

## 2020-05-19 VITALS
TEMPERATURE: 98.1 F | SYSTOLIC BLOOD PRESSURE: 110 MMHG | OXYGEN SATURATION: 98 % | BODY MASS INDEX: 34.66 KG/M2 | HEART RATE: 78 BPM | WEIGHT: 203 LBS | HEIGHT: 64 IN | DIASTOLIC BLOOD PRESSURE: 66 MMHG

## 2020-05-19 PROCEDURE — 3017F COLORECTAL CA SCREEN DOC REV: CPT | Performed by: INTERNAL MEDICINE

## 2020-05-19 PROCEDURE — 99214 OFFICE O/P EST MOD 30 MIN: CPT | Performed by: INTERNAL MEDICINE

## 2020-05-19 PROCEDURE — G8417 CALC BMI ABV UP PARAM F/U: HCPCS | Performed by: INTERNAL MEDICINE

## 2020-05-19 PROCEDURE — G8427 DOCREV CUR MEDS BY ELIG CLIN: HCPCS | Performed by: INTERNAL MEDICINE

## 2020-05-19 PROCEDURE — 4004F PT TOBACCO SCREEN RCVD TLK: CPT | Performed by: INTERNAL MEDICINE

## 2020-05-19 NOTE — PROGRESS NOTES
Chief Complaint: Follow up after sleep study done on 5/13/2020    Mallampati airway Class: 4  Neck Circumference:14Inches    Hines sleepiness score 5/19/20 12

## 2020-05-19 NOTE — PATIENT INSTRUCTIONS
Recommendations/Plan:  - Patient educated to quit smoking as soon as possible. Patient verbalizes understanding of adverse consequences of tobacco smoking (4 minutes). -Keep scheduled appointment for low dose CT scan of chest with out IV contrast as recommended by the  U.S. Preventive Services Task Force and the NCCN for lung Cancer Screening in December, 2020.   -She was advised to keep scheduled follow up appointment with Ms. Narcisa Hsieh CNP regarding management of her asthma.  - Schedule patient for follow up with my clinic on 1/5/2021 with low dose CT chest. Patient advised to make early appointment if needed.  -She was advised to loose weight by controlling diet and doing exercise once cleared by her family physician.   -Mk Lowe was advised to make early appointment with my clinic if she develops any constitutional symptoms including loss of weight, poor appetite or hemoptysis. She verbalizes under standing.  -Cancel her follow up appt on 6/16/2020.  -She was advised to discuss with MD Diogo- her pain management physician to consider alternative non sedative pain medication/Medicatin regimen to improve her Hypersomnia ( Excessive daytime sleepiness). -She was instructed to not to drive any motor vehicles or operate heavy equipment if she feels sleepy. - Patient educated to update his pneumococcal vaccine with family physician and take influenza vaccine in coming season with out fail.  -Mk Lowe educated about environmental safety precautions she need to practice to prevent exacerbation ofher Asthma. Gely Archer verbalizes understanding.

## 2020-05-27 ENCOUNTER — NURSE ONLY (OUTPATIENT)
Dept: ALLERGY | Age: 60
End: 2020-05-27
Payer: COMMERCIAL

## 2020-05-27 VITALS
RESPIRATION RATE: 16 BRPM | HEART RATE: 80 BPM | SYSTOLIC BLOOD PRESSURE: 132 MMHG | DIASTOLIC BLOOD PRESSURE: 84 MMHG | TEMPERATURE: 97.1 F

## 2020-05-27 PROCEDURE — 95117 IMMUNOTHERAPY INJECTIONS: CPT | Performed by: NURSE PRACTITIONER

## 2020-06-01 ENCOUNTER — NURSE ONLY (OUTPATIENT)
Dept: ALLERGY | Age: 60
End: 2020-06-01
Payer: COMMERCIAL

## 2020-06-01 VITALS
HEART RATE: 60 BPM | SYSTOLIC BLOOD PRESSURE: 122 MMHG | DIASTOLIC BLOOD PRESSURE: 72 MMHG | RESPIRATION RATE: 12 BRPM | TEMPERATURE: 98.3 F

## 2020-06-01 PROCEDURE — 95117 IMMUNOTHERAPY INJECTIONS: CPT | Performed by: NURSE PRACTITIONER

## 2020-06-01 NOTE — PROGRESS NOTES
After consent obtained/verified, allergy injection given in back of R/L arm(s). Documentation of vial injection specific to arm(s) noted on Allergy Immunotherapy Administration Form. Patient waited 30 minutes for observation. Patient tolerated yellow vials well at 0.1ml  without adverse reaction. SHOT REACTION TREATMENT INSTRUCTIONS    During the 30 minute wait after an allergy injection the following symptoms should be reported:    Itching other than at the injection site  Hives or swelling other than at the injection site  Redness other than at the injection site  Difficulty breathing  Chest tightness  Difficulty swallowing  Throat tightness    If these symptoms occur, NOTIFY PROVIDER and the following treatment should be administered:    1. Epinephrine 1:1000 IM - 0.3 ml if > 66 lbs or more, 0.15 ml if 33 - 63 lbs, or 0.1 ml if <33 lbs   2. Diphenhydramine - give all intramuscular:     2 to <6 years (off-label use): 6.25 mg,    6 to <12 years: 12.5 to 25 mg;    ?12 years: 25-50 mg.  3.  Famotidine:  Adults 40 mg oral    Adolescents age 12 years and >88 lbs: 40 mg    Children and Adolescents ? 12years of age: Initial: 0.25 mg/kg/dose   every 12 hours (maximum daily dose: 40 mg/day)    Epi dose may me repeated in 5-15 minutes if adequate resolution of symptoms does not occur    Patient should be observed for at least one hour after final epi dose and must be seen by provider. Patients cannot drive themselves if they have received diphenhydramine.

## 2020-06-08 ENCOUNTER — NURSE ONLY (OUTPATIENT)
Dept: ALLERGY | Age: 60
End: 2020-06-08
Payer: COMMERCIAL

## 2020-06-08 VITALS
HEART RATE: 66 BPM | SYSTOLIC BLOOD PRESSURE: 118 MMHG | TEMPERATURE: 98.7 F | DIASTOLIC BLOOD PRESSURE: 72 MMHG | RESPIRATION RATE: 14 BRPM

## 2020-06-08 PROCEDURE — 96401 CHEMO ANTI-NEOPL SQ/IM: CPT | Performed by: NURSE PRACTITIONER

## 2020-06-08 PROCEDURE — 95117 IMMUNOTHERAPY INJECTIONS: CPT | Performed by: NURSE PRACTITIONER

## 2020-06-08 RX ORDER — NICOTINE POLACRILEX 2 MG
1 GUM BUCCAL DAILY
COMMUNITY

## 2020-06-08 NOTE — PROGRESS NOTES
After consent obtained/verified, allergy injection given in back of R/L arm(s). Documentation of vial injection specific to arm(s) noted on Allergy Immunotherapy Administration Form. Patient waited 30 minutes for observation. Patient tolerated well without adverse reaction. SHOT REACTION TREATMENT INSTRUCTIONS    During the 30 minute wait after an allergy injection the following symptoms should be reported:    Itching other than at the injection site  Hives or swelling other than at the injection site  Redness other than at the injection site  Difficulty breathing  Chest tightness  Difficulty swallowing  Throat tightness    If these symptoms occur, NOTIFY PROVIDER and the following treatment should be administered:    1. Epinephrine 1:1000 IM - 0.3 ml if > 66 lbs or more, 0.15 ml if 33 - 63 lbs, or 0.1 ml if <33 lbs   2. Diphenhydramine - give all intramuscular:     2 to <6 years (off-label use): 6.25 mg,    6 to <12 years: 12.5 to 25 mg;    ?12 years: 25-50 mg.  3.  Famotidine:  Adults 40 mg oral    Adolescents age 12 years and >88 lbs: 40 mg    Children and Adolescents ? 12years of age: Initial: 0.25 mg/kg/dose   every 12 hours (maximum daily dose: 40 mg/day)    Epi dose may me repeated in 5-15 minutes if adequate resolution of symptoms does not occur    Patient should be observed for at least one hour after final epi dose and must be seen by provider. Patients cannot drive themselves if they have received diphenhydramine.
site  Redness other than at the injection site  Difficulty breathing  Chest tightness  Difficulty swallowing  Throat tightness    If these symptoms occur, NOTIFY PROVIDER and the following treatment should be administered:    1. Epinephrine 1:1000 IM - 0.3 ml if > 66 lbs or more, 0.15 ml if 33 - 63 lbs, or 0.1 ml if <33 lbs   2. Diphenhydramine - give all intramuscular:     2 to <6 years (off-label use): 6.25 mg,    6 to <12 years: 12.5 to 25 mg;    ?12 years: 25-50 mg.  3.  Famotidine:  Adults 40 mg oral    Adolescents age 12 years and >88 lbs: 40 mg    Children and Adolescents ? 12years of age: Initial: 0.25 mg/kg/dose   every 12 hours (maximum daily dose: 40 mg/day)    Epipen dose may be repeated in 5-15 minutes if adequate resolution of symptoms does not occur    Patient should be observed for at least one hour after final epi dose and must be seen by provider. Patients cannot drive themselves if they have received diphenhydramine.

## 2020-06-09 ENCOUNTER — VIRTUAL VISIT (OUTPATIENT)
Dept: FAMILY MEDICINE CLINIC | Age: 60
End: 2020-06-09
Payer: COMMERCIAL

## 2020-06-09 PROCEDURE — 99213 OFFICE O/P EST LOW 20 MIN: CPT | Performed by: FAMILY MEDICINE

## 2020-06-09 ASSESSMENT — ENCOUNTER SYMPTOMS
GASTROINTESTINAL NEGATIVE: 1
RESPIRATORY NEGATIVE: 1

## 2020-06-09 NOTE — PROGRESS NOTES
obstructive pulmonary disease) ([facility] [address]    Eosinophilic asthma ([facility] [address]     Past Surgical History:   Procedure Laterality Date    APPENDECTOMY       SECTION      x2    COLONOSCOPY  [id number]    CSF SHUNT  2007    Cervical syrinx to subarachnoid shunt; thoracic syrinx to subarachnoid shunt (2 separate dural openings)    CYST REMOVAL      extradural cysts at thoracic 2-3    ECTOPIC PREGNANCY SURGERY      HEMICOLECTOMY      HERNIA REPAIR      inguinal     HYSTERECTOMY      SPINAL CORD DECOMPRESSION  2007    C4-5-6-7; T1-2-3 laminectomies    TOOTH EXTRACTION  2017     Prior to Admission medications    Medication Sig Start Date End Date Taking? Authorizing Provider   Biotin 1 MG CAPS Take by mouth    Historical Provider, MD   albuterol (PROVENTIL) (2.5 MG/3ML) 0.083% nebulizer solution USE 1 VIAL IN NEBULIZER EVERY 6 HOURS AS NEEDED FOR WHEEZING (FOR ASTHMA) 20   Dara Pod, APRN - CNP   omalizumab (OMALIZUMAB) 150 MG injection Inject 300 mg into the skin every 28 days Weight 92 kg 20 Pod, APRN - CNP   metFORMIN (GLUCOPHAGE) 500 MG tablet TAKE 1 TABLET BY MOUTH TWICE DAILY WITH MEALS 3/24/20   Cristela Bertrandi, DO   atorvastatin (LIPITOR) 10 MG tablet Take 1 tablet by mouth once daily 3/24/20   Cristela Contreras, DO   budesonide-formoterol (SYMBICORT) 160-4.5 MCG/ACT AERO 2 puffs inhaled twice daily.   Rinse mouth well after use. 20   Dartha Pod, APRN - CNP   nicotine (NICODERM CQ) 14 MG/24HR Place 1 patch onto the skin every 24 hours 20  Dartha Pod, APRN - CNP   buPROPion (WELLBUTRIN XL) 150 MG extended release tablet Take 1 tablet by mouth every morning 20a Pod, APRN - CNP   esomeprazole (NEXIUM) 40 MG delayed release capsule Take 1 capsule by mouth 2 times daily 20   Cristela Contreras,    clobetasol (TEMOVATE) 0.05 % external solution Apply daily to scaly or itchy areas on scalp 19   Jose Martin Delong MD ketoconazole (NIZORAL) 2 % shampoo Apply 3-4 times weekly to scalp, leave on for five minutes prior to washing off 12/9/19   Ronni Jordan MD   fluocinonide (LIDEX) 0.05 % cream Apply topically 2 times daily to rash on hands a needed 12/9/19   Ronni Jordan MD   levocetirizine Bruce Mouse ALLERGY 24HR) 5 MG tablet Take 1 tablet by mouth nightly 12/5/19   COREEN Edgar CNP   albuterol sulfate  (90 Base) MCG/ACT inhaler Inhale 2 puffs into the lungs every 4 hours as needed for Wheezing 12/5/19   COREEN Edgar CNP   azelastine (ASTELIN) 0.1 % nasal spray 1 spray by Nasal route 2 times daily Use in each nostril as directed 12/5/19   COREEN Edgar CNP   azelastine (OPTIVAR) 0.05 % ophthalmic solution Place 1 drop into both eyes 2 times daily 12/5/19   COREEN Edgar CNP   amLODIPine (NORVASC) 5 MG tablet TAKE 1 TABLET BY MOUTH ONCE DAILY 11/4/19   Baltazar Marinelli DO   SYNTHROID 88 MCG tablet TAKE 1 TABLET BY MOUTH ONCE DAILY ON AN EMPTY STOMACH WITH WATER.  DO NOT EAT OR TAKE ANY OTHER MEDICATIONS FOR 30 MINUTES. 9/20/19   Baltazar Marinelli DO   levomilnacipran [facility]) 40 MG CP24 extended release capsule Take 1 capsule by mouth daily 9/12/19   COREEN Crooks CNP   Respiratory Therapy Supplies (NEBULIZER COMPRESSOR) KIT 1 kit by Does not apply route once for 1 dose 8/22/19 8/22/19  COREEN Edgar CNP   fluconazole (DIFLUCAN) 150 MG tablet  8/20/19   Historical Provider, MD   Omeprazole Magnesium (PRILOSEC OTC PO) Take by mouth daily    Historical Provider, MD   Multiple Vitamins-Minerals (MULTIVITAMIN PO) daily    Historical Provider, MD   Probiotic Product (PROBIOTIC PO) daily    Historical Provider, MD   Cyanocobalamin (B-12 PO) daily    Historical Provider, MD   EPIPEN 2-VENESSA 0.3 MG/0.3ML SOAJ injection INJECT 1 PEN IM AS A SINGLE DOSE PRN 6/14/19   Historical Provider, MD   SF 5000 PLUS 1.1 % CREA BRUSH TEETH TWICE DAILY FOR 2 MINUTES, SPIT EXCESS AFTER mouth every 6 hours as needed. Take 1-2 tabs every 6 hrs prn     Historical Provider, MD   gabapentin (NEURONTIN) 100 MG capsule Take 100 mg by mouth 2 times daily.     Lisandro Kruse MD       Lab Results   Component Value Date    LABA1C 5.8 08/07/2019     No results found for: EAG    No components found for: CHLPL  Lab Results   Component Value Date    TRIG 135 08/07/2019    TRIG 165 06/14/2018    TRIG 283 (H) 06/07/2017     Lab Results   Component Value Date    HDL 53 08/07/2019    HDL 42 06/14/2018    HDL 44 06/07/2017     Lab Results   Component Value Date    LDLCALC 119 08/07/2019    LDLCALC 85 06/14/2018    LDLCALC 97 06/07/2017     No results found for: LABVLDL      Chemistry        Component Value Date/Time     08/07/2019 0940    K 3.6 08/07/2019 0940     08/07/2019 0940    CO2 26 08/07/2019 0940    BUN 8 08/07/2019 0940    CREATININE 0.6 08/07/2019 0940        Component Value Date/Time    CALCIUM 9.5 08/07/2019 0940    ALKPHOS 123 08/07/2019 0940    AST 12 08/07/2019 0940    ALT 11 08/07/2019 0940    BILITOT 0.3 08/07/2019 0940            Lab Results   Component Value Date    TSH 0.665 08/07/2019       Lab Results   Component Value Date    WBC 6.6 08/07/2019    HGB 12.6 08/07/2019    HCT 39.4 08/07/2019    MCV 92.3 08/07/2019     08/07/2019         Health Maintenance   Topic Date Due    Breast cancer screen  09/23/2017    Cervical cancer screen  09/23/2018    A1C test (Diabetic or Prediabetic)  08/07/2020    Lipid screen  08/07/2020    TSH testing  08/07/2020    Low dose CT lung screening  12/24/2020    DTaP/Tdap/Td vaccine (2 - Tdap) 10/12/2026    Colon cancer screen colonoscopy  04/17/2027    Flu vaccine  Completed    Shingles Vaccine  Completed    Pneumococcal 0-64 years Vaccine  Completed    Hepatitis C screen  Addressed    HIV screen  Addressed    Hepatitis A vaccine  Aged Out    Hepatitis B vaccine  Aged Out    Hib vaccine  Aged Out    Meningococcal (ACWY) vaccine

## 2020-06-15 ENCOUNTER — NURSE ONLY (OUTPATIENT)
Dept: ALLERGY | Age: 60
End: 2020-06-15
Payer: COMMERCIAL

## 2020-06-15 VITALS
SYSTOLIC BLOOD PRESSURE: 126 MMHG | RESPIRATION RATE: 16 BRPM | HEART RATE: 76 BPM | DIASTOLIC BLOOD PRESSURE: 80 MMHG | TEMPERATURE: 97.3 F

## 2020-06-15 PROCEDURE — 95117 IMMUNOTHERAPY INJECTIONS: CPT | Performed by: NURSE PRACTITIONER

## 2020-06-19 ENCOUNTER — TELEPHONE (OUTPATIENT)
Dept: ENT CLINIC | Age: 60
End: 2020-06-19

## 2020-06-19 RX ORDER — HYDROCORTISONE 25 MG/ML
LOTION TOPICAL
Qty: 59 ML | Refills: 0 | Status: SHIPPED | OUTPATIENT
Start: 2020-06-19 | End: 2020-07-24

## 2020-06-22 ENCOUNTER — NURSE ONLY (OUTPATIENT)
Dept: ALLERGY | Age: 60
End: 2020-06-22
Payer: COMMERCIAL

## 2020-06-22 VITALS
TEMPERATURE: 97 F | SYSTOLIC BLOOD PRESSURE: 118 MMHG | DIASTOLIC BLOOD PRESSURE: 74 MMHG | HEART RATE: 66 BPM | RESPIRATION RATE: 14 BRPM

## 2020-06-22 PROCEDURE — 95117 IMMUNOTHERAPY INJECTIONS: CPT | Performed by: NURSE PRACTITIONER

## 2020-06-22 RX ORDER — ALBUTEROL SULFATE 90 UG/1
AEROSOL, METERED RESPIRATORY (INHALATION)
Qty: 9 G | Refills: 5 | Status: SHIPPED | OUTPATIENT
Start: 2020-06-22 | End: 2020-11-09 | Stop reason: SDUPTHER

## 2020-06-22 RX ORDER — NICOTINE 21 MG/24HR
1 PATCH, TRANSDERMAL 24 HOURS TRANSDERMAL EVERY 24 HOURS
Qty: 30 PATCH | Refills: 1 | Status: SHIPPED | OUTPATIENT
Start: 2020-06-22 | End: 2021-02-10

## 2020-06-22 RX ORDER — ALBUTEROL SULFATE 2.5 MG/3ML
SOLUTION RESPIRATORY (INHALATION)
Qty: 375 ML | Refills: 5 | Status: SHIPPED | OUTPATIENT
Start: 2020-06-22 | End: 2021-03-04 | Stop reason: SDUPTHER

## 2020-06-22 NOTE — TELEPHONE ENCOUNTER
Patient called and requested medication refill. She has been compliant with medication which has been effective in symptom management.   Medications refilled per patients request.  Patient is aware not to smoke related to health risks when wearing the patch which includes heart attack and stroke

## 2020-06-29 ENCOUNTER — NURSE ONLY (OUTPATIENT)
Dept: ALLERGY | Age: 60
End: 2020-06-29
Payer: COMMERCIAL

## 2020-06-29 VITALS
TEMPERATURE: 97.3 F | RESPIRATION RATE: 14 BRPM | DIASTOLIC BLOOD PRESSURE: 80 MMHG | HEART RATE: 66 BPM | SYSTOLIC BLOOD PRESSURE: 118 MMHG

## 2020-06-29 PROCEDURE — 95117 IMMUNOTHERAPY INJECTIONS: CPT | Performed by: NURSE PRACTITIONER

## 2020-07-06 ENCOUNTER — NURSE ONLY (OUTPATIENT)
Dept: ALLERGY | Age: 60
End: 2020-07-06
Payer: COMMERCIAL

## 2020-07-06 VITALS
TEMPERATURE: 97.7 F | RESPIRATION RATE: 14 BRPM | SYSTOLIC BLOOD PRESSURE: 124 MMHG | HEART RATE: 72 BPM | DIASTOLIC BLOOD PRESSURE: 82 MMHG

## 2020-07-06 PROCEDURE — 96372 THER/PROPH/DIAG INJ SC/IM: CPT | Performed by: NURSE PRACTITIONER

## 2020-07-06 PROCEDURE — 95117 IMMUNOTHERAPY INJECTIONS: CPT | Performed by: NURSE PRACTITIONER

## 2020-07-16 ENCOUNTER — TELEPHONE (OUTPATIENT)
Dept: ENT CLINIC | Age: 60
End: 2020-07-16

## 2020-07-16 NOTE — TELEPHONE ENCOUNTER
[facility] [address] call to schedule delivery of patient's Xolair. Delivery will be on Friday, July 31, 2020.

## 2020-07-24 RX ORDER — HYDROCORTISONE 25 MG/ML
LOTION TOPICAL
Qty: 59 ML | Refills: 0 | Status: SHIPPED | OUTPATIENT
Start: 2020-07-24 | End: 2020-08-26

## 2020-07-27 ENCOUNTER — HOSPITAL ENCOUNTER (OUTPATIENT)
Age: 60
Discharge: HOME | End: 2020-07-27
Payer: COMMERCIAL

## 2020-07-27 ENCOUNTER — HOSPITAL ENCOUNTER (OUTPATIENT)
Dept: GENERAL RADIOLOGY | Age: 60
Discharge: HOME | End: 2020-07-27
Payer: COMMERCIAL

## 2020-07-27 LAB
ANION GAP SERPL CALCULATED.3IONS-SCNC: 13 MEQ/L (ref 8–16)
BASOPHILS # BLD: 0.7 %
BASOPHILS ABSOLUTE: 0.1 THOU/MM3 (ref 0–0.1)
BUN BLDV-MCNC: 14 MG/DL (ref 7–22)
CALCIUM SERPL-MCNC: 9.5 MG/DL (ref 8.5–10.5)
CHLORIDE BLD-SCNC: 103 MEQ/L (ref 98–111)
CO2: 23 MEQ/L (ref 23–33)
CREAT SERPL-MCNC: 0.8 MG/DL (ref 0.4–1.2)
EKG ATRIAL RATE: 78 BPM
EKG P AXIS: 74 DEGREES
EKG P-R INTERVAL: 168 MS
EKG Q-T INTERVAL: 348 MS
EKG QRS DURATION: 70 MS
EKG QTC CALCULATION (BAZETT): 396 MS
EKG R AXIS: 75 DEGREES
EKG T AXIS: 47 DEGREES
EKG VENTRICULAR RATE: 78 BPM
EOSINOPHIL # BLD: 3.7 %
EOSINOPHILS ABSOLUTE: 0.3 THOU/MM3 (ref 0–0.4)
ERYTHROCYTE [DISTWIDTH] IN BLOOD BY AUTOMATED COUNT: 13.9 % (ref 11.5–14.5)
ERYTHROCYTE [DISTWIDTH] IN BLOOD BY AUTOMATED COUNT: 47 FL (ref 35–45)
GLUCOSE BLD-MCNC: 122 MG/DL (ref 70–108)
HCT VFR BLD CALC: 41.8 % (ref 37–47)
HEMOGLOBIN: 13 GM/DL (ref 12–16)
IMMATURE GRANS (ABS): 0.03 THOU/MM3 (ref 0–0.07)
IMMATURE GRANULOCYTES: 0.4 %
LYMPHOCYTES # BLD: 24.7 %
LYMPHOCYTES ABSOLUTE: 1.8 THOU/MM3 (ref 1–4.8)
MCH RBC QN AUTO: 29 PG (ref 26–33)
MCHC RBC AUTO-ENTMCNC: 31.1 GM/DL (ref 32.2–35.5)
MCV RBC AUTO: 93.1 FL (ref 81–99)
MONOCYTES # BLD: 5.1 %
MONOCYTES ABSOLUTE: 0.4 THOU/MM3 (ref 0.4–1.3)
NUCLEATED RED BLOOD CELLS: 0 /100 WBC
PLATELET # BLD: 304 THOU/MM3 (ref 130–400)
PMV BLD AUTO: 10.8 FL (ref 9.4–12.4)
POTASSIUM SERPL-SCNC: 3.9 MEQ/L (ref 3.5–5.2)
RBC # BLD: 4.49 MILL/MM3 (ref 4.2–5.4)
SEG NEUTROPHILS: 65.4 %
SEGMENTED NEUTROPHILS ABSOLUTE COUNT: 4.7 THOU/MM3 (ref 1.8–7.7)
SODIUM BLD-SCNC: 139 MEQ/L (ref 135–145)
WBC # BLD: 7.2 THOU/MM3 (ref 4.8–10.8)

## 2020-07-27 PROCEDURE — 71046 X-RAY EXAM CHEST 2 VIEWS: CPT

## 2020-07-27 PROCEDURE — 85025 COMPLETE CBC W/AUTO DIFF WBC: CPT

## 2020-07-27 PROCEDURE — 93005 ELECTROCARDIOGRAM TRACING: CPT | Performed by: PODIATRIST

## 2020-07-27 PROCEDURE — 36415 COLL VENOUS BLD VENIPUNCTURE: CPT

## 2020-07-27 PROCEDURE — 80048 BASIC METABOLIC PNL TOTAL CA: CPT

## 2020-07-27 RX ORDER — ACETAMINOPHEN 160 MG
TABLET,DISINTEGRATING ORAL
Qty: 30 CAPSULE | Refills: 11 | Status: SHIPPED | OUTPATIENT
Start: 2020-07-27 | End: 2021-08-12

## 2020-07-27 RX ORDER — HYOSCYAMINE SULFATE EXTENDED-RELEASE 0.38 MG/1
TABLET ORAL
Qty: 30 TABLET | Refills: 11 | Status: SHIPPED | OUTPATIENT
Start: 2020-07-27 | End: 2021-08-11

## 2020-07-27 NOTE — TELEPHONE ENCOUNTER
Antoine Jordan called requesting a refill on the following medications:  Requested Prescriptions     Pending Prescriptions Disp Refills    hyoscyamine (LEVBID) 375 MCG extended release tablet 30 tablet 11     Sig: TAKE 1/2 TABLET BY MOUTH TWICE DAILY    Cholecalciferol (VITAMIN D3) 50 MCG (2000 UT) CAPS 30 capsule      Pharmacy verified: to [facility] on [employer]  . pv      Date of last visit: 6/9/20  Date of next visit (if applicable): Visit date not found

## 2020-08-03 ENCOUNTER — NURSE ONLY (OUTPATIENT)
Dept: ALLERGY | Age: 60
End: 2020-08-03
Payer: COMMERCIAL

## 2020-08-03 VITALS
TEMPERATURE: 97 F | SYSTOLIC BLOOD PRESSURE: 132 MMHG | HEART RATE: 80 BPM | DIASTOLIC BLOOD PRESSURE: 82 MMHG | RESPIRATION RATE: 12 BRPM

## 2020-08-03 PROCEDURE — 95117 IMMUNOTHERAPY INJECTIONS: CPT | Performed by: NURSE PRACTITIONER

## 2020-08-03 PROCEDURE — 96372 THER/PROPH/DIAG INJ SC/IM: CPT | Performed by: NURSE PRACTITIONER

## 2020-08-03 NOTE — PROGRESS NOTES
Patient here today to receive Xolair injection. Patient does not report any previous reaction from Xolair injections. Denies any nausea vomiting fever urticaria or angioedema. Denies any recent exacerbation of asthma or asthma-like symptoms. Patient was explained benefits and potential risks including anaphylaxis to patient. Following obtaining verbal and written consent (on file)  Xolair injection was prepared 30 minutes prior to injection according to manufacture guidelines. LYOPHILIZED POWDER (VIAL); reconstituted with 1.4 ml injectable sterile water per Vial.      Patient has been explained the risk and benefits including delayed anaphylaxis. Patient has EpiPen and understands how to appropriately use. Xolair 150 mg given subcutaneously in RT & LT Thigh   for a total combined dose of 300mg  using a 25-gauge needle and 3 ML syringe. Site injections may include the following sites:  RIGHT  / LEFT UPPER ARM,  RIGHT  / LEFT FRONT OR MIDDLE OF THIGH, OR STOMACH        Xolair  NDC 42399-755-69(1)   Lot [id number] (2)   Expires 06/2021 (2)    Prior to patient receiving Xolair area was cleansed with alcohol swabs. Following the administration no evidence of bleeding or bruising. Patient  observed for 2 hours following first three injections and then 30 minutes following each subsequent administration. No adverse reactions reported. Patient tolerated well without adverse reactions or side effects. Patient to continue to receive Xolair injections monthly. Will report any problems to this office.     Patient to return to clinic monthly for Xolair injections and will follow-up with provider a minimum of every 6 months for evaluation and labs including CMP, CBC with diff, and IGE    SHOT REACTION TREATMENT INSTRUCTIONS    During the 30 minute wait after an allergy injection the following symptoms should be reported:    Itching other than at the injection site  Hives or swelling other than at the injection site  Redness other than at the injection site  Difficulty breathing  Chest tightness  Difficulty swallowing  Throat tightness    If these symptoms occur, NOTIFY PROVIDER and the following treatment should be administered:    1. Epinephrine 1:1000 IM - 0.3 ml if > 66 lbs or more, 0.15 ml if 33 - 63 lbs, or 0.1 ml if <33 lbs   2. Diphenhydramine - give all intramuscular:     2 to <6 years (off-label use): 6.25 mg,    6 to <12 years: 12.5 to 25 mg;    ?12 years: 25-50 mg.  3.  Famotidine:  Adults 40 mg oral    Adolescents age 12 years and >88 lbs: 40 mg    Children and Adolescents ? 12years of age: Initial: 0.25 mg/kg/dose   every 12 hours (maximum daily dose: 40 mg/day)    Epipen dose may be repeated in 5-15 minutes if adequate resolution of symptoms does not occur    Patient should be observed for at least one hour after final epi dose and must be seen by provider. Patients cannot drive themselves if they have received diphenhydramine.

## 2020-08-10 ENCOUNTER — NURSE ONLY (OUTPATIENT)
Dept: ALLERGY | Age: 60
End: 2020-08-10
Payer: COMMERCIAL

## 2020-08-10 VITALS
RESPIRATION RATE: 14 BRPM | DIASTOLIC BLOOD PRESSURE: 80 MMHG | TEMPERATURE: 96.6 F | HEART RATE: 70 BPM | SYSTOLIC BLOOD PRESSURE: 122 MMHG

## 2020-08-10 PROCEDURE — 95117 IMMUNOTHERAPY INJECTIONS: CPT | Performed by: NURSE PRACTITIONER

## 2020-08-10 NOTE — PROGRESS NOTES
After consent obtained/verified, allergy injection given in back of R/L arm(s). Documentation of vial injection specific to arm(s) noted on Allergy Immunotherapy Administration Form. Patient waited 30 minutes for observation. Patient tolerated Yellow vials well at 0.20ml without adverse reaction. SHOT REACTION TREATMENT INSTRUCTIONS    During the 30 minute wait after an allergy injection the following symptoms should be reported:    Itching other than at the injection site  Hives or swelling other than at the injection site  Redness other than at the injection site  Difficulty breathing  Chest tightness  Difficulty swallowing  Throat tightness    If these symptoms occur, NOTIFY PROVIDER and the following treatment should be administered:    1. Epinephrine 1:1000 IM - 0.3 ml if > 66 lbs or more, 0.15 ml if 33 - 63 lbs, or 0.1 ml if <33 lbs   2. Diphenhydramine - give all intramuscular:     2 to <6 years (off-label use): 6.25 mg,    6 to <12 years: 12.5 to 25 mg;    ?12 years: 25-50 mg.  3.  Famotidine:  Adults 40 mg oral    Adolescents age 12 years and >88 lbs: 40 mg    Children and Adolescents ? 12years of age: Initial: 0.25 mg/kg/dose   every 12 hours (maximum daily dose: 40 mg/day)    Epi dose may me repeated in 5-15 minutes if adequate resolution of symptoms does not occur    Patient should be observed for at least one hour after final epi dose and must be seen by provider. Patients cannot drive themselves if they have received diphenhydramine.

## 2020-08-17 ENCOUNTER — NURSE ONLY (OUTPATIENT)
Dept: ALLERGY | Age: 60
End: 2020-08-17
Payer: COMMERCIAL

## 2020-08-17 VITALS
HEART RATE: 70 BPM | SYSTOLIC BLOOD PRESSURE: 120 MMHG | DIASTOLIC BLOOD PRESSURE: 74 MMHG | TEMPERATURE: 97.6 F | RESPIRATION RATE: 14 BRPM

## 2020-08-17 PROCEDURE — 95117 IMMUNOTHERAPY INJECTIONS: CPT | Performed by: NURSE PRACTITIONER

## 2020-08-17 NOTE — PROGRESS NOTES
After consent obtained/verified, allergy injection given in back of R/L arm(s). Documentation of vial injection specific to arm(s) noted on Allergy Immunotherapy Administration Form. Patient waited 30 minutes for observation. Patient tolerated Blue vials well at 0.25ml without adverse reaction. SHOT REACTION TREATMENT INSTRUCTIONS    During the 30 minute wait after an allergy injection the following symptoms should be reported:    Itching other than at the injection site  Hives or swelling other than at the injection site  Redness other than at the injection site  Difficulty breathing  Chest tightness  Difficulty swallowing  Throat tightness    If these symptoms occur, NOTIFY PROVIDER and the following treatment should be administered:    1. Epinephrine 1:1000 IM - 0.3 ml if > 66 lbs or more, 0.15 ml if 33 - 63 lbs, or 0.1 ml if <33 lbs   2. Diphenhydramine - give all intramuscular:     2 to <6 years (off-label use): 6.25 mg,    6 to <12 years: 12.5 to 25 mg;    ?12 years: 25-50 mg.  3.  Famotidine:  Adults 40 mg oral    Adolescents age 12 years and >88 lbs: 40 mg    Children and Adolescents ? 12years of age: Initial: 0.25 mg/kg/dose   every 12 hours (maximum daily dose: 40 mg/day)    Epi dose may me repeated in 5-15 minutes if adequate resolution of symptoms does not occur    Patient should be observed for at least one hour after final epi dose and must be seen by provider. Patients cannot drive themselves if they have received diphenhydramine.

## 2020-08-19 ENCOUNTER — HOSPITAL ENCOUNTER (OUTPATIENT)
Age: 60
Discharge: HOME | End: 2020-08-19
Payer: COMMERCIAL

## 2020-08-19 LAB
ALBUMIN SERPL-MCNC: 4.5 G/DL (ref 3.5–5.1)
ALP BLD-CCNC: 132 U/L (ref 38–126)
ALT SERPL-CCNC: 13 U/L (ref 11–66)
ANION GAP SERPL CALCULATED.3IONS-SCNC: 11 MEQ/L (ref 8–16)
AST SERPL-CCNC: 13 U/L (ref 5–40)
AVERAGE GLUCOSE: 114 MG/DL (ref 70–126)
BASOPHILS # BLD: 0.8 %
BASOPHILS ABSOLUTE: 0.1 THOU/MM3 (ref 0–0.1)
BILIRUB SERPL-MCNC: 0.4 MG/DL (ref 0.3–1.2)
BUN BLDV-MCNC: 8 MG/DL (ref 7–22)
CALCIUM SERPL-MCNC: 9.8 MG/DL (ref 8.5–10.5)
CHLORIDE BLD-SCNC: 104 MEQ/L (ref 98–111)
CHOLESTEROL, TOTAL: 211 MG/DL (ref 100–199)
CO2: 26 MEQ/L (ref 23–33)
CREAT SERPL-MCNC: 0.5 MG/DL (ref 0.4–1.2)
EOSINOPHIL # BLD: 2.9 %
EOSINOPHILS ABSOLUTE: 0.2 THOU/MM3 (ref 0–0.4)
ERYTHROCYTE [DISTWIDTH] IN BLOOD BY AUTOMATED COUNT: 13.9 % (ref 11.5–14.5)
ERYTHROCYTE [DISTWIDTH] IN BLOOD BY AUTOMATED COUNT: 47.1 FL (ref 35–45)
GFR SERPL CREATININE-BSD FRML MDRD: > 90 ML/MIN/1.73M2
GLUCOSE BLD-MCNC: 103 MG/DL (ref 70–108)
HBA1C MFR BLD: 5.8 % (ref 4.4–6.4)
HCT VFR BLD CALC: 43.8 % (ref 37–47)
HDLC SERPL-MCNC: 44 MG/DL
HEMOGLOBIN: 14.1 GM/DL (ref 12–16)
IMMATURE GRANS (ABS): 0.02 THOU/MM3 (ref 0–0.07)
IMMATURE GRANULOCYTES: 0.3 %
LDL CHOLESTEROL CALCULATED: 119 MG/DL
LYMPHOCYTES # BLD: 29.7 %
LYMPHOCYTES ABSOLUTE: 1.9 THOU/MM3 (ref 1–4.8)
MAGNESIUM: 2.2 MG/DL (ref 1.6–2.4)
MCH RBC QN AUTO: 29.8 PG (ref 26–33)
MCHC RBC AUTO-ENTMCNC: 32.2 GM/DL (ref 32.2–35.5)
MCV RBC AUTO: 92.6 FL (ref 81–99)
MONOCYTES # BLD: 4.2 %
MONOCYTES ABSOLUTE: 0.3 THOU/MM3 (ref 0.4–1.3)
NUCLEATED RED BLOOD CELLS: 0 /100 WBC
PLATELET # BLD: 337 THOU/MM3 (ref 130–400)
PMV BLD AUTO: 10.5 FL (ref 9.4–12.4)
POTASSIUM SERPL-SCNC: 4.3 MEQ/L (ref 3.5–5.2)
RBC # BLD: 4.73 MILL/MM3 (ref 4.2–5.4)
SEG NEUTROPHILS: 62.1 %
SEGMENTED NEUTROPHILS ABSOLUTE COUNT: 4 THOU/MM3 (ref 1.8–7.7)
SODIUM BLD-SCNC: 141 MEQ/L (ref 135–145)
TOTAL PROTEIN: 7.7 G/DL (ref 6.1–8)
TRIGL SERPL-MCNC: 238 MG/DL (ref 0–199)
TSH SERPL DL<=0.05 MIU/L-ACNC: 0.7 UIU/ML (ref 0.4–4.2)
VITAMIN D 25-HYDROXY: 22 NG/ML (ref 30–100)
WBC # BLD: 6.5 THOU/MM3 (ref 4.8–10.8)

## 2020-08-19 PROCEDURE — 84443 ASSAY THYROID STIM HORMONE: CPT

## 2020-08-19 PROCEDURE — 82306 VITAMIN D 25 HYDROXY: CPT

## 2020-08-19 PROCEDURE — 36415 COLL VENOUS BLD VENIPUNCTURE: CPT

## 2020-08-19 PROCEDURE — 80061 LIPID PANEL: CPT

## 2020-08-19 PROCEDURE — 83036 HEMOGLOBIN GLYCOSYLATED A1C: CPT

## 2020-08-19 PROCEDURE — 80053 COMPREHEN METABOLIC PANEL: CPT

## 2020-08-19 PROCEDURE — 85025 COMPLETE CBC W/AUTO DIFF WBC: CPT

## 2020-08-19 PROCEDURE — 83735 ASSAY OF MAGNESIUM: CPT

## 2020-08-20 ENCOUNTER — OFFICE VISIT (OUTPATIENT)
Dept: FAMILY MEDICINE CLINIC | Age: 60
End: 2020-08-20
Payer: COMMERCIAL

## 2020-08-20 VITALS — TEMPERATURE: 97 F | WEIGHT: 199.6 LBS | BODY MASS INDEX: 34.26 KG/M2

## 2020-08-20 LAB — GFR SERPL CREATININE-BSD FRML MDRD: 73 ML/MIN/1.73M2

## 2020-08-20 PROCEDURE — 3017F COLORECTAL CA SCREEN DOC REV: CPT | Performed by: FAMILY MEDICINE

## 2020-08-20 PROCEDURE — 99214 OFFICE O/P EST MOD 30 MIN: CPT | Performed by: FAMILY MEDICINE

## 2020-08-20 PROCEDURE — 4004F PT TOBACCO SCREEN RCVD TLK: CPT | Performed by: FAMILY MEDICINE

## 2020-08-20 PROCEDURE — G8427 DOCREV CUR MEDS BY ELIG CLIN: HCPCS | Performed by: FAMILY MEDICINE

## 2020-08-20 PROCEDURE — G8417 CALC BMI ABV UP PARAM F/U: HCPCS | Performed by: FAMILY MEDICINE

## 2020-08-20 ASSESSMENT — ENCOUNTER SYMPTOMS
GASTROINTESTINAL NEGATIVE: 1
RESPIRATORY NEGATIVE: 1

## 2020-08-20 NOTE — PATIENT INSTRUCTIONS
You may receive a survey regarding the care you received during your visit. Your input is valuable to us. We encourage you to complete and return your survey. We hope you will choose us in the future for your healthcare needs. Tobacco Cessation Programs     Telephonic behavior modification  Harvey Hess ([phone])   Counseling service for those who are ready to quit using tobacco.     Available for uninsured [place] residents, [place] recipients, pregnant women, or patients whose health plans or employers are members of the [address] [place] [address] behavior modification   http://[place]. Quitlogix. org   Online support program to help patients through each step of the quitting process. Available 24 hours a day 7 days a week. Provides up to date researched based tool, step-by-step guides, and motivational messages. Online behavior modification   www.lungusa.org/stop-smoking/how-to-quit   HelpLine: [facility] (508-8427)   Email questions to:  Jean Marie@[email or web address]. org    Website offers resources to help tobacco users figure out their reasons for quitting and then take the big step of quitting for good. Hypnosis   Location: [facility] [address]   Contact: Sanjay Hale, PhD at [phone]   Hypnosis for tobacco cessation   Cost $225 for the initial session and $175 for each session afterwards. Most patients require 6-8 sessions. There is the option to submit through the patients insurance. Hypnosis and behavior modification   Location: [place] [address],  [facility] [place] [address]   Contact: Aria Baires, PhD at [phone]  [facility] Counseling and hypnosis for nicotine addition   Cost: For uninsured patients:  Please call above phone number  Cost for insured patients depends on patients insurance plan.     Behavior modification   Location: [facility], [address] [facility] [address] [place] [address], [address] [place] [address] Road: [place] [address] include four one-on-one

## 2020-08-20 NOTE — PROGRESS NOTES
Subjective:      Patient ID: Delroy Loya is a 62 y.o. female. HPI:   Chief Complaint   Patient presents with   Jonni Collet Dr. Linn Sorrow first, second and fifth toenails removed 2020     Pre-op eval.  Scheduled for removal of 2nd and 5th toenails on  with Dr. Renetta Preciado. Pt has constant CP but this is m/s in nature, it is a constant pain that is related to her fibromyalgia. Able to perform 4 METs with no increase in cardiac symptoms. Hx of COPD, controlled on inhalers. She has been cleared by Pulmonary, Dr. Mendel Ronde. BP well controlled. BP Readings from Last 3 Encounters:   20 120/74   08/10/20 122/80   20 132/82     Surgery will be done under \"twilight\". No hx of general anesthesia complications. Sugars very well controlled. Lab Results   Component Value Date    LABA1C 5.8 2020         Patient Active Problem List   Diagnosis    Hypothyroid    Fibromyalgia    Obesity    Hyperlipemia    Interstitial cystitis    ETD (eustachian tube dysfunction)    Sinusitis, chronic    Vestibular dizziness    Nasal septal deviation    IGT (impaired glucose tolerance)    Vitamin D deficiency    Arnold-Chiari malformation (HCC)    Tobacco abuse    Esophageal reflux    COPD (chronic obstructive pulmonary disease) ([address]    Eosinophilic asthma ([address]     Past Surgical History:   Procedure Laterality Date    APPENDECTOMY       SECTION      x2    COLONOSCOPY  [id number]    CSF SHUNT  2007    Cervical syrinx to subarachnoid shunt; thoracic syrinx to subarachnoid shunt (2 separate dural openings)    CYST REMOVAL      extradural cysts at thoracic 2-3    ECTOPIC PREGNANCY SURGERY      HEMICOLECTOMY      HERNIA REPAIR      inguinal     HYSTERECTOMY      SPINAL CORD DECOMPRESSION  2007    C4-5-6-7; T1-2-3 laminectomies    TOOTH EXTRACTION  2017     Prior to Admission medications    Medication Sig Start Date End Date Taking?  Authorizing Provider   hyoscyamine (LEVBID) 375 MCG extended release tablet TAKE 1/2 TABLET BY MOUTH TWICE DAILY 7/27/20  Yes Oh Bowles DO   Cholecalciferol (VITAMIN D3) 50 MCG (2000 UT) CAPS Vitamin D3 2,000 unit capsule 7/27/20  Yes Oh Bowles DO   hydrocortisone (HYTONE) 2.5 % lotion APPLY TOPICALLY DAILY 7/24/20  Yes Oh Bowles DO   albuterol (PROVENTIL) (2.5 MG/3ML) 0.083% nebulizer solution USE 1 VIAL IN NEBULIZER EVERY 6 HOURS AS NEEDED FOR WHEEZING (FOR ASTHMA) 6/22/20  Yes Larue Merlin, APRN - CNP   nicotine (EQ NICOTINE) 14 MG/24HR Place 1 patch onto the skin every 24 hours 1 patch on skin every 24 hours DO NOT SMOKE WITH PATCH ON! 6/22/20  Yes Larue Merlin, APRN - CNP   albuterol sulfate  (90 Base) MCG/ACT inhaler INHALE 2 PUFFS BY MOUTH 4 TIMES DAILY AS NEEDED FOR WHEEZING 6/22/20  Yes Larue Merlin, APRN - CNP   Biotin 1 MG CAPS Take by mouth   Yes Historical Provider, MD   metFORMIN (GLUCOPHAGE) 500 MG tablet TAKE 1 TABLET BY MOUTH TWICE DAILY WITH MEALS 3/24/20  Yes Oh Bowles DO   atorvastatin (LIPITOR) 10 MG tablet Take 1 tablet by mouth once daily 3/24/20  Yes Oh Bowles DO   budesonide-formoterol (SYMBICORT) 160-4.5 MCG/ACT AERO 2 puffs inhaled twice daily.   Rinse mouth well after use. 2/13/20  Yes Larue Merlin, APRN - CNP   buPROPion (WELLBUTRIN XL) 150 MG extended release tablet Take 1 tablet by mouth every morning 1/27/20  Yes Larue Merlin, APRN - CNP   esomeprazole (NEXIUM) 40 MG delayed release capsule Take 1 capsule by mouth 2 times daily 1/20/20  Yes Oh Bowles DO   clobetasol (TEMOVATE) 0.05 % external solution Apply daily to scaly or itchy areas on scalp 12/9/19  Yes Bridget Good MD   ketoconazole (NIZORAL) 2 % shampoo Apply 3-4 times weekly to scalp, leave on for five minutes prior to washing off 12/9/19  Yes Bridget Good MD   fluocinonide (LIDEX) 0.05 % cream Apply topically 2 times daily to rash on hands a needed 12/9/19 Yes Zo Frazier MD   levocetirizine Melyssa Birkenhead ALLERGY 24HR) 5 MG tablet Take 1 tablet by mouth nightly 12/5/19  Yes COREEN Stein CNP   azelastine (ASTELIN) 0.1 % nasal spray 1 spray by Nasal route 2 times daily Use in each nostril as directed 12/5/19  Yes COREEN Stein CNP   azelastine (OPTIVAR) 0.05 % ophthalmic solution Place 1 drop into both eyes 2 times daily 12/5/19  Yes COREEN Stein CNP   amLODIPine (NORVASC) 5 MG tablet TAKE 1 TABLET BY MOUTH ONCE DAILY 11/4/19  Yes Jerel Collins DO   SYNTHROID 88 MCG tablet TAKE 1 TABLET BY MOUTH ONCE DAILY ON AN EMPTY STOMACH WITH WATER. DO NOT EAT OR TAKE ANY OTHER MEDICATIONS FOR 30 MINUTES. 9/20/19  Yes Jerel Collins DO   levomilnacipran VENEGAS [facility]) 40 MG CP24 extended release capsule Take 1 capsule by mouth daily 9/12/19  Yes COREEN Garcia CNP   fluconazole (DIFLUCAN) 150 MG tablet  8/20/19  Yes Historical Provider, MD   Omeprazole Magnesium (PRILOSEC OTC PO) Take by mouth daily   Yes Historical Provider, MD   Multiple Vitamins-Minerals (MULTIVITAMIN PO) daily   Yes Historical Provider, MD   Probiotic Product (PROBIOTIC PO) daily   Yes Historical Provider, MD   Cyanocobalamin (B-12 PO) daily   Yes Historical Provider, MD   EPIPEN 2-VENESSA 0.3 MG/0.3ML SOAJ injection INJECT 1 PEN IM AS A SINGLE DOSE PRN 6/14/19  Yes Historical Provider, MD   SF 5000 PLUS 1.1 % CREA BRUSH TEETH TWICE DAILY FOR 2 MINUTES, SPIT EXCESS AFTER BRUSHING.  DO NOT RINSE WITH WATER , WAIT 30 MINUTES  FOR ANY FOOD OR DRINK 5/19/19  Yes Historical Provider, MD   BIOTIN 5000 PO Take by mouth daily    Yes Historical Provider, MD   ondansetron (ZOFRAN) 4 MG tablet TAKE 1 TABLET BY MOUTH EVERY 8 HOURS AS NEEDED FOR NAUSEA OR VOMITING 10/1/18  Yes Jerel Collins DO   meloxicam (MOBIC) 7.5 MG tablet TK 1 T PO BID 5/12/18  Yes Historical Provider, MD Álvarez Son 290 MCG CAPS capsule  6/8/18  Yes Historical Provider, MD   lidocaine (XYLOCAINE) 5 % ointment Apply topically as needed for Pain Apply topically as needed. Yes Historical Provider, MD   conjugated estrogens (PREMARIN) 0.625 MG/GM vaginal cream Place vaginally as needed Place vaginally daily. Yes Historical Provider, MD   lidocaine (LIDODERM) 5 % Place 1 patch onto the skin daily 12 hours on, 12 hours off. Yes Historical Provider, MD   hydrocortisone (ANUSOL-HC) 25 MG suppository Place 25 mg rectally as needed for Hemorrhoids   Yes Historical Provider, MD   fluticasone (FLONASE ALLERGY RELIEF) 50 MCG/ACT nasal spray 1 spray by Nasal route daily   Yes Historical Provider, MD   HYDROcodone-acetaminophen (NORCO) 5-325 MG per tablet 1 tablet nightly as needed for Pain. Take 1 tablet every 4-6 hours as needed for pain    Yes Thanh Geronimo MD   NONFORMULARY Immunotherapy allergy shot   Yes Historical Provider, MD   zinc 50 MG CAPS Take  by mouth daily. Yes Historical Provider, MD   Ascorbic Acid (VITAMIN C) 500 MG CAPS Take  by mouth. Yes Historical Provider, MD   orphenadrine (NORFLEX) 100 MG tablet Take 100 mg by mouth 2 times daily as needed. Yes Historical Provider, MD   tramadol (ULTRAM) 50 MG tablet Take 50 mg by mouth every 6 hours as needed. Take 1-2 tabs every 6 hrs prn    Yes Historical Provider, MD   gabapentin (NEURONTIN) 100 MG capsule Take 100 mg by mouth 2 times daily. Yes Thanh Geronimo MD   Respiratory Therapy Supplies (NEBULIZER COMPRESSOR) KIT 1 kit by Does not apply route once for 1 dose 8/22/19 8/22/19  COREEN Mao - CNP         Review of Systems   Constitutional: Negative. HENT: Negative. Respiratory: Negative. Cardiovascular: Negative. Gastrointestinal: Negative. Musculoskeletal: Negative. All other systems reviewed and are negative. Objective:   Physical Exam  Vitals signs and nursing note reviewed. Constitutional:       Appearance: She is well-developed. HENT:      Head: Normocephalic and atraumatic.       Right Ear: Tympanic membrane normal.      Left Ear: Tympanic membrane normal.   Cardiovascular:      Rate and Rhythm: Normal rate and regular rhythm. Heart sounds: Normal heart sounds. No murmur. Pulmonary:      Effort: Pulmonary effort is normal.      Breath sounds: Normal breath sounds. Abdominal:      General: Bowel sounds are normal.      Palpations: Abdomen is soft. Skin:     General: Skin is warm and dry. Neurological:      Mental Status: She is alert and oriented to person, place, and time. Psychiatric:         Behavior: Behavior normal.         Assessment:       Diagnosis Orders   1. Pre-op evaluation     2. Ingrown toenail of both feet     3. Non-cardiac chest pain     4. Fibromyalgia     5. Hypothyroidism, unspecified type     6. Pure hypercholesterolemia     7. IGT (impaired glucose tolerance)     8. Gastroesophageal reflux disease, esophagitis presence not specified     9. Eosinophilic asthma ([facility]     10.  Arnold-Chiari malformation (HCC)             Plan:      -  Labs, EKG, CXR reviewed  -  Pt acceptable risk for surgery, copy of this note will be sent to Dr. Quenten Romberg  -  Medication management pre-operative discussed  -  RTO prn        Ford Young DO

## 2020-08-26 ENCOUNTER — PATIENT MESSAGE (OUTPATIENT)
Dept: FAMILY MEDICINE CLINIC | Age: 60
End: 2020-08-26

## 2020-08-26 RX ORDER — BUDESONIDE AND FORMOTEROL FUMARATE DIHYDRATE 160; 4.5 UG/1; UG/1
AEROSOL RESPIRATORY (INHALATION)
Qty: 11 G | Refills: 5 | Status: SHIPPED | OUTPATIENT
Start: 2020-08-26 | End: 2020-11-09 | Stop reason: SDUPTHER

## 2020-08-26 NOTE — TELEPHONE ENCOUNTER
From: Edgard Nazario  To: Nidhi Pete   Sent: 8/26/2020 1:05 PM EDT  Subject: Prescription Question    Hi Dr. Jay Medina,  Can you please ADD FLEXERIL to the LIST OF RXS THAT I AM ALLERGIC TO & put it in my files so that it will show up to every Doctor when being prescribed for future reference? I ABSOLUTELY CANNOT EVER TAKE FLEXERIL! I take ORPHENADRINE (NORFLEX) instead. I had to be WEANED off of Flexeril in 2008 because it made me very sick to my stomach & I continued to regurgitate it @ that time & could not eat anything as long as it was in my system. I relayed all my troubles with it to Dr. Carey Lama back then & ever since I have been taking Orphenadrine which works very fine in my system. Dr. Lana Bernstein weaned me off of it   back then. Thank you very much Sir. Pedro Faustin  [phone].

## 2020-08-31 ENCOUNTER — NURSE ONLY (OUTPATIENT)
Dept: ALLERGY | Age: 60
End: 2020-08-31
Payer: COMMERCIAL

## 2020-08-31 VITALS
RESPIRATION RATE: 16 BRPM | HEART RATE: 70 BPM | DIASTOLIC BLOOD PRESSURE: 82 MMHG | TEMPERATURE: 98.1 F | SYSTOLIC BLOOD PRESSURE: 130 MMHG

## 2020-08-31 PROCEDURE — 96372 THER/PROPH/DIAG INJ SC/IM: CPT | Performed by: NURSE PRACTITIONER

## 2020-08-31 PROCEDURE — 96401 CHEMO ANTI-NEOPL SQ/IM: CPT | Performed by: NURSE PRACTITIONER

## 2020-08-31 PROCEDURE — 95117 IMMUNOTHERAPY INJECTIONS: CPT | Performed by: NURSE PRACTITIONER

## 2020-08-31 NOTE — PROGRESS NOTES
Patient here today to receive Xolair injection. Patient does not report any previous reaction from Xolair injections. Denies any nausea vomiting fever urticaria or angioedema. Denies any recent exacerbation of asthma or asthma-like symptoms. Patient was explained benefits and potential risks including anaphylaxis to patient. Following obtaining verbal and written consent (on file)  Xolair injection was prepared 30 minutes prior to injection according to manufacture guidelines. LYOPHILIZED POWDER (VIAL); reconstituted with 1.4 ml injectable sterile water per Vial.      Patient has been explained the risk and benefits including delayed anaphylaxis. Patient has EpiPen and understands how to appropriately use. Xolair 150 mg given subcutaneously in RT&LT thigh  for a total combined dose of 300mg using a 25-gauge needle and 3 ML syringe. Site injections may include the following sites:  RIGHT  / LEFT UPPER ARM,  RIGHT  / LEFT FRONT OR MIDDLE OF THIGH, OR STOMACH        Xolair  [address] 68417-475-50 (2)   Lot [id number]   Expires 06/2021 (2)    Prior to patient receiving Xolair area was cleansed with alcohol swabs. Following the administration no evidence of bleeding or bruising. Patient  observed for 2 hours following first three injections and then 30 minutes following each subsequent administration. No adverse reactions reported. Patient tolerated well without adverse reactions or side effects. Patient to continue to receive Xolair injections monthly. Will report any problems to this office.     Patient to return to clinic monthly for Xolair injections and will follow-up with provider a minimum of every 6 months for evaluation and labs including CMP, CBC with diff, and IGE    SHOT REACTION TREATMENT INSTRUCTIONS    During the 30 minute wait after an allergy injection the following symptoms should be reported:    Itching other than at the injection site  Hives or swelling other than at the injection site  Redness other than at the injection site  Difficulty breathing  Chest tightness  Difficulty swallowing  Throat tightness    If these symptoms occur, NOTIFY PROVIDER and the following treatment should be administered:    1. Epinephrine 1:1000 IM - 0.3 ml if > 66 lbs or more, 0.15 ml if 33 - 63 lbs, or 0.1 ml if <33 lbs   2. Diphenhydramine - give all intramuscular:     2 to <6 years (off-label use): 6.25 mg,    6 to <12 years: 12.5 to 25 mg;    ?12 years: 25-50 mg.  3.  Famotidine:  Adults 40 mg oral    Adolescents age 12 years and >88 lbs: 40 mg    Children and Adolescents ? 12years of age: Initial: 0.25 mg/kg/dose   every 12 hours (maximum daily dose: 40 mg/day)    Epipen dose may be repeated in 5-15 minutes if adequate resolution of symptoms does not occur    Patient should be observed for at least one hour after final epi dose and must be seen by provider. Patients cannot drive themselves if they have received diphenhydramine.

## 2020-09-09 ENCOUNTER — TELEPHONE (OUTPATIENT)
Dept: ENT CLINIC | Age: 60
End: 2020-09-09

## 2020-09-14 ENCOUNTER — NURSE ONLY (OUTPATIENT)
Dept: ALLERGY | Age: 60
End: 2020-09-14
Payer: MEDICAID

## 2020-09-14 VITALS
TEMPERATURE: 97.4 F | RESPIRATION RATE: 20 BRPM | DIASTOLIC BLOOD PRESSURE: 80 MMHG | SYSTOLIC BLOOD PRESSURE: 132 MMHG | HEART RATE: 80 BPM

## 2020-09-14 PROCEDURE — 95117 IMMUNOTHERAPY INJECTIONS: CPT | Performed by: NURSE PRACTITIONER

## 2020-09-23 PROCEDURE — 95165 ANTIGEN THERAPY SERVICES: CPT | Performed by: NURSE PRACTITIONER

## 2020-09-25 ENCOUNTER — TELEPHONE (OUTPATIENT)
Dept: FAMILY MEDICINE CLINIC | Age: 60
End: 2020-09-25

## 2020-09-25 NOTE — TELEPHONE ENCOUNTER
PA request received for Oscimin (Hyoscyamine) 375mcg. PA submitted online at [email or web address] and pending review.

## 2020-09-28 ENCOUNTER — NURSE ONLY (OUTPATIENT)
Dept: ALLERGY | Age: 60
End: 2020-09-28
Payer: COMMERCIAL

## 2020-09-28 VITALS
DIASTOLIC BLOOD PRESSURE: 80 MMHG | SYSTOLIC BLOOD PRESSURE: 118 MMHG | RESPIRATION RATE: 16 BRPM | HEART RATE: 70 BPM | TEMPERATURE: 96.7 F

## 2020-09-28 PROCEDURE — 95117 IMMUNOTHERAPY INJECTIONS: CPT | Performed by: NURSE PRACTITIONER

## 2020-09-28 PROCEDURE — 96372 THER/PROPH/DIAG INJ SC/IM: CPT | Performed by: NURSE PRACTITIONER

## 2020-09-28 PROCEDURE — 96401 CHEMO ANTI-NEOPL SQ/IM: CPT | Performed by: NURSE PRACTITIONER

## 2020-09-28 NOTE — PROGRESS NOTES
Patient here today to receive Xolair injection. Patient does not report any previous reaction from Xolair injections. Denies any nausea vomiting fever urticaria or angioedema. Denies any recent exacerbation of asthma or asthma-like symptoms. Patient was explained benefits and potential risks including anaphylaxis to patient. Following obtaining verbal and written consent (on file)  Xolair injection was prepared 30 minutes prior to injection according to manufacture guidelines. LYOPHILIZED POWDER (VIAL); reconstituted with 1.4 ml injectable sterile water per Vial.      Patient has been explained the risk and benefits including delayed anaphylaxis. Patient has EpiPen and understands how to appropriately use. Xolair 150 mg given subcutaneously in RT&LT thigh   for a total combined dose of 300mg using a 25-gauge needle and 3 ML syringe. Site injections may include the following sites:  RIGHT  / LEFT UPPER ARM,  RIGHT  / LEFT FRONT OR MIDDLE OF THIGH, OR STOMACH        Xolair  [address] [id number](0)   Lot [id number])               Expires 6/2021    Prior to patient receiving Xolair area was cleansed with alcohol swabs. Following the administration no evidence of bleeding or bruising. Patient  observed for 2 hours following first three injections and then 30 minutes following each subsequent administration. No adverse reactions reported. Patient tolerated well without adverse reactions or side effects. Patient to continue to receive Xolair injections monthly. Will report any problems to this office.     Patient to return to clinic monthly for Xolair injections and will follow-up with provider a minimum of every 6 months for evaluation and labs including CMP, CBC with diff, and IGE    SHOT REACTION TREATMENT INSTRUCTIONS    During the 30 minute wait after an allergy injection the following symptoms should be reported:    Itching other than at the injection site  Hives or swelling other than at the injection site  Redness other than at the injection site  Difficulty breathing  Chest tightness  Difficulty swallowing  Throat tightness    If these symptoms occur, NOTIFY PROVIDER and the following treatment should be administered:    1. Epinephrine 1:1000 IM - 0.3 ml if > 66 lbs or more, 0.15 ml if 33 - 63 lbs, or 0.1 ml if <33 lbs   2. Diphenhydramine - give all intramuscular:     2 to <6 years (off-label use): 6.25 mg,    6 to <12 years: 12.5 to 25 mg;    ?12 years: 25-50 mg.  3.  Famotidine:  Adults 40 mg oral    Adolescents age 12 years and >88 lbs: 40 mg    Children and Adolescents ? 12years of age: Initial: 0.25 mg/kg/dose   every 12 hours (maximum daily dose: 40 mg/day)    Epipen dose may be repeated in 5-15 minutes if adequate resolution of symptoms does not occur    Patient should be observed for at least one hour after final epi dose and must be seen by provider. Patients cannot drive themselves if they have received diphenhydramine.

## 2020-10-05 ENCOUNTER — NURSE ONLY (OUTPATIENT)
Dept: ALLERGY | Age: 60
End: 2020-10-05
Payer: MEDICAID

## 2020-10-05 VITALS
HEART RATE: 72 BPM | SYSTOLIC BLOOD PRESSURE: 128 MMHG | DIASTOLIC BLOOD PRESSURE: 76 MMHG | TEMPERATURE: 97.3 F | RESPIRATION RATE: 16 BRPM

## 2020-10-05 PROCEDURE — 95117 IMMUNOTHERAPY INJECTIONS: CPT | Performed by: NURSE PRACTITIONER

## 2020-10-12 ENCOUNTER — TELEPHONE (OUTPATIENT)
Dept: ENT CLINIC | Age: 60
End: 2020-10-12

## 2020-10-12 ENCOUNTER — NURSE ONLY (OUTPATIENT)
Dept: ALLERGY | Age: 60
End: 2020-10-12
Payer: MEDICAID

## 2020-10-12 VITALS
SYSTOLIC BLOOD PRESSURE: 118 MMHG | DIASTOLIC BLOOD PRESSURE: 80 MMHG | TEMPERATURE: 97.5 F | HEART RATE: 76 BPM | RESPIRATION RATE: 16 BRPM

## 2020-10-12 PROCEDURE — 95117 IMMUNOTHERAPY INJECTIONS: CPT | Performed by: NURSE PRACTITIONER

## 2020-10-19 ENCOUNTER — NURSE ONLY (OUTPATIENT)
Dept: ALLERGY | Age: 60
End: 2020-10-19
Payer: MEDICAID

## 2020-10-19 VITALS
SYSTOLIC BLOOD PRESSURE: 122 MMHG | TEMPERATURE: 97.8 F | HEART RATE: 70 BPM | RESPIRATION RATE: 14 BRPM | DIASTOLIC BLOOD PRESSURE: 78 MMHG

## 2020-10-19 PROCEDURE — 95117 IMMUNOTHERAPY INJECTIONS: CPT | Performed by: NURSE PRACTITIONER

## 2020-10-19 NOTE — PROGRESS NOTES
After consent obtained/verified, allergy injection given in back of R/L arm(s). Documentation of vial injection specific to arm(s) noted on Allergy Immunotherapy Administration Form. Patient waited 30 minutes for observation. Patient tolerated Yellow vials well at 0.05ml without adverse reaction. SHOT REACTION TREATMENT INSTRUCTIONS    During the 30 minute wait after an allergy injection the following symptoms should be reported:    Itching other than at the injection site  Hives or swelling other than at the injection site  Redness other than at the injection site  Difficulty breathing  Chest tightness  Difficulty swallowing  Throat tightness    If these symptoms occur, NOTIFY PROVIDER and the following treatment should be administered:    1. Epinephrine 1:1000 IM - 0.3 ml if > 66 lbs or more, 0.15 ml if 33 - 63 lbs, or 0.1 ml if <33 lbs   2. Diphenhydramine - give all intramuscular:     2 to <6 years (off-label use): 6.25 mg,    6 to <12 years: 12.5 to 25 mg;    ?12 years: 25-50 mg.  3.  Famotidine:  Adults 40 mg oral    Adolescents age 12 years and >88 lbs: 40 mg    Children and Adolescents ? 12years of age: Initial: 0.25 mg/kg/dose   every 12 hours (maximum daily dose: 40 mg/day)    Epi dose may me repeated in 5-15 minutes if adequate resolution of symptoms does not occur    Patient should be observed for at least one hour after final epi dose and must be seen by provider. Patients cannot drive themselves if they have received diphenhydramine.

## 2020-11-02 ENCOUNTER — NURSE ONLY (OUTPATIENT)
Dept: ALLERGY | Age: 60
End: 2020-11-02
Payer: MEDICAID

## 2020-11-02 ENCOUNTER — TELEPHONE (OUTPATIENT)
Dept: ENT CLINIC | Age: 60
End: 2020-11-02

## 2020-11-02 VITALS
DIASTOLIC BLOOD PRESSURE: 80 MMHG | SYSTOLIC BLOOD PRESSURE: 122 MMHG | HEART RATE: 80 BPM | TEMPERATURE: 97.8 F | RESPIRATION RATE: 16 BRPM

## 2020-11-02 PROCEDURE — 96372 THER/PROPH/DIAG INJ SC/IM: CPT | Performed by: NURSE PRACTITIONER

## 2020-11-02 PROCEDURE — 95117 IMMUNOTHERAPY INJECTIONS: CPT | Performed by: NURSE PRACTITIONER

## 2020-11-02 RX ORDER — FEXOFENADINE HCL 180 MG/1
180 TABLET ORAL DAILY
Qty: 90 TABLET | Refills: 3 | Status: SHIPPED | OUTPATIENT
Start: 2020-11-02 | End: 2021-10-25

## 2020-11-02 NOTE — TELEPHONE ENCOUNTER
Have the patient schedule an appointment as soon as she can. I would prefer to see her before the new year as is gone to be colder temperatures and possible snow.       I will send in the [facility]

## 2020-11-09 ENCOUNTER — NURSE ONLY (OUTPATIENT)
Dept: ALLERGY | Age: 60
End: 2020-11-09
Payer: MEDICAID

## 2020-11-09 ENCOUNTER — OFFICE VISIT (OUTPATIENT)
Dept: ALLERGY | Age: 60
End: 2020-11-09
Payer: MEDICAID

## 2020-11-09 VITALS
DIASTOLIC BLOOD PRESSURE: 74 MMHG | HEART RATE: 70 BPM | RESPIRATION RATE: 16 BRPM | TEMPERATURE: 97.6 F | SYSTOLIC BLOOD PRESSURE: 122 MMHG

## 2020-11-09 VITALS
HEART RATE: 70 BPM | RESPIRATION RATE: 16 BRPM | SYSTOLIC BLOOD PRESSURE: 122 MMHG | TEMPERATURE: 97.6 F | DIASTOLIC BLOOD PRESSURE: 74 MMHG

## 2020-11-09 PROCEDURE — G8428 CUR MEDS NOT DOCUMENT: HCPCS | Performed by: NURSE PRACTITIONER

## 2020-11-09 PROCEDURE — 4004F PT TOBACCO SCREEN RCVD TLK: CPT | Performed by: NURSE PRACTITIONER

## 2020-11-09 PROCEDURE — G8417 CALC BMI ABV UP PARAM F/U: HCPCS | Performed by: NURSE PRACTITIONER

## 2020-11-09 PROCEDURE — 95117 IMMUNOTHERAPY INJECTIONS: CPT | Performed by: NURSE PRACTITIONER

## 2020-11-09 PROCEDURE — 99214 OFFICE O/P EST MOD 30 MIN: CPT | Performed by: NURSE PRACTITIONER

## 2020-11-09 PROCEDURE — 3017F COLORECTAL CA SCREEN DOC REV: CPT | Performed by: NURSE PRACTITIONER

## 2020-11-09 PROCEDURE — G8482 FLU IMMUNIZE ORDER/ADMIN: HCPCS | Performed by: NURSE PRACTITIONER

## 2020-11-09 RX ORDER — ALBUTEROL SULFATE 90 UG/1
2 AEROSOL, METERED RESPIRATORY (INHALATION) EVERY 4 HOURS PRN
Qty: 9 G | Refills: 5 | Status: SHIPPED | OUTPATIENT
Start: 2020-11-09 | End: 2021-10-14

## 2020-11-09 RX ORDER — AZELASTINE 1 MG/ML
1 SPRAY, METERED NASAL 2 TIMES DAILY
Qty: 1 BOTTLE | Refills: 11 | Status: SHIPPED | OUTPATIENT
Start: 2020-11-09 | End: 2022-02-16 | Stop reason: SDUPTHER

## 2020-11-09 RX ORDER — BUDESONIDE AND FORMOTEROL FUMARATE DIHYDRATE 160; 4.5 UG/1; UG/1
AEROSOL RESPIRATORY (INHALATION)
Qty: 11 G | Refills: 5 | Status: SHIPPED | OUTPATIENT
Start: 2020-11-09 | End: 2022-02-21 | Stop reason: SDUPTHER

## 2020-11-09 RX ORDER — AZELASTINE HYDROCHLORIDE 0.5 MG/ML
1 SOLUTION/ DROPS OPHTHALMIC 2 TIMES DAILY
Qty: 1 BOTTLE | Refills: 11 | Status: SHIPPED | OUTPATIENT
Start: 2020-11-09 | End: 2021-11-15

## 2020-11-09 ASSESSMENT — ENCOUNTER SYMPTOMS
APNEA: 0
EYE REDNESS: 1
CHOKING: 0
VOMITING: 0
SINUS PRESSURE: 1
STRIDOR: 0
DIARRHEA: 0
SORE THROAT: 0
ABDOMINAL PAIN: 0
EYE ITCHING: 1
WHEEZING: 0
COUGH: 0
FACIAL SWELLING: 0
TROUBLE SWALLOWING: 0
CHEST TIGHTNESS: 0
COLOR CHANGE: 0
VOICE CHANGE: 0
RHINORRHEA: 1
NAUSEA: 0
SHORTNESS OF BREATH: 0

## 2020-11-09 NOTE — PROGRESS NOTES
After consent obtained/verified, allergy injection given in back of R/L arm(s). Documentation of vial injection specific to arm(s) noted on Allergy Immunotherapy Administration Form. Patient waited 30 minutes for observation. Patient tolerated yellow vials well at 0.15ml without adverse reaction. SHOT REACTION TREATMENT INSTRUCTIONS    During the 30 minute wait after an allergy injection the following symptoms should be reported:    Itching other than at the injection site  Hives or swelling other than at the injection site  Redness other than at the injection site  Difficulty breathing  Chest tightness  Difficulty swallowing  Throat tightness    If these symptoms occur, NOTIFY PROVIDER and the following treatment should be administered:    1. Epinephrine 1:1000 IM - 0.3 ml if > 66 lbs or more, 0.15 ml if 33 - 63 lbs, or 0.1 ml if <33 lbs   2. Diphenhydramine - give all intramuscular:     2 to <6 years (off-label use): 6.25 mg,    6 to <12 years: 12.5 to 25 mg;    ?12 years: 25-50 mg.  3.  Famotidine:  Adults 40 mg oral    Adolescents age 12 years and >88 lbs: 40 mg    Children and Adolescents ? 12years of age: Initial: 0.25 mg/kg/dose   every 12 hours (maximum daily dose: 40 mg/day)    Epi dose may me repeated in 5-15 minutes if adequate resolution of symptoms does not occur    Patient should be observed for at least one hour after final epi dose and must be seen by provider. Patients cannot drive themselves if they have received diphenhydramine.

## 2020-11-09 NOTE — PROGRESS NOTES
@[facility]@    Allergy & Asthma   [address] [facility], [address]  Ph:   [phone]  Fax:[phone]    Provider:  Dr. Mukesh Tate:   Chief Complaint   Patient presents with    Follow-up     Patient here for 6 month follow up per [facility]. HISTORY OF PRESENT ILLNESS: ESTABLISHED PATIENT HERE FOR EVALUATION   55-year-old  female here today for follow-up. Patient states that she is done very well taking the Allegra. She states that it is controlled her allergic rhinitis. She still occasionally does have some tearing but she is using Astelin eyedrops. She will need refills on medication. Today she denies any nausea, vomiting, fever. Patient is taking Covid precautions in order to avoid getting sick. Patient is on immunotherapy. She has been on it approximately 1 year. She states that she is done well on immunotherapy. She denies any reactions to the injections. Severity of her allergic rhinitis is moderate to severe. Patient has also been receiving Xolair injections for her asthma and is done quite well. She denies any reaction. Severity asthma has been moderate to severe but is been very mild when controlled on her Xolair. Patient states prior to receiving Xolair and immunotherapy her symptoms were severe. She complained of nasal congestion and severe asthma attacks. They occurred sporadically was especially bad in the spring fall and summer. Of concern patient will be moving to [facility] soon and is concerned about coming to this area to receive allergy injections wants to make sure that she will be able to come here. Review of Systems:  Review of Systems   Constitutional: Negative for activity change, appetite change, chills, diaphoresis, fatigue, fever and unexpected weight change. HENT: Positive for congestion, postnasal drip, rhinorrhea and sinus pressure.  Negative for dental problem, ear discharge, ear pain, facial swelling, hearing loss, mouth sores, nosebleeds, sneezing, sore throat, tinnitus, trouble swallowing and voice change. Eyes: Positive for redness and itching. Negative for visual disturbance. Respiratory: Negative for apnea, cough, choking, chest tightness, shortness of breath, wheezing and stridor. Cardiovascular: Negative for chest pain, palpitations and leg swelling. Gastrointestinal: Negative for abdominal pain, diarrhea, nausea and vomiting. Endocrine: Negative for cold intolerance, heat intolerance, polydipsia and polyuria. Genitourinary: Negative for difficulty urinating, dysuria, enuresis, hematuria and urgency. Musculoskeletal: Negative for arthralgias, gait problem, neck pain and neck stiffness. Skin: Negative for color change and rash. Allergic/Immunologic: Positive for environmental allergies and immunocompromised state. Negative for food allergies. Neurological: Negative for dizziness, syncope, facial asymmetry, speech difficulty, light-headedness and headaches. Hematological: Negative for adenopathy. Does not bruise/bleed easily. Psychiatric/Behavioral: Negative for confusion and sleep disturbance. The patient is not nervous/anxious. All other systems reviewed and are negative.         Past MedicalHistory:    Past Medical History:   Diagnosis Date    Allergic rhinitis     Anxiety     Arnold-Chiari malformation (HCC)     Asthma     Chronic bronchitis (HCC)     Fibromyalgia     GERD (gastroesophageal reflux disease)     Headache(784.0)     Hyperlipidemia     Neuropathy     Osteoarthritis     Sinusitis     Type II or unspecified type diabetes mellitus without mention of complication, not stated as uncontrolled        Past Surgical History:  Past Surgical History:   Procedure Laterality Date    APPENDECTOMY       SECTION      x2    COLONOSCOPY  [id number]    CSF SHUNT  2007    Cervical syrinx to subarachnoid shunt; thoracic syrinx to subarachnoid shunt (2 separate dural openings)    CYST REMOVAL      extradural cysts at thoracic 2-3    ECTOPIC PREGNANCY SURGERY      HEMICOLECTOMY      HERNIA REPAIR      inguinal     HYSTERECTOMY      SPINAL CORD DECOMPRESSION  2007    C4-5-6-7; T1-2-3 laminectomies    TOOTH EXTRACTION  2017       Family History:   Family History   Problem Relation Age of Onset    Cancer Other         colon    Mental Illness Other     High Blood Pressure Other     Diabetes Other     High Blood Pressure Sister        Social History:   Social History     Tobacco Use    Smoking status: Current Every Day Smoker     Packs/day: 1.00     Years: 25.00     Pack years: 25.00     Types: Cigarettes     Start date: 1975     Last attempt to quit: 2018     Years since quittin.9    Smokeless tobacco: Never Used    Tobacco comment: It will not allow me to put future dates. I do have plans to quit smoking. Praying to GOD to help me. Substance Use Topics    Alcohol use: Yes     Alcohol/week: 2.0 standard drinks     Types: 2 Cans of beer per week     Comment: Once a month; stomach tolerates very little alcohol        Allergies:  Bactrim [sulfamethoxazole-trimethoprim]; Flexeril [cyclobenzaprine]; Morphine; Peanut-containing drug products; Tessalon [benzonatate]; Amoxicillin-pot clavulanate; Ibuprofen [ibuprofen]; Lisinopril; Macrobid [nitrofurantoin monohydrate macrocrystals]; Macrodantin [nitrofurantoin]; and Ranitidine    CurrentMedications:     Current Outpatient Medications:     azelastine (OPTIVAR) 0.05 % ophthalmic solution, Place 1 drop into both eyes 2 times daily, Disp: 1 Bottle, Rfl: 11    azelastine (ASTELIN) 0.1 % nasal spray, 1 spray by Nasal route 2 times daily Use in each nostril as directed, Disp: 1 Bottle, Rfl: 11    SYMBICORT 160-4.5 MCG/ACT AERO, INHALE 2 PUFFS BY MOUTH TWICE DAILY.  RINSE MOUTH WELL AFTER USE., Disp: 11 g, Rfl: 5    albuterol sulfate  (90 Base) MCG/ACT inhaler, Inhale 2 puffs into the lungs every 4 hours as needed for Wheezing (AS NEEDED), Disp: 9 g, Rfl: 5    fexofenadine (ALLEGRA) 180 MG tablet, Take 1 tablet by mouth daily, Disp: 90 tablet, Rfl: 3    hydrocortisone (HYTONE) 2.5 % lotion, APPLY TOPICALLY DAILY, Disp: 59 mL, Rfl: 2    SYNTHROID 88 MCG tablet, TAKE 1 TABLET BY MOUTH ONCE DAILY ON AN EMPTY STOMACH WITH WATER.  DO NOT EAT OR TAKE ANY OTHER MEDICATIONS FOR 30 MINUTES., Disp: 30 tablet, Rfl: 11    hyoscyamine (LEVBID) 375 MCG extended release tablet, TAKE 1/2 TABLET BY MOUTH TWICE DAILY, Disp: 30 tablet, Rfl: 11    Cholecalciferol (VITAMIN D3) 50 MCG (2000 UT) CAPS, Vitamin D3 2,000 unit capsule, Disp: 30 capsule, Rfl: 11    albuterol (PROVENTIL) (2.5 MG/3ML) 0.083% nebulizer solution, USE 1 VIAL IN NEBULIZER EVERY 6 HOURS AS NEEDED FOR WHEEZING (FOR ASTHMA), Disp: 375 mL, Rfl: 5    nicotine (EQ NICOTINE) 14 MG/24HR, Place 1 patch onto the skin every 24 hours 1 patch on skin every 24 hours DO NOT SMOKE WITH PATCH ON!, Disp: 30 patch, Rfl: 1    Biotin 1 MG CAPS, Take by mouth, Disp: , Rfl:     metFORMIN (GLUCOPHAGE) 500 MG tablet, TAKE 1 TABLET BY MOUTH TWICE DAILY WITH MEALS, Disp: 180 tablet, Rfl: 3    atorvastatin (LIPITOR) 10 MG tablet, Take 1 tablet by mouth once daily, Disp: 90 tablet, Rfl: 3    buPROPion (WELLBUTRIN XL) 150 MG extended release tablet, Take 1 tablet by mouth every morning, Disp: 30 tablet, Rfl: 3    esomeprazole (NEXIUM) 40 MG delayed release capsule, Take 1 capsule by mouth 2 times daily, Disp: 180 capsule, Rfl: 3    clobetasol (TEMOVATE) 0.05 % external solution, Apply daily to scaly or itchy areas on scalp, Disp: 60 mL, Rfl: 11    ketoconazole (NIZORAL) 2 % shampoo, Apply 3-4 times weekly to scalp, leave on for five minutes prior to washing off, Disp: 120 mL, Rfl: 11    fluocinonide (LIDEX) 0.05 % cream, Apply topically 2 times daily to rash on hands a needed, Disp: 30 g, Rfl: 11    amLODIPine (NORVASC) 5 MG tablet, TAKE NONFORMULARY, Immunotherapy allergy shot, Disp: , Rfl:     zinc 50 MG CAPS, Take  by mouth daily. , Disp: , Rfl:     Ascorbic Acid (VITAMIN C) 500 MG CAPS, Take  by mouth., Disp: , Rfl:     orphenadrine (NORFLEX) 100 MG tablet, Take 100 mg by mouth 2 times daily as needed. , Disp: , Rfl:     tramadol (ULTRAM) 50 MG tablet, Take 50 mg by mouth every 6 hours as needed. Take 1-2 tabs every 6 hrs prn , Disp: , Rfl:     gabapentin (NEURONTIN) 100 MG capsule, Take 100 mg by mouth 2 times daily. , Disp: , Rfl:       Physical Exam:      Vitals:    Vitals:    11/09/20 0848   BP: 122/74   Pulse: 70   Resp: 16   Temp: 97.6 °F (36.4 °C)               Temp: 97.6 °F (36.4 °C) I @FLOWSTAT(6)@ IPulse: 70 I @FLOWSTAT(8)@ I BP: 122/74 I @RAGHAVST(42)@; @ENRIQUE(06)@ I Resp: 16 I @FLOWSTAT(9)@ I   I @FLOWSTAT(10)@ I   I   I   I Facility age limit for growth percentiles is 20 years. I     Facility age limit for growth percentiles is 20 years. Facility age limit for growth percentiles is 20 years. Facility age limit for growth percentiles is 20 years. No height and weight on file for this encounter. Physical Exam:    Physical Exam  Vitals signs and nursing note reviewed. Constitutional:       Appearance: Normal appearance. She is normal weight. HENT:      Head: Normocephalic and atraumatic. Right Ear: Tympanic membrane, ear canal and external ear normal.      Left Ear: Tympanic membrane, ear canal and external ear normal.      Nose: Nose normal.      Mouth/Throat:      Mouth: Mucous membranes are moist.      Pharynx: Oropharynx is clear. Eyes:      Extraocular Movements: Extraocular movements intact. Conjunctiva/sclera: Conjunctivae normal.      Pupils: Pupils are equal, round, and reactive to light. Neck:      Musculoskeletal: Normal range of motion and neck supple. Cardiovascular:      Rate and Rhythm: Normal rate and regular rhythm. Heart sounds: Normal heart sounds.    Pulmonary:      Effort: Pulmonary effort is normal.      Breath sounds: Normal breath sounds. Musculoskeletal: Normal range of motion. Skin:     General: Skin is warm and dry. Capillary Refill: Capillary refill takes less than 2 seconds. Neurological:      General: No focal deficit present. Mental Status: She is alert and oriented to person, place, and time. Mental status is at baseline. Psychiatric:         Mood and Affect: Mood normal.         Behavior: Behavior normal.         Thought Content: Thought content normal.         Judgment: Judgment normal.             DATA:  Lab Review:    CBC:   Lab Results   Component Value Date    WBC 6.5 08/19/2020    RBC 4.73 08/19/2020    RBC 4.36 01/17/2012    HGB 14.1 08/19/2020    HCT 43.8 08/19/2020    MCV 92.6 08/19/2020    MCH 29.8 08/19/2020    MCHC 32.2 08/19/2020    RDW 14.5 01/16/2018     08/19/2020          IgE   Date/Time Value Ref Range Status   08/07/2019 09:40  (H) <101 IU/mL Final     Comment:     [address] [phone]   08/07/2019 09:40 AM < 0.10 <=0.34 kU/L Final     Comment:     Allergen results of 0.10-0.34 kU/L for whole peanut are intended  for specialist use as the clinical relevance is undetermined. Even  though increasing ranges are reflective of increasing  concentrations of allergen-specific IgE, these concentrations may  not correlate with the degree of clinical response or skin testing  results when challenged with a specific allergen. The correlation  of allergy laboratory results with clinical history and in vivo  reactivity to specific allergens is essential. A negative test may  not rule out clinical allergy or even anaphylaxis.        Immunoglobulin E   Date/Time Value Ref Range Status   08/07/2019 09:40  (H) <=214 kU/L Final     Comment:     REFERENCE INTERVAL: Immunoglobulin E, Serum  Access complete set of age- and/or gender-specific reference  intervals for this test in the [facility] Laboratory Test Directory  (aruplab.com). IgG   Date/Time Value Ref Range Status   08/07/2019 09:40  700 - 1600 mg/dL Final     Comment:     [facility] [address] [facility], [address] [place] [address] [phone]     IgA   Date/Time Value Ref Range Status   08/07/2019 09:40  70 - 400 mg/dL Final     Comment:     [facility] [id number] [place] [address], [address] [place] [address] [phone]      IgM   Date/Time Value Ref Range Status   08/07/2019 09:40  40 - 230 mg/dL Final     Comment:     [facility] [id number] [place] [address], [address] [place] [address] [phone]       No results found for: LOIS   Rheumatoid Factor   Date/Time Value Ref Range Status   04/07/2020 09:35 AM 13 0 - 13 IU/mL Final     Comment:     Performed at [address] [place] [address], [address]          No results found for this or any previous visit. No results found for this or any previous visit. PROCEDURES:        Skin Testing performed on: 09/04/2019    Assessment/Orders:    Diagnosis Orders   1. Moderate persistent intrinsic asthma without status asthmaticus without complication  CBC Auto Differential    IgA    IgE    IgG    IgM   2. Allergy to trees     3. Allergy to cockroaches     4. Encounter for desensitization to allergens     5. Eosinophilic asthma  CBC Auto Differential    IgA    IgE    IgG    IgM   6. Allergy to dust     7. Allergy to mold     8. Severe persistent asthma without complication     9. Encounter for desensitization to pollen allergen     10. Seasonal allergic rhinitis due to pollen     11. Elevated IgE level  CBC Auto Differential    IgA    IgE    IgG    IgM       Plan:  Follow Up:6 months    Patient is continue Xolair therapy    Patient given samples of Allegra x4 weeks until her Sindhu Ward gets approved.   We have started I approval process for prior authorization for Allegra on cover my meds    Patient given refill of Astelin eyedrops and Astelin nasal spray    Patient have labs prior to next visit    Spent 25 minutes of face-to-face time with the patient with well more than half of the visit being dedicated to the discussion of the various symptom problems, provided education of medications and disease process, as well as discussion of a therapeutic plan for each.     (Please note that portions of this note may have been completed with a voice recognition program.  Efforts were made to edit the dictation but occasionally words are mis-transcribed.)         Signed:  COREEN Bullock CNP  11/9/2020  9:56 AM

## 2020-11-16 ENCOUNTER — PATIENT MESSAGE (OUTPATIENT)
Dept: FAMILY MEDICINE CLINIC | Age: 60
End: 2020-11-16

## 2020-11-16 ENCOUNTER — PATIENT MESSAGE (OUTPATIENT)
Dept: ALLERGY | Age: 60
End: 2020-11-16

## 2020-11-16 NOTE — TELEPHONE ENCOUNTER
From: Daniela Morales  To: Johnson Sparksr, APRN - CNP  Sent: 11/16/2020 6:54 AM EST  Subject: Visit Follow-Up Question    Maico Brewster,  I hope you get this message in time. I apologize for the inconvenience. I do not feel well today & was always told to not come in if I am ill. I have an EARACHE in my right ear & noticed it last week but I thought it would stop. It has now turned into a COLD & I have a cough, & body aches. I have felt this way before & my right ear always gives me trouble. I had my flu shot in October. I do not know what to do. Do I need an antibiotic? What do you suggest I do? I will await your response before I do anything else.    Thank you

## 2020-11-16 NOTE — TELEPHONE ENCOUNTER
From: Jeanine Dent  To: Dyan Alvarado DO  Sent: 11/16/2020 9:02 AM EST  Subject: Prescription Question    Dr Lesa Del Valle,  I called to cancel my ALLERGY SHOT this morning & told the nurse my symptoms of EARACHE, COUGH, & BODY ACHES & she said that they were COVID symptoms. What should I do?

## 2020-11-17 ENCOUNTER — HOSPITAL ENCOUNTER (OUTPATIENT)
Age: 60
Setting detail: SPECIMEN
Discharge: HOME | End: 2020-11-17
Payer: MEDICAID

## 2020-11-17 PROCEDURE — U0003 INFECTIOUS AGENT DETECTION BY NUCLEIC ACID (DNA OR RNA); SEVERE ACUTE RESPIRATORY SYNDROME CORONAVIRUS 2 (SARS-COV-2) (CORONAVIRUS DISEASE [COVID-19]), AMPLIFIED PROBE TECHNIQUE, MAKING USE OF HIGH THROUGHPUT TECHNOLOGIES AS DESCRIBED BY CMS-2020-01-R: HCPCS

## 2020-11-19 ENCOUNTER — TELEPHONE (OUTPATIENT)
Dept: FAMILY MEDICINE CLINIC | Age: 60
End: 2020-11-19

## 2020-11-19 LAB — SARS-COV-2: NOT DETECTED

## 2020-11-19 NOTE — TELEPHONE ENCOUNTER
[facility] received APPROVAL for Synthroid 88 mcg. [facility] notified spoke with [address].

## 2020-11-19 NOTE — TELEPHONE ENCOUNTER
PA request received from pharmacy for Synthroid 88mcg ANDREZ. PA done online at [employer] and pending review.

## 2020-11-20 ENCOUNTER — PATIENT MESSAGE (OUTPATIENT)
Dept: ALLERGY | Age: 60
End: 2020-11-20

## 2020-11-20 NOTE — TELEPHONE ENCOUNTER
From: Kiara Waters  To: Aditi ArangoCOREEN golden - CNP  Sent: 11/20/2020 10:55 AM EST  Subject: Non-Urgent Medical Question    Maico Brewster,  1). My INSURANCE CHANGED to [facility] MEDICAID & I am no longer with an HMO ([employer]). The NEW BILLING # is:  [id number]. My other doctors' RX's & PA's are going smoothly, so I am giving you the new # as well, until I receive my card which is in the mail, & then I will bring it to make a copy on file. 2). I was told I had paperwork due to you in the next 6 months & I will pick those up when I come for my shots. 3). Can you have one of your nurses check to see if my XOLAIR shots are due for NOV 30, or the following MONDAY; that first week of DEC?  4). My primary care doctor; Dr. Angelo Olvera seems to think my illness is my sinuses & a virus so he advised me to continue to use my sinus meds, rest, & wait it out. I have plans to come to get my allergy shots on next Monday if I am feeling better. I believe I covered everything that I needed to ask. Thank you for everything. Have a great weekend!

## 2020-11-23 ENCOUNTER — TELEPHONE (OUTPATIENT)
Dept: ENT CLINIC | Age: 60
End: 2020-11-23

## 2020-11-23 ENCOUNTER — NURSE ONLY (OUTPATIENT)
Dept: ALLERGY | Age: 60
End: 2020-11-23
Payer: MEDICAID

## 2020-11-23 VITALS
SYSTOLIC BLOOD PRESSURE: 118 MMHG | RESPIRATION RATE: 14 BRPM | TEMPERATURE: 97.3 F | HEART RATE: 70 BPM | DIASTOLIC BLOOD PRESSURE: 78 MMHG

## 2020-11-23 PROCEDURE — 95117 IMMUNOTHERAPY INJECTIONS: CPT | Performed by: NURSE PRACTITIONER

## 2020-11-23 RX ORDER — TRAZODONE HYDROCHLORIDE 50 MG/1
50 TABLET ORAL NIGHTLY PRN
COMMUNITY
Start: 2020-11-19

## 2020-11-23 NOTE — TELEPHONE ENCOUNTER
----- Message from COREEN Dawson CNP sent at 11/23/2020  8:19 AM EST -----  Regarding: see email below  Please call the  to give them your insurance,.  I cannot update it    Ask Mike Smith for any paperwork you may have. She should know if there is any paperwork. Mike Smith will know when your Laci Trammell is due. I will ask her to call you with this date. Thanks  Narcisa    ===View-only below this line===      ----- Message -----       From:Gely Real Single       Sent:11/20/2020 10:55 AM EST         To:COREEN Schofield CNP    Subject:Non-Urgent Medical Question    Hi Narcisa,  1). My INSURANCE CHANGED to [facility] MEDICAID & I am no longer with an HMO ([employer]). The NEW BILLING # is:  [id number]. My other doctors' RX's & PA's are going smoothly, so I am giving you the new # as well, until I receive my card which is in the mail, & then I will bring it to make a copy on file. 2). I was told I had paperwork due to you in the next 6 months & I will pick those up when I come for my shots. 3). Can you have one of your nurses check to see if my XOLAIR shots are due for NOV 30, or the following MONDAY; that first week of DEC?  4). My primary care doctor; Dr. Ann Davila seems to think my illness is my sinuses & a virus so he advised me to continue to use my sinus meds, rest, & wait it out. I have plans to come to get my allergy shots on next Monday if I am feeling better. I believe I covered everything that I needed to ask. Thank you for everything. Have a great weekend!

## 2020-11-23 NOTE — TELEPHONE ENCOUNTER
I will have the patient complete the New forms at her next allergy appointment. Sinus and chest congestion and cough  for 1 week

## 2020-12-14 ENCOUNTER — NURSE ONLY (OUTPATIENT)
Dept: ALLERGY | Age: 60
End: 2020-12-14
Payer: MEDICAID

## 2020-12-14 ENCOUNTER — TELEPHONE (OUTPATIENT)
Dept: ENT CLINIC | Age: 60
End: 2020-12-14

## 2020-12-14 VITALS — TEMPERATURE: 97.3 F | RESPIRATION RATE: 16 BRPM

## 2020-12-14 PROCEDURE — 95117 IMMUNOTHERAPY INJECTIONS: CPT | Performed by: NURSE PRACTITIONER

## 2020-12-14 PROCEDURE — 96372 THER/PROPH/DIAG INJ SC/IM: CPT | Performed by: NURSE PRACTITIONER

## 2020-12-14 RX ORDER — HYDROCORTISONE 25 MG/ML
LOTION TOPICAL
Qty: 59 ML | Refills: 2 | Status: SHIPPED | OUTPATIENT
Start: 2020-12-14 | End: 2020-12-16

## 2020-12-14 NOTE — PROGRESS NOTES
After consent obtained/verified, allergy injection given in back of R/L arm(s). Documentation of vial injection specific to arm(s) noted on Allergy Immunotherapy Administration Form. Patient waited 30 minutes for observation. Patient tolerated Yellow vials well at 0.05ml without adverse reaction. SHOT REACTION TREATMENT INSTRUCTIONS    During the 30 minute wait after an allergy injection the following symptoms should be reported:    Itching other than at the injection site  Hives or swelling other than at the injection site  Redness other than at the injection site  Difficulty breathing  Chest tightness  Difficulty swallowing  Throat tightness    If these symptoms occur, NOTIFY PROVIDER and the following treatment should be administered:    1. Epinephrine 1:1000 IM - 0.3 ml if > 66 lbs or more, 0.15 ml if 33 - 63 lbs, or 0.1 ml if <33 lbs   2. Diphenhydramine - give all intramuscular:     2 to <6 years (off-label use): 6.25 mg,    6 to <12 years: 12.5 to 25 mg;    ?12 years: 25-50 mg.  3.  Famotidine:  Adults 40 mg oral    Adolescents age 12 years and >88 lbs: 40 mg    Children and Adolescents ? 12years of age: Initial: 0.25 mg/kg/dose   every 12 hours (maximum daily dose: 40 mg/day)    Epi dose may me repeated in 5-15 minutes if adequate resolution of symptoms does not occur    Patient should be observed for at least one hour after final epi dose and must be seen by provider. Patients cannot drive themselves if they have received diphenhydramine.
Redness other than at the injection site  Difficulty breathing  Chest tightness  Difficulty swallowing  Throat tightness    If these symptoms occur, NOTIFY PROVIDER and the following treatment should be administered:    1. Epinephrine 1:1000 IM - 0.3 ml if > 66 lbs or more, 0.15 ml if 33 - 63 lbs, or 0.1 ml if <33 lbs   2. Diphenhydramine - give all intramuscular:     2 to <6 years (off-label use): 6.25 mg,    6 to <12 years: 12.5 to 25 mg;    ?12 years: 25-50 mg.  3.  Famotidine:  Adults 40 mg oral    Adolescents age 12 years and >88 lbs: 40 mg    Children and Adolescents ? 12years of age: Initial: 0.25 mg/kg/dose   every 12 hours (maximum daily dose: 40 mg/day)    Epipen dose may be repeated in 5-15 minutes if adequate resolution of symptoms does not occur    Patient should be observed for at least one hour after final epi dose and must be seen by provider. Patients cannot drive themselves if they have received diphenhydramine.

## 2020-12-15 ENCOUNTER — TELEPHONE (OUTPATIENT)
Dept: FAMILY MEDICINE CLINIC | Age: 60
End: 2020-12-15

## 2020-12-15 ENCOUNTER — TELEPHONE (OUTPATIENT)
Dept: ENT CLINIC | Age: 60
End: 2020-12-15

## 2020-12-15 NOTE — TELEPHONE ENCOUNTER
PA request received from pharmacy for Esomperazole Magnesium 40mg (Nexium). PA submitted online at [email or web address] and pending review.

## 2020-12-15 NOTE — TELEPHONE ENCOUNTER
Pharmacy notified [facility]: [facility]

## 2020-12-16 RX ORDER — HYDROCORTISONE 25 MG/ML
LOTION TOPICAL
Qty: 59 ML | Refills: 0 | Status: SHIPPED | OUTPATIENT
Start: 2020-12-16 | End: 2021-05-12

## 2020-12-21 ENCOUNTER — NURSE ONLY (OUTPATIENT)
Dept: ALLERGY | Age: 60
End: 2020-12-21
Payer: MEDICAID

## 2020-12-21 VITALS
DIASTOLIC BLOOD PRESSURE: 80 MMHG | HEART RATE: 70 BPM | TEMPERATURE: 97 F | RESPIRATION RATE: 14 BRPM | SYSTOLIC BLOOD PRESSURE: 124 MMHG

## 2020-12-21 PROCEDURE — 95117 IMMUNOTHERAPY INJECTIONS: CPT | Performed by: NURSE PRACTITIONER

## 2020-12-22 ENCOUNTER — TELEPHONE (OUTPATIENT)
Dept: PULMONOLOGY | Age: 60
End: 2020-12-22

## 2021-01-04 ENCOUNTER — NURSE ONLY (OUTPATIENT)
Dept: ALLERGY | Age: 61
End: 2021-01-04
Payer: MEDICAID

## 2021-01-04 VITALS
TEMPERATURE: 97.7 F | RESPIRATION RATE: 16 BRPM | SYSTOLIC BLOOD PRESSURE: 116 MMHG | DIASTOLIC BLOOD PRESSURE: 70 MMHG | HEART RATE: 66 BPM

## 2021-01-04 DIAGNOSIS — Z91.048 ALLERGY TO MOLD: ICD-10-CM

## 2021-01-04 DIAGNOSIS — J30.1 ALLERGY TO TREES: ICD-10-CM

## 2021-01-04 DIAGNOSIS — Z91.038 ALLERGY TO COCKROACHES: ICD-10-CM

## 2021-01-04 DIAGNOSIS — Z51.6 ALLERGY DESENSITIZATION THERAPY: ICD-10-CM

## 2021-01-04 DIAGNOSIS — J30.89 ALLERGY TO DUST: ICD-10-CM

## 2021-01-04 DIAGNOSIS — Z51.6 ENCOUNTER FOR DESENSITIZATION TO ALLERGENS: Primary | ICD-10-CM

## 2021-01-04 PROCEDURE — 95117 IMMUNOTHERAPY INJECTIONS: CPT | Performed by: NURSE PRACTITIONER

## 2021-01-11 ENCOUNTER — NURSE ONLY (OUTPATIENT)
Dept: ALLERGY | Age: 61
End: 2021-01-11
Payer: MEDICAID

## 2021-01-11 VITALS
HEART RATE: 70 BPM | RESPIRATION RATE: 16 BRPM | DIASTOLIC BLOOD PRESSURE: 68 MMHG | TEMPERATURE: 96.8 F | SYSTOLIC BLOOD PRESSURE: 122 MMHG

## 2021-01-11 DIAGNOSIS — Z91.038 ALLERGY TO COCKROACHES: ICD-10-CM

## 2021-01-11 DIAGNOSIS — Z91.048 ALLERGY TO MOLD: ICD-10-CM

## 2021-01-11 DIAGNOSIS — Z51.6 ENCOUNTER FOR DESENSITIZATION TO ALLERGENS: Primary | ICD-10-CM

## 2021-01-11 DIAGNOSIS — J30.1 ALLERGY TO TREES: ICD-10-CM

## 2021-01-11 DIAGNOSIS — J30.89 ALLERGY TO DUST: ICD-10-CM

## 2021-01-11 PROCEDURE — 95117 IMMUNOTHERAPY INJECTIONS: CPT | Performed by: NURSE PRACTITIONER

## 2021-01-12 ENCOUNTER — HOSPITAL ENCOUNTER (OUTPATIENT)
Dept: CT IMAGING | Age: 61
Discharge: HOME | End: 2021-01-12
Payer: MEDICAID

## 2021-01-12 DIAGNOSIS — Z87.891 PERSONAL HISTORY OF TOBACCO USE: ICD-10-CM

## 2021-01-12 PROCEDURE — 71271 CT THORAX LUNG CANCER SCR C-: CPT

## 2021-01-15 NOTE — PROGRESS NOTES
She denies any hemoptysis. She was never diagnosed with pulmonary tuberculosis in the past. She denies any recent exposure to any patients with tuberculosis. She was exposed to her step father >20year who suffered with pulmonary tuberculosis in the past. Her step father . She denies any recent travel to endemic places of tuberculosis. Social History:  Occupation:  She is current working: No  Type of profession: unemployed. She is disabled since 54. She used to work at a [employer] for [employer]. History of tobacco smoking:Yes  Amount of tobacco smokin.0 PPD. Years of tobacco smokin.75                                    Quit smoking: No           Current smoker: Yes  Amount of current tobacco smokin.5 PPD  . History of recreational or IV drug use in the past:NO     History of exposure to coal mines/coal dust: NO  History of exposure to foundry dust/welding: NO  History of exposure to quarry/silica/sandblasting: NO  History of exposure to asbestos/working with breaks/ships: NO  History of exposure to farm dust: NO  History of recent travel to long distances: NO  History of exposure to birds, pigeons, or chickens in the past:NO  Pet animals at home:No    History of pulmonary embolism in the past: No            History of DVT in the past:No                          Review of Systems:   General/Constitutional: Please see HPI section regarding weight, appetite, fever and chills. HENT: Negative. Eyes: Negative. Upper respiratory tract: No nasal stuffiness or post nasal drip. Lower respiratory tract/ lungs: Please see HPI section. Cardiovascular: No palpitations or chest pain. Gastrointestinal: No nausea or vomiting. Neurological: No focal neurologiacal weakness. Extremities: No edema. Musculoskeletal: No complaints. Genitourinary: No complaints. Hematological: Negative. Psychiatric/Behavioral: Negative. Skin: No itching.     Current Medications: Past Medical History:   Diagnosis Date    Allergic rhinitis     Anxiety     Arnold-Chiari malformation (HCC)     Asthma     Chronic bronchitis (HCC)     Fibromyalgia     GERD (gastroesophageal reflux disease)     Headache(784.0)     Hyperlipidemia     Neuropathy     Osteoarthritis     Sinusitis     Type II or unspecified type diabetes mellitus without mention of complication, not stated as uncontrolled        Past Surgical History:   Procedure Laterality Date    APPENDECTOMY       SECTION      x2    COLONOSCOPY  [id number]    CSF SHUNT  2007    Cervical syrinx to subarachnoid shunt; thoracic syrinx to subarachnoid shunt (2 separate dural openings)    CYST REMOVAL      extradural cysts at thoracic 2-3    ECTOPIC PREGNANCY SURGERY      HEMICOLECTOMY      HERNIA REPAIR      inguinal     HYSTERECTOMY      SPINAL CORD DECOMPRESSION  2007    C4-5-6-7; T1-2-3 laminectomies    TOOTH EXTRACTION  2017       Allergies   Allergen Reactions    Bactrim [Sulfamethoxazole-Trimethoprim]     Flexeril [Cyclobenzaprine]     Morphine Other (See Comments)     \"I hallucinate\"    Peanut-Containing Drug Products     Tessalon [Benzonatate] Hives    Amoxicillin-Pot Clavulanate Rash    Ibuprofen [Ibuprofen] Rash    Lisinopril Rash    Macrobid [Nitrofurantoin Monohydrate Macrocrystals] Rash    Macrodantin [Nitrofurantoin] Rash    Ranitidine Rash       Current Outpatient Medications   Medication Sig Dispense Refill    amLODIPine (NORVASC) 5 MG tablet TAKE 1 TABLET BY MOUTH ONCE DAILY 90 tablet 3    hydrocortisone 2.5 % cream APPLY TOPICALLY TO AFFECTED AREA TWICE DAILY      hydrocortisone (HYTONE) 2.5 % lotion APPLY TOPICALLY TO AFFECTED AREA DAILY 59 mL 0    omalizumab (OMALIZUMAB) 150 MG injection Inject 300 mg into the skin every 28 days 1 each 11    traZODone (DESYREL) 50 MG tablet Take 50 mg by mouth daily  azelastine (OPTIVAR) 0.05 % ophthalmic solution Place 1 drop into both eyes 2 times daily 1 Bottle 11    azelastine (ASTELIN) 0.1 % nasal spray 1 spray by Nasal route 2 times daily Use in each nostril as directed 1 Bottle 11    SYMBICORT 160-4.5 MCG/ACT AERO INHALE 2 PUFFS BY MOUTH TWICE DAILY. RINSE MOUTH WELL AFTER USE. 11 g 5    albuterol sulfate  (90 Base) MCG/ACT inhaler Inhale 2 puffs into the lungs every 4 hours as needed for Wheezing (AS NEEDED) 9 g 5    fexofenadine (ALLEGRA) 180 MG tablet Take 1 tablet by mouth daily 90 tablet 3    SYNTHROID 88 MCG tablet TAKE 1 TABLET BY MOUTH ONCE DAILY ON AN EMPTY STOMACH WITH WATER. DO NOT EAT OR TAKE ANY OTHER MEDICATIONS FOR 30 MINUTES.  30 tablet 11    hyoscyamine (LEVBID) 375 MCG extended release tablet TAKE 1/2 TABLET BY MOUTH TWICE DAILY 30 tablet 11    Cholecalciferol (VITAMIN D3) 50 MCG (2000 UT) CAPS Vitamin D3 2,000 unit capsule 30 capsule 11    albuterol (PROVENTIL) (2.5 MG/3ML) 0.083% nebulizer solution USE 1 VIAL IN NEBULIZER EVERY 6 HOURS AS NEEDED FOR WHEEZING (FOR ASTHMA) 375 mL 5    nicotine (EQ NICOTINE) 14 MG/24HR Place 1 patch onto the skin every 24 hours 1 patch on skin every 24 hours DO NOT SMOKE WITH PATCH ON! 30 patch 1    Biotin 1 MG CAPS Take by mouth      metFORMIN (GLUCOPHAGE) 500 MG tablet TAKE 1 TABLET BY MOUTH TWICE DAILY WITH MEALS 180 tablet 3    atorvastatin (LIPITOR) 10 MG tablet Take 1 tablet by mouth once daily 90 tablet 3    buPROPion (WELLBUTRIN XL) 150 MG extended release tablet Take 1 tablet by mouth every morning 30 tablet 3    esomeprazole (NEXIUM) 40 MG delayed release capsule Take 1 capsule by mouth 2 times daily 180 capsule 3    clobetasol (TEMOVATE) 0.05 % external solution Apply daily to scaly or itchy areas on scalp 60 mL 11    ketoconazole (NIZORAL) 2 % shampoo Apply 3-4 times weekly to scalp, leave on for five minutes prior to washing off 120 mL 11  tramadol (ULTRAM) 50 MG tablet Take 50 mg by mouth every 6 hours as needed. Take 1-2 tabs every 6 hrs prn       gabapentin (NEURONTIN) 100 MG capsule Take 100 mg by mouth 2 times daily.  Respiratory Therapy Supplies (NEBULIZER COMPRESSOR) KIT 1 kit by Does not apply route once for 1 dose 360 kit 0     No current facility-administered medications for this visit. Family History   Problem Relation Age of Onset    Cancer Other         colon    Mental Illness Other     High Blood Pressure Other     Diabetes Other     High Blood Pressure Sister            Physical Exam:    VITALS:  /70 (Site: Right Upper Arm, Position: Sitting, Cuff Size: Large Adult)   Pulse 85   Temp 98.1 °F (36.7 °C) (Tympanic)   Ht 5' 4\" (1.626 m)   Wt 203 lb (92.1 kg)   SpO2 98% Comment: on room air at rest  BMI 34.84 kg/m²   Nursing note and vitals reviewed. Constitutional: Patient appears moderately built and moderately nourished. No distress. Patient is oriented to person, place, and time. HENT:   Head: Normocephalic and atraumatic. Right Ear: External ear normal.   Left Ear: External ear normal.   Mouth/Throat: Oropharynx is clear and moist.  No oral thrush. Eyes: Conjunctivae are normal. Pupils are equal, round, and reactive to light. No scleral icterus. Neck: Neck supple. No JVD present. No tracheal deviation present. Cardiovascular: Normal rate, regular rhythm, normal heart sounds. No murmur heard. Pulmonary/Chest: Effort normal and breath sounds normal. No stridor. No respiratory distress. No wheezes. No rales. Patient exhibits no tenderness. Abdominal: Soft. Patient exhibits no distension. No tenderness. Musculoskeletal: Normal range of motion. Extremities: Patient exhibits no edema and no tenderness. Lymphadenopathy:  No cervical adenopathy. Neurological: Patient is alert and oriented to person, place, and time. Skin: Skin is warm and dry. Patient is not diaphoretic. Psychiatric: Patient  has a normal mood and affect. Patient behavior is normal.     Neck Circumference -   14  Mallampati - 3    Diagnostic Data:    Radiological Data:    Chest CT low dose:  Dec 24, 2019   NONCONTRAST SCREENING CT CHEST:   1. There is a 4 mm pulmonary nodule in the left lung. 2. There are no pathologically enlarged lymph nodes. 3. LUNGRADS ASSESSMENT VALUE: 2        Pulmonary function tests: 3/3/2020          Sleep study: 2020         SLEEP STUDY REPORT     PATIENT NAME: Abdulaziz Butcher                   :        1960  MED REC NO:   [id number]                           ROOM:  ACCOUNT NO:   [de-identified]                           ADMIT DATE: 2020  PROVIDER:     Victoriano Franco. MD Kate     DATE OF STUDY:  2020     REFERRING PROVIDER:  Ena Joshi CNP. IMPRESSION:  1. Mild-to-moderate snoring with no clinically significant obstructive  sleep apnea. 2.  The patient had a good amount of REM sleep during study. 3.  Rare periodic limb movements with no significant arousals. 4.  Hypersomnia with [place] Sleepiness Score of 11 of uncertain  etiology. 5.  Moderate persistent bronchial asthma. 6.  Arnold-Chiari malformation. 7.  Fibromyalgia. 8.  Anxiety disorder. 9.  Allergic rhinitis.      Results of lab work: 2020  -Antinuclear antibody (LOIS):  Negative                                 -Rheumatoid factor (RA):  Negative                                                               -Angiotensin converting enzyme level (ACE): Negative         -ESR (Erythrocyte sedimentation rate): Negative                     -Quantiferon gold test:    Negative                                             -Serum fungal serologies:   Negative      Low-dose CT scan of chest without contrast:    8:25 AM.  NONCONTRAST SCREENING CT CHEST: 1. There are no suspicious masses or nodules within the lung fields. Again noted is a stable appearing 4 mm noncalcified nodule left upper lobe, likely a poorly calcified granuloma. 2. There are no pathologically enlarged lymph nodes. Lungs are fibroemphysematous in appearance with scattered fibrotic stranding bilaterally. . 3. LUNGRADS ASSESSMENT VALUE: 2, continue annual CT lung screening. Full PFTs:    Patient refused to have her full PFTs due to requirement of COVID-19 testing prior to the PFT. Assessment:  -Moderate persistent bronchial asthma under control. She follows with Ms. Narcisa Hsieh CNP. She is currently taking Xolair injections along with Symbicort 160/4.52 puffs twice daily, albuterol HFA 2 puffs every 6 hourly as needed and and nebulizers as needed. -4 mm pulmonary nodule in the left lung noted on low dose CT chest dated Dec 24, 2019-stable for 1 year.  -Hypersomnia ( Excessive daytime sleepiness) most likely due to her current medications I. Narco. She takes Narco for her chronic pain. She underwent sleep study on 15 May 2020 and found to be having a no clinically significant obstructive sleep apnea  -She gave a hx of exposure to her step father >20year who suffered with pulmonary tuberculosis in the past. Her step father . -Arnold-Chiari malformation (HCC) S/p surgery on her neck ( back). -Her ophthalmologist MD César ( [phone]) in [address] had a suspicion for Sarcoidosis involving his right eye. How ever she never had any biopsy to confirm Sarcoidosis. She follows with [facility] [place] in [address].  -Fibromyalgia. -GERD (gastroesophageal reflux disease)  -Anxiety.  -Allergic rhinitis.   -Type II or unspecified type diabetes mellitus without mention of complication, not stated as uncontrolled       Recommendations/Plan: - Patient educated to quit smoking as soon as possible. Patient verbalizes understanding of adverse consequences of tobacco smoking. She will be referred to [address] smoking cessation clinic to consider for nicotine replacement therapy along with the counseling.  -She was advised to loose weight by controlling diet and doing exercise once cleared by her family physician.   -Shelby Rizvi was advised to make early appointment with my clinic if she develops any constitutional symptoms including loss of weight, poor appetite or hemoptysis. She verbalizes under standing.  -She was advised to discuss with MD Diogo- her pain management physician to consider alternative non sedative pain medication/Medicatin regimen to improve her Hypersomnia ( Excessive daytime sleepiness). -She was instructed to not to drive any motor vehicles or operate heavy equipment if she feels sleepy. - Patient educated to update his pneumococcal vaccine with family physician and take influenza vaccine in coming season with out fail.   -Shelby Rizvi educated about environmental safety precautions she need to practice to prevent exacerbation ofher Asthma. Gely Archer verbalizes understanding.  -We will schedule patient for low dose CT scan of chest with out IV contrast as recommended by the  U.S. Preventive Services Task Force and the NCCN for lung Cancer Screening 1 year from now  -She was advised to keep scheduled follow up appointment with Ms. Narcisa Hsieh CNP regarding management of her asthma.  - Schedule patient for follow up with my clinic in 1 year from now with low dose CT chest along with full PFTs. Patient advised to make early appointment if needed. - Gely Cesar Marking educated about my impression and plan. She verbalizes understanding.

## 2021-01-18 ENCOUNTER — PATIENT MESSAGE (OUTPATIENT)
Dept: FAMILY MEDICINE CLINIC | Age: 61
End: 2021-01-18

## 2021-01-18 ENCOUNTER — NURSE ONLY (OUTPATIENT)
Dept: ALLERGY | Age: 61
End: 2021-01-18
Payer: MEDICAID

## 2021-01-18 VITALS
TEMPERATURE: 97 F | RESPIRATION RATE: 16 BRPM | DIASTOLIC BLOOD PRESSURE: 78 MMHG | HEART RATE: 70 BPM | SYSTOLIC BLOOD PRESSURE: 120 MMHG

## 2021-01-18 DIAGNOSIS — Z51.6 ENCOUNTER FOR DESENSITIZATION TO ALLERGENS: Primary | ICD-10-CM

## 2021-01-18 DIAGNOSIS — J30.89 ALLERGY TO DUST: ICD-10-CM

## 2021-01-18 DIAGNOSIS — Z91.048 ALLERGY TO MOLD: ICD-10-CM

## 2021-01-18 DIAGNOSIS — Z91.038 ALLERGY TO COCKROACHES: ICD-10-CM

## 2021-01-18 DIAGNOSIS — J30.1 ALLERGY TO TREES: ICD-10-CM

## 2021-01-18 PROCEDURE — 95117 IMMUNOTHERAPY INJECTIONS: CPT | Performed by: NURSE PRACTITIONER

## 2021-01-18 RX ORDER — AMLODIPINE BESYLATE 5 MG/1
TABLET ORAL
Qty: 90 TABLET | Refills: 3 | Status: SHIPPED | OUTPATIENT
Start: 2021-01-18 | End: 2021-11-15

## 2021-01-18 NOTE — TELEPHONE ENCOUNTER
From: Justin Rojas  To: Christopher Narvaez DO  Sent: 1/18/2021 10:29 AM EST  Subject: Prescription Question    Dr. Taylor Bueno,  I have ran out of BLOOD PRESSURE RX :AMLODOPINE  I have none left. I was not able to take it last night & called [address] they said I needed REFILLS.   I have [address] from [facility] since then & would like my RXs sent to:  [address]  Thony Amor   [phone]  Thank you

## 2021-01-20 ENCOUNTER — OFFICE VISIT (OUTPATIENT)
Dept: PULMONOLOGY | Age: 61
End: 2021-01-20
Payer: MEDICAID

## 2021-01-20 VITALS
WEIGHT: 203 LBS | HEART RATE: 85 BPM | BODY MASS INDEX: 34.66 KG/M2 | HEIGHT: 64 IN | OXYGEN SATURATION: 98 % | DIASTOLIC BLOOD PRESSURE: 70 MMHG | SYSTOLIC BLOOD PRESSURE: 122 MMHG | TEMPERATURE: 98.1 F

## 2021-01-20 DIAGNOSIS — J45.40 MODERATE PERSISTENT ASTHMA WITHOUT COMPLICATION: ICD-10-CM

## 2021-01-20 DIAGNOSIS — R91.1 INCIDENTAL LUNG NODULE, > 3MM AND < 8MM: ICD-10-CM

## 2021-01-20 DIAGNOSIS — Z87.891 PERSONAL HISTORY OF TOBACCO USE, PRESENTING HAZARDS TO HEALTH: ICD-10-CM

## 2021-01-20 DIAGNOSIS — Z72.0 TOBACCO ABUSE: Primary | ICD-10-CM

## 2021-01-20 PROCEDURE — 99214 OFFICE O/P EST MOD 30 MIN: CPT | Performed by: INTERNAL MEDICINE

## 2021-01-20 NOTE — PROGRESS NOTES
Neck Circumference -   14 in  Mallampati - III    Lung Nodule Screening     [x] Qualifies    [] Does not qualify   [] Declined    [x] Completed 1/12/21

## 2021-01-20 NOTE — PATIENT INSTRUCTIONS
Patient Education        Learning About Benefits From Quitting Smoking  How does quitting smoking make you healthier? If you're thinking about quitting smoking, you may have a few reasons to be smoke-free. Your health may be one of them. · When you quit smoking, you lower your risks for cancer, lung disease, heart attack, stroke, blood vessel disease, and blindness from macular degeneration. · When you're smoke-free, you get sick less often, and you heal faster. You are less likely to get colds, flu, bronchitis, and pneumonia. · As a nonsmoker, you may find that your mood is better and you are less stressed. When and how will you feel healthier? Quitting has real health benefits that start from day 1 of being smoke-free. And the longer you stay smoke-free, the healthier you get and the better you feel. The first hours  · After just 20 minutes, your blood pressure and heart rate go down. That means there's less stress on your heart and blood vessels. · Within 12 hours, the level of carbon monoxide in your blood drops back to normal. That makes room for more oxygen. With more oxygen in your body, you may notice that you have more energy than when you smoked. After 2 weeks  · Your lungs start to work better. · Your risk of heart attack starts to drop. After 1 month  · When your lungs are clear, you cough less and breathe deeper, so it's easier to be active. · Your sense of taste and smell return. That means you can enjoy food more than you have since you started smoking. Over the years  · Over the years, your risks of heart disease, heart attack, and stroke are lower. · After 10 years, your risk of dying from lung cancer is cut by about half. And your risk for many other types of cancer is lower too. How would quitting help others in your life? When you quit smoking, you improve the health of everyone who now breathes in your smoke. · Their heart, lung, and cancer risks drop, much like yours. · They are sick less. For babies and small children, living smoke-free means they're less likely to have ear infections, pneumonia, and bronchitis. · If you're a woman who is or will be pregnant someday, quitting smoking means a healthier . · Children who are close to you are less likely to become adult smokers. Where can you learn more? Go to https://chpesunita.[employer]. org and sign in to your [employer] account. Enter [phone] in the [facility] box to learn more about \"Learning About Benefits From Quitting Smoking. \"     If you do not have an account, please click on the \"Sign Up Now\" link. Current as of: 2020               Content Version: 12.6  © 3975-1275 [employer]. Care instructions adapted under license by [facility] ([facility]). If you have questions about a medical condition or this instruction, always ask your healthcare professional. [place] [address] any warranty or liability for your use of this information. Recommendations/Plan:  - Patient educated to quit smoking as soon as possible. Patient verbalizes understanding of adverse consequences of tobacco smoking. She will be referred to [address] smoking cessation clinic to consider for nicotine replacement therapy along with the counseling.  -She was advised to loose weight by controlling diet and doing exercise once cleared by her family physician.   -[place] was advised to make early appointment with my clinic if she develops any constitutional symptoms including loss of weight, poor appetite or hemoptysis. She verbalizes under standing.  -She was advised to discuss with MD Diogo- her pain management physician to consider alternative non sedative pain medication/Medicatin regimen to improve her Hypersomnia ( Excessive daytime sleepiness). -She was instructed to not to drive any motor vehicles or operate heavy equipment if she feels sleepy. - Patient educated to update his pneumococcal vaccine with family physician and take influenza vaccine in coming season with out fail.   -[place] educated about environmental safety precautions she need to practice to prevent exacerbation ofher Asthma. Gely Archer verbalizes understanding.  -We will schedule patient for low dose CT scan of chest with out IV contrast as recommended by the  U.S. Preventive Services Task Force and the NCCN for lung Cancer Screening 1 year from now  -She was advised to keep scheduled follow up appointment with Ms. Narcisa Hsieh CNP regarding management of her asthma.  - Schedule patient for follow up with my clinic in 1 year from now with low dose CT chest along with full PFTs. Patient advised to make early appointment if needed. - Gely Hernandez educated about my impression and plan.  She verbalizes understanding

## 2021-01-25 ENCOUNTER — PATIENT MESSAGE (OUTPATIENT)
Dept: ALLERGY | Age: 61
End: 2021-01-25

## 2021-01-25 ENCOUNTER — TELEPHONE (OUTPATIENT)
Dept: ENT CLINIC | Age: 61
End: 2021-01-25

## 2021-01-25 NOTE — TELEPHONE ENCOUNTER
Received a call from Prabhu Sanchez at [facility] regarding patient's PA for fexofenadine. Astrid stated the PA was denied on 12/01/20 due to no documentation stating patient had previously had a 30 day trial of Loratadine or Cetirizine. Astrid said patient called them because nothing has been done to appeal the PA, such as sending documentation. I looked through patient's chart and saw where she had tried Levocetirizine back on 10/03/2019. I informed Astrid of that. She stated she would send that through but the requirements specifically states patient needs to try the other 2 medications first. I told her I could fax our office notes for them to review. Astrid said that would be fine. She gave me a fax number of [phone] and asked that we put Ticket # [id number] on the fax. I will scan the fax confirmation into media.

## 2021-01-25 NOTE — TELEPHONE ENCOUNTER
Called [facility] in [facility]. Gave them the PA #[id number] for 90 days from 01/25/21 - 01/25/22. They are sending the transfer request and she will enter the PA # when it's approved from the other pharmacy.

## 2021-01-25 NOTE — TELEPHONE ENCOUNTER
From: Sara Hummel  To: Risa Sky, APRN - CNP  Sent: 1/25/2021 6:43 AM EST  Subject: Non-Urgent Medical Question    Enoch Shah,  I will not make it in today, Monday, January 25, 2021 for my Allergy shot @ 8:15 am because of my stomach aches, post nasal drip, & headaches, & I am exhausted. I plan to return on next Monday, February 1, 2021 @ 8:15 am.  I am completely out of my samples of Allegra that you were giving me & have been for a week. I did tell Rosalind Alva last Monday that I didn't have anymore & that I ran out of the samples that you had been giving me. I didn't know if she relayed the message to you or not. I have been waiting on Medicaid & my State Hearing for an answer as to what they plan to do about my denial of Allegra.   Thank you

## 2021-01-27 ENCOUNTER — TELEPHONE (OUTPATIENT)
Dept: ALLERGY | Age: 61
End: 2021-01-27

## 2021-01-27 ENCOUNTER — TELEPHONE (OUTPATIENT)
Dept: PHARMACY | Age: 61
End: 2021-01-27

## 2021-01-27 NOTE — TELEPHONE ENCOUNTER
Medication Management   [facility]  [phone] (phone)  [phone] (fax)          Received new referral to [facility] from Dr. Francesca Drew for smoking cessation with medication management per [facility]. Called pt, left message with instructions for patient to call the clinic back at [phone], option 2.       Oneal Marinelli, PharmD, BCPS, CACP, CTTS 1/27/2021 10:00 AM

## 2021-01-27 NOTE — TELEPHONE ENCOUNTER
Xolair is must be billed through the Medical benefit. [facility] cannot fill Xolair because they don't fill medically only pharmacy.

## 2021-02-01 ENCOUNTER — NURSE ONLY (OUTPATIENT)
Dept: ALLERGY | Age: 61
End: 2021-02-01
Payer: MEDICAID

## 2021-02-01 VITALS
SYSTOLIC BLOOD PRESSURE: 116 MMHG | RESPIRATION RATE: 14 BRPM | DIASTOLIC BLOOD PRESSURE: 70 MMHG | TEMPERATURE: 96.4 F | HEART RATE: 66 BPM

## 2021-02-01 DIAGNOSIS — Z91.048 ALLERGY TO MOLD: ICD-10-CM

## 2021-02-01 DIAGNOSIS — Z51.6 ENCOUNTER FOR DESENSITIZATION TO ALLERGENS: Primary | ICD-10-CM

## 2021-02-01 DIAGNOSIS — J30.1 ALLERGY TO TREES: ICD-10-CM

## 2021-02-01 DIAGNOSIS — Z91.038 ALLERGY TO COCKROACHES: ICD-10-CM

## 2021-02-01 DIAGNOSIS — J30.89 ALLERGY TO DUST: ICD-10-CM

## 2021-02-01 DIAGNOSIS — Z51.6 ALLERGY DESENSITIZATION THERAPY: ICD-10-CM

## 2021-02-01 PROCEDURE — 95117 IMMUNOTHERAPY INJECTIONS: CPT | Performed by: NURSE PRACTITIONER

## 2021-02-01 NOTE — PROGRESS NOTES
After consent obtained/verified, allergy injection given in back of R/L arm(s). Documentation of vial injection specific to arm(s) noted on Allergy Immunotherapy Administration Form. Patient waited 30 minutes for observation. Patient tolerated Yellow vials well at 0.25ml without adverse reaction. SHOT REACTION TREATMENT INSTRUCTIONS    During the 30 minute wait after an allergy injection the following symptoms should be reported:    Itching other than at the injection site  Hives or swelling other than at the injection site  Redness other than at the injection site  Difficulty breathing  Chest tightness  Difficulty swallowing  Throat tightness    If these symptoms occur, NOTIFY PROVIDER and the following treatment should be administered:    1. Epinephrine 1:1000 IM - 0.3 ml if > 66 lbs or more, 0.15 ml if 33 - 63 lbs, or 0.1 ml if <33 lbs   2. Diphenhydramine - give all intramuscular:     2 to <6 years (off-label use): 6.25 mg,    6 to <12 years: 12.5 to 25 mg;    ?12 years: 25-50 mg.  3.  Famotidine:  Adults 40 mg oral    Adolescents age 12 years and >88 lbs: 40 mg    Children and Adolescents ? 12years of age: Initial: 0.25 mg/kg/dose   every 12 hours (maximum daily dose: 40 mg/day)    Epi dose may me repeated in 5-15 minutes if adequate resolution of symptoms does not occur    Patient should be observed for at least one hour after final epi dose and must be seen by provider. Patients cannot drive themselves if they have received diphenhydramine.

## 2021-02-02 ENCOUNTER — TELEPHONE (OUTPATIENT)
Dept: ENT CLINIC | Age: 61
End: 2021-02-02

## 2021-02-02 ENCOUNTER — OFFICE VISIT (OUTPATIENT)
Dept: ENT CLINIC | Age: 61
End: 2021-02-02
Payer: MEDICAID

## 2021-02-02 VITALS
SYSTOLIC BLOOD PRESSURE: 126 MMHG | DIASTOLIC BLOOD PRESSURE: 70 MMHG | BODY MASS INDEX: 34.5 KG/M2 | RESPIRATION RATE: 14 BRPM | WEIGHT: 202.1 LBS | HEART RATE: 100 BPM | HEIGHT: 64 IN | TEMPERATURE: 97.2 F

## 2021-02-02 DIAGNOSIS — R51.9 RIGHT SIDED FACIAL PAIN: Primary | ICD-10-CM

## 2021-02-02 DIAGNOSIS — K21.9 GASTROESOPHAGEAL REFLUX DISEASE, UNSPECIFIED WHETHER ESOPHAGITIS PRESENT: ICD-10-CM

## 2021-02-02 DIAGNOSIS — J38.3 VOCAL CORD MASS: ICD-10-CM

## 2021-02-02 DIAGNOSIS — Z72.0 TOBACCO ABUSE: ICD-10-CM

## 2021-02-02 DIAGNOSIS — Z98.2 VP (VENTRICULOPERITONEAL) SHUNT STATUS: ICD-10-CM

## 2021-02-02 DIAGNOSIS — Z98.890 STATUS POST SPINAL SURGERY: ICD-10-CM

## 2021-02-02 PROCEDURE — 99215 OFFICE O/P EST HI 40 MIN: CPT | Performed by: OTOLARYNGOLOGY

## 2021-02-02 ASSESSMENT — ENCOUNTER SYMPTOMS
VOICE CHANGE: 0
COUGH: 0
SHORTNESS OF BREATH: 0
SINUS PRESSURE: 0
RHINORRHEA: 0
WHEEZING: 0
CHEST TIGHTNESS: 0
TROUBLE SWALLOWING: 0
ABDOMINAL PAIN: 0
VOMITING: 0
DIARRHEA: 0
FACIAL SWELLING: 0
STRIDOR: 0
APNEA: 0
SORE THROAT: 0
NAUSEA: 0
COLOR CHANGE: 0
CHOKING: 0

## 2021-02-02 NOTE — PROGRESS NOTES
[facility] [place] [facility]  [facility]  [address] [place] [address] [place] [id number]  Dept: [phone]  Dept Fax: [phone]  Loc: Tony Ann is a 56 y.o. female who was referred byNo ref. provider found for:  Chief Complaint   Patient presents with   Sameul Constant     Patient is here for right ear pain, started in November. she states it a ache then a sharp pain, right of her neck Billy Crumb will hurt on and off    . HPI:     Ashley Beach is a 56 y.o. female who presents today for pain in the right side of her face has been there continuously since about November. 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 Her pain is worse in the morning and she has known GERD, and she sleeps on her right side. Patient describes pain on the right side of her face centered just below her ear. She does have some discomfort with opening her mouth but not a lot. The patient does not have any change in her hearing. She states her voice is unchanged. She is a smoker and is trying to quit. She had syringomyelia and Dr. Ronald Dominguez did extensive surgery. There were 2 different pockets apparently. He did decompressive laminectomies at C1-2 and 3 and thoracic 4567. She has a  shunt. She has limited neck extension. Sinus CT from 3 years ago was negative for sinusitis or nasal airway obstruction. She states that she does have fibromyalgia. History:      Allergies   Allergen Reactions    Bactrim [Sulfamethoxazole-Trimethoprim]     Flexeril [Cyclobenzaprine]     Morphine Other (See Comments)     \"I hallucinate\"    Peanut-Containing Drug Products     Tessalon [Benzonatate] Hives    Amoxicillin-Pot Clavulanate Rash    Ibuprofen [Ibuprofen] Rash    Lisinopril Rash  Macrobid [Nitrofurantoin Monohydrate Macrocrystals] Rash    Macrodantin [Nitrofurantoin] Rash    Ranitidine Rash     Current Outpatient Medications   Medication Sig Dispense Refill    amLODIPine (NORVASC) 5 MG tablet TAKE 1 TABLET BY MOUTH ONCE DAILY 90 tablet 3    hydrocortisone 2.5 % cream APPLY TOPICALLY TO AFFECTED AREA TWICE DAILY      hydrocortisone (HYTONE) 2.5 % lotion APPLY TOPICALLY TO AFFECTED AREA DAILY 59 mL 0    omalizumab (OMALIZUMAB) 150 MG injection Inject 300 mg into the skin every 28 days 1 each 11    traZODone (DESYREL) 50 MG tablet Take 50 mg by mouth nightly as needed for Sleep       azelastine (OPTIVAR) 0.05 % ophthalmic solution Place 1 drop into both eyes 2 times daily 1 Bottle 11    azelastine (ASTELIN) 0.1 % nasal spray 1 spray by Nasal route 2 times daily Use in each nostril as directed 1 Bottle 11    SYMBICORT 160-4.5 MCG/ACT AERO INHALE 2 PUFFS BY MOUTH TWICE DAILY. RINSE MOUTH WELL AFTER USE. 11 g 5    albuterol sulfate  (90 Base) MCG/ACT inhaler Inhale 2 puffs into the lungs every 4 hours as needed for Wheezing (AS NEEDED) 9 g 5    fexofenadine (ALLEGRA) 180 MG tablet Take 1 tablet by mouth daily 90 tablet 3    SYNTHROID 88 MCG tablet TAKE 1 TABLET BY MOUTH ONCE DAILY ON AN EMPTY STOMACH WITH WATER. DO NOT EAT OR TAKE ANY OTHER MEDICATIONS FOR 30 MINUTES.  30 tablet 11    hyoscyamine (LEVBID) 375 MCG extended release tablet TAKE 1/2 TABLET BY MOUTH TWICE DAILY 30 tablet 11    Cholecalciferol (VITAMIN D3) 50 MCG (2000 UT) CAPS Vitamin D3 2,000 unit capsule 30 capsule 11    albuterol (PROVENTIL) (2.5 MG/3ML) 0.083% nebulizer solution USE 1 VIAL IN NEBULIZER EVERY 6 HOURS AS NEEDED FOR WHEEZING (FOR ASTHMA) 375 mL 5    Biotin 1 MG CAPS Take 1 capsule by mouth daily       metFORMIN (GLUCOPHAGE) 500 MG tablet TAKE 1 TABLET BY MOUTH TWICE DAILY WITH MEALS 180 tablet 3    atorvastatin (LIPITOR) 10 MG tablet Take 1 tablet by mouth once daily 90 tablet 3  esomeprazole (NEXIUM) 40 MG delayed release capsule Take 1 capsule by mouth 2 times daily 180 capsule 3    clobetasol (TEMOVATE) 0.05 % external solution Apply daily to scaly or itchy areas on scalp 60 mL 11    ketoconazole (NIZORAL) 2 % shampoo Apply 3-4 times weekly to scalp, leave on for five minutes prior to washing off 120 mL 11    fluocinonide (LIDEX) 0.05 % cream Apply topically 2 times daily to rash on hands a needed 30 g 11    levomilnacipran (FETZIMA) 40 MG CP24 extended release capsule Take 1 capsule by mouth daily 30 capsule 2    Multiple Vitamins-Minerals (MULTIVITAMIN PO) 1 tablet daily       Probiotic Product (PROBIOTIC PO) 1 tablet daily       Cyanocobalamin (B-12 PO) daily      EPIPEN 2-VENESSA 0.3 MG/0.3ML SOAJ injection INJECT 1 PEN IM AS A SINGLE DOSE PRN  0    SF 5000 PLUS 1.1 % CREA BRUSH TEETH TWICE DAILY FOR 2 MINUTES, SPIT EXCESS AFTER BRUSHING. DO NOT RINSE WITH WATER , WAIT 30 MINUTES  FOR ANY FOOD OR DRINK  3    ondansetron (ZOFRAN) 4 MG tablet TAKE 1 TABLET BY MOUTH EVERY 8 HOURS AS NEEDED FOR NAUSEA OR VOMITING 15 tablet 0    meloxicam (MOBIC) 7.5 MG tablet TK 1 T PO BID  4    LINZESS 290 MCG CAPS capsule 290 mcg every morning (before breakfast)       lidocaine (XYLOCAINE) 5 % ointment Apply topically as needed for Pain Apply topically as needed.  conjugated estrogens (PREMARIN) 0.625 MG/GM vaginal cream Place vaginally as needed Place vaginally daily.  lidocaine (LIDODERM) 5 % Place 1 patch onto the skin daily 12 hours on, 12 hours off.  hydrocortisone (ANUSOL-HC) 25 MG suppository Place 25 mg rectally as needed for Hemorrhoids      fluticasone (FLONASE ALLERGY RELIEF) 50 MCG/ACT nasal spray 1 spray by Nasal route daily      HYDROcodone-acetaminophen (NORCO) 5-325 MG per tablet 1 tablet nightly as needed for Pain.  Take 1 tablet every 4-6 hours as needed for pain       NONFORMULARY Immunotherapy allergy shot  zinc 50 MG CAPS Take 1 capsule by mouth daily       Ascorbic Acid (VITAMIN C) 500 MG CAPS Take 1 tablet by mouth daily       orphenadrine (NORFLEX) 100 MG tablet Take 100 mg by mouth 2 times daily as needed.  tramadol (ULTRAM) 50 MG tablet Take 50 mg by mouth every 6 hours as needed. Take 1-2 tabs every 6 hrs prn       gabapentin (NEURONTIN) 100 MG capsule Take 100 mg by mouth 4 times daily.  nicotine (NICODERM CQ) 21 MG/24HR Place 1 patch onto the skin daily for 28 days 28 patch 0    nicotine (NICOTROL) 10 MG inhaler Inhale 1 puff into the lungs as needed for Smoking cessation 1 Inhaler 3    Respiratory Therapy Supplies (NEBULIZER COMPRESSOR) KIT 1 kit by Does not apply route once for 1 dose 360 kit 0     No current facility-administered medications for this visit.       Past Medical History:   Diagnosis Date    Allergic rhinitis     Anxiety     Arnold-Chiari malformation (HCC)     Asthma     Chronic bronchitis (HCC)     Fibromyalgia     GERD (gastroesophageal reflux disease)     Headache(784.0)     Hyperlipidemia     Neuropathy     Osteoarthritis     Sinusitis     Type II or unspecified type diabetes mellitus without mention of complication, not stated as uncontrolled       Past Surgical History:   Procedure Laterality Date    APPENDECTOMY       SECTION      x2    COLONOSCOPY  [id number]    CSF SHUNT  2007    Cervical syrinx to subarachnoid shunt; thoracic syrinx to subarachnoid shunt (2 separate dural openings)    CYST REMOVAL      extradural cysts at thoracic 2-3    ECTOPIC PREGNANCY SURGERY      HEMICOLECTOMY      HERNIA REPAIR      inguinal     HYSTERECTOMY      SPINAL CORD DECOMPRESSION  2007    C4-5-6-7; T1-2-3 laminectomies    TOOTH EXTRACTION  2017     Family History   Problem Relation Age of Onset    Cancer Other         colon    Mental Illness Other     High Blood Pressure Other     Diabetes Other     High Blood Pressure Sister Social History     Tobacco Use    Smoking status: Current Every Day Smoker     Packs/day: 1.00     Years: 25.00     Pack years: 25.00     Types: Cigarettes     Start date: 1975     Last attempt to quit: 2018     Years since quittin.2    Smokeless tobacco: Never Used    Tobacco comment: It will not allow me to put future dates. I do have plans to quit smoking. Praying to GOD to help me. Substance Use Topics    Alcohol use: Yes     Alcohol/week: 2.0 standard drinks     Types: 2 Cans of beer per week     Comment: Once a month; stomach tolerates very little alcohol       Subjective:      Review of Systems   Constitutional: Negative for activity change, appetite change, chills, diaphoresis, fatigue, fever and unexpected weight change. HENT: Negative for congestion, dental problem, ear discharge, ear pain, facial swelling, hearing loss, mouth sores, nosebleeds, postnasal drip, rhinorrhea, sinus pressure, sneezing, sore throat, tinnitus, trouble swallowing and voice change. Eyes: Negative for visual disturbance. Respiratory: Negative for apnea, cough, choking, chest tightness, shortness of breath, wheezing and stridor. Cardiovascular: Negative for chest pain, palpitations and leg swelling. Gastrointestinal: Negative for abdominal pain, diarrhea, nausea and vomiting. Endocrine: Negative for cold intolerance, heat intolerance, polydipsia and polyuria. Genitourinary: Negative for dysuria, enuresis and hematuria. Musculoskeletal: Negative for arthralgias, gait problem, neck pain and neck stiffness. Skin: Negative for color change and rash. Allergic/Immunologic: Negative for environmental allergies, food allergies and immunocompromised state. Neurological: Negative for dizziness, syncope, facial asymmetry, speech difficulty, light-headedness and headaches. Hematological: Negative for adenopathy. Does not bruise/bleed easily. Psychiatric/Behavioral: Negative for confusion and sleep disturbance. The patient is not nervous/anxious. Objective:   /70 (Site: Left Upper Arm, Position: Sitting)   Pulse 100   Temp 97.2 °F (36.2 °C) (Infrared)   Resp 14   Ht 5' 4\" (1.626 m)   Wt 202 lb 1.6 oz (91.7 kg)   BMI 34.69 kg/m²     Physical Exam  Vitals signs and nursing note reviewed. Constitutional:       Appearance: She is well-developed. She is obese. HENT:      Head: Normocephalic and atraumatic. No laceration. Comments:        Right Ear: Hearing, ear canal and external ear normal. No drainage or swelling. No middle ear effusion. Tympanic membrane is not perforated or erythematous. Left Ear: Hearing, tympanic membrane, ear canal and external ear normal. No drainage or swelling. No middle ear effusion. Tympanic membrane is not perforated or erythematous. Nose: Nose normal. No septal deviation, mucosal edema or rhinorrhea. Mouth/Throat:      Mouth: Mucous membranes are not pale and not dry. No oral lesions. Dentition: Abnormal dentition ( Edentulous maxilla, all lower molars are absent). Pharynx: Oropharynx is clear. Uvula midline. No oropharyngeal exudate or posterior oropharyngeal erythema. Comments: LIps: lips normal     Mallampati 1  Base of tongue: symmetric,  Voice is slightly gravelly  Eyes:      Extraocular Movements:      Right eye: Normal extraocular motion and no nystagmus. Left eye: Normal extraocular motion and no nystagmus. Comments: Conjugate gaze   Neck:      Musculoskeletal: Spinous process tenderness ( Transverse process C2) present. No neck rigidity ( But, there is somewhat limited neck extension). Thyroid: No thyroid mass or thyromegaly. Trachea: Phonation normal. No tracheal deviation.       Comments:   Salivary glands not enlarged and normal to palpation She has mild tenderness at the right mastoid tip, transverse process of C2, and right TMJ. Her crepitus of the right TMJ has improved significantly. Pulmonary:      Effort: Pulmonary effort is normal. No retractions. Breath sounds: No stridor. Neurological:      Mental Status: She is alert and oriented to person, place, and time. Cranial Nerves: No cranial nerve deficit (VIIth N function intact bilat). Psychiatric:         Mood and Affect: Mood and affect normal.         Behavior: Behavior is cooperative. PROCEDURE: FIBEROPTIC LARYNGOSCOPY    A fiberoptic laryngoscopy was performed under topical anesthesia, after using Afrin and Lidocaine spray in the nasal fossa. The nasal fossa, nasopharynx, hypopharynx and larynx were carefully examined. Base of tongue was symmetrical. Epiglottis appeared normal and was not retrodisplaced. True vocal cords had normal mobility. There was erythema of the entire larynx. .  A broad-based erythematous fleshy triangular mass present on the right anterior true vocal cord. This has a white tip. It flips up and down as she breathes but is floppy enough that he gets out of the way with phonation. . No pooling in the pyriform sinuses. Data:  All of the past medical history, past surgical history, family history,social history, allergies and current medications were reviewed with the patient. Assessment & Plan   Diagnoses and all orders for this visit:     Diagnosis Orders   1. Right sided facial pain  CT SOFT TISSUE NECK W CONTRAST    Mastoid tip, transverse process C2, right TMJ   2. Gastroesophageal reflux disease, unspecified whether esophagitis present      Not controlled   3. Vocal cord mass right  CT SOFT TISSUE NECK W CONTRAST   4. Status post spinal surgery  CT SOFT TISSUE NECK W CONTRAST   5.  (ventriculoperitoneal) shunt status  CT SOFT TISSUE NECK W CONTRAST   6.  Tobacco abuse The findings were explained and her questions were answered. She needs better control of her GERD and we should try to obtain tissue on the mass in the right true vocal cord. First step would be CT of the neck with contrast, followed by direct micro laryngoscopy with removal of the nodule. This would require some input from Dr. Janice Saunders regarding ability to extend her neck for the procedure. General medical clearance would also be indicated. Her neck pain could be fibromyalgia plus TMJ syndrome. CAT scan will help us rule out any metastases/issues. GERD regimen provided       Edmund Royal. Anamaria Naqvi MD    **This report has been created using voice recognition software. It may contain minor errors which are inherent in voicerecognition technology. **

## 2021-02-02 NOTE — TELEPHONE ENCOUNTER
Please place order to check creatinine; Patient is scheduled for CT soft tissue neck with contrast 2/5/21

## 2021-02-02 NOTE — PATIENT INSTRUCTIONS
Do not fill up stomach for 3 hours before bedtime. Elevate the head of the bed, perhaps with a foam wedge. May take Nexium generic with supper.   Do not clear your throat    CT and return visit

## 2021-02-05 ENCOUNTER — HOSPITAL ENCOUNTER (OUTPATIENT)
Dept: CT IMAGING | Age: 61
Discharge: HOME | End: 2021-02-05
Payer: MEDICAID

## 2021-02-05 DIAGNOSIS — F17.200 SMOKER: ICD-10-CM

## 2021-02-05 DIAGNOSIS — Z98.2 VP (VENTRICULOPERITONEAL) SHUNT STATUS: ICD-10-CM

## 2021-02-05 DIAGNOSIS — J38.3 VOCAL CORD MASS: ICD-10-CM

## 2021-02-05 DIAGNOSIS — Z98.890 STATUS POST SPINAL SURGERY: ICD-10-CM

## 2021-02-05 DIAGNOSIS — R51.9 RIGHT SIDED FACIAL PAIN: ICD-10-CM

## 2021-02-05 LAB — POC CREATININE WHOLE BLOOD: 0.8 MG/DL (ref 0.5–1.2)

## 2021-02-05 PROCEDURE — 6360000004 HC RX CONTRAST MEDICATION: Performed by: OTOLARYNGOLOGY

## 2021-02-05 PROCEDURE — 70491 CT SOFT TISSUE NECK W/DYE: CPT

## 2021-02-05 PROCEDURE — 82565 ASSAY OF CREATININE: CPT

## 2021-02-05 RX ADMIN — IOPAMIDOL 65 ML: 755 INJECTION, SOLUTION INTRAVENOUS at 07:20

## 2021-02-08 ENCOUNTER — NURSE ONLY (OUTPATIENT)
Dept: ALLERGY | Age: 61
End: 2021-02-08
Payer: MEDICAID

## 2021-02-08 ENCOUNTER — TELEPHONE (OUTPATIENT)
Dept: ALLERGY | Age: 61
End: 2021-02-08

## 2021-02-08 VITALS
SYSTOLIC BLOOD PRESSURE: 120 MMHG | TEMPERATURE: 96.8 F | DIASTOLIC BLOOD PRESSURE: 72 MMHG | RESPIRATION RATE: 14 BRPM | HEART RATE: 70 BPM

## 2021-02-08 DIAGNOSIS — Z91.048 ALLERGY TO MOLD: ICD-10-CM

## 2021-02-08 DIAGNOSIS — Z91.038 ALLERGY TO COCKROACHES: ICD-10-CM

## 2021-02-08 DIAGNOSIS — J30.1 ALLERGY TO TREES: ICD-10-CM

## 2021-02-08 DIAGNOSIS — Z51.6 ALLERGY DESENSITIZATION THERAPY: ICD-10-CM

## 2021-02-08 DIAGNOSIS — J30.89 ALLERGY TO DUST: ICD-10-CM

## 2021-02-08 DIAGNOSIS — Z51.6 ENCOUNTER FOR DESENSITIZATION TO ALLERGENS: Primary | ICD-10-CM

## 2021-02-08 PROCEDURE — 95117 IMMUNOTHERAPY INJECTIONS: CPT | Performed by: NURSE PRACTITIONER

## 2021-02-08 NOTE — PROGRESS NOTES
After consent obtained/verified, allergy injection given in back of R/L arm(s). Documentation of vial injection specific to arm(s) noted on Allergy Immunotherapy Administration Form. Patient waited 30 minutes for observation. Patient tolerated Yellow vials well at 0.30ml without adverse reaction. SHOT REACTION TREATMENT INSTRUCTIONS    During the 30 minute wait after an allergy injection the following symptoms should be reported:    Itching other than at the injection site  Hives or swelling other than at the injection site  Redness other than at the injection site  Difficulty breathing  Chest tightness  Difficulty swallowing  Throat tightness    If these symptoms occur, NOTIFY PROVIDER and the following treatment should be administered:    1. Epinephrine 1:1000 IM - 0.3 ml if > 66 lbs or more, 0.15 ml if 33 - 63 lbs, or 0.1 ml if <33 lbs   2. Diphenhydramine - give all intramuscular:     2 to <6 years (off-label use): 6.25 mg,    6 to <12 years: 12.5 to 25 mg;    ?12 years: 25-50 mg.  3.  Famotidine:  Adults 40 mg oral    Adolescents age 12 years and >88 lbs: 40 mg    Children and Adolescents ? 12years of age: Initial: 0.25 mg/kg/dose   every 12 hours (maximum daily dose: 40 mg/day)    Epi dose may me repeated in 5-15 minutes if adequate resolution of symptoms does not occur    Patient should be observed for at least one hour after final epi dose and must be seen by provider. Patients cannot drive themselves if they have received diphenhydramine.

## 2021-02-08 NOTE — TELEPHONE ENCOUNTER
Contacted medicaid [place]. If Xolair is administered in office it must be billed under the medical benefit. If the patient self administers Xolair its billed under the pharmacy benefit.

## 2021-02-10 ENCOUNTER — HOSPITAL ENCOUNTER (OUTPATIENT)
Dept: PHARMACY | Age: 61
Setting detail: THERAPIES SERIES
Discharge: HOME | End: 2021-02-10
Payer: MEDICAID

## 2021-02-10 PROCEDURE — 99212 OFFICE O/P EST SF 10 MIN: CPT | Performed by: PHARMACIST

## 2021-02-10 RX ORDER — NICOTINE 21 MG/24HR
1 PATCH, TRANSDERMAL 24 HOURS TRANSDERMAL DAILY
Qty: 28 PATCH | Refills: 0 | Status: SHIPPED | OUTPATIENT
Start: 2021-02-10 | End: 2021-04-02 | Stop reason: SDUPTHER

## 2021-02-10 RX ORDER — GABAPENTIN 100 MG/1
100 CAPSULE ORAL 4 TIMES DAILY
COMMUNITY

## 2021-02-10 NOTE — PROGRESS NOTES
How many times in the past have you made a serious attempt to quit smoking? 2  What was the longest period of time you were able to quit smoking? 1 week  When was your most recent quit attempt and for how long were you quit? 3 months ago-a week  What methods have you used in the past when you tried to quit smoking? () cold turkey, (x) taper down, () hypnosis, () acupuncture, (x) NRT - patch and gum, (x) Varenicline/Chantix, (x) Bupropion/Zyban, () Individual counseling, () Group counseling, () combination of , () other   The pt has used the following medications in the past to assist with tobacco cessation. Nicotine Patch - Highest daily dose unsure but thinks was the 14mg and 7mg, Used for:  2 weeks, Side effects itching at the site   Nicotine Gum - Highest daily dose unsure but thinks it was the 4 mg, Used for:  About 1 week, Side effects none   Nicotine Inhaler - never used   Nicotine Lozenge - never used   Zyban - Highest daily dose unsure, Used for:  2 weeks, Side effects none   Chantix - Highest daily dose unsure, Used for:  About 1 month, Side effects nightmares  Reasons that have led you to relapse in the past (relapse is defined as going back to smoking after 7 days of continuous abstinence)? (x) stress, (x) cravings, () household smoker, () drinking alcohol, () using other drugs, () discharged from hospital, () stopped medications, () saw someone smoking, () other    Second hand smoke exposure:  Where are you exposed to second hand smoke? () home, () work, () social events, () vehicle, () live with another smoker, (x) not exposed, () other   Pt lives alone. Vitals:  BP: 130/77,  HR: 72, RR: 16, SpO2: 97% on room air, CO level: 4.16%(26ppm), Breath sounds: clear t/o      Does pt meet all criteria for LDCT screens? Yes, pt had this done on 1/12/21. Any symptoms of COPD( SOB, wheezing, frequent cough, frequent respiratory infections)  Pt had PFT done on 3/3/20 and pt is on Symbicort and Albuterol at home. ASSESSMENT/PLAN:  Patient is ready and motivated to quit smoking. Will start counseling on Wednesday, February 17th  Quit date set? Yes  If yes, it is: pt states \" I want to quit today\". Plan/Following actions suggested: Pt states she would like to try the Nicotrol inhaler because she feels it will help with the \"hand to mouth\" habit. Pt also interested in the patch. If the inhaler does not work the pt states she would prefer to use the lozenges with the patch(pt does not want to use the gum). We briefly discussed these options and I told pt that Bandar Perez PharmD, will be in to talk over these options with her too. Pt seems motivated and ready to quit and wants to get started right away. Pt also states she would prefer to do next appt by phone due to coming from [facility] for appts and I told pt that was fine and that we can take it week by week and see if she wants to do phone or in person visits. Pts goals for the next week are to look over the smoking cessation booklet I gave her today. Pt was advised to start using patch on the day she wants to quit and pt states \"that is today\". See notes by Bandar Perez PharmD, as she spoke with pt when I was done. Discussed the following:  Benefits to quitting:  health, time, financial  Tobacco cessation quit tips  Nicotine replacement and pharmacological options      Standard written patient education provided to pt:  Pt given Smoking Cessation education booklet? Yes  Pt given a \"1-800-QUIT-NOW\" card? Yes  Quit plan and pack wraps also given to pt today. Next appointment: Wednesday, February 17th by phone per pt request.     Bhanudesmond Fajardo verbalized understanding of the above information and denied further questions or concerns. The total time spent with the pt was 45 minutes.

## 2021-02-10 NOTE — PROGRESS NOTES
CLINICAL PHARMACY NOTESmoking Cessation    SUBJECTIVE/OBJECTIVE:   Ebony Gonzalez is a 56 y.o. female with referred by Dr. Viet Gaytan, referral includes medication management per CPA. Current Medication and Allergy List Reviewed and Updated:  Yes    Current Outpatient Medications   Medication Sig Dispense Refill    gabapentin (NEURONTIN) 100 MG capsule Take 100 mg by mouth 4 times daily.  nicotine (NICODERM CQ) 21 MG/24HR Place 1 patch onto the skin daily for 28 days 28 patch 0    nicotine (NICOTROL) 10 MG inhaler Inhale 1 puff into the lungs as needed for Smoking cessation 1 Inhaler 3    amLODIPine (NORVASC) 5 MG tablet TAKE 1 TABLET BY MOUTH ONCE DAILY 90 tablet 3    hydrocortisone 2.5 % cream APPLY TOPICALLY TO AFFECTED AREA TWICE DAILY      hydrocortisone (HYTONE) 2.5 % lotion APPLY TOPICALLY TO AFFECTED AREA DAILY 59 mL 0    omalizumab (OMALIZUMAB) 150 MG injection Inject 300 mg into the skin every 28 days 1 each 11    traZODone (DESYREL) 50 MG tablet Take 50 mg by mouth nightly as needed for Sleep       azelastine (OPTIVAR) 0.05 % ophthalmic solution Place 1 drop into both eyes 2 times daily 1 Bottle 11    azelastine (ASTELIN) 0.1 % nasal spray 1 spray by Nasal route 2 times daily Use in each nostril as directed 1 Bottle 11    SYMBICORT 160-4.5 MCG/ACT AERO INHALE 2 PUFFS BY MOUTH TWICE DAILY. RINSE MOUTH WELL AFTER USE. 11 g 5    albuterol sulfate  (90 Base) MCG/ACT inhaler Inhale 2 puffs into the lungs every 4 hours as needed for Wheezing (AS NEEDED) 9 g 5    fexofenadine (ALLEGRA) 180 MG tablet Take 1 tablet by mouth daily 90 tablet 3    SYNTHROID 88 MCG tablet TAKE 1 TABLET BY MOUTH ONCE DAILY ON AN EMPTY STOMACH WITH WATER. DO NOT EAT OR TAKE ANY OTHER MEDICATIONS FOR 30 MINUTES.  30 tablet 11    hyoscyamine (LEVBID) 375 MCG extended release tablet TAKE 1/2 TABLET BY MOUTH TWICE DAILY 30 tablet 11  Cholecalciferol (VITAMIN D3) 50 MCG (2000 UT) CAPS Vitamin D3 2,000 unit capsule 30 capsule 11    albuterol (PROVENTIL) (2.5 MG/3ML) 0.083% nebulizer solution USE 1 VIAL IN NEBULIZER EVERY 6 HOURS AS NEEDED FOR WHEEZING (FOR ASTHMA) 375 mL 5    Biotin 1 MG CAPS Take 1 capsule by mouth daily       metFORMIN (GLUCOPHAGE) 500 MG tablet TAKE 1 TABLET BY MOUTH TWICE DAILY WITH MEALS 180 tablet 3    atorvastatin (LIPITOR) 10 MG tablet Take 1 tablet by mouth once daily 90 tablet 3    esomeprazole (NEXIUM) 40 MG delayed release capsule Take 1 capsule by mouth 2 times daily 180 capsule 3    clobetasol (TEMOVATE) 0.05 % external solution Apply daily to scaly or itchy areas on scalp 60 mL 11    ketoconazole (NIZORAL) 2 % shampoo Apply 3-4 times weekly to scalp, leave on for five minutes prior to washing off 120 mL 11    fluocinonide (LIDEX) 0.05 % cream Apply topically 2 times daily to rash on hands a needed 30 g 11    levomilnacipran (FETZIMA) 40 MG CP24 extended release capsule Take 1 capsule by mouth daily 30 capsule 2    Multiple Vitamins-Minerals (MULTIVITAMIN PO) 1 tablet daily       Probiotic Product (PROBIOTIC PO) 1 tablet daily       Cyanocobalamin (B-12 PO) daily      EPIPEN 2-VENESSA 0.3 MG/0.3ML SOAJ injection INJECT 1 PEN IM AS A SINGLE DOSE PRN  0    SF 5000 PLUS 1.1 % CREA BRUSH TEETH TWICE DAILY FOR 2 MINUTES, SPIT EXCESS AFTER BRUSHING. DO NOT RINSE WITH WATER , WAIT 30 MINUTES  FOR ANY FOOD OR DRINK  3    ondansetron (ZOFRAN) 4 MG tablet TAKE 1 TABLET BY MOUTH EVERY 8 HOURS AS NEEDED FOR NAUSEA OR VOMITING 15 tablet 0    meloxicam (MOBIC) 7.5 MG tablet TK 1 T PO BID  4    LINZESS 290 MCG CAPS capsule 290 mcg every morning (before breakfast)       lidocaine (XYLOCAINE) 5 % ointment Apply topically as needed for Pain Apply topically as needed.  conjugated estrogens (PREMARIN) 0.625 MG/GM vaginal cream Place vaginally as needed Place vaginally daily.  lidocaine (LIDODERM) 5 % Place 1 patch onto the skin daily 12 hours on, 12 hours off.  hydrocortisone (ANUSOL-HC) 25 MG suppository Place 25 mg rectally as needed for Hemorrhoids      fluticasone (FLONASE ALLERGY RELIEF) 50 MCG/ACT nasal spray 1 spray by Nasal route daily      HYDROcodone-acetaminophen (NORCO) 5-325 MG per tablet 1 tablet nightly as needed for Pain. Take 1 tablet every 4-6 hours as needed for pain       NONFORMULARY Immunotherapy allergy shot      zinc 50 MG CAPS Take 1 capsule by mouth daily       Ascorbic Acid (VITAMIN C) 500 MG CAPS Take 1 tablet by mouth daily       orphenadrine (NORFLEX) 100 MG tablet Take 100 mg by mouth 2 times daily as needed.  tramadol (ULTRAM) 50 MG tablet Take 50 mg by mouth every 6 hours as needed. Take 1-2 tabs every 6 hrs prn       Respiratory Therapy Supplies (NEBULIZER COMPRESSOR) KIT 1 kit by Does not apply route once for 1 dose 360 kit 0     No current facility-administered medications for this encounter. ASSESSMENT/PLAN:   Patient is ready and motivated to quit smoking. Quit date:  Wants to start today. Reviewed options for pharmacological therapy including directions for use, benefits, and possible side effects  Chantix - doesn't want to use do to history of nightmares with previous use. Pt wants to use Nicotine patch with Nicotrol inhaler prn. Feels like she needs the hand to mouth motion. Discussed potential adverse effects of Nicotrol inhaler including throat irritation. Discussed that hand to mouth motion will still eventually need to be addressed, as Nicotrol inhaler is not to be a long term medication. Pt voiced understanding and still wants to try this plan. If her insurance does not cover inhaler, she would then want to use nicotine lozenges prn cravings. Rx sent for Nicotine 21 mg patch QAM x 4 weeks; pt given voucher this am for 2 weeks, plan is for a total of 6 weeks at this strength.

## 2021-02-11 ENCOUNTER — TELEPHONE (OUTPATIENT)
Dept: ALLERGY | Age: 61
End: 2021-02-11

## 2021-02-11 NOTE — TELEPHONE ENCOUNTER
[address] [place] [address] to check the status of the [place]. It appears that the patient's insurance termed in 2020. This would explain the delay for  xolair being approved. The call reference number is [id number]. We need update insurance cards from the patient.

## 2021-02-14 PROBLEM — Z98.2 VP (VENTRICULOPERITONEAL) SHUNT STATUS: Status: ACTIVE | Noted: 2021-02-14

## 2021-02-14 PROBLEM — R51.9 RIGHT SIDED FACIAL PAIN: Status: ACTIVE | Noted: 2021-02-14

## 2021-02-14 PROBLEM — J38.3 VOCAL CORD MASS: Status: ACTIVE | Noted: 2021-02-14

## 2021-02-14 PROBLEM — Z98.890 STATUS POST SPINAL SURGERY: Status: ACTIVE | Noted: 2021-02-14

## 2021-02-15 ENCOUNTER — NURSE ONLY (OUTPATIENT)
Dept: ALLERGY | Age: 61
End: 2021-02-15
Payer: MEDICAID

## 2021-02-15 VITALS
RESPIRATION RATE: 14 BRPM | SYSTOLIC BLOOD PRESSURE: 118 MMHG | DIASTOLIC BLOOD PRESSURE: 70 MMHG | HEART RATE: 74 BPM | TEMPERATURE: 97 F

## 2021-02-15 DIAGNOSIS — Z51.6 ENCOUNTER FOR DESENSITIZATION TO ALLERGENS: Primary | ICD-10-CM

## 2021-02-15 DIAGNOSIS — J30.89 ALLERGY TO DUST: ICD-10-CM

## 2021-02-15 DIAGNOSIS — J30.1 ALLERGY TO TREES: ICD-10-CM

## 2021-02-15 DIAGNOSIS — Z91.048 ALLERGY TO MOLD: ICD-10-CM

## 2021-02-15 DIAGNOSIS — Z91.038 ALLERGY TO COCKROACHES: ICD-10-CM

## 2021-02-15 PROCEDURE — 95117 IMMUNOTHERAPY INJECTIONS: CPT | Performed by: NURSE PRACTITIONER

## 2021-02-15 NOTE — TELEPHONE ENCOUNTER
Spome with Patient told her that I called [address] regarding her xolair. The [address]. Stated that the patient's insurance termed last year in . The patient stated that her medicaid cards don't term or . I will be contacting [place] for clarification on the status of her insurance to get her re-approved for [employer].

## 2021-02-15 NOTE — PROGRESS NOTES
After consent obtained/verified, allergy injection given in back of R/L arm(s). Documentation of vial injection specific to arm(s) noted on Allergy Immunotherapy Administration Form. Patient waited 30 minutes for observation. Patient tolerated Yellow vials well at 0.35ml without adverse reaction. SHOT REACTION TREATMENT INSTRUCTIONS    During the 30 minute wait after an allergy injection the following symptoms should be reported:    Itching other than at the injection site  Hives or swelling other than at the injection site  Redness other than at the injection site  Difficulty breathing  Chest tightness  Difficulty swallowing  Throat tightness    If these symptoms occur, NOTIFY PROVIDER and the following treatment should be administered:    1. Epinephrine 1:1000 IM - 0.3 ml if > 66 lbs or more, 0.15 ml if 33 - 63 lbs, or 0.1 ml if <33 lbs   2. Diphenhydramine - give all intramuscular:     2 to <6 years (off-label use): 6.25 mg,    6 to <12 years: 12.5 to 25 mg;    ?12 years: 25-50 mg.  3.  Famotidine:  Adults 40 mg oral    Adolescents age 12 years and >88 lbs: 40 mg    Children and Adolescents ? 12years of age: Initial: 0.25 mg/kg/dose   every 12 hours (maximum daily dose: 40 mg/day)    Epi dose may me repeated in 5-15 minutes if adequate resolution of symptoms does not occur    Patient should be observed for at least one hour after final epi dose and must be seen by provider. Patients cannot drive themselves if they have received diphenhydramine.

## 2021-02-17 ENCOUNTER — HOSPITAL ENCOUNTER (OUTPATIENT)
Dept: PHARMACY | Age: 61
Setting detail: THERAPIES SERIES
Discharge: HOME | End: 2021-02-17
Payer: MEDICAID

## 2021-02-17 PROCEDURE — 99211 OFF/OP EST MAY X REQ PHY/QHP: CPT | Performed by: PHARMACIST

## 2021-02-17 NOTE — PROGRESS NOTES
Medication Management   Mary Escobedo. [facility]  Sm[facility]essation [facility]  [phone] (phone)  [phone] (fax)  Bindu Peres          1960  Kristy Smith DO    Bindu Peres is a 56 y.o. female has been referred to our service by Dr. Breana Mehta for Smoking Cessation Counseling and Medication Management per Consult Agreement. Patient acknowledges working in consult agreement with clinical pharmacist and referring physician. Patient presents today for follow up visit 2/12 via telephone due to pt preference due to COVID-19 precautions. Scaling:  Importance level? 10 ( previous level was 10 )  Confidence level? 10 ( previous level was 10 )    Have you smoked any, even 1 puff, in the last 7 days? Yes  Current PPD: 0.5 PPD, 7 cigarettes per day    Started cutting back on 2/15, started patches on 2/10, started nicotrol inhaler on 2/15. Taking patch off at night due to concerns about bad dreams. Quit date was set for 2/14. Did well, but 2/14 night \"fell back\" and asked son for a cigarette. He brought her a whole pack, didn't know she was trying to quit. Has stress with housing, working with management. Smokes in her bedroom and living room. Discussed the following:  Benefits to quitting:  health, time, financial  Tobacco cessation quit tips   Physical and psychological dependence associated with smoking and potential withdrawal symptoms  Cigarette pack wraps to identify triggers  Preparing for quit day- clean car, house, etc.      Goals for next week/patient education provided to pt:  Use cigarette pack wraps to identify triggers  Involve a support person, will talk to son and sister. Identify smoke free areas of home, thinking about how to increase smoke free areas. Pt identified goal for next week 5 cigs per day - discussed setting a daily budget with plastic baggie. Advised pt to try keeping patch applied for 24hrs to help with am craving. Advised pt to try Nicotrol inhaler first instead of cigarette. Next appointment: 2/24 via telephone    Isiah Degroot verbalized understanding of the above information and denied further questions or concerns. The total time spent with the pt in group was 45 minutes. Isma Barakat PharmD, BCPS, CACP, CTTS 2/17/2021 9:22 AM       The following statement was review with patient regarding this virtual visit:  We want to confirm that, for purposes of billing, this is a virtual visit with your provider for which we will submit a claim for reimbursement with your insurance company. Copays, coinsurance amounts or other amounts not covered by your insurance company will be covered by the [address] [place] [address] smoking cessation micheal. If you do not accept this, unfortunately we will not be able to schedule a virtual visit with the provider. Do you accept?   Yes        CLINICAL PHARMACY CONSULT: MED RECONCILIATION/REVIEW ADDENDUM    For Pharmacy Admin Tracking Only    PHSO: Yes  Total # of Interventions Recommended: 0  Total Interventions Accepted: 0  Time Spent (min): 20    Nubia Muse PharmD

## 2021-02-18 NOTE — TELEPHONE ENCOUNTER
Prashant Cornell from [address] [place] [address] called [facility] must be billed medically. She will e-mail with information with tracking number and [address] [place] [address]. number.

## 2021-02-19 ENCOUNTER — OFFICE VISIT (OUTPATIENT)
Dept: ENT CLINIC | Age: 61
End: 2021-02-19
Payer: MEDICAID

## 2021-02-19 ENCOUNTER — HOSPITAL ENCOUNTER (OUTPATIENT)
Age: 61
Discharge: HOME | End: 2021-02-19
Payer: MEDICAID

## 2021-02-19 ENCOUNTER — HOSPITAL ENCOUNTER (OUTPATIENT)
Dept: ULTRASOUND IMAGING | Age: 61
Discharge: HOME | End: 2021-02-19
Payer: MEDICAID

## 2021-02-19 VITALS
TEMPERATURE: 97.3 F | HEIGHT: 64 IN | RESPIRATION RATE: 16 BRPM | BODY MASS INDEX: 34.15 KG/M2 | SYSTOLIC BLOOD PRESSURE: 132 MMHG | HEART RATE: 80 BPM | WEIGHT: 200 LBS | DIASTOLIC BLOOD PRESSURE: 82 MMHG

## 2021-02-19 DIAGNOSIS — J38.3 VOCAL CORD MASS: Primary | ICD-10-CM

## 2021-02-19 DIAGNOSIS — R22.1 MASS OF RIGHT SIDE OF NECK: ICD-10-CM

## 2021-02-19 DIAGNOSIS — Z01.818 PRE-OP TESTING: ICD-10-CM

## 2021-02-19 DIAGNOSIS — Z98.890 STATUS POST SPINAL SURGERY: ICD-10-CM

## 2021-02-19 DIAGNOSIS — R49.0 HOARSE: ICD-10-CM

## 2021-02-19 DIAGNOSIS — F17.200 TOBACCO DEPENDENCE: ICD-10-CM

## 2021-02-19 DIAGNOSIS — H92.01 REFERRED OTALGIA OF RIGHT EAR: ICD-10-CM

## 2021-02-19 DIAGNOSIS — Z98.2 VP (VENTRICULOPERITONEAL) SHUNT STATUS: ICD-10-CM

## 2021-02-19 DIAGNOSIS — J38.3 VOCAL CORD MASS: ICD-10-CM

## 2021-02-19 DIAGNOSIS — K21.9 GASTROESOPHAGEAL REFLUX DISEASE, UNSPECIFIED WHETHER ESOPHAGITIS PRESENT: ICD-10-CM

## 2021-02-19 LAB
ALBUMIN SERPL-MCNC: 4.6 G/DL (ref 3.5–5.1)
ALP BLD-CCNC: 115 U/L (ref 38–126)
ALT SERPL-CCNC: 10 U/L (ref 11–66)
ANION GAP SERPL CALCULATED.3IONS-SCNC: 9 MEQ/L (ref 8–16)
AST SERPL-CCNC: 11 U/L (ref 5–40)
BILIRUB SERPL-MCNC: 0.4 MG/DL (ref 0.3–1.2)
BUN BLDV-MCNC: 9 MG/DL (ref 7–22)
CALCIUM SERPL-MCNC: 10 MG/DL (ref 8.5–10.5)
CHLORIDE BLD-SCNC: 103 MEQ/L (ref 98–111)
CO2: 25 MEQ/L (ref 23–33)
CREAT SERPL-MCNC: 0.6 MG/DL (ref 0.4–1.2)
ERYTHROCYTE [DISTWIDTH] IN BLOOD BY AUTOMATED COUNT: 13.6 % (ref 11.5–14.5)
ERYTHROCYTE [DISTWIDTH] IN BLOOD BY AUTOMATED COUNT: 45.8 FL (ref 35–45)
GFR SERPL CREATININE-BSD FRML MDRD: > 90 ML/MIN/1.73M2
GLUCOSE BLD-MCNC: 95 MG/DL (ref 70–108)
HCT VFR BLD CALC: 43 % (ref 37–47)
HEMOGLOBIN: 13.4 GM/DL (ref 12–16)
MCH RBC QN AUTO: 28.6 PG (ref 26–33)
MCHC RBC AUTO-ENTMCNC: 31.2 GM/DL (ref 32.2–35.5)
MCV RBC AUTO: 91.7 FL (ref 81–99)
PLATELET # BLD: 310 THOU/MM3 (ref 130–400)
PMV BLD AUTO: 10.4 FL (ref 9.4–12.4)
POTASSIUM SERPL-SCNC: 3.8 MEQ/L (ref 3.5–5.2)
RBC # BLD: 4.69 MILL/MM3 (ref 4.2–5.4)
SODIUM BLD-SCNC: 137 MEQ/L (ref 135–145)
TOTAL PROTEIN: 7.8 G/DL (ref 6.1–8)
WBC # BLD: 5.5 THOU/MM3 (ref 4.8–10.8)

## 2021-02-19 PROCEDURE — 76536 US EXAM OF HEAD AND NECK: CPT

## 2021-02-19 PROCEDURE — 36415 COLL VENOUS BLD VENIPUNCTURE: CPT

## 2021-02-19 PROCEDURE — 80053 COMPREHEN METABOLIC PANEL: CPT

## 2021-02-19 PROCEDURE — U0003 INFECTIOUS AGENT DETECTION BY NUCLEIC ACID (DNA OR RNA); SEVERE ACUTE RESPIRATORY SYNDROME CORONAVIRUS 2 (SARS-COV-2) (CORONAVIRUS DISEASE [COVID-19]), AMPLIFIED PROBE TECHNIQUE, MAKING USE OF HIGH THROUGHPUT TECHNOLOGIES AS DESCRIBED BY CMS-2020-01-R: HCPCS

## 2021-02-19 PROCEDURE — 85027 COMPLETE CBC AUTOMATED: CPT

## 2021-02-19 PROCEDURE — 99214 OFFICE O/P EST MOD 30 MIN: CPT | Performed by: OTOLARYNGOLOGY

## 2021-02-19 ASSESSMENT — ENCOUNTER SYMPTOMS
COUGH: 0
APNEA: 0
SINUS PRESSURE: 0
FACIAL SWELLING: 0
RHINORRHEA: 0
DIARRHEA: 0
TROUBLE SWALLOWING: 0
WHEEZING: 0
COLOR CHANGE: 0
STRIDOR: 0
ABDOMINAL PAIN: 0
VOMITING: 0
CHEST TIGHTNESS: 0
VOICE CHANGE: 0
NAUSEA: 0
SHORTNESS OF BREATH: 0
SORE THROAT: 0
CHOKING: 0

## 2021-02-19 NOTE — PROGRESS NOTES
i    [facility]  [facility] EAR, NOSE AND THROAT  [facility]  Dept: [phone]  Dept Fax: [phone]  Loc: Mikaela Elizabeth is a 56 y.o. female who was referred byNo ref. provider found for:  Chief Complaint   Patient presents with    Follow-up     Patient is here for CT neck review. Fab Grant HPI:     Liv Mcmahon is a 56 y.o. female who presents today for CT neck review. CT Soft Tissue Neck:  1. Motion artifact at the level of the vocal cords. No gross abnormality is noted. No paraglottic abnormality is present. 2. 1.1 cm nodule in the right lower neck. The differential diagnosis includes a mildly enlarged lymph node and a small parathyroid adenoma. 3. Enlarged right lacrimal gland. Clinical correlation is recommended.                   **This report has been created using voice recognition software. It may contain minor errors which are inherent in voice recognition technology. **       Final report electronically signed by Dr. Vince Singh on 2/5/2021      Her right ear is hurting. It hurts more when she talks. The cold air hurts her ear as well. She puts cotton in her ear to help it. She states the hoarseness and voice change is the same as the last time but she now notices the hoarseness more  that she clears her throat. She is trying to cough and not clear her throat. Her nose hurts. She has facial pain and she has sinus issues. Has a deviated septum. She does not have any kidney stone issues, no calcium. She takes vitamin D 3. She quit smoking post talking with me. She had a shunt put in by Dr. Donovan Hwang. History:      Allergies   Allergen Reactions    Bactrim [Sulfamethoxazole-Trimethoprim]     Flexeril [Cyclobenzaprine]     Morphine Other (See Comments)     \"I hallucinate\"    Peanut-Containing Drug Products     Tessalon [Benzonatate] Hives    Amoxicillin-Pot Clavulanate Rash  Ibuprofen [Ibuprofen] Rash    Lisinopril Rash    Macrobid [Nitrofurantoin Monohydrate Macrocrystals] Rash    Macrodantin [Nitrofurantoin] Rash    Ranitidine Rash     Current Outpatient Medications   Medication Sig Dispense Refill    gabapentin (NEURONTIN) 100 MG capsule Take 100 mg by mouth 4 times daily.  nicotine (NICODERM CQ) 21 MG/24HR Place 1 patch onto the skin daily for 28 days 28 patch 0    nicotine (NICOTROL) 10 MG inhaler Inhale 1 puff into the lungs as needed for Smoking cessation 1 Inhaler 3    amLODIPine (NORVASC) 5 MG tablet TAKE 1 TABLET BY MOUTH ONCE DAILY 90 tablet 3    hydrocortisone 2.5 % cream APPLY TOPICALLY TO AFFECTED AREA TWICE DAILY      hydrocortisone (HYTONE) 2.5 % lotion APPLY TOPICALLY TO AFFECTED AREA DAILY 59 mL 0    omalizumab (OMALIZUMAB) 150 MG injection Inject 300 mg into the skin every 28 days 1 each 11    traZODone (DESYREL) 50 MG tablet Take 50 mg by mouth nightly as needed for Sleep       azelastine (OPTIVAR) 0.05 % ophthalmic solution Place 1 drop into both eyes 2 times daily 1 Bottle 11    azelastine (ASTELIN) 0.1 % nasal spray 1 spray by Nasal route 2 times daily Use in each nostril as directed 1 Bottle 11    SYMBICORT 160-4.5 MCG/ACT AERO INHALE 2 PUFFS BY MOUTH TWICE DAILY. RINSE MOUTH WELL AFTER USE. 11 g 5    albuterol sulfate  (90 Base) MCG/ACT inhaler Inhale 2 puffs into the lungs every 4 hours as needed for Wheezing (AS NEEDED) 9 g 5    fexofenadine (ALLEGRA) 180 MG tablet Take 1 tablet by mouth daily 90 tablet 3    SYNTHROID 88 MCG tablet TAKE 1 TABLET BY MOUTH ONCE DAILY ON AN EMPTY STOMACH WITH WATER. DO NOT EAT OR TAKE ANY OTHER MEDICATIONS FOR 30 MINUTES.  30 tablet 11    hyoscyamine (LEVBID) 375 MCG extended release tablet TAKE 1/2 TABLET BY MOUTH TWICE DAILY 30 tablet 11    Cholecalciferol (VITAMIN D3) 50 MCG (2000 UT) CAPS Vitamin D3 2,000 unit capsule 30 capsule 11  albuterol (PROVENTIL) (2.5 MG/3ML) 0.083% nebulizer solution USE 1 VIAL IN NEBULIZER EVERY 6 HOURS AS NEEDED FOR WHEEZING (FOR ASTHMA) 375 mL 5    Biotin 1 MG CAPS Take 1 capsule by mouth daily       metFORMIN (GLUCOPHAGE) 500 MG tablet TAKE 1 TABLET BY MOUTH TWICE DAILY WITH MEALS 180 tablet 3    atorvastatin (LIPITOR) 10 MG tablet Take 1 tablet by mouth once daily 90 tablet 3    esomeprazole (NEXIUM) 40 MG delayed release capsule Take 1 capsule by mouth 2 times daily 180 capsule 3    clobetasol (TEMOVATE) 0.05 % external solution Apply daily to scaly or itchy areas on scalp 60 mL 11    ketoconazole (NIZORAL) 2 % shampoo Apply 3-4 times weekly to scalp, leave on for five minutes prior to washing off 120 mL 11    fluocinonide (LIDEX) 0.05 % cream Apply topically 2 times daily to rash on hands a needed 30 g 11    levomilnacipran (FETZIMA) 40 MG CP24 extended release capsule Take 1 capsule by mouth daily 30 capsule 2    Multiple Vitamins-Minerals (MULTIVITAMIN PO) 1 tablet daily       Probiotic Product (PROBIOTIC PO) 1 tablet daily       Cyanocobalamin (B-12 PO) daily      EPIPEN 2-VENESSA 0.3 MG/0.3ML SOAJ injection INJECT 1 PEN IM AS A SINGLE DOSE PRN  0    SF 5000 PLUS 1.1 % CREA BRUSH TEETH TWICE DAILY FOR 2 MINUTES, SPIT EXCESS AFTER BRUSHING. DO NOT RINSE WITH WATER , WAIT 30 MINUTES  FOR ANY FOOD OR DRINK  3    ondansetron (ZOFRAN) 4 MG tablet TAKE 1 TABLET BY MOUTH EVERY 8 HOURS AS NEEDED FOR NAUSEA OR VOMITING 15 tablet 0    meloxicam (MOBIC) 7.5 MG tablet TK 1 T PO BID  4    LINZESS 290 MCG CAPS capsule 290 mcg every morning (before breakfast)       lidocaine (XYLOCAINE) 5 % ointment Apply topically as needed for Pain Apply topically as needed.  conjugated estrogens (PREMARIN) 0.625 MG/GM vaginal cream Place vaginally as needed Place vaginally daily.  lidocaine (LIDODERM) 5 % Place 1 patch onto the skin daily 12 hours on, 12 hours off.  hydrocortisone (ANUSOL-HC) 25 MG suppository Place 25 mg rectally as needed for Hemorrhoids      fluticasone (FLONASE ALLERGY RELIEF) 50 MCG/ACT nasal spray 1 spray by Nasal route daily      HYDROcodone-acetaminophen (NORCO) 5-325 MG per tablet 1 tablet nightly as needed for Pain. Take 1 tablet every 4-6 hours as needed for pain       NONFORMULARY Immunotherapy allergy shot      zinc 50 MG CAPS Take 1 capsule by mouth daily       Ascorbic Acid (VITAMIN C) 500 MG CAPS Take 1 tablet by mouth daily       orphenadrine (NORFLEX) 100 MG tablet Take 100 mg by mouth 2 times daily as needed.  tramadol (ULTRAM) 50 MG tablet Take 50 mg by mouth every 6 hours as needed. Take 1-2 tabs every 6 hrs prn       Respiratory Therapy Supplies (NEBULIZER COMPRESSOR) KIT 1 kit by Does not apply route once for 1 dose 360 kit 0     No current facility-administered medications for this visit.       Past Medical History:   Diagnosis Date    Allergic rhinitis     Anxiety     Arnold-Chiari malformation (HCC)     Asthma     Chronic bronchitis (HCC)     Fibromyalgia     GERD (gastroesophageal reflux disease)     Headache(784.0)     Hyperlipidemia     Neuropathy     Osteoarthritis     Sinusitis     Type II or unspecified type diabetes mellitus without mention of complication, not stated as uncontrolled       Past Surgical History:   Procedure Laterality Date    APPENDECTOMY       SECTION      x2    COLONOSCOPY  [id number]    CSF SHUNT  2007    Cervical syrinx to subarachnoid shunt; thoracic syrinx to subarachnoid shunt (2 separate dural openings)    CYST REMOVAL      extradural cysts at thoracic 2-3    ECTOPIC PREGNANCY SURGERY      HEMICOLECTOMY      HERNIA REPAIR      inguinal     HYSTERECTOMY      SPINAL CORD DECOMPRESSION  2007    C4-5-6-7; T1-2-3 laminectomies    TOOTH EXTRACTION  2017     Family History   Problem Relation Age of Onset    Cancer Other         colon  Mental Illness Other     High Blood Pressure Other     Diabetes Other     High Blood Pressure Sister      Social History     Tobacco Use    Smoking status: Current Every Day Smoker     Packs/day: 1.00     Years: 25.00     Pack years: 25.00     Types: Cigarettes     Start date: 1975     Last attempt to quit: 2018     Years since quittin.2    Smokeless tobacco: Never Used    Tobacco comment: It will not allow me to put future dates. I do have plans to quit smoking. Praying to GOD to help me. Substance Use Topics    Alcohol use: Yes     Alcohol/week: 2.0 standard drinks     Types: 2 Cans of beer per week     Comment: Once a month; stomach tolerates very little alcohol       Subjective:       Review of Systems   Constitutional: Negative for activity change, appetite change, chills, diaphoresis, fatigue, fever and unexpected weight change. HENT: Negative for congestion, dental problem, ear discharge, ear pain, facial swelling, hearing loss, mouth sores, nosebleeds, postnasal drip, rhinorrhea, sinus pressure, sneezing, sore throat, tinnitus, trouble swallowing and voice change. Eyes: Negative for visual disturbance. Respiratory: Negative for apnea, cough, choking, chest tightness, shortness of breath, wheezing and stridor. Cardiovascular: Negative for chest pain, palpitations and leg swelling. Gastrointestinal: Negative for abdominal pain, diarrhea, nausea and vomiting. Endocrine: Negative for cold intolerance, heat intolerance, polydipsia and polyuria. Genitourinary: Negative for dysuria, enuresis and hematuria. Musculoskeletal: Negative for arthralgias, gait problem, neck pain and neck stiffness. Skin: Negative for color change and rash. Allergic/Immunologic: Negative for environmental allergies, food allergies and immunocompromised state. Neurological: Negative for dizziness, syncope, facial asymmetry, speech difficulty, light-headedness and headaches. Hematological: Negative for adenopathy. Does not bruise/bleed easily. Psychiatric/Behavioral: Negative for confusion and sleep disturbance. The patient is not nervous/anxious. Objective:     /82 (Site: Left Upper Arm)   Pulse 80   Temp 97.3 °F (36.3 °C) (Infrared)   Resp 16   Ht 5' 4\" (1.626 m)   Wt 200 lb (90.7 kg)   BMI 34.33 kg/m²     Physical Exam  Vitals signs and nursing note reviewed. Constitutional:       Appearance: She is well-developed. She is obese. HENT:      Head: Normocephalic and atraumatic. No laceration. Comments:        Right Ear: Hearing, ear canal and external ear normal. No drainage or swelling. No middle ear effusion. Tympanic membrane is not perforated or erythematous. Left Ear: Hearing, tympanic membrane, ear canal and external ear normal. No drainage or swelling. No middle ear effusion. Tympanic membrane is not perforated or erythematous. Nose: Nose normal. No septal deviation, mucosal edema or rhinorrhea. Mouth/Throat:      Mouth: Mucous membranes are not pale and not dry. No oral lesions. Dentition: Abnormal dentition ( Edentulous maxilla, all lower molars are absent). Pharynx: Oropharynx is clear. Uvula midline. No oropharyngeal exudate or posterior oropharyngeal erythema. Comments: LIps: lips normal     Mallampati 1  Base of tongue: symmetric,  Voice is slightly gravelly  Eyes:      Extraocular Movements:      Right eye: Normal extraocular motion and no nystagmus. Left eye: Normal extraocular motion and no nystagmus. Comments: Conjugate gaze   Neck:      Musculoskeletal: Spinous process tenderness ( Transverse process C2) present. No neck rigidity ( But, there is somewhat limited neck extension). Thyroid: No thyroid mass or thyromegaly. Trachea: Phonation normal. No tracheal deviation.       Comments:   Salivary glands not enlarged and normal to palpation She has mild tenderness at the right mastoid tip, transverse process of C2, and right TMJ. Her crepitus of the right TMJ has improved significantly. Cardiovascular:      Rate and Rhythm: Normal rate and regular rhythm. Heart sounds: No murmur. Pulmonary:      Effort: Pulmonary effort is normal. No retractions. Breath sounds: Normal breath sounds. No stridor. Chest:      Chest wall: There is no dullness to percussion. Neurological:      Mental Status: She is alert and oriented to person, place, and time. Cranial Nerves: No cranial nerve deficit (VIIth N function intact bilat). Psychiatric:         Mood and Affect: Mood and affect normal.         Behavior: Behavior is cooperative. Data:  All of the past medical history, past surgical history, family history,social history, allergies and current medications were reviewed with the patient. Assessment & Plan   Diagnoses and all orders for this visit:     Diagnosis Orders   1. Vocal cord mass right  TX LARYNGOSCOPY,DIRCT,OP SCOPE,BIOPSY    US HEAD NECK SOFT TISSUE THYROID   2. Referred otalgia of right ear  TX LARYNGOSCOPY,DIRCT,OP SCOPE,BIOPSY   3. Gastroesophageal reflux disease, unspecified whether esophagitis present     4. Status post spinal surgery     5.  (ventriculoperitoneal) shunt status     6. Tobacco dependence     7. Hoarse  US HEAD NECK SOFT TISSUE THYROID   8. Mass of right side of neck  US HEAD NECK SOFT TISSUE THYROID       The findings were explained and her questions were answered. Options were discussed including ordering a microlaryngoscopy & biopsy and Ultrasound of neck. She agreed. Benefits and risks are discussed, along with alternatives, and their questions were answered. No guarantees were made. They request we proceed.      Return after testing

## 2021-02-20 LAB — SARS-COV-2: NOT DETECTED

## 2021-02-22 ENCOUNTER — TELEPHONE (OUTPATIENT)
Dept: ENT CLINIC | Age: 61
End: 2021-02-22

## 2021-02-22 ENCOUNTER — NURSE ONLY (OUTPATIENT)
Dept: ALLERGY | Age: 61
End: 2021-02-22
Payer: MEDICAID

## 2021-02-22 ENCOUNTER — OFFICE VISIT (OUTPATIENT)
Dept: FAMILY MEDICINE CLINIC | Age: 61
End: 2021-02-22
Payer: MEDICAID

## 2021-02-22 VITALS
DIASTOLIC BLOOD PRESSURE: 80 MMHG | HEART RATE: 84 BPM | TEMPERATURE: 96.8 F | WEIGHT: 194.3 LBS | RESPIRATION RATE: 16 BRPM | BODY MASS INDEX: 33.17 KG/M2 | SYSTOLIC BLOOD PRESSURE: 136 MMHG | HEIGHT: 64 IN

## 2021-02-22 VITALS
DIASTOLIC BLOOD PRESSURE: 72 MMHG | RESPIRATION RATE: 14 BRPM | SYSTOLIC BLOOD PRESSURE: 116 MMHG | HEART RATE: 68 BPM | TEMPERATURE: 96.6 F

## 2021-02-22 DIAGNOSIS — Z51.6 ENCOUNTER FOR DESENSITIZATION TO ALLERGENS: Primary | ICD-10-CM

## 2021-02-22 DIAGNOSIS — G95.0 SYRINGOMYELIA AND SYRINGOBULBIA (HCC): ICD-10-CM

## 2021-02-22 DIAGNOSIS — J38.3 VOCAL CORD MASS: ICD-10-CM

## 2021-02-22 DIAGNOSIS — J82.89 PULMONARY EOSINOPHILIA (HCC): ICD-10-CM

## 2021-02-22 DIAGNOSIS — E78.00 PURE HYPERCHOLESTEROLEMIA: ICD-10-CM

## 2021-02-22 DIAGNOSIS — Q07.00 ARNOLD-CHIARI MALFORMATION (HCC): ICD-10-CM

## 2021-02-22 DIAGNOSIS — Z01.818 PRE-OP EVALUATION: Primary | ICD-10-CM

## 2021-02-22 DIAGNOSIS — Z91.048 ALLERGY TO MOLD: ICD-10-CM

## 2021-02-22 DIAGNOSIS — E03.9 HYPOTHYROIDISM, UNSPECIFIED TYPE: ICD-10-CM

## 2021-02-22 DIAGNOSIS — J82.89: ICD-10-CM

## 2021-02-22 DIAGNOSIS — J30.89 ALLERGY TO DUST: ICD-10-CM

## 2021-02-22 DIAGNOSIS — J42 CHRONIC BRONCHITIS, UNSPECIFIED CHRONIC BRONCHITIS TYPE (HCC): ICD-10-CM

## 2021-02-22 DIAGNOSIS — Z91.038 ALLERGY TO COCKROACHES: ICD-10-CM

## 2021-02-22 DIAGNOSIS — J30.1 ALLERGY TO TREES: ICD-10-CM

## 2021-02-22 PROCEDURE — 99214 OFFICE O/P EST MOD 30 MIN: CPT | Performed by: FAMILY MEDICINE

## 2021-02-22 PROCEDURE — 95117 IMMUNOTHERAPY INJECTIONS: CPT | Performed by: NURSE PRACTITIONER

## 2021-02-22 ASSESSMENT — PATIENT HEALTH QUESTIONNAIRE - PHQ9
SUM OF ALL RESPONSES TO PHQ QUESTIONS 1-9: 2
SUM OF ALL RESPONSES TO PHQ QUESTIONS 1-9: 2
SUM OF ALL RESPONSES TO PHQ9 QUESTIONS 1 & 2: 2
SUM OF ALL RESPONSES TO PHQ QUESTIONS 1-9: 2

## 2021-02-22 ASSESSMENT — ENCOUNTER SYMPTOMS
RESPIRATORY NEGATIVE: 1
GASTROINTESTINAL NEGATIVE: 1

## 2021-02-22 NOTE — TELEPHONE ENCOUNTER
Patient was in the office for her allergy injection. Spoke with patient and informed. She verbalized understanding.
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
## 2021-02-22 NOTE — TELEPHONE ENCOUNTER
Gely calls asking how to handle her Nicoderm patch? She has been prescribed this by Dr Alfredo Salazar to help her quit smoking and asking if she can wear it the day of surgery? Her surgery is scheduled this Wednesday 2/24/21 with Dr Parth Knight. Please advise.

## 2021-02-22 NOTE — PROGRESS NOTES
After consent obtained/verified, allergy injection given in back of R/L arm(s). Documentation of vial injection specific to arm(s) noted on Allergy Immunotherapy Administration Form. Patient waited 30 minutes for observation. Patient tolerated Yellow vials well at 0.40ml without adverse reaction. SHOT REACTION TREATMENT INSTRUCTIONS    During the 30 minute wait after an allergy injection the following symptoms should be reported:    Itching other than at the injection site  Hives or swelling other than at the injection site  Redness other than at the injection site  Difficulty breathing  Chest tightness  Difficulty swallowing  Throat tightness    If these symptoms occur, NOTIFY PROVIDER and the following treatment should be administered:    1. Epinephrine 1:1000 IM - 0.3 ml if > 66 lbs or more, 0.15 ml if 33 - 63 lbs, or 0.1 ml if <33 lbs   2. Diphenhydramine - give all intramuscular:     2 to <6 years (off-label use): 6.25 mg,    6 to <12 years: 12.5 to 25 mg;    ?12 years: 25-50 mg.  3.  Famotidine:  Adults 40 mg oral    Adolescents age 12 years and >88 lbs: 40 mg    Children and Adolescents ? 12years of age: Initial: 0.25 mg/kg/dose   every 12 hours (maximum daily dose: 40 mg/day)    Epi dose may me repeated in 5-15 minutes if adequate resolution of symptoms does not occur    Patient should be observed for at least one hour after final epi dose and must be seen by provider. Patients cannot drive themselves if they have received diphenhydramine.

## 2021-02-22 NOTE — PROGRESS NOTES
Visit Information    Have you changed or started any medications since your last visit including any over-the-counter medicines, vitamins, or herbal medicines? no   Are you having any side effects from any of your medications? -  no  Have you stopped taking any of your medications? Is so, why? -  yes - see updated med list    Have you seen any other physician or provider since your last visit? Yes - Records Obtained  Have you had any other diagnostic tests since your last visit? Yes - Records Obtained  Have you been seen in the emergency room and/or had an admission to a hospital since we last saw you? No  Have you had your routine dental cleaning in the past 6 months? no    Have you activated your [employer] account? If not, what are your barriers?  Yes     Patient Care Team:  Lucas Chun DO as PCP - General (Family Medicine)  Lucas Chun DO as PCP - [facility] Provider  Frederick Essex, MD    Medical History Review  Past Medical, Family, and Social History reviewed and does not contribute to the patient presenting condition    Health Maintenance   Topic Date Due    COVID-19 Vaccine (1 of 2) 11/10/1974    Breast cancer screen  09/23/2017    Cervical cancer screen  09/23/2018    A1C test (Diabetic or Prediabetic)  08/19/2021    Lipid screen  08/19/2021    TSH testing  08/19/2021    DTaP/Tdap/Td vaccine (2 - Tdap) 10/12/2026    Colon cancer screen colonoscopy  04/17/2027    Flu vaccine  Completed    Shingles Vaccine  Completed    Pneumococcal 0-64 years Vaccine  Completed    Hepatitis C screen  Addressed    HIV screen  Addressed    Hepatitis A vaccine  Aged Out    Hepatitis B vaccine  Aged Out    Hib vaccine  Aged Out    Meningococcal (ACWY) vaccine  Aged Out

## 2021-02-22 NOTE — TELEPHONE ENCOUNTER
I would just avoid wearing it the morning of surgery. She can request one the day of surgery from [address] [place] [address] if she feels she needs it and they will likely give her one.  Otherwise, she could one when she gets home from the hospital.

## 2021-02-23 NOTE — TELEPHONE ENCOUNTER
contacted [address] they will be faxing a Specialty medication form to be completed and faxed back.    Medicaid Pharmacy can be reached at [phone]

## 2021-02-24 ENCOUNTER — ANESTHESIA (OUTPATIENT)
Dept: OPERATING ROOM | Age: 61
End: 2021-02-24
Payer: MEDICAID

## 2021-02-24 ENCOUNTER — HOSPITAL ENCOUNTER (OUTPATIENT)
Age: 61
Setting detail: OUTPATIENT SURGERY
Discharge: HOME | End: 2021-02-24
Attending: OTOLARYNGOLOGY | Admitting: OTOLARYNGOLOGY
Payer: MEDICAID

## 2021-02-24 ENCOUNTER — APPOINTMENT (OUTPATIENT)
Dept: PHARMACY | Age: 61
End: 2021-02-24
Payer: MEDICAID

## 2021-02-24 ENCOUNTER — TELEPHONE (OUTPATIENT)
Dept: FAMILY MEDICINE CLINIC | Age: 61
End: 2021-02-24

## 2021-02-24 ENCOUNTER — ANESTHESIA EVENT (OUTPATIENT)
Dept: OPERATING ROOM | Age: 61
End: 2021-02-24
Payer: MEDICAID

## 2021-02-24 VITALS
HEART RATE: 106 BPM | HEIGHT: 64 IN | DIASTOLIC BLOOD PRESSURE: 85 MMHG | SYSTOLIC BLOOD PRESSURE: 145 MMHG | TEMPERATURE: 97.3 F | OXYGEN SATURATION: 93 % | BODY MASS INDEX: 33.29 KG/M2 | WEIGHT: 195 LBS | RESPIRATION RATE: 16 BRPM

## 2021-02-24 VITALS — TEMPERATURE: 96.8 F | OXYGEN SATURATION: 100 % | DIASTOLIC BLOOD PRESSURE: 74 MMHG | SYSTOLIC BLOOD PRESSURE: 160 MMHG

## 2021-02-24 LAB
GLUCOSE BLD-MCNC: 113 MG/DL (ref 70–108)
GLUCOSE BLD-MCNC: 150 MG/DL (ref 70–108)

## 2021-02-24 PROCEDURE — 6360000002 HC RX W HCPCS: Performed by: ANESTHESIOLOGY

## 2021-02-24 PROCEDURE — 3700000000 HC ANESTHESIA ATTENDED CARE: Performed by: OTOLARYNGOLOGY

## 2021-02-24 PROCEDURE — 3700000001 HC ADD 15 MINUTES (ANESTHESIA): Performed by: OTOLARYNGOLOGY

## 2021-02-24 PROCEDURE — 6360000002 HC RX W HCPCS

## 2021-02-24 PROCEDURE — 2500000003 HC RX 250 WO HCPCS: Performed by: NURSE ANESTHETIST, CERTIFIED REGISTERED

## 2021-02-24 PROCEDURE — 2580000003 HC RX 258: Performed by: OTOLARYNGOLOGY

## 2021-02-24 PROCEDURE — 3600000014 HC SURGERY LEVEL 4 ADDTL 15MIN: Performed by: OTOLARYNGOLOGY

## 2021-02-24 PROCEDURE — 82948 REAGENT STRIP/BLOOD GLUCOSE: CPT

## 2021-02-24 PROCEDURE — 7100000011 HC PHASE II RECOVERY - ADDTL 15 MIN: Performed by: OTOLARYNGOLOGY

## 2021-02-24 PROCEDURE — 2709999900 HC NON-CHARGEABLE SUPPLY: Performed by: OTOLARYNGOLOGY

## 2021-02-24 PROCEDURE — 88305 TISSUE EXAM BY PATHOLOGIST: CPT

## 2021-02-24 PROCEDURE — 6360000002 HC RX W HCPCS: Performed by: OTOLARYNGOLOGY

## 2021-02-24 PROCEDURE — 7100000010 HC PHASE II RECOVERY - FIRST 15 MIN: Performed by: OTOLARYNGOLOGY

## 2021-02-24 PROCEDURE — 7100000001 HC PACU RECOVERY - ADDTL 15 MIN: Performed by: OTOLARYNGOLOGY

## 2021-02-24 PROCEDURE — 6360000002 HC RX W HCPCS: Performed by: NURSE ANESTHETIST, CERTIFIED REGISTERED

## 2021-02-24 PROCEDURE — 6370000000 HC RX 637 (ALT 250 FOR IP)

## 2021-02-24 PROCEDURE — 3600000004 HC SURGERY LEVEL 4 BASE: Performed by: OTOLARYNGOLOGY

## 2021-02-24 PROCEDURE — 7100000000 HC PACU RECOVERY - FIRST 15 MIN: Performed by: OTOLARYNGOLOGY

## 2021-02-24 PROCEDURE — 94660 CPAP INITIATION&MGMT: CPT

## 2021-02-24 PROCEDURE — 2580000003 HC RX 258: Performed by: NURSE ANESTHETIST, CERTIFIED REGISTERED

## 2021-02-24 PROCEDURE — 31536 LARYNGOSCOPY W/BX & OP SCOPE: CPT | Performed by: OTOLARYNGOLOGY

## 2021-02-24 RX ORDER — DEXAMETHASONE SODIUM PHOSPHATE 10 MG/ML
INJECTION, EMULSION INTRAMUSCULAR; INTRAVENOUS PRN
Status: DISCONTINUED | OUTPATIENT
Start: 2021-02-24 | End: 2021-02-24 | Stop reason: SDUPTHER

## 2021-02-24 RX ORDER — FENTANYL CITRATE 50 UG/ML
25 INJECTION, SOLUTION INTRAMUSCULAR; INTRAVENOUS EVERY 5 MIN PRN
Status: DISCONTINUED | OUTPATIENT
Start: 2021-02-24 | End: 2021-02-24 | Stop reason: HOSPADM

## 2021-02-24 RX ORDER — PROPOFOL 10 MG/ML
INJECTION, EMULSION INTRAVENOUS PRN
Status: DISCONTINUED | OUTPATIENT
Start: 2021-02-24 | End: 2021-02-24 | Stop reason: SDUPTHER

## 2021-02-24 RX ORDER — FENTANYL CITRATE 50 UG/ML
50 INJECTION, SOLUTION INTRAMUSCULAR; INTRAVENOUS EVERY 5 MIN PRN
Status: DISCONTINUED | OUTPATIENT
Start: 2021-02-24 | End: 2021-02-24 | Stop reason: HOSPADM

## 2021-02-24 RX ORDER — SODIUM CHLORIDE FOR INHALATION 0.9 %
3 VIAL, NEBULIZER (ML) INHALATION ONCE
Status: COMPLETED | OUTPATIENT
Start: 2021-02-24 | End: 2021-02-24

## 2021-02-24 RX ORDER — ONDANSETRON 2 MG/ML
INJECTION INTRAMUSCULAR; INTRAVENOUS PRN
Status: DISCONTINUED | OUTPATIENT
Start: 2021-02-24 | End: 2021-02-24 | Stop reason: SDUPTHER

## 2021-02-24 RX ORDER — LIDOCAINE HCL/PF 100 MG/5ML
SYRINGE (ML) INJECTION PRN
Status: DISCONTINUED | OUTPATIENT
Start: 2021-02-24 | End: 2021-02-24 | Stop reason: SDUPTHER

## 2021-02-24 RX ORDER — ROCURONIUM BROMIDE 10 MG/ML
INJECTION, SOLUTION INTRAVENOUS PRN
Status: DISCONTINUED | OUTPATIENT
Start: 2021-02-24 | End: 2021-02-24 | Stop reason: SDUPTHER

## 2021-02-24 RX ORDER — DEXAMETHASONE SODIUM PHOSPHATE 10 MG/ML
4 INJECTION, EMULSION INTRAMUSCULAR; INTRAVENOUS EVERY 6 HOURS
Status: DISCONTINUED | OUTPATIENT
Start: 2021-02-24 | End: 2021-02-24 | Stop reason: HOSPADM

## 2021-02-24 RX ORDER — SODIUM CHLORIDE 9 MG/ML
INJECTION, SOLUTION INTRAVENOUS CONTINUOUS PRN
Status: DISCONTINUED | OUTPATIENT
Start: 2021-02-24 | End: 2021-02-24 | Stop reason: SDUPTHER

## 2021-02-24 RX ORDER — LABETALOL 20 MG/4 ML (5 MG/ML) INTRAVENOUS SYRINGE
10 EVERY 10 MIN PRN
Status: DISCONTINUED | OUTPATIENT
Start: 2021-02-24 | End: 2021-02-24 | Stop reason: HOSPADM

## 2021-02-24 RX ORDER — LORAZEPAM 2 MG/ML
0.25 INJECTION INTRAMUSCULAR
Status: COMPLETED | OUTPATIENT
Start: 2021-02-24 | End: 2021-02-24

## 2021-02-24 RX ORDER — MIDAZOLAM HYDROCHLORIDE 1 MG/ML
INJECTION INTRAMUSCULAR; INTRAVENOUS PRN
Status: DISCONTINUED | OUTPATIENT
Start: 2021-02-24 | End: 2021-02-24 | Stop reason: SDUPTHER

## 2021-02-24 RX ORDER — EPINEPHRINE 1 MG/ML
INJECTION, SOLUTION, CONCENTRATE INTRAVENOUS PRN
Status: DISCONTINUED | OUTPATIENT
Start: 2021-02-24 | End: 2021-02-24 | Stop reason: ALTCHOICE

## 2021-02-24 RX ORDER — FENTANYL CITRATE 50 UG/ML
INJECTION, SOLUTION INTRAMUSCULAR; INTRAVENOUS PRN
Status: DISCONTINUED | OUTPATIENT
Start: 2021-02-24 | End: 2021-02-24 | Stop reason: SDUPTHER

## 2021-02-24 RX ORDER — SODIUM CHLORIDE FOR INHALATION 0.9 %
VIAL, NEBULIZER (ML) INHALATION
Status: COMPLETED
Start: 2021-02-24 | End: 2021-02-24

## 2021-02-24 RX ORDER — PROMETHAZINE HYDROCHLORIDE 25 MG/ML
12.5 INJECTION, SOLUTION INTRAMUSCULAR; INTRAVENOUS
Status: DISCONTINUED | OUTPATIENT
Start: 2021-02-24 | End: 2021-02-24 | Stop reason: HOSPADM

## 2021-02-24 RX ORDER — SODIUM CHLORIDE 9 MG/ML
INJECTION, SOLUTION INTRAVENOUS CONTINUOUS
Status: DISCONTINUED | OUTPATIENT
Start: 2021-02-24 | End: 2021-02-24 | Stop reason: HOSPADM

## 2021-02-24 RX ADMIN — SUGAMMADEX 200 MG: 100 INJECTION, SOLUTION INTRAVENOUS at 08:31

## 2021-02-24 RX ADMIN — FENTANYL CITRATE 50 MCG: 50 INJECTION, SOLUTION INTRAMUSCULAR; INTRAVENOUS at 08:03

## 2021-02-24 RX ADMIN — Medication 60 MG: at 07:49

## 2021-02-24 RX ADMIN — SODIUM CHLORIDE: 9 INJECTION, SOLUTION INTRAVENOUS at 07:38

## 2021-02-24 RX ADMIN — SODIUM CHLORIDE: 9 INJECTION, SOLUTION INTRAVENOUS at 06:26

## 2021-02-24 RX ADMIN — RACEPINEPHRINE HYDROCHLORIDE 11.25 MG: 11.25 SOLUTION RESPIRATORY (INHALATION) at 08:55

## 2021-02-24 RX ADMIN — HYDROCORTISONE SODIUM SUCCINATE 100 MG: 100 INJECTION, POWDER, FOR SOLUTION INTRAMUSCULAR; INTRAVENOUS at 08:55

## 2021-02-24 RX ADMIN — FENTANYL CITRATE 100 MCG: 50 INJECTION, SOLUTION INTRAMUSCULAR; INTRAVENOUS at 07:49

## 2021-02-24 RX ADMIN — SODIUM CHLORIDE: 9 INJECTION, SOLUTION INTRAVENOUS at 08:30

## 2021-02-24 RX ADMIN — ISODIUM CHLORIDE 3 ML: 0.03 SOLUTION RESPIRATORY (INHALATION) at 08:55

## 2021-02-24 RX ADMIN — DEXAMETHASONE SODIUM PHOSPHATE 4 MG: 10 INJECTION, EMULSION INTRAMUSCULAR; INTRAVENOUS at 14:23

## 2021-02-24 RX ADMIN — Medication 3 ML: at 08:55

## 2021-02-24 RX ADMIN — LORAZEPAM 0.25 MG: 2 INJECTION, SOLUTION INTRAMUSCULAR; INTRAVENOUS at 09:25

## 2021-02-24 RX ADMIN — DEXAMETHASONE SODIUM PHOSPHATE 4 MG: 10 INJECTION, EMULSION INTRAMUSCULAR; INTRAVENOUS at 08:04

## 2021-02-24 RX ADMIN — ROCURONIUM BROMIDE 5 MG: 10 INJECTION INTRAVENOUS at 07:49

## 2021-02-24 RX ADMIN — LORAZEPAM 0.25 MG: 2 INJECTION, SOLUTION INTRAMUSCULAR; INTRAVENOUS at 09:35

## 2021-02-24 RX ADMIN — MIDAZOLAM HYDROCHLORIDE 2 MG: 1 INJECTION, SOLUTION INTRAMUSCULAR; INTRAVENOUS at 07:39

## 2021-02-24 RX ADMIN — ONDANSETRON HYDROCHLORIDE 4 MG: 4 INJECTION, SOLUTION INTRAMUSCULAR; INTRAVENOUS at 08:24

## 2021-02-24 RX ADMIN — PROPOFOL 140 MG: 10 INJECTION, EMULSION INTRAVENOUS at 07:49

## 2021-02-24 RX ADMIN — ROCURONIUM BROMIDE 30 MG: 10 INJECTION INTRAVENOUS at 07:50

## 2021-02-24 ASSESSMENT — PULMONARY FUNCTION TESTS
PIF_VALUE: 21
PIF_VALUE: 21
PIF_VALUE: 22
PIF_VALUE: 1
PIF_VALUE: 19
PIF_VALUE: 11
PIF_VALUE: 19
PIF_VALUE: 19
PIF_VALUE: 21
PIF_VALUE: 13
PIF_VALUE: 1
PIF_VALUE: 2
PIF_VALUE: 21
PIF_VALUE: 19
PIF_VALUE: 18
PIF_VALUE: 21
PIF_VALUE: 21
PIF_VALUE: 1
PIF_VALUE: 19
PIF_VALUE: 6
PIF_VALUE: 19
PIF_VALUE: 1
PIF_VALUE: 1
PIF_VALUE: 24
PIF_VALUE: 22
PIF_VALUE: 3
PIF_VALUE: 18
PIF_VALUE: 13
PIF_VALUE: 21
PIF_VALUE: 22
PIF_VALUE: 21
PIF_VALUE: 6
PIF_VALUE: 19

## 2021-02-24 ASSESSMENT — PAIN SCALES - GENERAL
PAINLEVEL_OUTOF10: 8
PAINLEVEL_OUTOF10: 0

## 2021-02-24 ASSESSMENT — PAIN - FUNCTIONAL ASSESSMENT: PAIN_FUNCTIONAL_ASSESSMENT: 0-10

## 2021-02-24 NOTE — FLOWSHEET NOTE
Pt returned to [facility] room 9. Vitals and assessment as charted. 0.9 infusing, @400ml to count from PACU. Pt has crackers, ice cream and tapan mist. Family at the bedside. Pt and family verbalized understanding of discharge criteria and call light use. Call light in reach.

## 2021-02-24 NOTE — ANESTHESIA PRE PROCEDURE
Department of Anesthesiology  Preprocedure Note       Name:  Liv Mcmahon   Age:  56 y.o.  :  1960                                          MRN:  [id number]         Date:  2021      Surgeon: Ruy Mojica):  Jeff Naqvi MD    Procedure: Procedure(s): MICROLARYNGOSCOPY AND BIOPSY WITH CO2 LASER    Medications prior to admission:   Prior to Admission medications    Medication Sig Start Date End Date Taking? Authorizing Provider   nicotine (NICODERM CQ) 21 MG/24HR Place 1 patch onto the skin daily for 28 days 2/10/21 3/10/21 Yes Ernst Johnson MD   nicotine (NICOTROL) 10 MG inhaler Inhale 1 puff into the lungs as needed for Smoking cessation 2/10/21  Yes Ernst Johnson MD   amLODIPine (NORVASC) 5 MG tablet TAKE 1 TABLET BY MOUTH ONCE DAILY 21  Yes Leandro Arizmendi DO   hydrocortisone 2.5 % cream APPLY TOPICALLY TO AFFECTED AREA TWICE DAILY 20  Yes Historical Provider, MD   hydrocortisone (HYTONE) 2.5 % lotion APPLY TOPICALLY TO AFFECTED AREA DAILY 20  Yes Leandro Arizmendi DO   omalizumab (OMALIZUMAB) 150 MG injection Inject 300 mg into the skin every 28 days 20 Yes COREEN Ruiz CNP   azelastine (OPTIVAR) 0.05 % ophthalmic solution Place 1 drop into both eyes 2 times daily 20  Yes COREEN Ruiz CNP   SYMBICORT 160-4.5 MCG/ACT AERO INHALE 2 PUFFS BY MOUTH TWICE DAILY.  RINSE MOUTH WELL AFTER USE. 20  Yes COREEN Ruiz CNP   albuterol sulfate  (90 Base) MCG/ACT inhaler Inhale 2 puffs into the lungs every 4 hours as needed for Wheezing (AS NEEDED) 20  Yes COREEN Ruiz CNP   hyoscyamine (LEVBID) 375 MCG extended release tablet TAKE 1/2 TABLET BY MOUTH TWICE DAILY 20  Yes Leandro Arizmendi DO   Cholecalciferol (VITAMIN D3) 50 MCG (2000 UT) CAPS Vitamin D3 2,000 unit capsule 20  Yes Leandro Arizmendi DO albuterol (PROVENTIL) (2.5 MG/3ML) 0.083% nebulizer solution USE 1 VIAL IN NEBULIZER EVERY 6 HOURS AS NEEDED FOR WHEEZING (FOR ASTHMA) 6/22/20  Yes COREEN Thao CNP   Biotin 1 MG CAPS Take 1 capsule by mouth daily    Yes Historical Provider, MD   esomeprazole ([address]) 40 MG delayed release capsule Take 1 capsule by mouth 2 times daily 1/20/20  Yes Lucina Kelley DO   ketoconazole (NIZORAL) 2 % shampoo Apply 3-4 times weekly to scalp, leave on for five minutes prior to washing off 12/9/19  Yes Nash Motta MD   fluocinonide (LIDEX) 0.05 % cream Apply topically 2 times daily to rash on hands a needed 12/9/19  Yes Nash Motta MD   Multiple Vitamins-Minerals (MULTIVITAMIN PO) 1 tablet daily    Yes Historical Provider, MD   ondansetron (ZOFRAN) 4 MG tablet TAKE 1 TABLET BY MOUTH EVERY 8 HOURS AS NEEDED FOR NAUSEA OR VOMITING 10/1/18  Yes Lucina Kelley DO   lidocaine (XYLOCAINE) 5 % ointment Apply topically as needed for Pain Apply topically as needed. Yes Historical Provider, MD   lidocaine (LIDODERM) 5 % Place 1 patch onto the skin daily 12 hours on, 12 hours off. Yes Historical Provider, MD   hydrocortisone (ANUSOL-HC) 25 MG suppository Place 25 mg rectally as needed for Hemorrhoids   Yes Historical Provider, MD   Ascorbic Acid (VITAMIN C) 500 MG CAPS Take 1 tablet by mouth daily    Yes Historical Provider, MD   orphenadrine (NORFLEX) 100 MG tablet Take 100 mg by mouth 2 times daily as needed. Yes Historical Provider, MD   gabapentin (NEURONTIN) 100 MG capsule Take 100 mg by mouth 4 times daily.     Historical Provider, MD   traZODone (DESYREL) 50 MG tablet Take 50 mg by mouth nightly as needed for Sleep  11/19/20   Historical Provider, MD   azelastine (ASTELIN) 0.1 % nasal spray 1 spray by Nasal route 2 times daily Use in each nostril as directed 11/9/20   COREEN Thao CNP HYDROcodone-acetaminophen (NORCO) 5-325 MG per tablet 1 tablet nightly as needed for Pain. Take 1 tablet every 4-6 hours as needed for pain     Steffen Curry MD   NONFORMULARY Immunotherapy allergy shot    Historical Provider, MD   zinc 50 MG CAPS Take 1 capsule by mouth daily     Historical Provider, MD   tramadol (ULTRAM) 50 MG tablet Take 50 mg by mouth every 6 hours as needed. Take 1-2 tabs every 6 hrs prn     Historical Provider, MD       Current medications:    Current Facility-Administered Medications   Medication Dose Route Frequency Provider Last Rate Last Admin    0.9 % sodium chloride infusion   Intravenous Continuous Ashajessica Chapa  mL/hr at 02/24/21 0626 New Bag at 02/24/21 9725       Allergies:     Allergies   Allergen Reactions    Bactrim [Sulfamethoxazole-Trimethoprim]     Flexeril [Cyclobenzaprine]     Morphine Other (See Comments)     \"I hallucinate\"    Peanut-Containing Drug Products     Tessalon [Benzonatate] Hives    Amoxicillin-Pot Clavulanate Rash    Ibuprofen [Ibuprofen] Rash    Lisinopril Rash    Macrobid [Nitrofurantoin Monohydrate Macrocrystals] Rash    Macrodantin [Nitrofurantoin] Rash    Ranitidine Rash       Problem List:    Patient Active Problem List   Diagnosis Code    Hypothyroid E03.9    Fibromyalgia M79.7    Obesity E66.9    Hyperlipemia E78.5    Interstitial cystitis N30.10    ETD (eustachian tube dysfunction) H69.80    Sinusitis, chronic J32.9    Vestibular dizziness R42    Nasal septal deviation J34.2    IGT (impaired glucose tolerance) R73.02    Vitamin D deficiency E55.9    Arnold-Chiari malformation ([facility]) Q07.00    Tobacco abuse Z72.0    Esophageal reflux K21.9    COPD (chronic obstructive pulmonary disease) ([facility]) L64.2    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    Right sided facial pain R51.9    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     (ventriculoperitoneal) shunt status Z98.2    Status post spinal surgery Z98.890  Syringomyelia and syringobulbia (HCC) G95.0    Pulmonary eosinophilia J82.89       Past Medical History:        Diagnosis Date    Allergic rhinitis     Anxiety     Arnold-Chiari malformation (HCC)     Asthma     Chronic bronchitis (HCC)     Fibromyalgia     GERD (gastroesophageal reflux disease)     Headache(784.0)     Hyperlipidemia     Neuropathy     Osteoarthritis     Sinusitis     Type II or unspecified type diabetes mellitus without mention of complication, not stated as uncontrolled        Past Surgical History:        Procedure Laterality Date    APPENDECTOMY       SECTION      x2    COLONOSCOPY  [id number]    CSF SHUNT  2007    Cervical syrinx to subarachnoid shunt; thoracic syrinx to subarachnoid shunt (2 separate dural openings)    CYST REMOVAL      extradural cysts at thoracic 2-3    ECTOPIC PREGNANCY SURGERY      HEMICOLECTOMY      HERNIA REPAIR      inguinal     HYSTERECTOMY      SPINAL CORD DECOMPRESSION  2007    C4-5-6-7; T1-2-3 laminectomies    TOOTH EXTRACTION  2017       Social History:    Social History     Tobacco Use    Smoking status: Former Smoker     Packs/day: 1.00     Years: 25.00     Pack years: 25.00     Types: Cigarettes     Start date: 1975     Quit date: 2018     Years since quittin.2    Smokeless tobacco: Never Used    Tobacco comment: It will not allow me to put future dates. I do have plans to quit smoking. Praying to GOD to help me. Substance Use Topics    Alcohol use: Yes     Alcohol/week: 2.0 standard drinks     Types: 2 Cans of beer per week     Comment: Once a month; stomach tolerates very little alcohol                                Counseling given: Not Answered  Comment: It will not allow me to put future dates. I do have plans to quit smoking. Praying to GOD to help me.       Vital Signs (Current):   Vitals:    21 0554 21 0600   BP: 120/71    Pulse: 76    Resp: 18    Temp: 97.9 °F (36.6 °C) SpO2: 97%    Weight:  195 lb (88.5 kg)   Height:  5' 4\" (1.626 m)                                              BP Readings from Last 3 Encounters:   02/24/21 120/71   02/22/21 136/80   02/22/21 116/72       NPO Status: Time of last liquid consumption: 2315                        Time of last solid consumption: 2315                        Date of last liquid consumption: 02/23/21                        Date of last solid food consumption: 02/23/21    BMI:   Wt Readings from Last 3 Encounters:   02/24/21 195 lb (88.5 kg)   02/22/21 194 lb 4.8 oz (88.1 kg)   02/19/21 200 lb (90.7 kg)     Body mass index is 33.47 kg/m².     CBC:   Lab Results   Component Value Date    WBC 5.5 02/19/2021    RBC 4.69 02/19/2021    RBC 4.36 01/17/2012    HGB 13.4 02/19/2021    HCT 43.0 02/19/2021    MCV 91.7 02/19/2021    RDW 14.5 01/16/2018     02/19/2021       CMP:   Lab Results   Component Value Date     02/19/2021    K 3.8 02/19/2021     02/19/2021    CO2 25 02/19/2021    BUN 9 02/19/2021    CREATININE 0.6 02/19/2021    LABGLOM >90 02/19/2021    GLUCOSE 95 02/19/2021    GLUCOSE 109 03/21/2012    PROT 7.8 02/19/2021    CALCIUM 10.0 02/19/2021    BILITOT 0.4 02/19/2021    ALKPHOS 115 02/19/2021    AST 11 02/19/2021    ALT 10 02/19/2021       POC Tests:   Recent Labs     02/24/21  0628   POCGLU 113*       Coags: No results found for: PROTIME, INR, APTT    HCG (If Applicable): No results found for: PREGTESTUR, PREGSERUM, HCG, HCGQUANT     ABGs: No results found for: PHART, PO2ART, GTE2VMB, TSQ7MHS, BEART, W5TGIYKR     Type & Screen (If Applicable):  No results found for: LABABO, LABRH    Drug/Infectious Status (If Applicable):  No results found for: HIV, HEPCAB    COVID-19 Screening (If Applicable):   Lab Results   Component Value Date    COVID19 Not Detected 02/19/2021         Anesthesia Evaluation  Patient summary reviewed  Airway: Mallampati: II  TM distance: >3 FB   Neck ROM: full  Mouth opening: > = 3 FB Dental: (+) upper dentures      Pulmonary:   (+) COPD:                             Cardiovascular:          ECG reviewed                        Neuro/Psych:   (+) neuromuscular disease:, headaches:,             GI/Hepatic/Renal:             Endo/Other:    (+) Diabetes, hypothyroidism::., .                 Abdominal:           Vascular:                                        Anesthesia Plan      general     ASA 3       Induction: intravenous and rapid sequence. MIPS: Postoperative opioids intended and Prophylactic antiemetics administered. Anesthetic plan and risks discussed with patient. Plan discussed with CRNA. Nubia Wiggins.  [facility],    2/24/2021

## 2021-02-24 NOTE — BRIEF OP NOTE
Brief Postoperative Note      Patient: Cara Garcia  YOB: 1960  MRN: [id number]    Date of Procedure: 2/24/2021    Pre-Op Diagnosis: RIGHT VOCAL CORD MASS, REFERRED OTALGIA OF RIGHT EAR    Post-Op Diagnosis: Same and likely vocal cord nodule       Procedure(s): MICROLARYNGOSCOPY AND BIOPSY      Surgeon(s):  John Mancilla MD    Assistant:  * No surgical staff found *    Anesthesia: General    Estimated Blood Loss (mL): negligible    Complications: None    Specimens:   ID Type Source Tests Collected by Time Destination   A : right anterior true vocal cord Tissue Pharynx SURGICAL PATHOLOGY John Mancilla MD 2/24/2021 0820        Implants:  * No implants in log *      Drains: * No LDAs found *    Findings: Large right TVC nodule, see photos. Both TVC's had mild edema, probable polypoid degeneration from her smoking.       Electronically signed by Kali Rosas MD on 2/24/2021 at 8:58 AM

## 2021-02-24 NOTE — TELEPHONE ENCOUNTER
Noted. No need for additional supplies at this time. A1C 5.8 on metformin alone. No need for daily testing at this time.

## 2021-02-24 NOTE — PROGRESS NOTES
847 Awake and sitting up in the cart pt having an upper airway  Spasm , pt encouraged to take slow small breathes  855 racemic epi aerosol TX started and pt medicated with  Solu cortef 100 mg IV   900 Aerosol Tx complete , pt improved a little , pt very restless ,  FAB Vazquez to pt bedside , updated on pt   920 Resp here , pt placed on C- Pap   925 medicated with Ativan 0.25 mg IV , pt spasm nearly gone but pt remains restless   935 pt no longer having spasm , remains anxious , medicated with ativan 0.25 mg IV   940 pt resting on and off , HOB lowered and encouraged pt to take a nap  955 resting on and off   1005 C- Pap off   1025 Dr Cathy Washburn to bedside , ok for pt to go to [facility]   1030 pt meets criteria for discharge , transported to [facility]

## 2021-02-24 NOTE — FLOWSHEET NOTE
Contacted Dr Cyndie Singh office per Dr Afshin Galeano spoke with Josue Webster and relayed information of pt diabetic non compliance. If meds/supplies needed pt prefers [email or web address].

## 2021-02-24 NOTE — TELEPHONE ENCOUNTER
[facility] called pt had surgery today Dr. Gabriel Roth asking if pt needs to have new diabetic supplies called into [facility] Pt is NOT compliant with her BS.  1:59 pm 2/24/21 reading is 150. Pt will be staying in [address] [place] [address] with family. Please advise.

## 2021-02-24 NOTE — PROGRESS NOTES
ADMITTED TO [facility] AND ORIENTED TO UNIT. SCDS ON. FALL AND ALLERGY BANDS ON. PT VERBALIZED APPROVAL FOR FIRST NAME, LAST INITIAL AND PHYSICIAN NAME ON UNIT WHITEBOARD.

## 2021-02-24 NOTE — INTERVAL H&P NOTE
Pt Name: Nuha Mcginnis  MRN: [id number]  YOB: 1960  Date of evaluation: 2/24/2021    I have examined the patient and reviewed the H&P/Consult and there are no changes to the patient or plans.          Electronically signed by Teresa Dasilva MD on 2/24/2021 at 7:38 AM

## 2021-02-24 NOTE — ANESTHESIA POSTPROCEDURE EVALUATION
Department of Anesthesiology  Postprocedure Note    Patient: Bindu Peres  MRN: [id number]  YOB: 1960  Date of evaluation: 2/24/2021  Time:  11:18 AM     Procedure Summary     Date: 02/24/21 Room / Location: [address] TUYET Gonzalez    Anesthesia Start: 0738 Anesthesia Stop: 0856    Procedure: MICROLARYNGOSCOPY AND BIOPSY   (N/A ) Diagnosis: (RIGHT VOCAL CORD MASS, REFERRED OTALGIA OF RIGHT EAR)    Surgeons: Caden Stokes MD Responsible Provider: Benito Osgood, DO    Anesthesia Type: general ASA Status: 3          Anesthesia Type: general    Jesse Phase I: Jesse Score: 10    Jesse Phase II: Jesse Score: 10    Last vitals: Reviewed and per EMR flowsheets.        Anesthesia Post Evaluation    Patient location during evaluation: floor  Level of consciousness: awake  Airway patency: patent  Nausea & Vomiting: no vomiting and no nausea  Cardiovascular status: hemodynamically stable  Respiratory status: acceptable  Hydration status: stable

## 2021-02-25 ENCOUNTER — PATIENT MESSAGE (OUTPATIENT)
Dept: ALLERGY | Age: 61
End: 2021-02-25

## 2021-02-25 NOTE — OP NOTE
[address] [place] [address]                                OPERATIVE REPORT    PATIENT NAME: Domingo Velasquez                   :        1960  MED REC NO:   [id number]                           ROOM:  ACCOUNT NO:   [de-identified]                           ADMIT DATE: 2021  PROVIDER:     Edmund Naqvi M.D.    Radha Jarrett:  2021    OPERATIONS:  Microlaryngoscopy and biopsy, right true vocal cord. SURGEON:  Edmund Jimenez. Anamaria Naqvi MD    ANESTHESIA:  General endotracheal.    PREOPERATIVE DIAGNOSES:  Mass, right true vocal cord; hoarseness;  smoker. POSTOPERATIVE DIAGNOSES:  Mass, right true vocal cord; hoarseness;  smoker; probable vocal nodule. Polypoid degeneration, both true vocal  cords. HISTORY AND OPERATIVE FINDINGS:  This 57-year-old female has currently  quit, but still doing a pack a day and has for many years. She  presented with marked hoarseness and pain in her throat with referred  pain to her right ear. Fiberoptic laryngoscopy showed a mass in the  right true vocal cord that would flip up and down with respirations. Findings at surgery showed mild bilateral polypoid degeneration with a  very swollen right anterior true vocal cord. I suspect there might have  been a cyst within this. The vocal cord mass was excised, almost  completely intact. Care was taken to avoid the anterior commissure and  the medial true vocal cord mucosa. Pre and postop photos were taken. Some  additional bits of submucosal tissue were removed to trim the right  vocal cord down to match the size of the left vocal cord. Any oozing  which was extremely minimal was controlled with two applications of  topical epinephrine and pledgets. The patient was sprayed with  lidocaine prior to emergence from anesthesia.     OPERATIVE PROCEDURE:  After adequate level of general endotracheal  anesthesia had been obtained, a Dedo anterior commissure laryngoscope  was introduced in the hypopharynx. All areas of the hypopharynx were  evaluated. They looked relatively normal.  The laryngoscope was  introduced into the endolarynx and the true vocal cord was brought into  visualization. Photograph was taken and findings were noted as above. This appeared to be a broad-based polypoid mass on the right true vocal  cord. This was grasped and upbiting scissors were used to incise it  from posterior to anterior. Dissection was carried to the anterior  commissure, but not across it. The left true vocal cord was not  manipulated. Hemostasis was supplemented with 1:100,000 epinephrine topically on  cottonoids. Postoperative photos were taken. 0.25% lidocaine was  instilled in the endolarynx prior to extubation. The patient was then awakened, extubated, and taken to recovery room in  satisfactory condition. In the Recovery, she had some stridorous  breathing which was treated with racemic epinephrine by nebulizer,  Solu-Cortef IV, and CPAP administered by the Anesthesia team.  At this  time, the patient is breathing much better. Oxygen saturation has  remained very good. I will make a determination later on today as to  whether she needs to be admitted. For now, she is still in PACU.         Kali Rosas M.D.    D: 02/24/2021 9:35:46       T: 02/24/2021 12:16:34     NAHUM/LITZY  Job#: [id number]     Doc#: [id number]    CC:  Bridgett Vela D.O.

## 2021-02-25 NOTE — TELEPHONE ENCOUNTER
From: Nuha Mcginnis  To: Char Edwards APRN - CNP  Sent: 2/25/2021 10:17 AM EST  Subject: Prescription Question    Alem Wei here. I cannot talk on the telephone at this time because of my procedure on yesterday with Dr. Merlyn Dao, however, I wanted to share the latest information on my Alexandr Perez shot from [facility] with you. This is a [address] concerning it. Also, My [place] MEDICAID NUMBER is what I have already reported to your office months ago which is on this [place] as well.

## 2021-03-01 ENCOUNTER — PATIENT MESSAGE (OUTPATIENT)
Dept: ALLERGY | Age: 61
End: 2021-03-01

## 2021-03-01 NOTE — TELEPHONE ENCOUNTER
From: Rosario Fajardo  To: COREEN Cunningham - CNP  Sent: 3/1/2021 6:12 AM EST  Subject: Non-Urgent Medical Question    María Elena Milan,  I'm not feeling the greatest this morning so I need to cancel & re~schedule my shots.

## 2021-03-02 ENCOUNTER — TELEPHONE (OUTPATIENT)
Dept: ALLERGY | Age: 61
End: 2021-03-02

## 2021-03-03 ENCOUNTER — TELEPHONE (OUTPATIENT)
Dept: PHARMACY | Age: 61
End: 2021-03-03

## 2021-03-04 ENCOUNTER — OFFICE VISIT (OUTPATIENT)
Dept: ENT CLINIC | Age: 61
End: 2021-03-04
Payer: MEDICAID

## 2021-03-04 ENCOUNTER — TELEPHONE (OUTPATIENT)
Dept: ENT CLINIC | Age: 61
End: 2021-03-04

## 2021-03-04 VITALS
RESPIRATION RATE: 14 BRPM | BODY MASS INDEX: 34.23 KG/M2 | HEIGHT: 64 IN | SYSTOLIC BLOOD PRESSURE: 124 MMHG | TEMPERATURE: 97.1 F | HEART RATE: 80 BPM | WEIGHT: 200.5 LBS | DIASTOLIC BLOOD PRESSURE: 70 MMHG

## 2021-03-04 DIAGNOSIS — J45.50 SEVERE PERSISTENT ASTHMA WITHOUT COMPLICATION: ICD-10-CM

## 2021-03-04 DIAGNOSIS — Z98.2 VP (VENTRICULOPERITONEAL) SHUNT STATUS: ICD-10-CM

## 2021-03-04 DIAGNOSIS — H92.01 REFERRED OTALGIA OF RIGHT EAR: ICD-10-CM

## 2021-03-04 DIAGNOSIS — J38.1 VOCAL CORD POLYP: Primary | ICD-10-CM

## 2021-03-04 DIAGNOSIS — F17.200 TOBACCO DEPENDENCE: ICD-10-CM

## 2021-03-04 DIAGNOSIS — Z98.890 STATUS POST SPINAL SURGERY: ICD-10-CM

## 2021-03-04 DIAGNOSIS — R49.0 HOARSE: ICD-10-CM

## 2021-03-04 DIAGNOSIS — R59.0 CERVICAL LYMPHADENOPATHY: ICD-10-CM

## 2021-03-04 DIAGNOSIS — K21.9 GASTROESOPHAGEAL REFLUX DISEASE, UNSPECIFIED WHETHER ESOPHAGITIS PRESENT: ICD-10-CM

## 2021-03-04 DIAGNOSIS — E04.2 MULTIPLE THYROID NODULES: ICD-10-CM

## 2021-03-04 PROCEDURE — 99213 OFFICE O/P EST LOW 20 MIN: CPT | Performed by: OTOLARYNGOLOGY

## 2021-03-04 RX ORDER — EPINEPHRINE 0.3 MG/.3ML
INJECTION INTRAMUSCULAR
Qty: 1 EACH | Refills: 1 | Status: SHIPPED | OUTPATIENT
Start: 2021-03-04 | End: 2021-04-26 | Stop reason: SDUPTHER

## 2021-03-04 RX ORDER — ALBUTEROL SULFATE 2.5 MG/3ML
SOLUTION RESPIRATORY (INHALATION)
Qty: 375 ML | Refills: 5 | Status: SHIPPED | OUTPATIENT
Start: 2021-03-04 | End: 2022-03-02 | Stop reason: SDUPTHER

## 2021-03-04 ASSESSMENT — ENCOUNTER SYMPTOMS
TROUBLE SWALLOWING: 0
STRIDOR: 0
DIARRHEA: 0
CHOKING: 0
VOICE CHANGE: 0
COLOR CHANGE: 0
SINUS PRESSURE: 0
WHEEZING: 0
SORE THROAT: 0
APNEA: 0
VOMITING: 0
CHEST TIGHTNESS: 0
NAUSEA: 0
COUGH: 0
SHORTNESS OF BREATH: 0
FACIAL SWELLING: 0
ABDOMINAL PAIN: 0
RHINORRHEA: 0

## 2021-03-04 NOTE — TELEPHONE ENCOUNTER
Contacted patient regarding the refills for the epipen and albuterol nebulizer solution. The patient stated that she does need the medications filled.

## 2021-03-04 NOTE — TELEPHONE ENCOUNTER
[address] [facility] called saying the patient needs her Epipen and Albuterol nebulizer solution refilled.

## 2021-03-04 NOTE — PROGRESS NOTES
[facility] [place] [facility]  [facility] EAR, NOSE AND THROAT  [address] [place], [place] [id number]  Dept: [phone]  Dept Fax: [phone]  Loc: Marcelo Chilel is a 56 y.o. female who was referred byNo ref. provider found for:  Chief Complaint   Patient presents with    Post-Op Check     Patient is here post-op microlaryngoscopy and biopsy 2/24/21 c/o right dull ear pain, slight on the left also    . HPI:     Ashvin Ortiz is a 56 y.o. female who presents today for post-op microlaryngoscopy & biopsy. Pathology:  FINAL DIAGNOSIS:   Vocal cord, right anterior, biopsy:    Vocal cord polyp. She is doing well. She is practicing vocal cord rest.  Her voice kept going in and out. She is doing better with stopping smoking. She wanted to see if she could use Lozenges or gum. She had Nicotrol that she inhaled into her lungs then she stopped because the family doctor didn't think she should be using the inhaler since she had a biopsy with her throat. She has patches on and she will discuss with family doctor to get help with stopping smoking. She has Lozenges to help and decided she will stop smoking and is working toward that goal.  Her throat does not hurt anymore. History: Allergies   Allergen Reactions    Bactrim [Sulfamethoxazole-Trimethoprim]     Flexeril [Cyclobenzaprine]     Morphine Other (See Comments)     \"I hallucinate\"    Peanut-Containing Drug Products     Tessalon [Benzonatate] Hives    Amoxicillin-Pot Clavulanate Rash    Ibuprofen [Ibuprofen] Rash    Lisinopril Rash    Macrobid [Nitrofurantoin Monohydrate Macrocrystals] Rash    Macrodantin [Nitrofurantoin] Rash    Ranitidine Rash     Current Outpatient Medications   Medication Sig Dispense Refill    gabapentin (NEURONTIN) 100 MG capsule Take 100 mg by mouth 4 times daily.       nicotine (NICODERM CQ) 21 MG/24HR Place 1 patch onto the skin daily for 28 days 28 patch 0    nicotine (NICOTROL) 10 MG inhaler Inhale 1 puff into the lungs as needed for Smoking cessation 1 Inhaler 3    amLODIPine (NORVASC) 5 MG tablet TAKE 1 TABLET BY MOUTH ONCE DAILY 90 tablet 3    hydrocortisone 2.5 % cream APPLY TOPICALLY TO AFFECTED AREA TWICE DAILY      hydrocortisone (HYTONE) 2.5 % lotion APPLY TOPICALLY TO AFFECTED AREA DAILY 59 mL 0    omalizumab (OMALIZUMAB) 150 MG injection Inject 300 mg into the skin every 28 days 1 each 11    traZODone (DESYREL) 50 MG tablet Take 50 mg by mouth nightly as needed for Sleep       azelastine (OPTIVAR) 0.05 % ophthalmic solution Place 1 drop into both eyes 2 times daily 1 Bottle 11    azelastine (ASTELIN) 0.1 % nasal spray 1 spray by Nasal route 2 times daily Use in each nostril as directed 1 Bottle 11    SYMBICORT 160-4.5 MCG/ACT AERO INHALE 2 PUFFS BY MOUTH TWICE DAILY. RINSE MOUTH WELL AFTER USE. 11 g 5    albuterol sulfate  (90 Base) MCG/ACT inhaler Inhale 2 puffs into the lungs every 4 hours as needed for Wheezing (AS NEEDED) 9 g 5    fexofenadine (ALLEGRA) 180 MG tablet Take 1 tablet by mouth daily 90 tablet 3    SYNTHROID 88 MCG tablet TAKE 1 TABLET BY MOUTH ONCE DAILY ON AN EMPTY STOMACH WITH WATER. DO NOT EAT OR TAKE ANY OTHER MEDICATIONS FOR 30 MINUTES.  30 tablet 11    hyoscyamine (LEVBID) 375 MCG extended release tablet TAKE 1/2 TABLET BY MOUTH TWICE DAILY 30 tablet 11    Cholecalciferol (VITAMIN D3) 50 MCG (2000 UT) CAPS Vitamin D3 2,000 unit capsule 30 capsule 11    albuterol (PROVENTIL) (2.5 MG/3ML) 0.083% nebulizer solution USE 1 VIAL IN NEBULIZER EVERY 6 HOURS AS NEEDED FOR WHEEZING (FOR ASTHMA) 375 mL 5    Biotin 1 MG CAPS Take 1 capsule by mouth daily       metFORMIN (GLUCOPHAGE) 500 MG tablet TAKE 1 TABLET BY MOUTH TWICE DAILY WITH MEALS 180 tablet 3    atorvastatin (LIPITOR) 10 MG tablet Take 1 tablet by mouth once daily 90 tablet 3    esomeprazole (NEXIUM) 40 MG delayed release capsule Take 1 capsule by mouth 2 times daily 180 capsule 3    clobetasol (TEMOVATE) 0.05 % external solution Apply daily to scaly or itchy areas on scalp 60 mL 11    ketoconazole (NIZORAL) 2 % shampoo Apply 3-4 times weekly to scalp, leave on for five minutes prior to washing off 120 mL 11    fluocinonide (LIDEX) 0.05 % cream Apply topically 2 times daily to rash on hands a needed 30 g 11    levomilnacipran (FETZIMA) 40 MG CP24 extended release capsule Take 1 capsule by mouth daily 30 capsule 2    Multiple Vitamins-Minerals (MULTIVITAMIN PO) 1 tablet daily       Probiotic Product (PROBIOTIC PO) 1 tablet daily       Cyanocobalamin (B-12 PO) daily      EPIPEN 2-VENESSA 0.3 MG/0.3ML SOAJ injection INJECT 1 PEN IM AS A SINGLE DOSE PRN  0    SF 5000 PLUS 1.1 % CREA BRUSH TEETH TWICE DAILY FOR 2 MINUTES, SPIT EXCESS AFTER BRUSHING. DO NOT RINSE WITH WATER , WAIT 30 MINUTES  FOR ANY FOOD OR DRINK  3    ondansetron (ZOFRAN) 4 MG tablet TAKE 1 TABLET BY MOUTH EVERY 8 HOURS AS NEEDED FOR NAUSEA OR VOMITING 15 tablet 0    meloxicam (MOBIC) 7.5 MG tablet TK 1 T PO BID  4    LINZESS 290 MCG CAPS capsule 290 mcg every morning (before breakfast)       lidocaine (XYLOCAINE) 5 % ointment Apply topically as needed for Pain Apply topically as needed.  conjugated estrogens (PREMARIN) 0.625 MG/GM vaginal cream Place vaginally as needed Place vaginally daily.  lidocaine (LIDODERM) 5 % Place 1 patch onto the skin daily 12 hours on, 12 hours off.  hydrocortisone (ANUSOL-HC) 25 MG suppository Place 25 mg rectally as needed for Hemorrhoids      fluticasone (FLONASE ALLERGY RELIEF) 50 MCG/ACT nasal spray 1 spray by Nasal route daily      HYDROcodone-acetaminophen (NORCO) 5-325 MG per tablet 1 tablet nightly as needed for Pain.  Take 1 tablet every 4-6 hours as needed for pain       NONFORMULARY Immunotherapy allergy shot      zinc 50 MG CAPS Take 1 capsule by mouth daily       Ascorbic Acid (VITAMIN C) 500 MG CAPS Take 1 tablet by mouth daily future dates. I do have plans to quit smoking. Praying to GOD to help me. Substance Use Topics    Alcohol use: Yes     Alcohol/week: 2.0 standard drinks     Types: 2 Cans of beer per week     Comment: Once a month; stomach tolerates very little alcohol       Subjective:       Review of Systems   Constitutional: Negative for activity change, appetite change, chills, diaphoresis, fatigue, fever and unexpected weight change. HENT: Negative for congestion, dental problem, ear discharge, ear pain, facial swelling, hearing loss, mouth sores, nosebleeds, postnasal drip, rhinorrhea, sinus pressure, sneezing, sore throat, tinnitus, trouble swallowing and voice change. Eyes: Negative for visual disturbance. Respiratory: Negative for apnea, cough, choking, chest tightness, shortness of breath, wheezing and stridor. Cardiovascular: Negative for chest pain, palpitations and leg swelling. Gastrointestinal: Negative for abdominal pain, diarrhea, nausea and vomiting. Endocrine: Negative for cold intolerance, heat intolerance, polydipsia and polyuria. Genitourinary: Negative for dysuria, enuresis and hematuria. Musculoskeletal: Negative for arthralgias, gait problem, neck pain and neck stiffness. Skin: Negative for color change and rash. Allergic/Immunologic: Negative for environmental allergies, food allergies and immunocompromised state. Neurological: Negative for dizziness, syncope, facial asymmetry, speech difficulty, light-headedness and headaches. Hematological: Negative for adenopathy. Does not bruise/bleed easily. Psychiatric/Behavioral: Negative for confusion and sleep disturbance. The patient is not nervous/anxious.         Objective:     /70 (Site: Right Upper Arm, Position: Sitting)   Pulse 80   Temp 97.1 °F (36.2 °C) (Infrared)   Resp 14   Ht 5' 4\" (1.626 m)   Wt 200 lb 8 oz (90.9 kg)   BMI 34.42 kg/m²     Physical Exam   Voice is better  More jaw excursion on the right than the left.      Data:  All of the past medical history, past surgical history, family history,social history, allergies and current medications were reviewed with the patient. Assessment & Plan   Diagnoses and all orders for this visit:     Diagnosis Orders   1. Vocal cord polyp     2. Referred otalgia of right ear     3. Gastroesophageal reflux disease, unspecified whether esophagitis present     4. Tobacco dependence     5. Hoarse     6.  (ventriculoperitoneal) shunt status     7. Status post spinal surgery         The findings were explained and her questions were answered. Options were discussed including discussing with her family doctor about getting help to stop smoking. She can use Luden's wild cherry Lozenges. Do not use Halls cough drops. I will see her in May to check vocal cords. I ordered an ultrasound to be done around May 19 th then follow up. If she still has ear pain in 2 weeks she can call for appointment. She agreed. Follow up vocal cord check and ultrasound in May. IAlex CMA ([facility]), am scribing for, and in the presence of Dr. Mara Sandoval. Electronically signed by Del Schultz CMA ([facility]) on 3/4/21 at 9:37 AM EST. (Please note that portions of this note were completed with a voice recognition program. Efforts were made to edit the dictations butoccasionally words are mis-transcribed.)    I agree to the above documentation placed by my scribe. I have personally evaluated this patient. Additional findings are as noted. I reviewed the scribe's note and agree with the documented findings and plan of care. Any areas of disagreement are corrected. I agree with the chief complaint, past medical history, past surgical history, allergies, medications, social and family history as documented unless otherwise noted below.      Electronically signed by Teresa Dasilva MD on 3/15/2021 at 7:31 PM

## 2021-03-08 ENCOUNTER — TELEPHONE (OUTPATIENT)
Dept: ENT CLINIC | Age: 61
End: 2021-03-08

## 2021-03-08 ENCOUNTER — NURSE ONLY (OUTPATIENT)
Dept: ALLERGY | Age: 61
End: 2021-03-08
Payer: MEDICAID

## 2021-03-08 VITALS
HEART RATE: 70 BPM | RESPIRATION RATE: 16 BRPM | SYSTOLIC BLOOD PRESSURE: 122 MMHG | DIASTOLIC BLOOD PRESSURE: 82 MMHG | TEMPERATURE: 97.2 F

## 2021-03-08 DIAGNOSIS — Z51.6 ENCOUNTER FOR DESENSITIZATION TO ALLERGENS: ICD-10-CM

## 2021-03-08 DIAGNOSIS — J30.89 ALLERGY TO DUST: ICD-10-CM

## 2021-03-08 DIAGNOSIS — J45.50 SEVERE PERSISTENT ASTHMA WITHOUT COMPLICATION: Primary | ICD-10-CM

## 2021-03-08 DIAGNOSIS — Z91.038 ALLERGY TO COCKROACHES: ICD-10-CM

## 2021-03-08 DIAGNOSIS — J30.1 ALLERGY TO TREES: ICD-10-CM

## 2021-03-08 DIAGNOSIS — J82.83 EOSINOPHILIC ASTHMA: ICD-10-CM

## 2021-03-08 DIAGNOSIS — Z91.048 ALLERGY TO MOLD: ICD-10-CM

## 2021-03-08 PROCEDURE — 96372 THER/PROPH/DIAG INJ SC/IM: CPT | Performed by: NURSE PRACTITIONER

## 2021-03-08 PROCEDURE — 95117 IMMUNOTHERAPY INJECTIONS: CPT | Performed by: NURSE PRACTITIONER

## 2021-03-08 NOTE — PROGRESS NOTES
Patient here today to receive Xolair injection. Patient does not report any previous reaction from Xolair injections. Denies any nausea vomiting fever urticaria or angioedema. Denies any recent exacerbation of asthma or asthma-like symptoms. Patient was explained benefits and potential risks including anaphylaxis to patient. Following obtaining verbal and written consent (on file)  Xolair injection was prepared 30 minutes prior to injection according to manufacture guidelines. LYOPHILIZED POWDER (VIAL); reconstituted with 1.4 ml injectable sterile water per Vial.      Patient has been explained the risk and benefits including delayed anaphylaxis. Patient has EpiPen and understands how to appropriately use. Xolair 150 mg given subcutaneously in LT&RT THIGHS  for a total combined dose of 300mg  using a 25-gauge needle and 3 ML syringe. Site injections may include the following sites:  RIGHT  / LEFT UPPER ARM,  RIGHT  / LEFT FRONT OR MIDDLE OF THIGH, OR STOMACH        SAMPLE  Xolair  NDC 37882-358-05(1)   Lot [id number](7)   Expires 05/2021(2)    Prior to patient receiving Xolair area was cleansed with alcohol swabs. Following the administration no evidence of bleeding or bruising. Patient  observed for 2 hours following first three injections and then 30 minutes following each subsequent administration. No adverse reactions reported. Patient tolerated well without adverse reactions or side effects. Patient to continue to receive Xolair injections monthly. Will report any problems to this office.     Patient to return to clinic monthly for Xolair injections and will follow-up with provider a minimum of every 6 months for evaluation and labs including CMP, CBC with diff, and IGE    SHOT REACTION TREATMENT INSTRUCTIONS    During the 30 minute wait after an allergy injection the following symptoms should be reported:    Itching other than at the injection site  Hives or swelling other than at the injection site  Redness other than at the injection site  Difficulty breathing  Chest tightness  Difficulty swallowing  Throat tightness    If these symptoms occur, NOTIFY PROVIDER and the following treatment should be administered:    1. Epinephrine 1:1000 IM - 0.3 ml if > 66 lbs or more, 0.15 ml if 33 - 63 lbs, or 0.1 ml if <33 lbs   2. Diphenhydramine - give all intramuscular:     2 to <6 years (off-label use): 6.25 mg,    6 to <12 years: 12.5 to 25 mg;    ?12 years: 25-50 mg.  3.  Famotidine:  Adults 40 mg oral    Adolescents age 12 years and >88 lbs: 40 mg    Children and Adolescents ? 12years of age: Initial: 0.25 mg/kg/dose   every 12 hours (maximum daily dose: 40 mg/day)    Epipen dose may be repeated in 5-15 minutes if adequate resolution of symptoms does not occur    Patient should be observed for at least one hour after final epi dose and must be seen by provider. Patients cannot drive themselves if they have received diphenhydramine.

## 2021-03-08 NOTE — PROGRESS NOTES
After consent obtained/verified, allergy injection given in back of R/L arm(s). Documentation of vial injection specific to arm(s) noted on Allergy Immunotherapy Administration Form. Patient waited 30 minutes for observation. Patient tolerated Yellow vials well at 0.45ml without adverse reaction. SHOT REACTION TREATMENT INSTRUCTIONS    During the 30 minute wait after an allergy injection the following symptoms should be reported:    Itching other than at the injection site  Hives or swelling other than at the injection site  Redness other than at the injection site  Difficulty breathing  Chest tightness  Difficulty swallowing  Throat tightness    If these symptoms occur, NOTIFY PROVIDER and the following treatment should be administered:    1. Epinephrine 1:1000 IM - 0.3 ml if > 66 lbs or more, 0.15 ml if 33 - 63 lbs, or 0.1 ml if <33 lbs   2. Diphenhydramine - give all intramuscular:     2 to <6 years (off-label use): 6.25 mg,    6 to <12 years: 12.5 to 25 mg;    ?12 years: 25-50 mg.  3.  Famotidine:  Adults 40 mg oral    Adolescents age 12 years and >88 lbs: 40 mg    Children and Adolescents ? 12years of age: Initial: 0.25 mg/kg/dose   every 12 hours (maximum daily dose: 40 mg/day)    Epi dose may me repeated in 5-15 minutes if adequate resolution of symptoms does not occur    Patient should be observed for at least one hour after final epi dose and must be seen by provider. Patients cannot drive themselves if they have received diphenhydramine.

## 2021-03-08 NOTE — TELEPHONE ENCOUNTER
Patient was on the phone with [facility] while she was in the office and they are stating they are not her provider for medications. She stated that they are trying to contact [facility] to inform them of the change. [facility] recommended we call patient insurance to see who patient can get her medication from.

## 2021-03-09 NOTE — TELEPHONE ENCOUNTER
[facility] called needing a new verbal script for [employer]. I called [facility] and provided the pharmacist with the verbal script for xolair.

## 2021-03-19 ENCOUNTER — TELEPHONE (OUTPATIENT)
Dept: ENT CLINIC | Age: 61
End: 2021-03-19

## 2021-03-19 NOTE — TELEPHONE ENCOUNTER
I discussed with Dr. Scott Rust. This is unlikely to be directly related to surgery if the discomfort is starting now. She should follow-up with her PCP regarding her symptoms. She can call us and update us on Monday if symptoms persist.  However, if they were to worsen she should go to the ER for evaluation.

## 2021-03-19 NOTE — TELEPHONE ENCOUNTER
Patient called stating that she is having left sided throat pain when she is swallowing and under her neck that is getting worse. She stated Dr. Josselin Rossi did surgery on the right side. Patient seen post-op 03/04/2021. Please advise. Next available appointment is 03/26.

## 2021-03-22 ENCOUNTER — OFFICE VISIT (OUTPATIENT)
Dept: DERMATOLOGY | Age: 61
End: 2021-03-22
Payer: MEDICAID

## 2021-03-22 VITALS
BODY MASS INDEX: 33.63 KG/M2 | HEART RATE: 84 BPM | SYSTOLIC BLOOD PRESSURE: 126 MMHG | HEIGHT: 64 IN | DIASTOLIC BLOOD PRESSURE: 84 MMHG | WEIGHT: 197 LBS | OXYGEN SATURATION: 97 %

## 2021-03-22 DIAGNOSIS — L66.9 CENTRAL CENTRIFUGAL SCARRING ALOPECIA: Primary | ICD-10-CM

## 2021-03-22 PROCEDURE — 99214 OFFICE O/P EST MOD 30 MIN: CPT | Performed by: DERMATOLOGY

## 2021-03-22 RX ORDER — KETOCONAZOLE 20 MG/ML
SHAMPOO TOPICAL
Qty: 120 ML | Refills: 11 | Status: SHIPPED | OUTPATIENT
Start: 2021-03-22 | End: 2021-05-24 | Stop reason: SDUPTHER

## 2021-03-22 RX ORDER — CLOBETASOL PROPIONATE 0.5 MG/G
OINTMENT TOPICAL
Qty: 60 G | Refills: 2 | Status: SHIPPED | OUTPATIENT
Start: 2021-03-22 | End: 2021-05-24

## 2021-03-22 NOTE — PROGRESS NOTES
Dermatology Patient Note  [address] [address]  [address]  Dept: [phone]  Dept Fax: [phone]: [phone]      VISITDATE: 3/22/2021   REFERRING PROVIDER: No ref. provider found      Ashley Beach is a 56 y.o. female  who presents today in the office for:    Other (yearly f/u alopecia- Previous Dr. Mani Young pt. )      PERTINENT HISTORY NOT LISTED ABOVE:  Patient presents for f/u [facility]  - has maintained hair growth with current regimen  - clobetasol soln daily and ketoconazole shampoo weekly  - still has burning pain occasionally on bilateral occipital scalp. Lidex soln makes it worse    CURRENT MEDICATIONS:   Current Outpatient Medications   Medication Sig Dispense Refill    clobetasol (TEMOVATE) 0.05 % ointment Apply to scalp daily 60 g 2    ketoconazole (NIZORAL) 2 % shampoo Apply 3-4 times weekly to scalp, leave on for five minutes prior to washing off 120 mL 11    albuterol (PROVENTIL) (2.5 MG/3ML) 0.083% nebulizer solution USE 1 VIAL IN NEBULIZER EVERY 6 HOURS AS NEEDED FOR WHEEZING (FOR ASTHMA) 375 mL 5    EPIPEN 2-VENESSA 0.3 MG/0.3ML SOAJ injection INJECT 1 PEN IM AS A SINGLE DOSE PRN 1 each 1    gabapentin (NEURONTIN) 100 MG capsule Take 100 mg by mouth 4 times daily.       nicotine (NICODERM CQ) 21 MG/24HR Place 1 patch onto the skin daily for 28 days 28 patch 0    nicotine (NICOTROL) 10 MG inhaler Inhale 1 puff into the lungs as needed for Smoking cessation 1 Inhaler 3    amLODIPine (NORVASC) 5 MG tablet TAKE 1 TABLET BY MOUTH ONCE DAILY 90 tablet 3    hydrocortisone 2.5 % cream APPLY TOPICALLY TO AFFECTED AREA TWICE DAILY      hydrocortisone (HYTONE) 2.5 % lotion APPLY TOPICALLY TO AFFECTED AREA DAILY 59 mL 0    omalizumab (OMALIZUMAB) 150 MG injection Inject 300 mg into the skin every 28 days 1 each 11    traZODone (DESYREL) 50 MG tablet Take 50 mg by mouth nightly as needed for Sleep  azelastine (OPTIVAR) 0.05 % ophthalmic solution Place 1 drop into both eyes 2 times daily 1 Bottle 11    azelastine (ASTELIN) 0.1 % nasal spray 1 spray by Nasal route 2 times daily Use in each nostril as directed 1 Bottle 11    SYMBICORT 160-4.5 MCG/ACT AERO INHALE 2 PUFFS BY MOUTH TWICE DAILY. RINSE MOUTH WELL AFTER USE. 11 g 5    albuterol sulfate  (90 Base) MCG/ACT inhaler Inhale 2 puffs into the lungs every 4 hours as needed for Wheezing (AS NEEDED) 9 g 5    fexofenadine (ALLEGRA) 180 MG tablet Take 1 tablet by mouth daily 90 tablet 3    SYNTHROID 88 MCG tablet TAKE 1 TABLET BY MOUTH ONCE DAILY ON AN EMPTY STOMACH WITH WATER. DO NOT EAT OR TAKE ANY OTHER MEDICATIONS FOR 30 MINUTES.  30 tablet 11    hyoscyamine (LEVBID) 375 MCG extended release tablet TAKE 1/2 TABLET BY MOUTH TWICE DAILY 30 tablet 11    Cholecalciferol (VITAMIN D3) 50 MCG (2000 UT) CAPS Vitamin D3 2,000 unit capsule 30 capsule 11    Biotin 1 MG CAPS Take 1 capsule by mouth daily       metFORMIN (GLUCOPHAGE) 500 MG tablet TAKE 1 TABLET BY MOUTH TWICE DAILY WITH MEALS 180 tablet 3    atorvastatin (LIPITOR) 10 MG tablet Take 1 tablet by mouth once daily 90 tablet 3    esomeprazole (NEXIUM) 40 MG delayed release capsule Take 1 capsule by mouth 2 times daily 180 capsule 3    clobetasol (TEMOVATE) 0.05 % external solution Apply daily to scaly or itchy areas on scalp 60 mL 11    fluocinonide (LIDEX) 0.05 % cream Apply topically 2 times daily to rash on hands a needed 30 g 11    levomilnacipran (FETZIMA) 40 MG CP24 extended release capsule Take 1 capsule by mouth daily 30 capsule 2    Respiratory Therapy Supplies (NEBULIZER COMPRESSOR) KIT 1 kit by Does not apply route once for 1 dose 360 kit 0    Multiple Vitamins-Minerals (MULTIVITAMIN PO) 1 tablet daily       Probiotic Product (PROBIOTIC PO) 1 tablet daily       Cyanocobalamin (B-12 PO) daily      SF 5000 PLUS 1.1 % CREA BRUSH TEETH TWICE DAILY FOR 2 MINUTES, SPIT EXCESS AFTER BRUSHING. DO NOT RINSE WITH WATER , WAIT 30 MINUTES  FOR ANY FOOD OR DRINK  3    ondansetron (ZOFRAN) 4 MG tablet TAKE 1 TABLET BY MOUTH EVERY 8 HOURS AS NEEDED FOR NAUSEA OR VOMITING 15 tablet 0    meloxicam (MOBIC) 7.5 MG tablet TK 1 T PO BID  4    LINZESS 290 MCG CAPS capsule 290 mcg every morning (before breakfast)       lidocaine (XYLOCAINE) 5 % ointment Apply topically as needed for Pain Apply topically as needed.  conjugated estrogens (PREMARIN) 0.625 MG/GM vaginal cream Place vaginally as needed Place vaginally daily.  lidocaine (LIDODERM) 5 % Place 1 patch onto the skin daily 12 hours on, 12 hours off.  hydrocortisone (ANUSOL-HC) 25 MG suppository Place 25 mg rectally as needed for Hemorrhoids      fluticasone (FLONASE ALLERGY RELIEF) 50 MCG/ACT nasal spray 1 spray by Nasal route daily      HYDROcodone-acetaminophen (NORCO) 5-325 MG per tablet 1 tablet nightly as needed for Pain. Take 1 tablet every 4-6 hours as needed for pain       NONFORMULARY Immunotherapy allergy shot      zinc 50 MG CAPS Take 1 capsule by mouth daily       Ascorbic Acid (VITAMIN C) 500 MG CAPS Take 1 tablet by mouth daily       orphenadrine (NORFLEX) 100 MG tablet Take 100 mg by mouth 2 times daily as needed.  tramadol (ULTRAM) 50 MG tablet Take 50 mg by mouth every 6 hours as needed.  Take 1-2 tabs every 6 hrs prn        Current Facility-Administered Medications   Medication Dose Route Frequency Provider Last Rate Last Admin    omalizumab Josefina Carrero) injection 150 mg  150 mg Subcutaneous Q28 Days Carlie COREEN Cornelius - CNP   150 mg at 03/08/21 7473    omalizumab Josefina Carrero) injection 150 mg  150 mg Subcutaneous Q28 Days Carlie Adryan, APRN - CNP   150 mg at 03/08/21 1025       ALLERGIES:   Allergies   Allergen Reactions    Bactrim [Sulfamethoxazole-Trimethoprim]     Flexeril [Cyclobenzaprine]     Morphine Other (See Comments)     \"I hallucinate\"    Peanut-Containing Drug Products    [employer] [Benzonatate] Hives    Amoxicillin-Pot Clavulanate Rash    Ibuprofen [Ibuprofen] Rash    Lisinopril Rash    Macrobid [Nitrofurantoin Monohydrate Macrocrystals] Rash    Macrodantin [Nitrofurantoin] Rash    Ranitidine Rash       SOCIAL HISTORY:  Social History     Tobacco Use    Smoking status: Former Smoker     Packs/day: 1.00     Years: 25.00     Pack years: 25.00     Types: Cigarettes     Start date: 1975     Quit date: 2018     Years since quittin.3    Smokeless tobacco: Never Used    Tobacco comment: It will not allow me to put future dates. I do have plans to quit smoking. Praying to GOD to help me. Substance Use Topics    Alcohol use: Yes     Alcohol/week: 2.0 standard drinks     Types: 2 Cans of beer per week     Comment: Once a month; stomach tolerates very little alcohol       Pertinent ROS:  Review of Systems  Skin: Denies any new changing, growing or bleeding lesions or rashes except as described in the HPI   Constitutional: Denies fevers, chills, and malaise. PHYSICAL EXAM:   /84 (Site: Left Upper Arm, Position: Sitting, Cuff Size: Large Adult)   Pulse 84   Ht 5' 4\" (1.626 m)   Wt 197 lb (89.4 kg)   SpO2 97%   BMI 33.81 kg/m²     The patient is generally well appearing, well nourished, alert and conversational. Affect is normal.    Cutaneous Exam:  Physical Exam  Sun-exposed skin, which includes the head/face, neck, both arms, digits and/or nails was examined. Diagnoses/exam findings/medical history pertinent to this visit are listed below:    Assessment and Plan:  Assessment   1. Central centrifugal scarring alopecia  - discussed diagnosis, etiology, natural course, and treatment options  - chronic illness, responding to treatment but not yet at goal  - switch from soln to ointment (has burning with soln)  - consider ILK at next visit if still pain  - clobetasol (TEMOVATE) 0.05 % ointment;  Apply to scalp daily  Dispense: 60 g; Refill: 2  - ketoconazole (NIZORAL) 2 % shampoo; Apply 3-4 times weekly to scalp, leave on for five minutes prior to washing off  Dispense: 120 mL; Refill: 11          RTC 2 months    There are no Patient Instructions on file for this visit. This note was created with the assistance of a speech-recognition program.  Although the intention is to generate a document that actually reflects the content of the visit, no guarantees can be provided that every mistake has been identified and corrected byediting.     Electronically signed by Maddy Teague MD on 3/22/21 at 8:28 AM EDT

## 2021-03-23 ENCOUNTER — IMMUNIZATION (OUTPATIENT)
Dept: PRIMARY CARE CLINIC | Age: 61
End: 2021-03-23
Payer: MEDICAID

## 2021-03-23 PROCEDURE — 91300 COVID-19, PFIZER VACCINE 30MCG/0.3ML DOSE: CPT | Performed by: FAMILY MEDICINE

## 2021-03-23 PROCEDURE — 0001A COVID-19, PFIZER VACCINE 30MCG/0.3ML DOSE: CPT | Performed by: FAMILY MEDICINE

## 2021-03-24 ENCOUNTER — PATIENT MESSAGE (OUTPATIENT)
Dept: ENT CLINIC | Age: 61
End: 2021-03-24

## 2021-03-24 ENCOUNTER — TELEPHONE (OUTPATIENT)
Dept: ENT CLINIC | Age: 61
End: 2021-03-24

## 2021-03-24 SDOH — HEALTH STABILITY: MENTAL HEALTH: HOW MANY STANDARD DRINKS CONTAINING ALCOHOL DO YOU HAVE ON A TYPICAL DAY?: PATIENT DECLINED

## 2021-03-24 NOTE — TELEPHONE ENCOUNTER
Have been monitoring pt chart via Epic, as per last interaction pt informed she was not cleared yet to speak s/p surgery. Per ASYM III, pt is still communicating via email. Will continue to monitor and will call patient when she is cleared to speak.

## 2021-03-24 NOTE — TELEPHONE ENCOUNTER
Gely verified appointment r/s 5/10/21. Gely verified the labs, which she needs to have completed before her appointment with Venu Costa, SILVIANO. Gely has IgG, IgM, IgA, IgE, and CBC. Gely states she will have those labs completed before her appointment with [facility] 5/17/21.

## 2021-03-24 NOTE — TELEPHONE ENCOUNTER
From: Penny Burk  To: Swapnil Walsh MD  Sent: 3/24/2021 11:42 AM EDT  Subject: Visit Follow-Up Question    Jade,  I cannot come today bc I live in [facility] & have to arrange transportation w/ [facility] @ least 48 hours b/c they have to transport so many other patients. I can call the Agency & see but I doubt if they can pick me up @ 2:00 pm TODAY to get me there as it takes 40 minutes & it is short notice. I have a DENTIST APPT tomorrow @ 9:00 am.  I have no appointments on this Friday; & Monday I will be in [address] [facility] to get my regular Allergy Shot @ 8:15 am with Carole Salcedo. No, I am not short of breath, & no there is nothing else that I can think of at this time to cause my throat to hurt. I do have chronic sinusitis & s/t dry upon waking up in the mornings. Let me know what he wants me to do.   Thank you

## 2021-03-24 NOTE — TELEPHONE ENCOUNTER
Spoke with Dr. Maegan Velazquez and he stated in the imaging before surgery and at the last appointment did not show anything as to why she is having the pain. Patient stated that the pain is swallowing and to touch. Dr. Maegan Velazquez stated that he recommends the patient to see her PCP since he did not do anything to that side of her throat. Patient was informed that if he develops lightheadedness, chest pain or dizziness to go to urgent care or call PCP asap. Patient verbalized understanding. Dr. Maegan Velazquez stated the pain could be musculoskeletal and this is a new symptom she should see PCP about. Dr. Maegan Velazquez stated we can make an appointment for her sooner then May to look everything over again. Appointment made. Patient also stated that she herself stopped taking her inhaler. Dr. Issac Johnson is monitoring her smoking issues, not PCP. Patient informed of everything Dr. Maegan Velazquez stated and verbalized understanding. Patient thanked me as well.

## 2021-03-24 NOTE — TELEPHONE ENCOUNTER
Patient called stating that in her current living situation she has cat and gas fumes. She stated that she has to pour water down her drain every 3 days or a sewage and gas smell comes up. Patient stated she is in the process of moving. She also stated she is gargling and taking pain medications. Patient would like to know if th fumes could be the reason for her pain or what Dr. Stevenson Mane suggests.

## 2021-03-29 ENCOUNTER — NURSE ONLY (OUTPATIENT)
Dept: ALLERGY | Age: 61
End: 2021-03-29
Payer: MEDICAID

## 2021-03-29 VITALS
HEART RATE: 70 BPM | TEMPERATURE: 96.3 F | DIASTOLIC BLOOD PRESSURE: 74 MMHG | SYSTOLIC BLOOD PRESSURE: 116 MMHG | RESPIRATION RATE: 14 BRPM

## 2021-03-29 DIAGNOSIS — Z51.6 ENCOUNTER FOR DESENSITIZATION TO ALLERGENS: Primary | ICD-10-CM

## 2021-03-29 DIAGNOSIS — J30.89 ALLERGY TO DUST: ICD-10-CM

## 2021-03-29 DIAGNOSIS — Z91.038 ALLERGY TO COCKROACHES: ICD-10-CM

## 2021-03-29 DIAGNOSIS — Z91.048 ALLERGY TO MOLD: ICD-10-CM

## 2021-03-29 DIAGNOSIS — J30.1 ALLERGY TO TREES: ICD-10-CM

## 2021-03-29 PROCEDURE — 95117 IMMUNOTHERAPY INJECTIONS: CPT | Performed by: NURSE PRACTITIONER

## 2021-03-29 NOTE — PROGRESS NOTES
After consent obtained/verified, allergy injection given in back of R/L arm(s). Documentation of vial injection specific to arm(s) noted on Allergy Immunotherapy Administration Form. Patient waited 30 minutes for observation. Patient tolerated Yellow vials well at 0.50ml without adverse reaction. SHOT REACTION TREATMENT INSTRUCTIONS    During the 30 minute wait after an allergy injection the following symptoms should be reported:    Itching other than at the injection site  Hives or swelling other than at the injection site  Redness other than at the injection site  Difficulty breathing  Chest tightness  Difficulty swallowing  Throat tightness    If these symptoms occur, NOTIFY PROVIDER and the following treatment should be administered:    1. Epinephrine 1:1000 IM - 0.3 ml if > 66 lbs or more, 0.15 ml if 33 - 63 lbs, or 0.1 ml if <33 lbs   2. Diphenhydramine - give all intramuscular:     2 to <6 years (off-label use): 6.25 mg,    6 to <12 years: 12.5 to 25 mg;    ?12 years: 25-50 mg.  3.  Famotidine:  Adults 40 mg oral    Adolescents age 12 years and >88 lbs: 40 mg    Children and Adolescents ? 12years of age: Initial: 0.25 mg/kg/dose   every 12 hours (maximum daily dose: 40 mg/day)    Epi dose may me repeated in 5-15 minutes if adequate resolution of symptoms does not occur    Patient should be observed for at least one hour after final epi dose and must be seen by provider. Patients cannot drive themselves if they have received diphenhydramine.

## 2021-03-31 NOTE — TELEPHONE ENCOUNTER
Patient called in requesting another refill on her nicotine patches. Patient states she had vocal cord surgery and is unsure if she is to continue with the nicotine inhaler. Patient stated if she is not to continue with the nicotine inhaler can we prescribe nicotine gum or lozenges. I advised her she could purchase the gum or lozenges over the counter as most insurances do not cover them. I was informed by the patient that you were the one that went to college to make that decision and not me. I tried to explain to her that I have worked with Venkata Laureano on this and again was informed that it wasn't my decision it was yours. Please advise.

## 2021-04-01 ENCOUNTER — PATIENT MESSAGE (OUTPATIENT)
Dept: FAMILY MEDICINE CLINIC | Age: 61
End: 2021-04-01

## 2021-04-01 RX ORDER — ESOMEPRAZOLE MAGNESIUM 40 MG/1
40 CAPSULE, DELAYED RELEASE ORAL 2 TIMES DAILY
Qty: 180 CAPSULE | Refills: 3 | Status: SHIPPED | OUTPATIENT
Start: 2021-04-01 | End: 2022-02-14

## 2021-04-01 NOTE — TELEPHONE ENCOUNTER
From: Bulmaro Pineda  To: Jeremy Vallecillo DO  Sent: 4/1/2021 11:27 AM EDT  Subject: Prescription Question    Dr German Lima or Nurse,  This is [facility]. I am in need of my   ESOMAPRAZOLE 40mg 2 x a day RX for my GERD. They have no refills on file for me. [address] [facility], [address] [facility].   Thank you

## 2021-04-02 ENCOUNTER — TELEPHONE (OUTPATIENT)
Dept: FAMILY MEDICINE CLINIC | Age: 61
End: 2021-04-02

## 2021-04-02 ENCOUNTER — TELEPHONE (OUTPATIENT)
Dept: PULMONOLOGY | Age: 61
End: 2021-04-02

## 2021-04-02 DIAGNOSIS — Z72.0 TOBACCO ABUSE: Primary | ICD-10-CM

## 2021-04-02 DIAGNOSIS — Z87.891 PERSONAL HISTORY OF TOBACCO USE: ICD-10-CM

## 2021-04-02 RX ORDER — NICOTINE 21 MG/24HR
1 PATCH, TRANSDERMAL 24 HOURS TRANSDERMAL DAILY
Qty: 28 PATCH | Refills: 0 | OUTPATIENT
Start: 2021-04-02 | End: 2021-04-30

## 2021-04-02 RX ORDER — NICOTINE 21 MG/24HR
1 PATCH, TRANSDERMAL 24 HOURS TRANSDERMAL EVERY 24 HOURS
Qty: 42 PATCH | Refills: 0 | Status: SHIPPED | OUTPATIENT
Start: 2021-04-02 | End: 2022-03-28

## 2021-04-02 NOTE — TELEPHONE ENCOUNTER
[address] fax received, PA required for Esomeprazole 40 mg Capsules. Cover my meds PA pending.

## 2021-04-02 NOTE — TELEPHONE ENCOUNTER
I tried to reach patient by calling her home phone number and mobile phone number given in the chart to inform her regarding refill of nicotine replacement therapy as requested by patient. Patient home phone number and mobile phone number coming busy without any answer after trying 2 times at each phone number. Plan:   Nicoderm CQ patches by starting  At 14mg patch daily for 6weeks followed by 7mg patch for 2weeks and then stop. The above prescription was E scripted to her pharmacy. Patient to keep her scheduled follow-up with my clinic.

## 2021-04-02 NOTE — TELEPHONE ENCOUNTER
Esomeprazole 40 mg capsule APPROVAL letter received. Coverage 4/1/21-4/1/22. Prior auth #: [id number]. Tracking #: [id number]. Osmin Ronquillo notified via detailed VM.

## 2021-04-05 ENCOUNTER — NURSE ONLY (OUTPATIENT)
Dept: ALLERGY | Age: 61
End: 2021-04-05
Payer: MEDICAID

## 2021-04-05 VITALS
TEMPERATURE: 97.3 F | RESPIRATION RATE: 16 BRPM | HEART RATE: 70 BPM | SYSTOLIC BLOOD PRESSURE: 120 MMHG | DIASTOLIC BLOOD PRESSURE: 78 MMHG

## 2021-04-05 DIAGNOSIS — J30.89 ALLERGY TO DUST: ICD-10-CM

## 2021-04-05 DIAGNOSIS — Z91.048 ALLERGY TO MOLD: ICD-10-CM

## 2021-04-05 DIAGNOSIS — Z51.6 ENCOUNTER FOR DESENSITIZATION TO ALLERGENS: ICD-10-CM

## 2021-04-05 DIAGNOSIS — J45.50 SEVERE PERSISTENT ASTHMA WITHOUT COMPLICATION: Primary | ICD-10-CM

## 2021-04-05 DIAGNOSIS — J82.83 EOSINOPHILIC ASTHMA: ICD-10-CM

## 2021-04-05 DIAGNOSIS — J30.1 ALLERGY TO TREES: ICD-10-CM

## 2021-04-05 DIAGNOSIS — Z91.038 ALLERGY TO COCKROACHES: ICD-10-CM

## 2021-04-05 PROCEDURE — 96401 CHEMO ANTI-NEOPL SQ/IM: CPT | Performed by: NURSE PRACTITIONER

## 2021-04-05 PROCEDURE — 95117 IMMUNOTHERAPY INJECTIONS: CPT | Performed by: NURSE PRACTITIONER

## 2021-04-05 PROCEDURE — 96372 THER/PROPH/DIAG INJ SC/IM: CPT | Performed by: NURSE PRACTITIONER

## 2021-04-05 NOTE — PROGRESS NOTES
Patient here today to receive Xolair injection. Patient does not report any previous reaction from Xolair injections. Denies any nausea vomiting fever urticaria or angioedema. Denies any recent exacerbation of asthma or asthma-like symptoms. Patient was explained benefits and potential risks including anaphylaxis to patient. Following obtaining verbal and written consent (on file)  Xolair injection was prepared 30 minutes prior to injection according to manufacture guidelines. LYOPHILIZED POWDER (VIAL); reconstituted with 1.4 ml injectable sterile water per Vial.      Patient has been explained the risk and benefits including delayed anaphylaxis. Patient has EpiPen and understands how to appropriately use. Xolair 150 mg given subcutaneously in LT&RT THIGHs for a total combined dose of 300mg  using a 25-gauge needle and 3 ML syringe. Site injections may include the following sites:  RIGHT  / LEFT UPPER ARM,  RIGHT  / LEFT FRONT OR MIDDLE OF THIGH, OR STOMACH        Xolair  NDC 29811-733-91(1)   Lot [id number](8)   Expires 11/2021(2)    Prior to patient receiving Xolair area was cleansed with alcohol swabs. Following the administration no evidence of bleeding or bruising. Patient  observed for 2 hours following first three injections and then 30 minutes following each subsequent administration. No adverse reactions reported. Patient tolerated well without adverse reactions or side effects. Patient to continue to receive Xolair injections monthly. Will report any problems to this office.     Patient to return to clinic monthly for Xolair injections and will follow-up with provider a minimum of every 6 months for evaluation and labs including CMP, CBC with diff, and IGE    SHOT REACTION TREATMENT INSTRUCTIONS    During the 30 minute wait after an allergy injection the following symptoms should be reported:    Itching other than at the injection site  Hives or swelling other than at the injection site  Redness other than at the injection site  Difficulty breathing  Chest tightness  Difficulty swallowing  Throat tightness    If these symptoms occur, NOTIFY PROVIDER and the following treatment should be administered:    1. Epinephrine 1:1000 IM - 0.3 ml if > 66 lbs or more, 0.15 ml if 33 - 63 lbs, or 0.1 ml if <33 lbs   2. Diphenhydramine - give all intramuscular:     2 to <6 years (off-label use): 6.25 mg,    6 to <12 years: 12.5 to 25 mg;    ?12 years: 25-50 mg.  3.  Famotidine:  Adults 40 mg oral    Adolescents age 12 years and >88 lbs: 40 mg    Children and Adolescents ? 12years of age: Initial: 0.25 mg/kg/dose   every 12 hours (maximum daily dose: 40 mg/day)    Epipen dose may be repeated in 5-15 minutes if adequate resolution of symptoms does not occur    Patient should be observed for at least one hour after final epi dose and must be seen by provider. Patients cannot drive themselves if they have received diphenhydramine.

## 2021-04-07 ENCOUNTER — TELEPHONE (OUTPATIENT)
Dept: PHARMACY | Age: 61
End: 2021-04-07

## 2021-04-07 NOTE — TELEPHONE ENCOUNTER
Called pt to discuss smoking cessation clinic status. Currently pt's enrollment was put on hold has she had vocal cord surgery and was told not to speak. Pt informs that is has been cleared to speak, wants to continue. Pt states that she stopped Nicotrol inhaler on her own due to vocal cord procedure. Pt states that called Dr. Vianca Dale office for Nicotine patch refills. Pt is interested in Nicotine lozenge prn, states that Dr. Vianca Dale office told her to get this OTC. Pt states that she has a lot going on right now. Pt planning to talk to her counselor about several concerns, encouraged pt to also discuss with counselor that she is stopping smoking and discuss worries with counselor. Will call pt on 4/21 to determine next appt.

## 2021-04-08 ENCOUNTER — OFFICE VISIT (OUTPATIENT)
Dept: FAMILY MEDICINE CLINIC | Age: 61
End: 2021-04-08
Payer: MEDICAID

## 2021-04-08 VITALS
HEART RATE: 100 BPM | SYSTOLIC BLOOD PRESSURE: 138 MMHG | RESPIRATION RATE: 20 BRPM | BODY MASS INDEX: 35.14 KG/M2 | DIASTOLIC BLOOD PRESSURE: 68 MMHG | WEIGHT: 204.7 LBS

## 2021-04-08 DIAGNOSIS — E04.1 THYROID NODULE: ICD-10-CM

## 2021-04-08 DIAGNOSIS — M79.12 STERNOCLEIDOMASTOID MUSCLE TENDERNESS: ICD-10-CM

## 2021-04-08 DIAGNOSIS — J38.3 VOCAL CORD MASS: ICD-10-CM

## 2021-04-08 DIAGNOSIS — M54.2 CERVICALGIA: Primary | ICD-10-CM

## 2021-04-08 DIAGNOSIS — E03.9 HYPOTHYROIDISM, UNSPECIFIED TYPE: ICD-10-CM

## 2021-04-08 PROCEDURE — 99214 OFFICE O/P EST MOD 30 MIN: CPT | Performed by: FAMILY MEDICINE

## 2021-04-08 ASSESSMENT — ENCOUNTER SYMPTOMS
GASTROINTESTINAL NEGATIVE: 1
RESPIRATORY NEGATIVE: 1

## 2021-04-08 NOTE — PROGRESS NOTES
Visit Information    Have you changed or started any medications since your last visit including any over-the-counter medicines, vitamins, or herbal medicines? no   Are you having any side effects from any of your medications? -  no  Have you stopped taking any of your medications? Is so, why? -  yes - tx complete    Have you seen any other physician or provider since your last visit? No  Have you had any other diagnostic tests since your last visit? Yes - Records Obtained  Have you been seen in the emergency room and/or had an admission to a hospital since we last saw you? No  Have you had your routine dental cleaning in the past 6 months? yes - 3/17/21    Have you activated your [employer] account? If not, what are your barriers?  Yes     Patient Care Team:  Phill Lopez DO as PCP - General (Family Medicine)  Phill Lopez DO as PCP [facility]  Kavitha Kc MD    Medical History Review  Past Medical, Family, and Social History reviewed and does contribute to the patient presenting condition    Health Maintenance   Topic Date Due    Breast cancer screen  09/23/2017    Cervical cancer screen  09/23/2018    COVID-19 Vaccine (2 - Pfizer 2-dose series) 04/13/2021    A1C test (Diabetic or Prediabetic)  08/19/2021    Lipid screen  08/19/2021    TSH testing  08/19/2021    DTaP/Tdap/Td vaccine (2 - Tdap) 10/12/2026    Colon cancer screen colonoscopy  04/17/2027    Flu vaccine  Completed    Shingles Vaccine  Completed    Pneumococcal 0-64 years Vaccine  Completed    Hepatitis C screen  Addressed    HIV screen  Addressed    Hepatitis A vaccine  Aged Out    Hepatitis B vaccine  Aged Out    Hib vaccine  Aged Out    Meningococcal (ACWY) vaccine  Aged Out

## 2021-04-08 NOTE — PROGRESS NOTES
Derian Sorensen (:  1960) is a 56 y.o. female,Established patient, here for evaluation of the following chief complaint(s):  Neck Pain      SUBJECTIVE/OBJECTIVE:  HPI:    Chief Complaint   Patient presents with    Neck Pain     Pt presents today with c/o neck pain since her surgery on 19 s/p ENT surgery. Pain is located on the left side of the neck. Pt has seen Dr. Jaspreet Menezes for this, did not feel this was related to the surgery or any other ENT issue. Impression       1. 8.7 x 6.2 x 4.1 mm hypoechoic well-circumscribed nodule in the left lobe of the thyroid gland. TR2.   2. Small lymph nodes adjacent to the right lobe of thyroid gland inferiorly measuring 1.1 x 0.7 x 0.7 cm and 1.1 x 0.8 x 0.4 cm with no fatty hilum. This corresponds to previously noted node seen on CT scan. 3. Small thyroid gland. Impression       1. Motion artifact at the level of the vocal cords. No gross abnormality is noted. No paraglottic abnormality is present. 2. 1.1 cm nodule in the right lower neck. The differential diagnosis includes a mildly enlarged lymph node and a small parathyroid adenoma. 3. Enlarged right lacrimal gland. Clinical correlation is recommended. Pt denies radicular symptoms.       Patient Active Problem List   Diagnosis    Hypothyroid    Fibromyalgia    Obesity    Hyperlipemia    Interstitial cystitis    ETD (eustachian tube dysfunction)    Sinusitis, chronic    Vestibular dizziness    Nasal septal deviation    IGT (impaired glucose tolerance)    Vitamin D deficiency    Arnold-Chiari malformation (HCC)    Tobacco abuse    Esophageal reflux    COPD (chronic obstructive pulmonary disease) (HCC)    Eosinophilic asthma    Right sided facial pain    Vocal cord mass right     (ventriculoperitoneal) shunt status    Status post spinal surgery    Syringomyelia and syringobulbia ([address]    Pulmonary eosinophilia     Past Surgical History:   Procedure Laterality Date    APPENDECTOMY       SECTION      x2    COLONOSCOPY  [id number]    CSF SHUNT  2007    Cervical syrinx to subarachnoid shunt; thoracic syrinx to subarachnoid shunt (2 separate dural openings)    CYST REMOVAL      extradural cysts at thoracic 2-3    ECTOPIC PREGNANCY SURGERY      HEMICOLECTOMY      HERNIA REPAIR      inguinal     HYSTERECTOMY      LARYNGOSCOPY N/A 2021    MICROLARYNGOSCOPY AND BIOPSY   performed by Kathleen Carter MD at [facility]  2007    C4-5-6-7; T1-2-3 laminectomies    TOOTH EXTRACTION  2017     Social History     Tobacco Use    Smoking status: Current Every Day Smoker     Packs/day: 0.20     Years: 25.00     Pack years: 5.00     Types: Cigarettes     Start date: 1975    Smokeless tobacco: Never Used    Tobacco comment: smokes 2 cigs a day   Substance Use Topics    Alcohol use: Yes     Alcohol/week: 2.0 standard drinks     Types: 2 Cans of beer per week     Frequency: Monthly or less     Drinks per session: Patient refused     Binge frequency: Never     Comment: Patient drinks 1-2 beers a MONTH ; stomach tolerates very little alcohol    Drug use: Yes     Types: Marijuana     Comment: smokes marijuana occasionally         Review of Systems   Constitutional: Negative. HENT: Negative. Respiratory: Negative. Cardiovascular: Negative. Gastrointestinal: Negative. Musculoskeletal: Positive for neck pain and neck stiffness. All other systems reviewed and are negative. Physical Exam  Vitals signs and nursing note reviewed. Constitutional:       General: She is not in acute distress. Appearance: Normal appearance. She is well-developed. HENT:      Head: Normocephalic and atraumatic. Right Ear: Tympanic membrane normal.      Left Ear: Tympanic membrane normal.   Eyes:      Conjunctiva/sclera: Conjunctivae normal.   Neck:      Musculoskeletal: Neck supple.  Pain with movement and muscular tenderness present. No neck rigidity or spinous process tenderness. Trachea: Trachea normal.     Cardiovascular:      Rate and Rhythm: Normal rate and regular rhythm. Heart sounds: Normal heart sounds. No murmur. Pulmonary:      Effort: Pulmonary effort is normal.      Breath sounds: Normal breath sounds. No wheezing, rhonchi or rales. Abdominal:      General: There is no distension. Lymphadenopathy:      Cervical: No cervical adenopathy. Skin:     General: Skin is warm and dry. Findings: No rash (on exposed surfaces). Neurological:      General: No focal deficit present. Mental Status: She is alert. Psychiatric:         Attention and Perception: Attention normal.         Mood and Affect: Mood normal.         Speech: Speech normal.         Behavior: Behavior normal. Behavior is cooperative. Thought Content: Thought content normal.         Judgment: Judgment normal.       ASSESSMENT/PLAN:  1. Cervicalgia  2. Sternocleidomastoid muscle tenderness  3. Thyroid nodule  4. Vocal cord mass  5. Hypothyroidism, unspecified type    -  Pt reassurance  -  Recent testing reviewed  -  Agree with Dr. Brina Medeiros that this is likely MSK in nature, home exercises given  -  Repeat US scheduled for May  -  Continue current meds    Return if symptoms worsen or fail to improve. An electronic signature was used to authenticate this note.     --Matias Carrington, DO

## 2021-04-12 ENCOUNTER — NURSE ONLY (OUTPATIENT)
Dept: ALLERGY | Age: 61
End: 2021-04-12
Payer: MEDICAID

## 2021-04-12 VITALS
TEMPERATURE: 96.6 F | SYSTOLIC BLOOD PRESSURE: 124 MMHG | HEART RATE: 70 BPM | DIASTOLIC BLOOD PRESSURE: 76 MMHG | RESPIRATION RATE: 14 BRPM

## 2021-04-12 DIAGNOSIS — Z91.048 ALLERGY TO MOLD: ICD-10-CM

## 2021-04-12 DIAGNOSIS — J30.1 ALLERGY TO TREES: ICD-10-CM

## 2021-04-12 DIAGNOSIS — J30.89 ALLERGY TO DUST: ICD-10-CM

## 2021-04-12 DIAGNOSIS — Z51.6 ENCOUNTER FOR DESENSITIZATION TO ALLERGENS: Primary | ICD-10-CM

## 2021-04-12 DIAGNOSIS — Z91.038 ALLERGY TO COCKROACHES: ICD-10-CM

## 2021-04-12 PROCEDURE — 95117 IMMUNOTHERAPY INJECTIONS: CPT | Performed by: NURSE PRACTITIONER

## 2021-04-12 NOTE — PROGRESS NOTES
After consent obtained/verified, allergy injection given in back of R/L arm(s). VIAL COLOR OF ALL VIALS TODAY IS RED vials    ALLERGY INJECTION FROM VIAL A GIVEN RT UPPER ARM IN THE AMOUNT OF 0.10ML    ALLERGY INJECTION FROM VIAL B GIVEN LT UPPER ARM IN THE AMOUNT OF 0.10 ML        Documentation of vial injection specific to arm(s) noted on Allergy Immunotherapy Administration Form. Patient waited 30 minutes for observation. Patient tolerated well without adverse reaction. SHOT REACTION TREATMENT INSTRUCTIONS    During the 30 minute wait after an allergy injection the following symptoms should be reported:    Itching other than at the injection site  Hives or swelling other than at the injection site  Redness other than at the injection site  Difficulty breathing  Chest tightness  Difficulty swallowing  Throat tightness    If these symptoms occur, NOTIFY PROVIDER and the following treatment should be administered:    1. Epinephrine/Auvi Q 1:1000 IM - 0.3 ml if > 66 lbs or more, 0.15 ml if 33 - 63 lbs, or 0.1 ml if <33 lbs     2. Diphenhydramine - give all intramuscular:     2 to <6 years (off-label use): 6.25 mg,    6 to <12 years: 12.5 to 25 mg;    ?12 years: 25-50 mg.    3.  Famotidine:  Adults 40 mg oral    Adolescents age 12 years and >88 lbs: 40 mg    Children and Adolescents ? 12years of age: Initial: 0.25 mg/kg/dose  every 12 hours (maximum daily dose: 40 mg/day)    Epi/Auvi Q dose may me repeated in 5-15 minutes if adequate resolution of symptoms does not occur    Patient should be observed for at least one hour after final Epi/Auvi Q dose and must be seen by provider. Patients cannot drive themselves if they have received diphenhydramine.

## 2021-04-13 ENCOUNTER — IMMUNIZATION (OUTPATIENT)
Dept: PRIMARY CARE CLINIC | Age: 61
End: 2021-04-13
Payer: MEDICAID

## 2021-04-13 PROCEDURE — 91300 COVID-19, PFIZER VACCINE 30MCG/0.3ML DOSE: CPT | Performed by: FAMILY MEDICINE

## 2021-04-13 PROCEDURE — 0002A COVID-19, PFIZER VACCINE 30MCG/0.3ML DOSE: CPT | Performed by: FAMILY MEDICINE

## 2021-04-15 ENCOUNTER — NURSE ONLY (OUTPATIENT)
Dept: LAB | Age: 61
End: 2021-04-15

## 2021-04-15 ENCOUNTER — OFFICE VISIT (OUTPATIENT)
Dept: ENT CLINIC | Age: 61
End: 2021-04-15
Payer: MEDICAID

## 2021-04-15 VITALS
HEART RATE: 56 BPM | SYSTOLIC BLOOD PRESSURE: 100 MMHG | BODY MASS INDEX: 33.6 KG/M2 | HEIGHT: 64 IN | DIASTOLIC BLOOD PRESSURE: 80 MMHG | TEMPERATURE: 97.9 F | WEIGHT: 196.8 LBS | RESPIRATION RATE: 24 BRPM

## 2021-04-15 DIAGNOSIS — F17.200 TOBACCO DEPENDENCE: ICD-10-CM

## 2021-04-15 DIAGNOSIS — E03.9 ACQUIRED HYPOTHYROIDISM: ICD-10-CM

## 2021-04-15 DIAGNOSIS — J38.3 POLYPOID DEGENERATION OF VOCAL CORDS: Primary | ICD-10-CM

## 2021-04-15 DIAGNOSIS — E04.2 MULTIPLE THYROID NODULES: ICD-10-CM

## 2021-04-15 DIAGNOSIS — R49.0 HOARSE: ICD-10-CM

## 2021-04-15 LAB
T4 FREE: 1.41 NG/DL (ref 0.93–1.76)
TSH SERPL DL<=0.05 MIU/L-ACNC: 0.61 UIU/ML (ref 0.4–4.2)

## 2021-04-15 PROCEDURE — 99214 OFFICE O/P EST MOD 30 MIN: CPT | Performed by: OTOLARYNGOLOGY

## 2021-04-15 PROCEDURE — 31575 DIAGNOSTIC LARYNGOSCOPY: CPT | Performed by: OTOLARYNGOLOGY

## 2021-04-15 ASSESSMENT — ENCOUNTER SYMPTOMS
COLOR CHANGE: 0
APNEA: 0
COUGH: 0
VOMITING: 0
ABDOMINAL PAIN: 0
RHINORRHEA: 0
CHEST TIGHTNESS: 0
TROUBLE SWALLOWING: 0
STRIDOR: 0
SHORTNESS OF BREATH: 0
SORE THROAT: 0
CHOKING: 0
SINUS PRESSURE: 0
VOICE CHANGE: 0
NAUSEA: 0
FACIAL SWELLING: 0
DIARRHEA: 0
WHEEZING: 0

## 2021-04-15 NOTE — PROGRESS NOTES
[facility] [place] [facility]  [facility] EAR, NOSE AND THROAT  [address] [place] [address] [place] [address]  Dept: [phone]  Dept Fax: [phone]  Loc: Enedelia Kirkland is a 56 y.o. female who was referred byNo ref. provider found for:  Chief Complaint   Patient presents with    Other     left sided throat pain. when she first called it was pain in the inside and now it's pain on the outside. When she swalllowed it was very noticable, does not notice it when she swallows now. she finds relief from massaging it after she was told it was muscluskeletal pain and taking muscle relaxers. Leonardo Davison HPI:     Zuleyma Shabazz is a 56 y.o. female who presents today for her left-sided neck pain. She stated Dr. Anders Fleischer also thought it was musculoskeletal and this has completely resolved with appropriate massage and muscle relaxants. She still cannot raise her voice very much. I reassured her that the pathology on her biopsy was benign. As for her smoking she still smokes 2 cigarettes a day and is using patches. She still has significant tobacco cravings that she cannot seem to control. History:      Allergies   Allergen Reactions    Bactrim [Sulfamethoxazole-Trimethoprim]     Flexeril [Cyclobenzaprine]     Morphine Other (See Comments)     \"I hallucinate\"    Peanut-Containing Drug Products     Tessalon [Benzonatate] Hives    Amoxicillin-Pot Clavulanate Rash    Ibuprofen [Ibuprofen] Rash    Lisinopril Rash    Macrobid [Nitrofurantoin Monohydrate Macrocrystals] Rash    Macrodantin [Nitrofurantoin] Rash    Ranitidine Rash     Current Outpatient Medications   Medication Sig Dispense Refill    esomeprazole (NEXIUM) 40 MG delayed release capsule Take 1 capsule by mouth 2 times daily 180 capsule 3    metFORMIN (GLUCOPHAGE) 500 MG tablet TAKE 1 TABLET BY MOUTH TWICE DAILY WITH MEALS 60 tablet 5    atorvastatin (LIPITOR) 10 MG tablet Take 1 tablet by mouth once daily 30 tablet 5    hydrocortisone 2.5 % cream APPLY TOPICALLY TO AFFECTED AREA TWICE DAILY 56 g 0    clobetasol (TEMOVATE) 0.05 % ointment Apply to scalp daily 60 g 2    ketoconazole (NIZORAL) 2 % shampoo Apply 3-4 times weekly to scalp, leave on for five minutes prior to washing off 120 mL 11    albuterol (PROVENTIL) (2.5 MG/3ML) 0.083% nebulizer solution USE 1 VIAL IN NEBULIZER EVERY 6 HOURS AS NEEDED FOR WHEEZING (FOR ASTHMA) 375 mL 5    EPIPEN 2-VENESSA 0.3 MG/0.3ML SOAJ injection INJECT 1 PEN IM AS A SINGLE DOSE PRN 1 each 1    gabapentin (NEURONTIN) 100 MG capsule Take 100 mg by mouth 4 times daily.  amLODIPine (NORVASC) 5 MG tablet TAKE 1 TABLET BY MOUTH ONCE DAILY 90 tablet 3    hydrocortisone (HYTONE) 2.5 % lotion APPLY TOPICALLY TO AFFECTED AREA DAILY 59 mL 0    omalizumab (OMALIZUMAB) 150 MG injection Inject 300 mg into the skin every 28 days 1 each 11    traZODone (DESYREL) 50 MG tablet Take 50 mg by mouth nightly as needed for Sleep       azelastine (OPTIVAR) 0.05 % ophthalmic solution Place 1 drop into both eyes 2 times daily 1 Bottle 11    azelastine (ASTELIN) 0.1 % nasal spray 1 spray by Nasal route 2 times daily Use in each nostril as directed 1 Bottle 11    SYMBICORT 160-4.5 MCG/ACT AERO INHALE 2 PUFFS BY MOUTH TWICE DAILY. RINSE MOUTH WELL AFTER USE. 11 g 5    albuterol sulfate  (90 Base) MCG/ACT inhaler Inhale 2 puffs into the lungs every 4 hours as needed for Wheezing (AS NEEDED) 9 g 5    fexofenadine (ALLEGRA) 180 MG tablet Take 1 tablet by mouth daily 90 tablet 3    SYNTHROID 88 MCG tablet TAKE 1 TABLET BY MOUTH ONCE DAILY ON AN EMPTY STOMACH WITH WATER. DO NOT EAT OR TAKE ANY OTHER MEDICATIONS FOR 30 MINUTES.  30 tablet 11    hyoscyamine (LEVBID) 375 MCG extended release tablet TAKE 1/2 TABLET BY MOUTH TWICE DAILY 30 tablet 11    Cholecalciferol (VITAMIN D3) 50 MCG (2000 UT) CAPS Vitamin D3 2,000 unit capsule 30 capsule 11    Biotin 1 MG CAPS Take 1 capsule by mouth daily  clobetasol (TEMOVATE) 0.05 % external solution Apply daily to scaly or itchy areas on scalp 60 mL 11    fluocinonide (LIDEX) 0.05 % cream Apply topically 2 times daily to rash on hands a needed 30 g 11    levomilnacipran (FETZIMA) 40 MG CP24 extended release capsule Take 1 capsule by mouth daily 30 capsule 2    Multiple Vitamins-Minerals (MULTIVITAMIN PO) 1 tablet daily       Probiotic Product (PROBIOTIC PO) 1 tablet daily       Cyanocobalamin (B-12 PO) daily      SF 5000 PLUS 1.1 % CREA BRUSH TEETH TWICE DAILY FOR 2 MINUTES, SPIT EXCESS AFTER BRUSHING. DO NOT RINSE WITH WATER , WAIT 30 MINUTES  FOR ANY FOOD OR DRINK  3    ondansetron (ZOFRAN) 4 MG tablet TAKE 1 TABLET BY MOUTH EVERY 8 HOURS AS NEEDED FOR NAUSEA OR VOMITING 15 tablet 0    meloxicam (MOBIC) 7.5 MG tablet TK 1 T PO BID  4    LINZESS 290 MCG CAPS capsule 290 mcg every morning (before breakfast)       lidocaine (XYLOCAINE) 5 % ointment Apply topically as needed for Pain Apply topically as needed.  conjugated estrogens (PREMARIN) 0.625 MG/GM vaginal cream Place vaginally as needed Place vaginally daily.  lidocaine (LIDODERM) 5 % Place 1 patch onto the skin daily 12 hours on, 12 hours off.  hydrocortisone (ANUSOL-HC) 25 MG suppository Place 25 mg rectally as needed for Hemorrhoids      fluticasone (FLONASE ALLERGY RELIEF) 50 MCG/ACT nasal spray 1 spray by Nasal route daily      HYDROcodone-acetaminophen (NORCO) 5-325 MG per tablet 1 tablet nightly as needed for Pain. Take 1 tablet every 4-6 hours as needed for pain       NONFORMULARY Immunotherapy allergy shot      zinc 50 MG CAPS Take 1 capsule by mouth daily       Ascorbic Acid (VITAMIN C) 500 MG CAPS Take 1 tablet by mouth daily       orphenadrine (NORFLEX) 100 MG tablet Take 100 mg by mouth 2 times daily as needed.  tramadol (ULTRAM) 50 MG tablet Take 50 mg by mouth every 6 hours as needed.  Take 1-2 tabs every 6 hrs prn       nicotine (NICODERM CQ) 14 MG/24HR Place 1 patch onto the skin every 24 hours May wear 24 hours. May remove at bedtime if problems with insomnia. Re-Apply new patch immediately on awakening. Diagnosis: Tobacco dependence 42 patch 0    nicotine (NICODERM CQ) 7 MG/24HR Place 1 patch onto the skin every 24 hours for 14 days Nicoderm 7mg/day patch need to be started only after completion of Nicoderm CQ 14mg/day patch for 14days ( 2weeks).  14 patch 0    nicotine (NICOTROL) 10 MG inhaler Inhale 1 puff into the lungs as needed for Smoking cessation (Patient not taking: Reported on 4/15/2021) 1 Inhaler 3    Respiratory Therapy Supplies (NEBULIZER COMPRESSOR) KIT 1 kit by Does not apply route once for 1 dose 360 kit 0     Current Facility-Administered Medications   Medication Dose Route Frequency Provider Last Rate Last Admin    omalizumab Kory Gallagher injection 150 mg  150 mg Subcutaneous Q28 Days Elin Herbert, APRN - CNP   150 mg at 21 0941    omalizumab (XOLAIR) injection 150 mg  150 mg Subcutaneous Q28 Days Elin Herbert, APRN - CNP   150 mg at 21 8186    omalizumab (XOLAIR) injection 150 mg  150 mg Subcutaneous Q28 Days Elin Herbert, APRN - CNP   150 mg at 21 5977    omalizumab (XOLAIR) injection 150 mg  150 mg Subcutaneous Q28 Days Elin Herbert, APRN - CNP   150 mg at 21 8264     Past Medical History:   Diagnosis Date    Allergic rhinitis     Anxiety     Arnold-Chiari malformation (HCC)     Asthma     Chronic bronchitis (HCC)     Fibromyalgia     GERD (gastroesophageal reflux disease)     Headache(784.0)     Hyperlipidemia     Neuropathy     Osteoarthritis     Sinusitis     Type II or unspecified type diabetes mellitus without mention of complication, not stated as uncontrolled       Past Surgical History:   Procedure Laterality Date    APPENDECTOMY       SECTION      x2    COLONOSCOPY  [id number]    CSF SHUNT  2007    Cervical syrinx to subarachnoid shunt; thoracic syrinx to subarachnoid shunt (2 separate dural openings)    CYST REMOVAL      extradural cysts at thoracic 2-3    ECTOPIC PREGNANCY SURGERY      HEMICOLECTOMY      HERNIA REPAIR      inguinal     HYSTERECTOMY      LARYNGOSCOPY N/A 2/24/2021    MICROLARYNGOSCOPY AND BIOPSY   performed by Anita Marquis MD at [facility]  01/09/2007    C4-5-6-7; T1-2-3 laminectomies    TOOTH EXTRACTION  09/2017     Family History   Problem Relation Age of Onset    Cancer Other         colon    Mental Illness Other     High Blood Pressure Other     Diabetes Other     High Blood Pressure Sister      Social History     Tobacco Use    Smoking status: Current Every Day Smoker     Packs/day: 0.20     Years: 25.00     Pack years: 5.00     Types: Cigarettes     Start date: 6/1/1975    Smokeless tobacco: Never Used    Tobacco comment: smokes 2 cigs a day   Substance Use Topics    Alcohol use: Not Currently     Alcohol/week: 2.0 standard drinks     Types: 2 Cans of beer per week     Frequency: Monthly or less     Drinks per session: Patient refused     Binge frequency: Never     Comment: Patient drinks 1-2 beers a MONTH ; stomach tolerates very little alcohol       Subjective:      Review of Systems   Constitutional: Negative for activity change, appetite change, chills, diaphoresis, fatigue, fever and unexpected weight change. HENT: Negative for congestion, dental problem, ear discharge, ear pain, facial swelling, hearing loss, mouth sores, nosebleeds, postnasal drip, rhinorrhea, sinus pressure, sneezing, sore throat, tinnitus, trouble swallowing and voice change. Eyes: Negative for visual disturbance. Respiratory: Negative for apnea, cough, choking, chest tightness, shortness of breath, wheezing and stridor. Cardiovascular: Negative for chest pain, palpitations and leg swelling. Gastrointestinal: Negative for abdominal pain, diarrhea, nausea and vomiting.    Endocrine: Negative for cold intolerance, heat intolerance, polydipsia and polyuria. Genitourinary: Negative for dysuria, enuresis and hematuria. Musculoskeletal: Negative for arthralgias, gait problem, neck pain and neck stiffness. Skin: Negative for color change and rash. Allergic/Immunologic: Negative for environmental allergies, food allergies and immunocompromised state. Neurological: Negative for dizziness, syncope, facial asymmetry, speech difficulty, light-headedness and headaches. Hematological: Negative for adenopathy. Does not bruise/bleed easily. Psychiatric/Behavioral: Negative for confusion and sleep disturbance. The patient is not nervous/anxious. Objective:   /80 (Site: Left Upper Arm)   Pulse 56   Temp 97.9 °F (36.6 °C) (Temporal)   Resp 24   Ht 5' 4\" (1.626 m)   Wt 196 lb 12.8 oz (89.3 kg)   BMI 33.78 kg/m²     Physical Exam   Mild to moderate hoarseness. No diplophonia. PROCEDURE: FIBEROPTIC LARYNGOSCOPY    A fiberoptic laryngoscopy was performed under topical anesthesia, after using Afrin and Lidocaine spray in the nasal fossa. The nasal fossa, nasopharynx, hypopharynx and larynx were carefully examined. Base of tongue was symmetrical. Epiglottis appeared normal and was not retrodisplaced. True vocal cords had normal mobility with symmetrical polypoid degeneration. There was no erythema. No mucosal lesions or masses were noted. No pooling in the pyriform sinuses. Data:  All of the past medical history, past surgical history, family history,social history, allergies and current medications were reviewed with the patient. Assessment & Plan   Diagnoses and all orders for this visit:     Diagnosis Orders   1. Polypoid degeneration of vocal cords  MA LARYNGOSCOPY FLEXIBLE DIAGNOSTIC   2. Tobacco dependence     3. Multiple thyroid nodules  T4, Free    TSH without Reflex   4. Acquired hypothyroidism  T4, Free    TSH without Reflex   5.  Hoarse  MA LARYNGOSCOPY FLEXIBLE DIAGNOSTIC       The findings were explained and her questions were answered. Patient's voice is improved significantly but she still has significant polypoid degeneration. She has a follow-up ultrasound next month and return visit to me. She is not had thyroid labs were quite some time. She seems tired and dragging and thought it would be best to make sure her thyroid supplementation was sufficient. Untreated hypothyroidism can contribute to vocal cord edema. She seems quite stressed. Apparently she has to move. I tried to reassure her that all he was having was the ultrasound and we would take a look to see if her nodules have grown. We discussed stopping smoking. I explained how she is trying to quit and has a mindset of just seeing how it goes, rather than deciding to quit. I have to restate explanations and recommendations several times. Jojo Bell. Nubia Byrd MD    **This report has been created using voice recognition software. It may contain minor errors which are inherent in voicerecognition technology. **

## 2021-04-15 NOTE — LETTER
[address] and Throat  Flakita Bhatia [address] [facility]  Phone: [phone]  Fax: [address], [place]        April 15, 2021    Rivas Luke, [address] [id number] [facility], [address]  [facility]    Patient: Zuleyma Shabazz   MR Number: [id number]   YOB: 1960   Date of Visit: 4/15/2021     Dear Rivas Luke,    I recently saw your patient, Zuleyma Shabazz, regarding her left external neck pain which has resolved with massage and muscle relaxants. Vocal cords still have significant polypoid degeneration which is symmetrical and related to her smoking and possibly hypothyroidism. I am also somewhat concerned about her general mentation. Below are the relevant portions of my assessment and plan of care. Assessment & Plan   Diagnoses and all orders for this visit:     Diagnosis Orders   1. Polypoid degeneration of vocal cords  ID LARYNGOSCOPY FLEXIBLE DIAGNOSTIC   2. Tobacco dependence     3. Multiple thyroid nodules  T4, Free    TSH without Reflex   4. Acquired hypothyroidism  T4, Free    TSH without Reflex   5. Hoarse  ID LARYNGOSCOPY FLEXIBLE DIAGNOSTIC       The findings were explained and her questions were answered. Patient's voice is improved significantly but she still has significant polypoid degeneration. She has a follow-up ultrasound next month and return visit to me. She is not had thyroid labs were quite some time. She seems tired and dragging and thought it would be best to make sure her thyroid supplementation was sufficient. Untreated hypothyroidism can contribute to vocal cord edema. She seems quite stressed. Apparently she has to move. I tried to reassure her that all he was having was the ultrasound and we would take a look to see if her nodules have grown. We discussed stopping smoking.   I explained how she is trying to quit and has a mindset of just seeing how it goes, rather than deciding to quit. I have to restate explanations and recommendations several times. If you have questions, please do not hesitate to call me. I look forward to following Gely along with you.     Sincerely,          Hua Vazquez MD

## 2021-04-19 ENCOUNTER — PATIENT MESSAGE (OUTPATIENT)
Dept: ENT CLINIC | Age: 61
End: 2021-04-19

## 2021-04-19 ENCOUNTER — NURSE ONLY (OUTPATIENT)
Dept: ALLERGY | Age: 61
End: 2021-04-19
Payer: MEDICAID

## 2021-04-19 VITALS
HEART RATE: 70 BPM | DIASTOLIC BLOOD PRESSURE: 80 MMHG | SYSTOLIC BLOOD PRESSURE: 122 MMHG | TEMPERATURE: 96.4 F | RESPIRATION RATE: 14 BRPM

## 2021-04-19 DIAGNOSIS — J30.89 ALLERGY TO DUST: ICD-10-CM

## 2021-04-19 DIAGNOSIS — Z91.038 ALLERGY TO COCKROACHES: ICD-10-CM

## 2021-04-19 DIAGNOSIS — Z91.048 ALLERGY TO MOLD: ICD-10-CM

## 2021-04-19 DIAGNOSIS — Z51.6 ENCOUNTER FOR DESENSITIZATION TO ALLERGENS: Primary | ICD-10-CM

## 2021-04-19 DIAGNOSIS — J30.1 ALLERGY TO TREES: ICD-10-CM

## 2021-04-19 DIAGNOSIS — H92.01 RIGHT EAR PAIN: Primary | ICD-10-CM

## 2021-04-19 PROCEDURE — 95117 IMMUNOTHERAPY INJECTIONS: CPT | Performed by: NURSE PRACTITIONER

## 2021-04-19 RX ORDER — FLUCONAZOLE 150 MG/1
TABLET ORAL PRN
COMMUNITY
Start: 2021-04-02

## 2021-04-19 NOTE — TELEPHONE ENCOUNTER
Dr Nubia Byrd is out of the office today. I will have to review this with him and decide the next course of action.

## 2021-04-19 NOTE — TELEPHONE ENCOUNTER
From: Kevan Marrero  To: Go Harris MD  Sent: 4/19/2021 9:37 AM EDT  Subject: Visit Follow-Up Question    This Message is for Keesha. Could you please tell Dr. Ciera Aponte that I am still having issues with my RIGHT EAR. Hello Dr. Ciera Aponte,  My RIGHT EAR is still bothering me! Some days worse than others but nevertheless it is frustrating! My CHIEF COMPLAINT BEFORE MY POLYP SURGERY WAS MY RIGHT EAR & it still is since a YEAR AGO. You told me to call back & set up an appointment after my FEBRUARY 24 surgery if it kept bothering me after 2 weeks. It comes and goes, so I did call back about BOTH my RIGHT EAR & the LEFT SIDE OF my THROAT. The throat pain has subsided but the ear pain is worse. I don't know what to do at this point. My EAR Symptoms are now:  1). Burning INSIDE OF EAR  2). I always told you when you would put the ear tool in my ear that it was Itchy  3). I am having Dizziness & Balance issues  4). Dull Ache  Some of the symptoms have developed over time. LAST YEAR it was the dull [address] which is why I started to complain about it initially. Do I have to go back to Dr. Schaffer Nito? What shall I do? Thank you Sir.

## 2021-04-19 NOTE — PROGRESS NOTES
After consent obtained/verified, allergy injection given in back of R/L arm(s). VIAL COLOR OF ALL VIALS TODAY IS RED vials    ALLERGY INJECTION FROM VIAL A GIVEN LT  UPPER ARM IN THE AMOUNT OF 0.15 ML    ALLERGY INJECTION FROM VIAL B GIVEN RT  UPPER ARM IN THE AMOUNT OF 0.15  ML        Documentation of vial injection specific to arm(s) noted on Allergy Immunotherapy Administration Form. Patient waited 30 minutes for observation. Patient tolerated well without adverse reaction. SHOT REACTION TREATMENT INSTRUCTIONS    During the 30 minute wait after an allergy injection the following symptoms should be reported:    Itching other than at the injection site  Hives or swelling other than at the injection site  Redness other than at the injection site  Difficulty breathing  Chest tightness  Difficulty swallowing  Throat tightness    If these symptoms occur, NOTIFY PROVIDER and the following treatment should be administered:    1. Epinephrine/Auvi Q 1:1000 IM - 0.3 ml if > 66 lbs or more, 0.15 ml if 33 - 63 lbs, or 0.1 ml if <33 lbs     2. Diphenhydramine - give all intramuscular:     2 to <6 years (off-label use): 6.25 mg,    6 to <12 years: 12.5 to 25 mg;    ?12 years: 25-50 mg.    3.  Famotidine:  Adults 40 mg oral    Adolescents age 12 years and >88 lbs: 40 mg    Children and Adolescents ? 12years of age: Initial: 0.25 mg/kg/dose  every 12 hours (maximum daily dose: 40 mg/day)    Epi/Auvi Q dose may me repeated in 5-15 minutes if adequate resolution of symptoms does not occur    Patient should be observed for at least one hour after final Epi/Auvi Q dose and must be seen by provider. Patients cannot drive themselves if they have received diphenhydramine.

## 2021-04-20 NOTE — TELEPHONE ENCOUNTER
Spoke with Dr. Cuco Donis and he stated that patient can sweet oil, soft food. He wanted to know if patient noticed if her jaw \"crunched\" TMJ symptoms. He stated that patient can keep her June appointment and we can address it at that time, if needed we can move up her appointment. Spoke with patient and she denied \"crunching\" of the jaw area, not noticing any jaw pain. She did mention when she yaws then it seems to open her ears and helps. She stated she will try the sweet oil and soft foods. She wanted to know if she could get an antibiotic to try she stated she hasn't been on one in a while and in the past she would be placed on an antibiotic and it would seem to help her with her ears and her dizziness, she wonders if she has some fluid/ ear infection.

## 2021-04-21 ENCOUNTER — HOSPITAL ENCOUNTER (OUTPATIENT)
Dept: PHARMACY | Age: 61
Setting detail: THERAPIES SERIES
Discharge: HOME | End: 2021-04-21
Payer: MEDICAID

## 2021-04-21 PROCEDURE — 99211 OFF/OP EST MAY X REQ PHY/QHP: CPT | Performed by: PHARMACIST

## 2021-04-21 RX ORDER — CEFDINIR 300 MG/1
300 CAPSULE ORAL 2 TIMES DAILY
Qty: 20 CAPSULE | Refills: 0 | Status: SHIPPED | OUTPATIENT
Start: 2021-04-21 | End: 2021-05-01

## 2021-04-21 RX ORDER — POLYETHYLENE GLYCOL 3350 17 G
2 POWDER IN PACKET (EA) ORAL PRN
Qty: 100 EACH | Refills: 3 | Status: SHIPPED | OUTPATIENT
Start: 2021-04-21 | End: 2022-03-28

## 2021-04-21 NOTE — TELEPHONE ENCOUNTER
Spoke with patient and let her know antibiotic was sent to [facility] in [facility] and he wants audiogram/tympanogram.      I will work on getting her audiogram scheduled for same day as her [address] or reschedule the US to make same day as audiogram.    Patient had thanked me, Dakotah Hilliard and Dr Edwin Bob.

## 2021-04-26 ENCOUNTER — NURSE ONLY (OUTPATIENT)
Dept: ALLERGY | Age: 61
End: 2021-04-26
Payer: MEDICAID

## 2021-04-26 VITALS
DIASTOLIC BLOOD PRESSURE: 72 MMHG | HEART RATE: 70 BPM | RESPIRATION RATE: 16 BRPM | SYSTOLIC BLOOD PRESSURE: 118 MMHG | TEMPERATURE: 97.2 F

## 2021-04-26 DIAGNOSIS — J30.1 ALLERGY TO TREES: ICD-10-CM

## 2021-04-26 DIAGNOSIS — Z51.6 ENCOUNTER FOR DESENSITIZATION TO ALLERGENS: Primary | ICD-10-CM

## 2021-04-26 DIAGNOSIS — Z91.048 ALLERGY TO MOLD: ICD-10-CM

## 2021-04-26 DIAGNOSIS — Z91.038 ALLERGY TO COCKROACHES: ICD-10-CM

## 2021-04-26 DIAGNOSIS — J30.89 ALLERGY TO DUST: ICD-10-CM

## 2021-04-26 PROCEDURE — 95117 IMMUNOTHERAPY INJECTIONS: CPT | Performed by: NURSE PRACTITIONER

## 2021-04-26 RX ORDER — EPINEPHRINE 0.3 MG/.3ML
INJECTION INTRAMUSCULAR
Qty: 1 EACH | Refills: 1 | Status: SHIPPED | OUTPATIENT
Start: 2021-04-26 | End: 2021-05-18 | Stop reason: SDUPTHER

## 2021-04-26 NOTE — PROGRESS NOTES
After consent obtained/verified, allergy injection given in back of R/L arm(s). VIAL COLOR OF ALL VIALS TODAY IS RED Vials 5ml    ALLERGY INJECTION FROM VIAL A GIVEN RT  UPPER ARM IN THE AMOUNT OF 0.20 ML    ALLERGY INJECTION FROM VIAL B GIVEN LT UPPER ARM IN THE AMOUNT OF 0.20 ML        Documentation of vial injection specific to arm(s) noted on Allergy Immunotherapy Administration Form. Patient waited 30 minutes for observation. Patient tolerated well without adverse reaction. SHOT REACTION TREATMENT INSTRUCTIONS    During the 30 minute wait after an allergy injection the following symptoms should be reported:    Itching other than at the injection site  Hives or swelling other than at the injection site  Redness other than at the injection site  Difficulty breathing  Chest tightness  Difficulty swallowing  Throat tightness    If these symptoms occur, NOTIFY PROVIDER and the following treatment should be administered:    1. Epinephrine/Auvi Q 1:1000 IM - 0.3 ml if > 66 lbs or more, 0.15 ml if 33 - 63 lbs, or 0.1 ml if <33 lbs     2. Diphenhydramine - give all intramuscular:     2 to <6 years (off-label use): 6.25 mg,    6 to <12 years: 12.5 to 25 mg;    ?12 years: 25-50 mg.    3.  Famotidine:  Adults 40 mg oral    Adolescents age 12 years and >88 lbs: 40 mg    Children and Adolescents ? 12years of age: Initial: 0.25 mg/kg/dose  every 12 hours (maximum daily dose: 40 mg/day)    Epi/Auvi Q dose may me repeated in 5-15 minutes if adequate resolution of symptoms does not occur    Patient should be observed for at least one hour after final Epi/Auvi Q dose and must be seen by provider. Patients cannot drive themselves if they have received diphenhydramine.

## 2021-04-28 ENCOUNTER — APPOINTMENT (OUTPATIENT)
Dept: PHARMACY | Age: 61
End: 2021-04-28
Payer: MEDICAID

## 2021-04-29 ENCOUNTER — HOSPITAL ENCOUNTER (OUTPATIENT)
Dept: PHARMACY | Age: 61
Setting detail: THERAPIES SERIES
Discharge: HOME | End: 2021-04-29
Payer: MEDICAID

## 2021-04-29 PROCEDURE — 99211 OFF/OP EST MAY X REQ PHY/QHP: CPT | Performed by: PHARMACIST

## 2021-04-29 NOTE — PROGRESS NOTES
Medication Management   [facility]  Smoking Cessation Clinic  [phone] (phone)  [phone] (fax)    Juancarlos Gomes          1960  Seb Silva DO    Juancarlos Gomes is a 56 y.o. female has been referred to our service by Dr. Hetal Nelson for Smoking Cessation Counseling and Medication Management per Consult Agreement. Patient acknowledges working in consult agreement with clinical pharmacist and referring physician. Patient via telephone for follow up visit 4/12. The following statement was review with patient regarding this virtual visit:  We want to confirm that, for purposes of billing, this is a virtual visit with your provider for which we will submit a claim for reimbursement with your insurance company. Copays, coinsurance amounts or other amounts not covered by your insurance company will be covered by the [address] [place] [address] smoking cessation micheal. If you do not accept this, unfortunately we will not be able to schedule a virtual visit with the provider. Do you accept? Yes    Scaling:  Importance level: 10 ( previous level was 10 )  Confidence level: 10 ( previous level was 10 )    Have you smoked any, even 1 puff, in the last 7 days? Yes  Current PPD: 5 cigarettes (down from 1 PPD)  Picked up her Nicotine lozenges yesterday, going to start them today. Reviewed appropriate use. Still using Nicotine 14mg patch daily takes off for naps and bedtime - sleeping well. Discussed the following: Tobacco cessation quit tips  Trigger avoidance and coping with the urge to quit  Nicotine replacement and pharmacological options  Discuss 4-D's to help with cravings   Stressed - moving to a hotel for 30 days, and then will be working with an agency to find a place to live. Thinking about making her new living place a smoke free zone. Plan:  · Continue Nicotine 14mg patch daily. Pt to contact [facility] if she needs a refill.   · Start Nicotine lozenge prn cravings as instructed  · Continue to work on 4D's  · Continue to work on changing routines around smoking  · Will mail another smoking cessation booklet to pt. Next appointment: 5/13 via telephone    Benoit Harmon verbalized understanding of the above information and denied further questions or concerns. The total time spent with the pt in group was 30 minutes.     Sandhya Gonsalves, PharmD, BCPS, CACP, CTTS 4/29/2021 8:42 AM

## 2021-05-03 ENCOUNTER — TELEPHONE (OUTPATIENT)
Dept: ALLERGY | Age: 61
End: 2021-05-03

## 2021-05-03 NOTE — TELEPHONE ENCOUNTER
Gely called to schedule an allergy injection 5/5/21, she changed her xolair injection to this date also.

## 2021-05-05 ENCOUNTER — NURSE ONLY (OUTPATIENT)
Dept: ALLERGY | Age: 61
End: 2021-05-05
Payer: MEDICAID

## 2021-05-05 VITALS
RESPIRATION RATE: 14 BRPM | HEART RATE: 66 BPM | SYSTOLIC BLOOD PRESSURE: 116 MMHG | DIASTOLIC BLOOD PRESSURE: 70 MMHG | TEMPERATURE: 96.6 F

## 2021-05-05 DIAGNOSIS — Z91.048 ALLERGY TO MOLD: ICD-10-CM

## 2021-05-05 DIAGNOSIS — J30.1 ALLERGY TO TREES: ICD-10-CM

## 2021-05-05 DIAGNOSIS — J30.89 ALLERGY TO DUST: ICD-10-CM

## 2021-05-05 DIAGNOSIS — Z51.6 ENCOUNTER FOR DESENSITIZATION TO ALLERGENS: ICD-10-CM

## 2021-05-05 DIAGNOSIS — Z91.038 ALLERGY TO COCKROACHES: ICD-10-CM

## 2021-05-05 DIAGNOSIS — J45.50 SEVERE PERSISTENT ASTHMA WITHOUT COMPLICATION: Primary | ICD-10-CM

## 2021-05-05 DIAGNOSIS — J82.83 EOSINOPHILIC ASTHMA: ICD-10-CM

## 2021-05-05 PROCEDURE — 96372 THER/PROPH/DIAG INJ SC/IM: CPT | Performed by: NURSE PRACTITIONER

## 2021-05-05 PROCEDURE — 95117 IMMUNOTHERAPY INJECTIONS: CPT | Performed by: NURSE PRACTITIONER

## 2021-05-05 NOTE — PROGRESS NOTES
After consent obtained/verified, allergy injection given in back of R/L arm(s). VIAL COLOR OF ALL VIALS TODAY IS RED VIALS    ALLERGY INJECTION FROM VIAL A GIVEN LT UPPER ARM IN THE AMOUNT OF 0.25 ML    ALLERGY INJECTION FROM VIAL B GIVEN RT UPPER ARM IN THE AMOUNT OF 0.25 ML        Documentation of vial injection specific to arm(s) noted on Allergy Immunotherapy Administration Form. Patient waited 30 minutes for observation. Patient tolerated well without adverse reaction. SHOT REACTION TREATMENT INSTRUCTIONS    During the 30 minute wait after an allergy injection the following symptoms should be reported:    Itching other than at the injection site  Hives or swelling other than at the injection site  Redness other than at the injection site  Difficulty breathing  Chest tightness  Difficulty swallowing  Throat tightness    If these symptoms occur, NOTIFY PROVIDER and the following treatment should be administered:    1. Epinephrine/Auvi Q 1:1000 IM - 0.3 ml if > 66 lbs or more, 0.15 ml if 33 - 63 lbs, or 0.1 ml if <33 lbs     2. Diphenhydramine - give all intramuscular:     2 to <6 years (off-label use): 6.25 mg,    6 to <12 years: 12.5 to 25 mg;    ?12 years: 25-50 mg.    3.  Famotidine:  Adults 40 mg oral    Adolescents age 12 years and >88 lbs: 40 mg    Children and Adolescents ? 12years of age: Initial: 0.25 mg/kg/dose  every 12 hours (maximum daily dose: 40 mg/day)    Epi/Auvi Q dose may me repeated in 5-15 minutes if adequate resolution of symptoms does not occur    Patient should be observed for at least one hour after final Epi/Auvi Q dose and must be seen by provider. Patients cannot drive themselves if they have received diphenhydramine.
injection site  Redness other than at the injection site  Difficulty breathing  Chest tightness  Difficulty swallowing  Throat tightness    If these symptoms occur, NOTIFY PROVIDER and the following treatment should be administered:    1. Epinephrine 1:1000 IM - 0.3 ml if > 66 lbs or more, 0.15 ml if 33 - 63 lbs, or 0.1 ml if <33 lbs   2. Diphenhydramine - give all intramuscular:     2 to <6 years (off-label use): 6.25 mg,    6 to <12 years: 12.5 to 25 mg;    ?12 years: 25-50 mg.  3.  Famotidine:  Adults 40 mg oral    Adolescents age 12 years and >88 lbs: 40 mg    Children and Adolescents ? 12years of age: Initial: 0.25 mg/kg/dose   every 12 hours (maximum daily dose: 40 mg/day)    Epipen dose may be repeated in 5-15 minutes if adequate resolution of symptoms does not occur    Patient should be observed for at least one hour after final epi dose and must be seen by provider. Patients cannot drive themselves if they have received diphenhydramine.

## 2021-05-11 ENCOUNTER — NURSE ONLY (OUTPATIENT)
Dept: LAB | Age: 61
End: 2021-05-11

## 2021-05-11 ENCOUNTER — PATIENT MESSAGE (OUTPATIENT)
Dept: FAMILY MEDICINE CLINIC | Age: 61
End: 2021-05-11

## 2021-05-11 DIAGNOSIS — L85.3 XEROSIS OF SKIN: ICD-10-CM

## 2021-05-11 DIAGNOSIS — J82.83 EOSINOPHILIC ASTHMA: ICD-10-CM

## 2021-05-11 DIAGNOSIS — J45.40 MODERATE PERSISTENT INTRINSIC ASTHMA WITHOUT STATUS ASTHMATICUS WITHOUT COMPLICATION: ICD-10-CM

## 2021-05-11 DIAGNOSIS — R76.8 ELEVATED IGE LEVEL: ICD-10-CM

## 2021-05-11 LAB
BASOPHILS # BLD: 0.8 %
BASOPHILS ABSOLUTE: 0 THOU/MM3 (ref 0–0.1)
EOSINOPHIL # BLD: 2.6 %
EOSINOPHILS ABSOLUTE: 0.1 THOU/MM3 (ref 0–0.4)
ERYTHROCYTE [DISTWIDTH] IN BLOOD BY AUTOMATED COUNT: 14.2 % (ref 11.5–14.5)
ERYTHROCYTE [DISTWIDTH] IN BLOOD BY AUTOMATED COUNT: 50 FL (ref 35–45)
HCT VFR BLD CALC: 44.6 % (ref 37–47)
HEMOGLOBIN: 13.2 GM/DL (ref 12–16)
IGA: 177 MG/DL (ref 70–400)
IGE: 266 IU/ML
IGG: 956 MG/DL (ref 700–1600)
IGM: 231 MG/DL (ref 40–230)
IMMATURE GRANS (ABS): 0.02 THOU/MM3 (ref 0–0.07)
IMMATURE GRANULOCYTES: 0.4 %
LYMPHOCYTES # BLD: 34.4 %
LYMPHOCYTES ABSOLUTE: 1.8 THOU/MM3 (ref 1–4.8)
MCH RBC QN AUTO: 28.6 PG (ref 26–33)
MCHC RBC AUTO-ENTMCNC: 29.6 GM/DL (ref 32.2–35.5)
MCV RBC AUTO: 96.5 FL (ref 81–99)
MONOCYTES # BLD: 5.5 %
MONOCYTES ABSOLUTE: 0.3 THOU/MM3 (ref 0.4–1.3)
NUCLEATED RED BLOOD CELLS: 0 /100 WBC
PLATELET # BLD: 232 THOU/MM3 (ref 130–400)
PMV BLD AUTO: 12.1 FL (ref 9.4–12.4)
RBC # BLD: 4.62 MILL/MM3 (ref 4.2–5.4)
SEG NEUTROPHILS: 56.3 %
SEGMENTED NEUTROPHILS ABSOLUTE COUNT: 2.9 THOU/MM3 (ref 1.8–7.7)
WBC # BLD: 5.1 THOU/MM3 (ref 4.8–10.8)

## 2021-05-12 RX ORDER — HYDROCORTISONE 25 MG/ML
LOTION TOPICAL
Qty: 59 ML | Refills: 0 | Status: SHIPPED | OUTPATIENT
Start: 2021-05-12 | End: 2021-06-07

## 2021-05-12 RX ORDER — HYDROCORTISONE 25 MG/ML
LOTION TOPICAL
Qty: 59 ML | Refills: 0 | Status: SHIPPED | OUTPATIENT
Start: 2021-05-12 | End: 2021-05-24

## 2021-05-12 NOTE — TELEPHONE ENCOUNTER
From: Kevan Marrero  To: Nahum Kirkpatrick DO  Sent: 5/11/2021 4:43 PM EDT  Subject: Prescription Question    [facility] Dr. Caridad Zavala & Staff,  I no longer reside in [place], [place]. I am in [place], [place]. I am asking you for my HYDROCORT LOTION 2.5% & also I need to UPDATE my [address]  for my patient file. FROM:  [facility], [place], [place]   TO:   [facility]  [address], [place]. I will send change of ADDRESS once I have one. MY CONTACT # for now is:  CELLULAR [phone] if it is not already in your system. No HOME # @ this time. Thank you Sir. Nash Villanueva

## 2021-05-13 ENCOUNTER — NURSE ONLY (OUTPATIENT)
Dept: ALLERGY | Age: 61
End: 2021-05-13
Payer: MEDICAID

## 2021-05-13 ENCOUNTER — APPOINTMENT (OUTPATIENT)
Dept: PHARMACY | Age: 61
End: 2021-05-13
Payer: MEDICAID

## 2021-05-13 VITALS
TEMPERATURE: 97.2 F | RESPIRATION RATE: 14 BRPM | HEART RATE: 66 BPM | SYSTOLIC BLOOD PRESSURE: 118 MMHG | DIASTOLIC BLOOD PRESSURE: 72 MMHG

## 2021-05-13 DIAGNOSIS — Z91.048 ALLERGY TO MOLD: ICD-10-CM

## 2021-05-13 DIAGNOSIS — Z51.6 ENCOUNTER FOR DESENSITIZATION TO ALLERGENS: Primary | ICD-10-CM

## 2021-05-13 DIAGNOSIS — J30.89 ALLERGY TO DUST: ICD-10-CM

## 2021-05-13 DIAGNOSIS — J30.1 ALLERGY TO TREES: ICD-10-CM

## 2021-05-13 DIAGNOSIS — Z91.038 ALLERGY TO COCKROACHES: ICD-10-CM

## 2021-05-13 PROCEDURE — 95117 IMMUNOTHERAPY INJECTIONS: CPT | Performed by: NURSE PRACTITIONER

## 2021-05-13 RX ORDER — NICOTINE 10 MG
CARTRIDGE (EA) INHALATION
COMMUNITY
Start: 2021-05-04 | End: 2021-05-24

## 2021-05-18 ENCOUNTER — OFFICE VISIT (OUTPATIENT)
Dept: ALLERGY | Age: 61
End: 2021-05-18
Payer: MEDICAID

## 2021-05-18 ENCOUNTER — NURSE ONLY (OUTPATIENT)
Dept: ALLERGY | Age: 61
End: 2021-05-18
Payer: MEDICAID

## 2021-05-18 VITALS
HEART RATE: 66 BPM | RESPIRATION RATE: 14 BRPM | TEMPERATURE: 97.9 F | SYSTOLIC BLOOD PRESSURE: 118 MMHG | DIASTOLIC BLOOD PRESSURE: 72 MMHG

## 2021-05-18 VITALS
HEART RATE: 70 BPM | DIASTOLIC BLOOD PRESSURE: 72 MMHG | RESPIRATION RATE: 14 BRPM | BODY MASS INDEX: 35.02 KG/M2 | WEIGHT: 204 LBS | SYSTOLIC BLOOD PRESSURE: 118 MMHG | TEMPERATURE: 97.6 F

## 2021-05-18 DIAGNOSIS — Z91.038 ALLERGY TO COCKROACHES: ICD-10-CM

## 2021-05-18 DIAGNOSIS — J30.1 ALLERGY TO TREES: ICD-10-CM

## 2021-05-18 DIAGNOSIS — J32.4 CHRONIC PANSINUSITIS: ICD-10-CM

## 2021-05-18 DIAGNOSIS — Z29.8 PROPHYLACTIC IMMUNOTHERAPY: Primary | ICD-10-CM

## 2021-05-18 DIAGNOSIS — J82.83 EOSINOPHILIC ASTHMA: ICD-10-CM

## 2021-05-18 DIAGNOSIS — Z71.6 ENCOUNTER FOR SMOKING CESSATION COUNSELING: ICD-10-CM

## 2021-05-18 DIAGNOSIS — Z91.048 ALLERGY TO MOLD: ICD-10-CM

## 2021-05-18 DIAGNOSIS — J30.89 ALLERGY TO DUST: ICD-10-CM

## 2021-05-18 DIAGNOSIS — Z51.6 ENCOUNTER FOR DESENSITIZATION TO ALLERGENS: Primary | ICD-10-CM

## 2021-05-18 DIAGNOSIS — R76.8 ELEVATED IGE LEVEL: ICD-10-CM

## 2021-05-18 DIAGNOSIS — Z51.6 ENCOUNTER FOR DESENSITIZATION TO ALLERGENS: ICD-10-CM

## 2021-05-18 DIAGNOSIS — R51.9 SINUS HEADACHE: ICD-10-CM

## 2021-05-18 DIAGNOSIS — J45.50 ASTHMA IN ADULT, SEVERE PERSISTENT, UNCOMPLICATED: ICD-10-CM

## 2021-05-18 PROCEDURE — 95117 IMMUNOTHERAPY INJECTIONS: CPT | Performed by: NURSE PRACTITIONER

## 2021-05-18 PROCEDURE — 99214 OFFICE O/P EST MOD 30 MIN: CPT | Performed by: NURSE PRACTITIONER

## 2021-05-18 RX ORDER — EPINEPHRINE 0.3 MG/.3ML
INJECTION INTRAMUSCULAR
Qty: 1 EACH | Refills: 1 | Status: SHIPPED | OUTPATIENT
Start: 2021-05-18

## 2021-05-18 ASSESSMENT — ENCOUNTER SYMPTOMS
RHINORRHEA: 1
EYE ITCHING: 1
SINUS PAIN: 1
SINUS PRESSURE: 1

## 2021-05-18 NOTE — PROGRESS NOTES
[facility]    Allergy & Asthma   [address] [facility], [address]  Ph:   [phone]  Fax:[phone]    Provider:  Dr. Phi Ogden:   Chief Complaint   Patient presents with    Follow-up     Patient is here for 6 month lab review           HISTORY OF PRESENT ILLNESS: ESTABLISHED PATIENT HERE FOR EVALUATION   66-year-old black female here today for follow-up on allergy injections. Patient is in the red vial receiving allergy injections weekly. She states that although the allergy injections are doing quite well for her and she continues to have copious amounts of postnasal drainage. She attributes this to her living conditions. Patient is now homeless and living in a hotel. She states that recently she had to move out of her apartment due to uninhabitable living conditions. She states she was exposed to a lot of environmental allergens which caused her to have a lot of postnasal drainage and a polyp on her vocal cord that had to be removed. Patient now suffers from severe nasal congestion. She states she is taking her Allegra every day. She cannot take Allegra-D because of hypertension. She states that nothing seems to be really helping her severe congestion. She denies any nausea, vomiting, fever. She does have severe headaches. She states her sinuses feel like they are impacted. Patient is very tearful. She states that she has had chronic sinusitis. She denies any altered sense of smell at this time. She does report that her sinuses are tender to touch. Review of Systems:  Review of Systems   HENT: Positive for congestion, postnasal drip, rhinorrhea, sinus pressure, sinus pain, sneezing and tinnitus. Eyes: Positive for itching. Allergic/Immunologic: Positive for environmental allergies. Neurological: Positive for headaches. All other systems reviewed and are negative.         Past MedicalHistory:    Past Medical History: Diagnosis Date    Allergic rhinitis     Anxiety     Arnold-Chiari malformation (HCC)     Asthma     Chronic bronchitis (HCC)     Fibromyalgia     GERD (gastroesophageal reflux disease)     Headache(784.0)     Hyperlipidemia     Neuropathy     Osteoarthritis     Sinusitis     Type II or unspecified type diabetes mellitus without mention of complication, not stated as uncontrolled        Past Surgical History:  Past Surgical History:   Procedure Laterality Date    APPENDECTOMY       SECTION      x2    COLONOSCOPY  [id number]    CSF SHUNT  2007    Cervical syrinx to subarachnoid shunt; thoracic syrinx to subarachnoid shunt (2 separate dural openings)    CYST REMOVAL      extradural cysts at thoracic 2-3    ECTOPIC PREGNANCY SURGERY      HEMICOLECTOMY      HERNIA REPAIR      inguinal     HYSTERECTOMY      LARYNGOSCOPY N/A 2021    MICROLARYNGOSCOPY AND BIOPSY   performed by Buzz Pang MD at [address]  2007    C4-5-6-7; T1-2-3 laminectomies    TOOTH EXTRACTION  2017       Family History:   Family History   Problem Relation Age of Onset    Cancer Other         colon    Mental Illness Other     High Blood Pressure Other     Diabetes Other     High Blood Pressure Sister        Social History:   Social History     Tobacco Use    Smoking status: Current Every Day Smoker     Packs/day: 0.20     Years: 25.00     Pack years: 5.00     Types: Cigarettes     Start date: 1975    Smokeless tobacco: Never Used    Tobacco comment: smokes 2 cigs a day   Substance Use Topics    Alcohol use: Not Currently     Alcohol/week: 2.0 standard drinks     Types: 2 Cans of beer per week     Comment: Patient drinks 1-2 beers a MONTH ; stomach tolerates very little alcohol        Allergies:  Bactrim [sulfamethoxazole-trimethoprim], Flexeril [cyclobenzaprine], Morphine, Peanut-containing drug products, Tessalon [benzonatate], Amoxicillin-pot clavulanate, Cefdinir, Ibuprofen [ibuprofen], Lisinopril, Macrobid [nitrofurantoin monohydrate macrocrystals], Macrodantin [nitrofurantoin], and Ranitidine    CurrentMedications:     Current Outpatient Medications:     EPIPEN 2-VENESSA 0.3 MG/0.3ML SOAJ injection, INJECT 1 PEN IM AS A SINGLE DOSE PRN, Disp: 1 each, Rfl: 1    NICOTROL 10 MG inhaler, INHALE 1 PUFF INTO THE LUNGS AS NEEDED FOR SMOKING CESSATION, Disp: , Rfl:     hydrocortisone (HYTONE) 2.5 % lotion, APPLY TOPICALLY TO AFFECTED AREA DAILY, Disp: 59 mL, Rfl: 0    hydrocortisone (HYTONE) 2.5 % lotion, APPLY TOPICALLY TO AFFECTED AREA DAILY, Disp: 59 mL, Rfl: 0    hydrocortisone 2.5 % cream, APPLY TOPICALLY TO AFFECTED AREA TWICE DAILY, Disp: 56 g, Rfl: 0    nicotine polacrilex (COMMIT) 2 MG lozenge, Take 1 lozenge by mouth as needed for Smoking cessation (max 20 per day), Disp: 100 each, Rfl: 3    fluconazole (DIFLUCAN) 150 MG tablet, as needed (as need for yeast infection) , Disp: , Rfl:     esomeprazole (NEXIUM) 40 MG delayed release capsule, Take 1 capsule by mouth 2 times daily, Disp: 180 capsule, Rfl: 3    metFORMIN (GLUCOPHAGE) 500 MG tablet, TAKE 1 TABLET BY MOUTH TWICE DAILY WITH MEALS, Disp: 60 tablet, Rfl: 5    atorvastatin (LIPITOR) 10 MG tablet, Take 1 tablet by mouth once daily, Disp: 30 tablet, Rfl: 5    clobetasol (TEMOVATE) 0.05 % ointment, Apply to scalp daily, Disp: 60 g, Rfl: 2    ketoconazole (NIZORAL) 2 % shampoo, Apply 3-4 times weekly to scalp, leave on for five minutes prior to washing off, Disp: 120 mL, Rfl: 11    albuterol (PROVENTIL) (2.5 MG/3ML) 0.083% nebulizer solution, USE 1 VIAL IN NEBULIZER EVERY 6 HOURS AS NEEDED FOR WHEEZING (FOR ASTHMA), Disp: 375 mL, Rfl: 5    gabapentin (NEURONTIN) 100 MG capsule, Take 100 mg by mouth 4 times daily. , Disp: , Rfl:     amLODIPine (NORVASC) 5 MG tablet, TAKE 1 TABLET BY MOUTH ONCE DAILY, Disp: 90 tablet, Rfl: 3    omalizumab (OMALIZUMAB) 150 MG injection, Inject 300 mg into the skin every 28 days, Disp: 1 each, Rfl: 11    traZODone (DESYREL) 50 MG tablet, Take 50 mg by mouth nightly as needed for Sleep , Disp: , Rfl:     azelastine (OPTIVAR) 0.05 % ophthalmic solution, Place 1 drop into both eyes 2 times daily, Disp: 1 Bottle, Rfl: 11    azelastine (ASTELIN) 0.1 % nasal spray, 1 spray by Nasal route 2 times daily Use in each nostril as directed, Disp: 1 Bottle, Rfl: 11    SYMBICORT 160-4.5 MCG/ACT AERO, INHALE 2 PUFFS BY MOUTH TWICE DAILY. RINSE MOUTH WELL AFTER USE., Disp: 11 g, Rfl: 5    albuterol sulfate  (90 Base) MCG/ACT inhaler, Inhale 2 puffs into the lungs every 4 hours as needed for Wheezing (AS NEEDED), Disp: 9 g, Rfl: 5    fexofenadine (ALLEGRA) 180 MG tablet, Take 1 tablet by mouth daily, Disp: 90 tablet, Rfl: 3    SYNTHROID 88 MCG tablet, TAKE 1 TABLET BY MOUTH ONCE DAILY ON AN EMPTY STOMACH WITH WATER. DO NOT EAT OR TAKE ANY OTHER MEDICATIONS FOR 30 MINUTES., Disp: 30 tablet, Rfl: 11    hyoscyamine (LEVBID) 375 MCG extended release tablet, TAKE 1/2 TABLET BY MOUTH TWICE DAILY, Disp: 30 tablet, Rfl: 11    Cholecalciferol (VITAMIN D3) 50 MCG (2000 UT) CAPS, Vitamin D3 2,000 unit capsule, Disp: 30 capsule, Rfl: 11    Biotin 1 MG CAPS, Take 1 capsule by mouth daily , Disp: , Rfl:     clobetasol (TEMOVATE) 0.05 % external solution, Apply daily to scaly or itchy areas on scalp, Disp: 60 mL, Rfl: 11    fluocinonide (LIDEX) 0.05 % cream, Apply topically 2 times daily to rash on hands a needed, Disp: 30 g, Rfl: 11    levomilnacipran (FETZIMA) 40 MG CP24 extended release capsule, Take 1 capsule by mouth daily, Disp: 30 capsule, Rfl: 2    Multiple Vitamins-Minerals (MULTIVITAMIN PO), 1 tablet daily , Disp: , Rfl:     Probiotic Product (PROBIOTIC PO), 1 tablet daily , Disp: , Rfl:     Cyanocobalamin (B-12 PO), daily, Disp: , Rfl:     SF 5000 PLUS 1.1 % CREA, BRUSH TEETH TWICE DAILY FOR 2 MINUTES, SPIT EXCESS AFTER BRUSHING.  DO NOT RINSE WITH WATER , WAIT 30 MINUTES  FOR ANY FOOD OR DRINK, Disp: , Rfl: 3    ondansetron (ZOFRAN) 4 MG tablet, TAKE 1 TABLET BY MOUTH EVERY 8 HOURS AS NEEDED FOR NAUSEA OR VOMITING, Disp: 15 tablet, Rfl: 0    meloxicam (MOBIC) 7.5 MG tablet, TK 1 T PO BID, Disp: , Rfl: 4    LINZESS 290 MCG CAPS capsule, 290 mcg every morning (before breakfast) , Disp: , Rfl:     lidocaine (XYLOCAINE) 5 % ointment, Apply topically as needed for Pain Apply topically as needed. , Disp: , Rfl:     conjugated estrogens (PREMARIN) 0.625 MG/GM vaginal cream, Place vaginally as needed Place vaginally daily. , Disp: , Rfl:     lidocaine (LIDODERM) 5 %, Place 1 patch onto the skin daily 12 hours on, 12 hours off., Disp: , Rfl:     hydrocortisone (ANUSOL-HC) 25 MG suppository, Place 25 mg rectally as needed for Hemorrhoids, Disp: , Rfl:     fluticasone (FLONASE ALLERGY RELIEF) 50 MCG/ACT nasal spray, 1 spray by Nasal route daily, Disp: , Rfl:     HYDROcodone-acetaminophen (NORCO) 5-325 MG per tablet, 1 tablet nightly as needed for Pain. Take 1 tablet every 4-6 hours as needed for pain , Disp: , Rfl:     NONFORMULARY, Immunotherapy allergy shot, Disp: , Rfl:     zinc 50 MG CAPS, Take 1 capsule by mouth daily , Disp: , Rfl:     Ascorbic Acid (VITAMIN C) 500 MG CAPS, Take 1 tablet by mouth daily , Disp: , Rfl:     orphenadrine (NORFLEX) 100 MG tablet, Take 100 mg by mouth 2 times daily as needed. , Disp: , Rfl:     tramadol (ULTRAM) 50 MG tablet, Take 50 mg by mouth every 6 hours as needed. Take 1-2 tabs every 6 hrs prn , Disp: , Rfl:     nicotine (NICODERM CQ) 14 MG/24HR, Place 1 patch onto the skin every 24 hours May wear 24 hours. May remove at bedtime if problems with insomnia. Re-Apply new patch immediately on awakening.  Diagnosis: Tobacco dependence, Disp: 42 patch, Rfl: 0    nicotine (NICODERM CQ) 7 MG/24HR, Place 1 patch onto the skin every 24 hours for 14 days Nicoderm 7mg/day patch need to be started only after completion of Nicoderm CQ 14mg/day patch for 14days ( 2weeks). , Disp: 14 patch, Rfl: 0    Respiratory Therapy Supplies (NEBULIZER COMPRESSOR) KIT, 1 kit by Does not apply route once for 1 dose, Disp: 360 kit, Rfl: 0      Physical Exam:      Vitals:    Vitals:    05/18/21 0852   BP: 118/72   Pulse: 70   Resp: 14   Temp: 97.6 °F (36.4 °C)       204 lb (92.5 kg)       Temp: 97.6 °F (36.4 °C) I @FLOWSTAT(6)@ IPulse: 70 I @FLOWSTAT(8)@ I BP: 118/72 I @CECRVC(95)@; @TRYZVQ(32)@ I Resp: 14 I @FLOWSTAT(9)@ I   I @FLOWSTAT(10)@ I   I   I   I Facility age limit for growth percentiles is 20 years. I     Facility age limit for growth percentiles is 20 years. Facility age limit for growth percentiles is 20 years. Facility age limit for growth percentiles is 20 years. Facility age limit for growth percentiles is 20 years. Physical Exam:    Physical Exam  Vitals and nursing note reviewed. Constitutional:       Appearance: Normal appearance. She is well-developed. She is obese. HENT:      Head: Normocephalic and atraumatic. Right Ear: Tympanic membrane, ear canal and external ear normal.      Left Ear: Tympanic membrane, ear canal and external ear normal.      Ears:      Comments: Bilateral hazy tympanic membranes     Nose: Congestion and rhinorrhea present. Mouth/Throat:      Mouth: Mucous membranes are moist.      Pharynx: Posterior oropharyngeal erythema present. No oropharyngeal exudate. Comments: Large amounts of clear thick, white postnasal drainage  Eyes:      General: No scleral icterus. Right eye: No discharge. Left eye: No discharge. Extraocular Movements: Extraocular movements intact. Conjunctiva/sclera: Conjunctivae normal.      Pupils: Pupils are equal, round, and reactive to light. Neck:      Thyroid: No thyromegaly. Cardiovascular:      Rate and Rhythm: Normal rate and regular rhythm. Pulses: Normal pulses. Heart sounds: Normal heart sounds.    Pulmonary:      Effort: Pulmonary effort is normal. No respiratory distress. Breath sounds: Normal breath sounds. No wheezing or rales. Abdominal:      Palpations: Abdomen is soft. Tenderness: There is no abdominal tenderness. Musculoskeletal:         General: No tenderness. Normal range of motion. Cervical back: Normal range of motion and neck supple. Skin:     General: Skin is warm and dry. Capillary Refill: Capillary refill takes less than 2 seconds. Findings: No rash. Neurological:      General: No focal deficit present. Mental Status: She is alert and oriented to person, place, and time. Mental status is at baseline. Deep Tendon Reflexes: Reflexes are normal and symmetric. Psychiatric:         Behavior: Behavior normal.         Thought Content:  Thought content normal.         Judgment: Judgment normal.             DATA:  Lab Review:    CBC:   Lab Results   Component Value Date    WBC 5.1 05/11/2021    RBC 4.62 05/11/2021    RBC 4.36 01/17/2012    HGB 13.2 05/11/2021    HCT 44.6 05/11/2021    MCV 96.5 05/11/2021    MCH 28.6 05/11/2021    MCHC 29.6 05/11/2021    RDW 14.5 01/16/2018     05/11/2021          IgE   Date/Time Value Ref Range Status   05/11/2021 08:17  (H) <101 IU/mL Final     Comment:     [address] [phone]      IgG   Date/Time Value Ref Range Status   05/11/2021 08:16  700 - 1600 mg/dL Final     Comment:     [address] [phone]     IgA   Date/Time Value Ref Range Status   05/11/2021 08:25  70 - 400 mg/dL Final     Comment:     [address] [phone]      IgM   Date/Time Value Ref Range Status   05/11/2021 08:15  (H) 40 - 230 mg/dL Final     Comment:     [address] [phone]       No results found for: LOIS   Rheumatoid Factor   Date/Time Value Ref Range Status   04/07/2020 09:35 AM 13 0 - 13 IU/mL Final Comment:     Performed at [facility], [address]          No results found for this or any previous visit. No results found for this or any previous visit. PROCEDURES:      Skin Testing performed on:08/21/2019    Assessment/Orders:    Diagnosis Orders   1. Prophylactic immunotherapy  EPIPEN 2-VENESSA 0.3 MG/0.3ML SOAJ injection   2. Chronic pansinusitis  CT SINUS WO CONTRAST   3. Sinus headache     4. Encounter for desensitization to allergens     5. Allergy to trees     6. Elevated IgE level     7. Asthma in adult, severe persistent, uncomplicated     8. Allergy to mold     9. Allergy to cockroaches     10. Eosinophilic asthma     11. Encounter for smoking cessation counseling         Plan:  Follow Up:4 weeks  Patient have CT scan of the sinuses for chronic sinusitis, sinus headaches  Patient given AHIST is to take twice a day along with Allegra to see if this helps with chronic severe sinusitis  Patient given 2 samples of saline spray  Patient given 6 packages of a sinus AHIST   Patient to continue weekly allergy shots    Patient was instructed on use of EpiPen/Auvi Q. In case of severe allergic reaction the patient had any angioedema, scratchy throat, trouble breathing, chest tightness they're to use an EpiPen. Patient to use the EpiPen/Auvi Q by injecting the vial into the lateral thigh through clothing. To hold in place for 5-10 seconds. Following use patient is to immediately go to the emergency room. EpiPen may be repeated after 5-10 minutes if no improvement. Patient emergency treatment and is stable they are to notify this office of the event. If emergent or severe allergic reactions occurs related to the injection we will need to cease or consider reducing dose. Do not keep epi in places it may become too hot or too cold and inactivates the medication. Store according to manufacture recommendations.   Spent 30 minutes of face-to-face time with the patient with well more than half of the visit being dedicated to the discussion of the various symptom problems, provided education of medications and disease process, as well as discussion of a therapeutic plan for each. Face-to-face education time does not include any time that may have been spent for procedures.     (Please note that portions of this note may have been completed with a voice recognition program.  Efforts were made to edit the dictation but occasionally words are mis-transcribed.)         Signed:  COREEN Mauro CNP  5/18/2021  10:26 AM

## 2021-05-18 NOTE — PROGRESS NOTES
After consent obtained/verified, allergy injection given in back of R/L arm(s). VIAL COLOR OF ALL VIALS TODAY IS Maintenance vials    ALLERGY INJECTION FROM VIAL A GIVEN LT UPPER ARM IN THE AMOUNT OF 0.35 ML    ALLERGY INJECTION FROM VIAL B GIVEN RT UPPER ARM IN THE AMOUNT OF 0.35 ML        Documentation of vial injection specific to arm(s) noted on Allergy Immunotherapy Administration Form. Patient waited 30 minutes for observation. Patient tolerated well without adverse reaction. SHOT REACTION TREATMENT INSTRUCTIONS    During the 30 minute wait after an allergy injection the following symptoms should be reported:    Itching other than at the injection site  Hives or swelling other than at the injection site  Redness other than at the injection site  Difficulty breathing  Chest tightness  Difficulty swallowing  Throat tightness    If these symptoms occur, NOTIFY PROVIDER and the following treatment should be administered:    1. Epinephrine/Auvi Q 1:1000 IM - 0.3 ml if > 66 lbs or more, 0.15 ml if 33 - 63 lbs, or 0.1 ml if <33 lbs     2. Diphenhydramine - give all intramuscular:     2 to <6 years (off-label use): 6.25 mg,    6 to <12 years: 12.5 to 25 mg;    ?12 years: 25-50 mg.    3.  Famotidine:  Adults 40 mg oral    Adolescents age 12 years and >88 lbs: 40 mg    Children and Adolescents ? 12years of age: Initial: 0.25 mg/kg/dose  every 12 hours (maximum daily dose: 40 mg/day)    Epi/Auvi Q dose may me repeated in 5-15 minutes if adequate resolution of symptoms does not occur    Patient should be observed for at least one hour after final Epi/Auvi Q dose and must be seen by provider. Patients cannot drive themselves if they have received diphenhydramine.

## 2021-05-18 NOTE — PATIENT INSTRUCTIONS
Patient Education        epinephrine injection  Pronunciation:  HUANG ruano  Brand:  Adrenalin, Auvi-Q, Epinephrinesnap-EMS, Epinephrinesnap-V, EpiPen 2-Jadon, EpiPen JR 2-Jadon, EPIsnap, Symjepi  What is the most important information I should know about epinephrine injection? Seek emergency medical attention after any use of epinephrine to treat a severe allergic reaction. After the injection you will need to receive further treatment and observation. What is epinephrine injection? Epinephrine injection is used to treat severe allergic reactions (anaphylaxis) to insect stings or bites, foods, drugs, and other allergens. Epinephrine auto-injectors may be kept on hand for self-injection by a person with a history of severe allergic reaction. Epinephrine is also used to treat exercise-induced anaphylaxis, or to treat low blood pressure that is caused by septic shock. Epinephrine injection may also be used for purposes not listed in this medication guide. What should I discuss with my healthcare provider before using epinephrine injection? Before using epinephrine, tell your doctor if any past use of this medicine caused an allergic reaction to get worse. Tell your doctor if you have ever had:  · heart disease or high blood pressure;  · asthma;  · Parkinson's disease;  · depression or mental illness;  · a thyroid disorder; or  · diabetes. Having an allergic reaction while pregnant or nursing could harm both mother and baby. You may need to use epinephrine during pregnancy or while you are breast-feeding. Seek emergency medical attention right away after using the injection. In an emergency, you may not be able to tell caregivers if you are pregnant or breast feeding. Make sure any doctor caring for your pregnancy or your baby knows you received this medicine. How should I use epinephrine injection? Epinephrine is injected into the skin or muscle of your outer thigh.  In an emergency, this injection can be given through your clothing. Epinephrine is sometimes given as an infusion into a vein. A healthcare provider will give you this type of injection. Read and carefully follow any Instructions for Use provided with your medicine. Ask your doctor or pharmacist if you do not understand these instructions. The auto-injector device is a disposable single-use system. Follow all directions on your prescription label and read all medication guides or instruction sheets. Use the medicine exactly as directed. Do not give this medicine to a child without medical advice. Do not remove the safety cap until you are ready to use the auto-injector. Never put your fingers over the injector tip after the safety cap has been removed. To use an epinephrine auto-injector:  · Form a fist around the auto-injector with the tip pointing down. Pull off the safety cap. · Place the tip against the fleshy portion of the outer thigh. You may give the injection directly through clothing. Hold the leg firmly when giving this injection to a child or infant. · Push the auto-injector firmly against the thigh to release the needle that injects the dose of epinephrine. Hold the auto-injector in place for 10 seconds after activation. · Remove the auto-injector from the thigh and massage the area gently. Carefully re-insert the used device needle-first into the carrying tube. Re-cap the tube and take it with you to the emergency room so that anyone who treats you will know how much epinephrine you have received. · Use an auto-injector only one time. Do not try to reinsert an auto-injector a second time if the needle has come out of your skin before the full 10 seconds. If the needle is bent from the first use, it may cause serious injury to your skin. Seek emergency medical attention after any use of epinephrine. The effects of epinephrine may wear off after 10 or 20 minutes. You will need to receive further treatment and observation.   Also seek emergency medical attention if you accidentally inject yourself while giving epinephrine to another person. Your medicine may also come with a \" pen. \" The  pen contains no medicine and no needle. It is only for non-emergency use to practice giving yourself an epinephrine injection. Store at room temperature away from moisture, heat, and light. Do not refrigerate or freeze this medication, and do not store it in a car. Do not use epinephrine injection if it has changed colors or has particles in it, or if the expiration date on the label has passed. Call your pharmacist for a new prescription. What happens if I miss a dose? Since epinephrine is used when needed, it does not have a daily dosing schedule. What happens if I overdose? Seek emergency medical attention or call the [employer] Help line at [phone]. Symptoms of an epinephrine overdose may include numbness or weakness, severe headache, blurred vision, pounding in your neck or ears, sweating, chills, chest pain, fast or slow heartbeats, severe shortness of breath, or cough with foamy mucus. What should I avoid while using epinephrine injection? Do not inject epinephrine into a vein or into the muscles of your buttocks, or it may not work as well. Inject it only into the fleshy outer portion of the thigh. Accidentally injecting epinephrine into your hands or feet may result in a loss of blood flow to those areas, and resulting numbness. What are the possible side effects of epinephrine injection? Before using epinephrine, tell your doctor if any past use of this medicine caused an allergic reaction to get worse. Call your doctor at once if you notice pain, swelling, warmth, redness, or other signs of infection around the area where you gave an injection.   Common side effects may include:  · breathing problems;  · fast or pounding heartbeats;  · pale skin, sweating;  · nausea and vomiting;  · dizziness;  · weakness or tremors;  · throbbing headache; or  · feeling nervous, anxious, or fearful. This is not a complete list of side effects and others may occur. Call your doctor for medical advice about side effects. You may report side effects to FDA at 5-376-EEJ-3487. What other drugs will affect epinephrine injection? Other drugs may affect epinephrine, including prescription and over-the-counter medicines, vitamins, and herbal products. Tell your doctor about all your current medicines and any medicine you start or stop using. Where can I get more information? Your doctor or pharmacist can provide more information about epinephrine injection. Remember, keep this and all other medicines out of the reach of children, never share your medicines with others, and use this medication only for the indication prescribed. Every effort has been made to ensure that the information provided by [address] is accurate, up-to-date, and complete, but no guarantee is made to that effect. Drug information contained herein may be time sensitive. [employer] information has been compiled for use by healthcare practitioners and consumers in the [place] and therefore [employer] does not warrant that uses outside of the [place] are appropriate, unless specifically indicated otherwise. [facility]'s drug information does not endorse drugs, diagnose patients or recommend therapy. [employer] drug information is an informational resource designed to assist licensed healthcare practitioners in caring for their patients and/or to serve consumers viewing this service as a supplement to, and not a substitute for, the expertise, skill, knowledge and judgment of healthcare practitioners. The absence of a warning for a given drug or drug combination in no way should be construed to indicate that the drug or drug combination is safe, effective or appropriate for any given patient.  [employer] does not assume any responsibility for any aspect of healthcare administered with the aid of information [facility] provides. The information contained herein is not intended to cover all possible uses, directions, precautions, warnings, drug interactions, allergic reactions, or adverse effects. If you have questions about the drugs you are taking, check with your doctor, nurse or pharmacist.  Copyright 5178-5200 [address]: 13.01. Revision date: 7/8/2020. Care instructions adapted under license by [facility] ([facility]). If you have questions about a medical condition or this instruction, always ask your healthcare professional. [place] [address] any warranty or liability for your use of this information.

## 2021-05-19 ENCOUNTER — APPOINTMENT (OUTPATIENT)
Dept: PHARMACY | Age: 61
End: 2021-05-19
Payer: MEDICAID

## 2021-05-20 ENCOUNTER — HOSPITAL ENCOUNTER (OUTPATIENT)
Dept: PHARMACY | Age: 61
Setting detail: THERAPIES SERIES
Discharge: HOME | End: 2021-05-20
Payer: MEDICAID

## 2021-05-20 PROCEDURE — 99211 OFF/OP EST MAY X REQ PHY/QHP: CPT | Performed by: PHARMACIST

## 2021-05-20 NOTE — PROGRESS NOTES
Medication Management   [facility] [place]  [facility]  [phone] (phone)  [phone] (fax)      Patient called at this time to follow up on smoking cessation, week 5&6/12. Encounter completed via telephone. Have you smoked any, even 1 puff, in the last 7 days? Yes    Baseline PPD: 1 PPD  Current PPD: 5 cigarettes per day (same as previously reported)  Smoking Reduction Percent from initial baseline: 75%    Compliant with NRT and/or med regiment? Yes  Any side effects or problems? No    Scaling  Importance: 10 (previously was 10)  Confidence: 10 (previously was 10)    How have you been feeling overall since quitting/cutting back: feels better, but has also noticed that she is gaining weight     Withdrawal symptoms: does have some constipation      Pt is currently using Nicotine Patch 14mg daily. Did  Nicotine lozenges, states that they do work. But stressful days are difficult, and she reaches for a cigarette. Pt has a lot of current stressors with health and living arrangements. Pt states that she speaks to her counselor weekly, has discussed her desire to quit smoking and counselor reviews this with her during appt. Plan:  1. Continue coping strategies - walking around  2. Use the 4D's  3. Continue Nicotine Patch 14mg daily x 6 weeks total, then consider decreasing to Nicotine 7mg patch x 2 weeks as currently prescribed by Dr. aDniel Frankel. Continue Nicotine Lozenges prn.  4. Continue to discuss smoking cessation and stressors with counselor. Pt had no questions at this time. Total time spent on phone with pt: 20 minutes    Next appt in 3 weeks. Pt has been enrolled in program for >12 weeks.       Sandhya Gonsalves, PharmD, BCPS, CACP, CTTS 5/20/2021 8:34 AM

## 2021-05-24 ENCOUNTER — OFFICE VISIT (OUTPATIENT)
Dept: DERMATOLOGY | Age: 61
End: 2021-05-24
Payer: MEDICAID

## 2021-05-24 VITALS
HEART RATE: 80 BPM | BODY MASS INDEX: 35.61 KG/M2 | HEIGHT: 64 IN | SYSTOLIC BLOOD PRESSURE: 126 MMHG | WEIGHT: 208.6 LBS | DIASTOLIC BLOOD PRESSURE: 78 MMHG

## 2021-05-24 DIAGNOSIS — L66.9 CENTRAL CENTRIFUGAL SCARRING ALOPECIA: ICD-10-CM

## 2021-05-24 DIAGNOSIS — M79.2 NEURALGIA INVOLVING SCALP: Primary | ICD-10-CM

## 2021-05-24 PROCEDURE — 11900 INJECT SKIN LESIONS </W 7: CPT | Performed by: DERMATOLOGY

## 2021-05-24 PROCEDURE — 99214 OFFICE O/P EST MOD 30 MIN: CPT

## 2021-05-24 PROCEDURE — 99214 OFFICE O/P EST MOD 30 MIN: CPT | Performed by: DERMATOLOGY

## 2021-05-24 RX ORDER — KETOCONAZOLE 20 MG/ML
SHAMPOO TOPICAL
Qty: 120 ML | Refills: 11 | Status: SHIPPED | OUTPATIENT
Start: 2021-05-24 | End: 2021-12-07 | Stop reason: ALTCHOICE

## 2021-05-24 RX ORDER — CLOBETASOL PROPIONATE 0.5 MG/G
OINTMENT TOPICAL
Qty: 60 G | Refills: 2 | Status: SHIPPED | OUTPATIENT
Start: 2021-05-24 | End: 2021-11-16

## 2021-05-24 NOTE — Clinical Note
I'd like to fax my note to both her neurosurgeon and pain management doctor. She told me their names but I cannot remember now that the day is over! Could you help me find them and fax my note over?

## 2021-05-24 NOTE — PROGRESS NOTES
Dermatology Patient Note  Pamella [address]  [address]  Dept: [phone]  Dept Fax: [phone]: [phone]      VISITDATE: 5/24/2021   REFERRING PROVIDER: No ref. provider found      Willi Sarkar is a 56 y.o. female  who presents today in the office for:    Alopecia (recheck after last prescription)      PERTINENT HISTORY NOT LISTED ABOVE:  Patient presents for f/u CCCA  - she has not noted any hair regrowth but also no loss  - she is still experiencing pain, burning, and itching of the scalp. She has a history of Chiari malformation and shunt  - she has f/u with neurosurgery in June and with pain management in July. She takes gabapentin (reported 100 mg TID)    CURRENT MEDICATIONS:   Current Outpatient Medications   Medication Sig Dispense Refill    ketoconazole (NIZORAL) 2 % shampoo Apply 3-4 times weekly to scalp, leave on for five minutes prior to washing off 120 mL 11    clobetasol (TEMOVATE) 0.05 % ointment Apply to scalp daily 60 g 2    EPIPEN 2-VENESSA 0.3 MG/0.3ML SOAJ injection INJECT 1 PEN IM AS A SINGLE DOSE PRN 1 each 1    hydrocortisone (HYTONE) 2.5 % lotion APPLY TOPICALLY TO AFFECTED AREA DAILY 59 mL 0    nicotine polacrilex (COMMIT) 2 MG lozenge Take 1 lozenge by mouth as needed for Smoking cessation (max 20 per day) 100 each 3    fluconazole (DIFLUCAN) 150 MG tablet as needed (as need for yeast infection)       nicotine (NICODERM CQ) 14 MG/24HR Place 1 patch onto the skin every 24 hours May wear 24 hours. May remove at bedtime if problems with insomnia. Re-Apply new patch immediately on awakening.   Diagnosis: Tobacco dependence 42 patch 0    esomeprazole (NEXIUM) 40 MG delayed release capsule Take 1 capsule by mouth 2 times daily 180 capsule 3    metFORMIN (GLUCOPHAGE) 500 MG tablet TAKE 1 TABLET BY MOUTH TWICE DAILY WITH MEALS 60 tablet 5    atorvastatin (LIPITOR) 10 MG tablet Take 1 tablet by mouth once daily 30 tablet 5    albuterol (PROVENTIL) (2.5 MG/3ML) 0.083% nebulizer solution USE 1 VIAL IN NEBULIZER EVERY 6 HOURS AS NEEDED FOR WHEEZING (FOR ASTHMA) 375 mL 5    gabapentin (NEURONTIN) 100 MG capsule Take 100 mg by mouth 4 times daily.  amLODIPine (NORVASC) 5 MG tablet TAKE 1 TABLET BY MOUTH ONCE DAILY 90 tablet 3    omalizumab (OMALIZUMAB) 150 MG injection Inject 300 mg into the skin every 28 days 1 each 11    traZODone (DESYREL) 50 MG tablet Take 50 mg by mouth nightly as needed for Sleep       azelastine (OPTIVAR) 0.05 % ophthalmic solution Place 1 drop into both eyes 2 times daily 1 Bottle 11    azelastine (ASTELIN) 0.1 % nasal spray 1 spray by Nasal route 2 times daily Use in each nostril as directed 1 Bottle 11    SYMBICORT 160-4.5 MCG/ACT AERO INHALE 2 PUFFS BY MOUTH TWICE DAILY. RINSE MOUTH WELL AFTER USE. 11 g 5    albuterol sulfate  (90 Base) MCG/ACT inhaler Inhale 2 puffs into the lungs every 4 hours as needed for Wheezing (AS NEEDED) 9 g 5    fexofenadine (ALLEGRA) 180 MG tablet Take 1 tablet by mouth daily 90 tablet 3    SYNTHROID 88 MCG tablet TAKE 1 TABLET BY MOUTH ONCE DAILY ON AN EMPTY STOMACH WITH WATER. DO NOT EAT OR TAKE ANY OTHER MEDICATIONS FOR 30 MINUTES.  30 tablet 11    hyoscyamine (LEVBID) 375 MCG extended release tablet TAKE 1/2 TABLET BY MOUTH TWICE DAILY 30 tablet 11    Cholecalciferol (VITAMIN D3) 50 MCG (2000 UT) CAPS Vitamin D3 2,000 unit capsule 30 capsule 11    Biotin 1 MG CAPS Take 1 capsule by mouth daily       clobetasol (TEMOVATE) 0.05 % external solution Apply daily to scaly or itchy areas on scalp 60 mL 11    fluocinonide (LIDEX) 0.05 % cream Apply topically 2 times daily to rash on hands a needed 30 g 11    levomilnacipran (FETZIMA) 40 MG CP24 extended release capsule Take 1 capsule by mouth daily 30 capsule 2    Multiple Vitamins-Minerals (MULTIVITAMIN PO) 1 tablet daily       Probiotic Product (PROBIOTIC PO) 1 tablet daily       Cyanocobalamin (B-12 PO) daily      ondansetron (ZOFRAN) 4 MG tablet TAKE 1 TABLET BY MOUTH EVERY 8 HOURS AS NEEDED FOR NAUSEA OR VOMITING 15 tablet 0    meloxicam (MOBIC) 7.5 MG tablet TK 1 T PO BID  4    LINZESS 290 MCG CAPS capsule 290 mcg every morning (before breakfast)       lidocaine (XYLOCAINE) 5 % ointment Apply topically as needed for Pain Apply topically as needed.  conjugated estrogens (PREMARIN) 0.625 MG/GM vaginal cream Place vaginally as needed Place vaginally daily.  lidocaine (LIDODERM) 5 % Place 1 patch onto the skin daily 12 hours on, 12 hours off.  hydrocortisone (ANUSOL-HC) 25 MG suppository Place 25 mg rectally as needed for Hemorrhoids      fluticasone (FLONASE ALLERGY RELIEF) 50 MCG/ACT nasal spray 1 spray by Nasal route daily      HYDROcodone-acetaminophen (NORCO) 5-325 MG per tablet 1 tablet nightly as needed for Pain. Take 1 tablet every 4-6 hours as needed for pain       NONFORMULARY Immunotherapy allergy shot      zinc 50 MG CAPS Take 1 capsule by mouth daily       Ascorbic Acid (VITAMIN C) 500 MG CAPS Take 1 tablet by mouth daily       orphenadrine (NORFLEX) 100 MG tablet Take 100 mg by mouth 2 times daily as needed.  tramadol (ULTRAM) 50 MG tablet Take 50 mg by mouth every 6 hours as needed.  Take 1-2 tabs every 6 hrs prn        Current Facility-Administered Medications   Medication Dose Route Frequency Provider Last Rate Last Admin    omalizumab Tess Sprinkle) injection 150 mg  150 mg Subcutaneous Q28 Days COREEN Alvarez - CNP   150 mg at 05/05/21 3723    omalizumab Tess Sprinkle) injection 150 mg  150 mg Subcutaneous Q28 Days Kan Sifuentes APRN - CNP   150 mg at 05/05/21 0933    omalizumab Tess Sprinkle) injection 150 mg  150 mg Subcutaneous Q28 Days Kan iSfuentes APRN - CNP   150 mg at 04/05/21 0941    omalizumab Tess Sprinkle) injection 150 mg  150 mg Subcutaneous Q28 Days Kan Sifuentes APRN - CNP   150 mg at 04/05/21 0942    omalizumab Hershall Luavelina) injection 150 mg  150 mg Subcutaneous Q28 Days Miguel Never, APRN - CNP   150 mg at 03/08/21 8807    omalizumab Hershall Luavelina) injection 150 mg  150 mg Subcutaneous Q28 Days Miguel Never, APRN - CNP   150 mg at 03/08/21 2805       ALLERGIES:   Allergies   Allergen Reactions    Bactrim [Sulfamethoxazole-Trimethoprim]     Flexeril [Cyclobenzaprine]     Morphine Other (See Comments)     \"I hallucinate\"    Peanut-Containing Drug Products     Tessalon [Benzonatate] Hives    Amoxicillin-Pot Clavulanate Rash    Cefdinir Rash    Ibuprofen [Ibuprofen] Rash    Lisinopril Rash    Macrobid [Nitrofurantoin Monohydrate Macrocrystals] Rash    Macrodantin [Nitrofurantoin] Rash    Ranitidine Rash       SOCIAL HISTORY:  Social History     Tobacco Use    Smoking status: Current Every Day Smoker     Packs/day: 0.20     Years: 25.00     Pack years: 5.00     Types: Cigarettes     Start date: 6/1/1975    Smokeless tobacco: Never Used    Tobacco comment: smokes 2 cigs a day   Substance Use Topics    Alcohol use: Not Currently     Alcohol/week: 2.0 standard drinks     Types: 2 Cans of beer per week     Comment: Patient drinks 1-2 beers a MONTH ; stomach tolerates very little alcohol       Pertinent ROS:  Review of Systems  Skin: Denies any new changing, growing or bleeding lesions or rashes except as described in the HPI   Constitutional: Denies fevers, chills, and malaise. PHYSICAL EXAM:   /78 (Site: Left Upper Arm, Position: Sitting, Cuff Size: Large Adult)   Pulse 80   Ht 5' 4\" (1.626 m)   Wt 208 lb 9.6 oz (94.6 kg)   BMI 35.81 kg/m²     The patient is generally well appearing, well nourished, alert and conversational. Affect is normal.    Cutaneous Exam:  Physical Exam  Sun-exposed skin, which includes the head/face, neck, both arms, digits and/or nails was examined. Diagnoses/exam findings/medical history pertinent to this visit are listed below:    Assessment and Plan:  Assessment   1.  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 She has a h/o chiari malformation with shunt placement which may put her at risk of scalp neuralgia. I would consider optimization of neuropathy treatment (increased dose of gabapentin vs. Switching to TCA or lyrica). I will defer this to neurosurgery and/or pain management  - if this approach unsuccessful, can consider a compounded topicals (topical ketamine, amytriptylline, gabapentin, doxepin, and/or lidocaine have previously been used with success) for the scalp. Discussed that this would be an out of pocket cost    Intralesional Injection: After discussion of risks including atrophy and dyspigmentation, patient gives verbal consent to proceed. After cleansing with alcohol the alopecic patches on the scalp were injected with 1 ml of Kenalog 5     - INJECTION INTO SKIN LESIONS, UP TO 7  - ketoconazole (NIZORAL) 2 % shampoo; Apply 3-4 times weekly to scalp, leave on for five minutes prior to washing off  Dispense: 120 mL; Refill: 11  - clobetasol (TEMOVATE) 0.05 % ointment; Apply to scalp daily  Dispense: 60 g; Refill: 2          RTC 4 months    There are no Patient Instructions on file for this visit. This note was created with the assistance of a speech-recognition program.  Although the intention is to generate a document that actually reflects the content of the visit, no guarantees can be provided that every mistake has been identified and corrected byediting.     Electronically signed by Biju Jimenez MD on 5/24/21 at 9:23 AM EDT

## 2021-05-26 ENCOUNTER — PATIENT MESSAGE (OUTPATIENT)
Dept: ALLERGY | Age: 61
End: 2021-05-26

## 2021-05-26 ENCOUNTER — NURSE ONLY (OUTPATIENT)
Dept: ALLERGY | Age: 61
End: 2021-05-26
Payer: MEDICAID

## 2021-05-26 VITALS
TEMPERATURE: 96.7 F | HEART RATE: 70 BPM | RESPIRATION RATE: 14 BRPM | SYSTOLIC BLOOD PRESSURE: 116 MMHG | DIASTOLIC BLOOD PRESSURE: 72 MMHG

## 2021-05-26 DIAGNOSIS — J30.1 ALLERGY TO TREES: ICD-10-CM

## 2021-05-26 DIAGNOSIS — J30.89 ALLERGY TO DUST: ICD-10-CM

## 2021-05-26 DIAGNOSIS — Z91.048 ALLERGY TO MOLD: ICD-10-CM

## 2021-05-26 DIAGNOSIS — Z91.038 ALLERGY TO COCKROACHES: ICD-10-CM

## 2021-05-26 DIAGNOSIS — Z51.6 ENCOUNTER FOR DESENSITIZATION TO ALLERGENS: Primary | ICD-10-CM

## 2021-05-26 PROCEDURE — 95117 IMMUNOTHERAPY INJECTIONS: CPT | Performed by: NURSE PRACTITIONER

## 2021-05-26 NOTE — TELEPHONE ENCOUNTER
From: Dulce Moran  To: COREEN Gardiner CNP  Sent: 5/26/2021 9:57 AM EDT  Subject: Prescription Question    Maico Brewster  [facility] is saying that I need a PA for my EPI~PEN & that they sent your office a Edannie BENZ[facility] [address]. [id number] Wendie Gonzalez, thank you.

## 2021-05-27 ENCOUNTER — HOSPITAL ENCOUNTER (OUTPATIENT)
Dept: ULTRASOUND IMAGING | Age: 61
Discharge: HOME | End: 2021-05-27
Payer: MEDICAID

## 2021-05-27 ENCOUNTER — HOSPITAL ENCOUNTER (OUTPATIENT)
Dept: AUDIOLOGY | Age: 61
Discharge: HOME | End: 2021-05-27
Payer: MEDICAID

## 2021-05-27 DIAGNOSIS — E04.2 MULTIPLE THYROID NODULES: ICD-10-CM

## 2021-05-27 DIAGNOSIS — F17.200 TOBACCO DEPENDENCE: ICD-10-CM

## 2021-05-27 DIAGNOSIS — J38.1 VOCAL CORD POLYP: ICD-10-CM

## 2021-05-27 DIAGNOSIS — H92.01 REFERRED OTALGIA OF RIGHT EAR: ICD-10-CM

## 2021-05-27 DIAGNOSIS — R59.0 CERVICAL LYMPHADENOPATHY: ICD-10-CM

## 2021-05-27 PROCEDURE — 76536 US EXAM OF HEAD AND NECK: CPT

## 2021-05-27 PROCEDURE — 92567 TYMPANOMETRY: CPT | Performed by: AUDIOLOGIST

## 2021-05-27 PROCEDURE — 92557 COMPREHENSIVE HEARING TEST: CPT | Performed by: AUDIOLOGIST

## 2021-05-27 NOTE — PROGRESS NOTES
AUDIOLOGICAL EVALUATION      REASON FOR TESTING:  Audiometric evaluation per the request of MARY ALICE Krishnan, due to the diagnosis of right ear pain. The patient reports experiencing intermittent ear pain mostly in her right ear. It is a deep dull ache type pain. She also reports hearing cracking and popping sounds in both ears. She does report experiencing dizziness/imbalance but attributes that to her Arnold-Chiari malformation and shunt. Occasional bilateral tinnitus was also reported. OTOSCOPY: Clear canal, bilaterally     AUDIOGRAM            Reliability: Good  Audiometer Used:  [id number]        COMMENTS: Normal hearing at 250-4000 Hz sloping to a mild to moderate sensorineural hearing loss at 5549-4108 Hz for the left ear. Normal hearing at 250-4000 Hz sloping to a mild to moderate sensorineural hearing loss for the right ear. Speech discrimination ability is good at 96% for the left ear and excellent at 100% for the right ear. Tympanometry revealed normal peak pressure and normal middle ear compliance for both ears. RECOMMENDATION(S): 1- Continue care with Dr. Margareth Reddy as scheduled. 2- Repeat audiogram periodically to monitor hearing levels. Amplification is not recommended at this time. 3-VNG testing to further evaluate the patient's dizziness.

## 2021-05-27 NOTE — TELEPHONE ENCOUNTER
Please read the note. She does not need a new EPI PEN she needs a PA. Also if you check the med list I just send her in an [employer] on 05/18/2021. Call [facility] and have them start a PA.

## 2021-06-02 ENCOUNTER — NURSE ONLY (OUTPATIENT)
Dept: ALLERGY | Age: 61
End: 2021-06-02
Payer: MEDICAID

## 2021-06-02 VITALS
TEMPERATURE: 97.2 F | SYSTOLIC BLOOD PRESSURE: 124 MMHG | HEART RATE: 70 BPM | DIASTOLIC BLOOD PRESSURE: 74 MMHG | RESPIRATION RATE: 14 BRPM

## 2021-06-02 DIAGNOSIS — Z51.6 ENCOUNTER FOR DESENSITIZATION TO ALLERGENS: Primary | ICD-10-CM

## 2021-06-02 DIAGNOSIS — Z91.048 ALLERGY TO MOLD: ICD-10-CM

## 2021-06-02 DIAGNOSIS — Z91.038 ALLERGY TO COCKROACHES: ICD-10-CM

## 2021-06-02 DIAGNOSIS — J30.89 ALLERGY TO DUST: ICD-10-CM

## 2021-06-02 DIAGNOSIS — J30.1 SEASONAL ALLERGIC RHINITIS DUE TO POLLEN: Primary | ICD-10-CM

## 2021-06-02 DIAGNOSIS — J30.1 ALLERGY TO TREES: ICD-10-CM

## 2021-06-02 PROCEDURE — 95165 ANTIGEN THERAPY SERVICES: CPT | Performed by: NURSE PRACTITIONER

## 2021-06-02 PROCEDURE — 95117 IMMUNOTHERAPY INJECTIONS: CPT | Performed by: NURSE PRACTITIONER

## 2021-06-02 NOTE — PROGRESS NOTES
After consent obtained/verified, allergy injection given in back of R/L arm(s). VIAL COLOR OF ALL VIALS TODAY IS RED VIALS    ALLERGY INJECTION FROM VIAL A GIVEN LT  UPPER ARM IN THE AMOUNT OF 0.45 ML    ALLERGY INJECTION FROM VIAL B GIVEN RT UPPER ARM IN THE AMOUNT OF 0.45 ML        Documentation of vial injection specific to arm(s) noted on Allergy Immunotherapy Administration Form. Patient waited 30 minutes for observation. Patient tolerated well without adverse reaction. SHOT REACTION TREATMENT INSTRUCTIONS    During the 30 minute wait after an allergy injection the following symptoms should be reported:    Itching other than at the injection site  Hives or swelling other than at the injection site  Redness other than at the injection site  Difficulty breathing  Chest tightness  Difficulty swallowing  Throat tightness    If these symptoms occur, NOTIFY PROVIDER and the following treatment should be administered:    1. Epinephrine/Auvi Q 1:1000 IM - 0.3 ml if > 66 lbs or more, 0.15 ml if 33 - 63 lbs, or 0.1 ml if <33 lbs     2. Diphenhydramine - give all intramuscular:     2 to <6 years (off-label use): 6.25 mg,    6 to <12 years: 12.5 to 25 mg;    ?12 years: 25-50 mg.    3.  Famotidine:  Adults 40 mg oral    Adolescents age 12 years and >88 lbs: 40 mg    Children and Adolescents ? 12years of age: Initial: 0.25 mg/kg/dose  every 12 hours (maximum daily dose: 40 mg/day)    Epi/Auvi Q dose may me repeated in 5-15 minutes if adequate resolution of symptoms does not occur    Patient should be observed for at least one hour after final Epi/Auvi Q dose and must be seen by provider. Patients cannot drive themselves if they have received diphenhydramine.

## 2021-06-07 ENCOUNTER — NURSE ONLY (OUTPATIENT)
Dept: ALLERGY | Age: 61
End: 2021-06-07
Payer: MEDICAID

## 2021-06-07 VITALS
DIASTOLIC BLOOD PRESSURE: 82 MMHG | TEMPERATURE: 96.8 F | HEART RATE: 80 BPM | RESPIRATION RATE: 16 BRPM | SYSTOLIC BLOOD PRESSURE: 136 MMHG

## 2021-06-07 DIAGNOSIS — J30.89 ALLERGY TO DUST: ICD-10-CM

## 2021-06-07 DIAGNOSIS — L85.3 XEROSIS OF SKIN: ICD-10-CM

## 2021-06-07 DIAGNOSIS — J82.83 EOSINOPHILIC ASTHMA: ICD-10-CM

## 2021-06-07 DIAGNOSIS — J30.1 ALLERGY TO TREES: Primary | ICD-10-CM

## 2021-06-07 DIAGNOSIS — Z91.038 ALLERGY TO COCKROACHES: ICD-10-CM

## 2021-06-07 DIAGNOSIS — Z51.6 ENCOUNTER FOR DESENSITIZATION TO ALLERGENS: ICD-10-CM

## 2021-06-07 PROCEDURE — 95117 IMMUNOTHERAPY INJECTIONS: CPT | Performed by: NURSE PRACTITIONER

## 2021-06-07 PROCEDURE — 96372 THER/PROPH/DIAG INJ SC/IM: CPT | Performed by: NURSE PRACTITIONER

## 2021-06-07 RX ORDER — HYDROCORTISONE 25 MG/ML
LOTION TOPICAL
Qty: 59 ML | Refills: 0 | Status: SHIPPED | OUTPATIENT
Start: 2021-06-07 | End: 2021-07-07

## 2021-06-07 NOTE — PROGRESS NOTES
After consent obtained/verified, allergy injection given in back of R/L arm(s). VIAL COLOR OF ALL VIALS TODAY IS RED MAINTENANCE    ALLERGY INJECTION FROM VIAL A GIVEN Right  UPPER ARM IN THE AMOUNT OF 0.50 ML    ALLERGY INJECTION FROM VIAL B GIVEN Left UPPER ARM IN THE AMOUNT OF 0.50 ML    Documentation of vial injection specific to arm(s) noted on Allergy Immunotherapy Administration Form. Patient waited 30 minutes for observation. Patient tolerated well without adverse reaction. SHOT REACTION TREATMENT INSTRUCTIONS    During the 30 minute wait after an allergy injection the following symptoms should be reported:    Itching other than at the injection site  Hives or swelling other than at the injection site  Redness other than at the injection site  Difficulty breathing  Chest tightness  Difficulty swallowing  Throat tightness    If these symptoms occur, NOTIFY PROVIDER and the following treatment should be administered:    1. Epinephrine/Auvi Q 1:1000 IM - 0.3 ml if > 66 lbs or more, 0.15 ml if 33 - 63 lbs, or 0.1 ml if <33 lbs     2. Diphenhydramine - give all intramuscular:     2 to <6 years (off-label use): 6.25 mg,    6 to <12 years: 12.5 to 25 mg;    ?12 years: 25-50 mg.    3.  Famotidine:  Adults 40 mg oral    Adolescents age 12 years and >88 lbs: 40 mg    Children and Adolescents ? 12years of age: Initial: 0.25 mg/kg/dose  every 12 hours (maximum daily dose: 40 mg/day)    Epi/Auvi Q dose may me repeated in 5-15 minutes if adequate resolution of symptoms does not occur    Patient should be observed for at least one hour after final Epi/Auvi Q dose and must be seen by provider. Patients cannot drive themselves if they have received diphenhydramine.
site  Difficulty breathing  Chest tightness  Difficulty swallowing  Throat tightness    If these symptoms occur, NOTIFY PROVIDER and the following treatment should be administered:    1. Epinephrine 1:1000 IM - 0.3 ml if > 66 lbs or more, 0.15 ml if 33 - 63 lbs, or 0.1 ml if <33 lbs   2. Diphenhydramine - give all intramuscular:     2 to <6 years (off-label use): 6.25 mg,    6 to <12 years: 12.5 to 25 mg;    ?12 years: 25-50 mg.  3.  Famotidine:  Adults 40 mg oral    Adolescents age 12 years and >88 lbs: 40 mg    Children and Adolescents ? 12years of age: Initial: 0.25 mg/kg/dose every 12 hours (maximum daily dose: 40 mg/day)    Epipen dose may be repeated in 5-15 minutes if adequate resolution of symptoms does not occur    Patient should be observed for at least one hour after final epi dose and must be seen by provider. Patients cannot drive themselves if they have received diphenhydramine.

## 2021-06-08 ENCOUNTER — OFFICE VISIT (OUTPATIENT)
Dept: ENT CLINIC | Age: 61
End: 2021-06-08
Payer: MEDICAID

## 2021-06-08 VITALS
BODY MASS INDEX: 35.27 KG/M2 | WEIGHT: 206.6 LBS | HEIGHT: 64 IN | DIASTOLIC BLOOD PRESSURE: 70 MMHG | RESPIRATION RATE: 14 BRPM | TEMPERATURE: 97.1 F | SYSTOLIC BLOOD PRESSURE: 128 MMHG | HEART RATE: 80 BPM

## 2021-06-08 DIAGNOSIS — F17.200 TOBACCO DEPENDENCE: ICD-10-CM

## 2021-06-08 DIAGNOSIS — E04.2 MULTIPLE THYROID NODULES: ICD-10-CM

## 2021-06-08 DIAGNOSIS — R49.0 HOARSE: ICD-10-CM

## 2021-06-08 DIAGNOSIS — E03.9 ACQUIRED HYPOTHYROIDISM: ICD-10-CM

## 2021-06-08 DIAGNOSIS — J38.3 POLYPOID DEGENERATION OF VOCAL CORDS: Primary | ICD-10-CM

## 2021-06-08 PROCEDURE — 99213 OFFICE O/P EST LOW 20 MIN: CPT | Performed by: OTOLARYNGOLOGY

## 2021-06-08 ASSESSMENT — ENCOUNTER SYMPTOMS
STRIDOR: 0
ABDOMINAL PAIN: 0
SHORTNESS OF BREATH: 0
VOICE CHANGE: 0
CHEST TIGHTNESS: 0
NAUSEA: 0
FACIAL SWELLING: 0
TROUBLE SWALLOWING: 0
WHEEZING: 0
VOMITING: 0
SINUS PRESSURE: 0
DIARRHEA: 0
COLOR CHANGE: 0
CHOKING: 0
SORE THROAT: 0
RHINORRHEA: 0
COUGH: 0
APNEA: 0

## 2021-06-08 NOTE — PROGRESS NOTES
[facility] [place] [facility]  [facility]  [address] [place] [address] [place] [id number]  Dept: [phone]  Dept Fax: [phone]  Loc: Romulo Bello is a 56 y.o. female who was referred byNo ref. provider found for:  Chief Complaint   Patient presents with    Follow-up     Patient is here for follow up US review and labs    . HPI:     Kimo Zuñiag is a 56 y.o. 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 She says her voice is still a little hoarse. She always has many many questions, which I answered, each more than once. Thyroid ultrasound results showed a subcentimeter nodule unchanged in size over the last several months. Everything looks stable and fine    History:      Allergies   Allergen Reactions    Bactrim [Sulfamethoxazole-Trimethoprim]     Flexeril [Cyclobenzaprine]     Morphine Other (See Comments)     \"I hallucinate\"    Peanut-Containing Drug Products     Tessalon [Benzonatate] Hives    Amoxicillin-Pot Clavulanate Rash    Cefdinir Rash    Ibuprofen [Ibuprofen] Rash    Lisinopril Rash    Macrobid [Nitrofurantoin Monohydrate Macrocrystals] Rash    Macrodantin [Nitrofurantoin] Rash    Ranitidine Rash     Current Outpatient Medications   Medication Sig Dispense Refill    hydrocortisone (HYTONE) 2.5 % lotion APPLY TOPICALLY TO AFFECTED AREA DAILY 59 mL 0    ketoconazole (NIZORAL) 2 % shampoo Apply 3-4 times weekly to scalp, leave on for five minutes prior to washing off 120 mL 11    clobetasol (TEMOVATE) 0.05 % ointment Apply to scalp daily 60 g 2    EPIPEN 2-VENESSA 0.3 MG/0.3ML SOAJ injection INJECT 1 PEN IM AS A SINGLE DOSE PRN 1 each 1    nicotine polacrilex (COMMIT) 2 MG lozenge Take 1 lozenge by mouth as needed for Smoking cessation (max 20 per day) 100 each 3    fluconazole (DIFLUCAN) 150 MG tablet as needed (as need for topically 2 times daily to rash on hands a needed 30 g 11    levomilnacipran (FETZIMA) 40 MG CP24 extended release capsule Take 1 capsule by mouth daily 30 capsule 2    Multiple Vitamins-Minerals (MULTIVITAMIN PO) 1 tablet daily       Probiotic Product (PROBIOTIC PO) 1 tablet daily       Cyanocobalamin (B-12 PO) daily      ondansetron (ZOFRAN) 4 MG tablet TAKE 1 TABLET BY MOUTH EVERY 8 HOURS AS NEEDED FOR NAUSEA OR VOMITING 15 tablet 0    meloxicam (MOBIC) 7.5 MG tablet TK 1 T PO BID  4    LINZESS 290 MCG CAPS capsule 290 mcg every morning (before breakfast)       lidocaine (XYLOCAINE) 5 % ointment Apply topically as needed for Pain Apply topically as needed.  conjugated estrogens (PREMARIN) 0.625 MG/GM vaginal cream Place vaginally as needed Place vaginally daily.  lidocaine (LIDODERM) 5 % Place 1 patch onto the skin daily 12 hours on, 12 hours off.  hydrocortisone (ANUSOL-HC) 25 MG suppository Place 25 mg rectally as needed for Hemorrhoids      fluticasone (FLONASE ALLERGY RELIEF) 50 MCG/ACT nasal spray 1 spray by Nasal route daily      HYDROcodone-acetaminophen (NORCO) 5-325 MG per tablet 1 tablet nightly as needed for Pain. Take 1 tablet every 4-6 hours as needed for pain       NONFORMULARY Immunotherapy allergy shot      zinc 50 MG CAPS Take 1 capsule by mouth daily       Ascorbic Acid (VITAMIN C) 500 MG CAPS Take 1 tablet by mouth daily       orphenadrine (NORFLEX) 100 MG tablet Take 100 mg by mouth 2 times daily as needed.  tramadol (ULTRAM) 50 MG tablet Take 50 mg by mouth every 6 hours as needed. Take 1-2 tabs every 6 hrs prn       Estradiol 10 % CREA by Does not apply route      nicotine (NICODERM CQ) 14 MG/24HR Place 1 patch onto the skin every 24 hours May wear 24 hours. May remove at bedtime if problems with insomnia. Re-Apply new patch immediately on awakening.   Diagnosis: Tobacco dependence 42 patch 0     Current Facility-Administered Medications   Medication Dose Route Frequency Provider Last Rate Last Admin    omalizumab Zelpha Nigh) injection 150 mg  150 mg Subcutaneous Q28 Days [facility], APRN - CNP   150 mg at 21 1633    omalizumab (XOLAIR) injection 150 mg  150 mg Subcutaneous Q28 Days [facility], APRN - CNP   150 mg at 21 1632    omalizumab (XOLAIR) injection 150 mg  150 mg Subcutaneous Q28 Days [facility], APRN - CNP   150 mg at 21 3433    omalizumab (XOLAIR) injection 150 mg  150 mg Subcutaneous Q28 Days [facility], APRN - CNP   150 mg at 21 0933    omalizumab Zelpha Nigh) injection 150 mg  150 mg Subcutaneous Q28 Days [facility], APRN - CNP   150 mg at 21 0941    omalizumab (XOLAIR) injection 150 mg  150 mg Subcutaneous Q28 Days [facility], APRN - CNP   150 mg at 21 9004    omalizumab (XOLAIR) injection 150 mg  150 mg Subcutaneous Q28 Days [facility], APRN - CNP   150 mg at 21 8325    omalizumab (XOLAIR) injection 150 mg  150 mg Subcutaneous Q28 Days [facility], APRN - CNP   150 mg at 21 8730     Past Medical History:   Diagnosis Date    Allergic rhinitis     Anxiety     Arnold-Chiari malformation (HCC)     Asthma     Chronic bronchitis (HCC)     Fibromyalgia     GERD (gastroesophageal reflux disease)     Headache(784.0)     Hyperlipidemia     Neuropathy     Osteoarthritis     Sinusitis     Type II or unspecified type diabetes mellitus without mention of complication, not stated as uncontrolled       Past Surgical History:   Procedure Laterality Date    APPENDECTOMY       SECTION      x2    COLONOSCOPY  [id number]    CSF SHUNT  2007    Cervical syrinx to subarachnoid shunt; thoracic syrinx to subarachnoid shunt (2 separate dural openings)    CYST REMOVAL      extradural cysts at thoracic 2-3    ECTOPIC PREGNANCY SURGERY      HEMICOLECTOMY      HERNIA REPAIR      inguinal     HYSTERECTOMY      LARYNGOSCOPY N/A 2021    MICROLARYNGOSCOPY AND BIOPSY   performed by Hanna Freire MD at [facility]  01/09/2007    C4-5-6-7; T1-2-3 laminectomies    TOOTH EXTRACTION  09/2017     Family History   Problem Relation Age of Onset    Cancer Other         colon    Mental Illness Other     High Blood Pressure Other     Diabetes Other     High Blood Pressure Sister      Social History     Tobacco Use    Smoking status: Current Every Day Smoker     Packs/day: 0.20     Years: 25.00     Pack years: 5.00     Types: Cigarettes     Start date: 6/1/1975    Smokeless tobacco: Never Used    Tobacco comment: smokes 2 cigs a day   Substance Use Topics    Alcohol use: Not Currently     Alcohol/week: 2.0 standard drinks     Types: 2 Cans of beer per week     Comment: Patient drinks 1-2 beers a MONTH ; stomach tolerates very little alcohol       Subjective:      Review of Systems   Constitutional: Negative for activity change, appetite change, chills, diaphoresis, fatigue, fever and unexpected weight change. HENT: Negative for congestion, dental problem, ear discharge, ear pain, facial swelling, hearing loss, mouth sores, nosebleeds, postnasal drip, rhinorrhea, sinus pressure, sneezing, sore throat, tinnitus, trouble swallowing and voice change. Eyes: Negative for visual disturbance. Respiratory: Negative for apnea, cough, choking, chest tightness, shortness of breath, wheezing and stridor. Cardiovascular: Negative for chest pain, palpitations and leg swelling. Gastrointestinal: Negative for abdominal pain, diarrhea, nausea and vomiting. Endocrine: Negative for cold intolerance, heat intolerance, polydipsia and polyuria. Genitourinary: Negative for dysuria, enuresis and hematuria. Musculoskeletal: Negative for arthralgias, gait problem, neck pain and neck stiffness. Skin: Negative for color change and rash. Allergic/Immunologic: Negative for environmental allergies, food allergies and immunocompromised state.    Neurological: Negative for

## 2021-06-09 ENCOUNTER — APPOINTMENT (OUTPATIENT)
Dept: PHARMACY | Age: 61
End: 2021-06-09
Payer: MEDICAID

## 2021-06-14 ENCOUNTER — NURSE ONLY (OUTPATIENT)
Dept: ALLERGY | Age: 61
End: 2021-06-14
Payer: MEDICAID

## 2021-06-14 VITALS
SYSTOLIC BLOOD PRESSURE: 118 MMHG | RESPIRATION RATE: 14 BRPM | TEMPERATURE: 98 F | DIASTOLIC BLOOD PRESSURE: 80 MMHG | HEART RATE: 76 BPM

## 2021-06-14 DIAGNOSIS — J30.1 ALLERGY TO TREES: Primary | ICD-10-CM

## 2021-06-14 DIAGNOSIS — Z91.038 ALLERGY TO COCKROACHES: ICD-10-CM

## 2021-06-14 DIAGNOSIS — Z51.6 ENCOUNTER FOR DESENSITIZATION TO ALLERGENS: ICD-10-CM

## 2021-06-14 PROCEDURE — 95117 IMMUNOTHERAPY INJECTIONS: CPT | Performed by: NURSE PRACTITIONER

## 2021-06-14 NOTE — PROGRESS NOTES
After consent obtained/verified, allergy injection given in back of R/L arm(s). VIAL COLOR OF ALL VIALS TODAY IS RED MAINTENANCE    ALLERGY INJECTION FROM VIAL A GIVEN Left  UPPER ARM IN THE AMOUNT OF 0.50 ML    ALLERGY INJECTION FROM VIAL B GIVEN Right UPPER ARM IN THE AMOUNT OF 0.50 ML    Documentation of vial injection specific to arm(s) noted on Allergy Immunotherapy Administration Form. Patient waited 30 minutes for observation. Patient tolerated well without adverse reaction. SHOT REACTION TREATMENT INSTRUCTIONS    During the 30 minute wait after an allergy injection the following symptoms should be reported:    Itching other than at the injection site  Hives or swelling other than at the injection site  Redness other than at the injection site  Difficulty breathing  Chest tightness  Difficulty swallowing  Throat tightness    If these symptoms occur, NOTIFY PROVIDER and the following treatment should be administered:    1. Epinephrine/Auvi Q 1:1000 IM - 0.3 ml if > 66 lbs or more, 0.15 ml if 33 - 63 lbs, or 0.1 ml if <33 lbs     2. Diphenhydramine - give all intramuscular:     2 to <6 years (off-label use): 6.25 mg,    6 to <12 years: 12.5 to 25 mg;    ?12 years: 25-50 mg.    3.  Famotidine:  Adults 40 mg oral    Adolescents age 12 years and >88 lbs: 40 mg    Children and Adolescents ? 12years of age: Initial: 0.25 mg/kg/dose  every 12 hours (maximum daily dose: 40 mg/day)    Epi/Auvi Q dose may me repeated in 5-15 minutes if adequate resolution of symptoms does not occur    Patient should be observed for at least one hour after final Epi/Auvi Q dose and must be seen by provider. Patients cannot drive themselves if they have received diphenhydramine.

## 2021-06-21 ENCOUNTER — NURSE ONLY (OUTPATIENT)
Dept: ALLERGY | Age: 61
End: 2021-06-21
Payer: MEDICAID

## 2021-06-21 VITALS
HEART RATE: 76 BPM | SYSTOLIC BLOOD PRESSURE: 124 MMHG | RESPIRATION RATE: 14 BRPM | TEMPERATURE: 97.5 F | DIASTOLIC BLOOD PRESSURE: 80 MMHG

## 2021-06-21 DIAGNOSIS — J30.1 ALLERGY TO TREES: ICD-10-CM

## 2021-06-21 DIAGNOSIS — J30.89 ALLERGY TO DUST: ICD-10-CM

## 2021-06-21 DIAGNOSIS — Z51.6 ENCOUNTER FOR DESENSITIZATION TO ALLERGENS: Primary | ICD-10-CM

## 2021-06-21 PROCEDURE — 95117 IMMUNOTHERAPY INJECTIONS: CPT | Performed by: NURSE PRACTITIONER

## 2021-06-23 ENCOUNTER — APPOINTMENT (OUTPATIENT)
Dept: PHARMACY | Age: 61
End: 2021-06-23
Payer: MEDICAID

## 2021-06-24 ENCOUNTER — OFFICE VISIT (OUTPATIENT)
Dept: FAMILY MEDICINE CLINIC | Age: 61
End: 2021-06-24
Payer: MEDICAID

## 2021-06-24 VITALS
BODY MASS INDEX: 36.37 KG/M2 | HEART RATE: 94 BPM | RESPIRATION RATE: 18 BRPM | WEIGHT: 213 LBS | HEIGHT: 64 IN | DIASTOLIC BLOOD PRESSURE: 84 MMHG | SYSTOLIC BLOOD PRESSURE: 118 MMHG

## 2021-06-24 DIAGNOSIS — M79.7 FIBROMYALGIA: ICD-10-CM

## 2021-06-24 DIAGNOSIS — Z72.0 TOBACCO ABUSE: ICD-10-CM

## 2021-06-24 DIAGNOSIS — E03.9 HYPOTHYROIDISM, UNSPECIFIED TYPE: Primary | ICD-10-CM

## 2021-06-24 DIAGNOSIS — J44.9 CHRONIC OBSTRUCTIVE PULMONARY DISEASE, UNSPECIFIED COPD TYPE (HCC): ICD-10-CM

## 2021-06-24 DIAGNOSIS — E55.9 VITAMIN D DEFICIENCY: ICD-10-CM

## 2021-06-24 DIAGNOSIS — Q07.00 ARNOLD-CHIARI MALFORMATION (HCC): ICD-10-CM

## 2021-06-24 DIAGNOSIS — E66.9 OBESITY (BMI 30-39.9): ICD-10-CM

## 2021-06-24 DIAGNOSIS — E78.00 PURE HYPERCHOLESTEROLEMIA: ICD-10-CM

## 2021-06-24 DIAGNOSIS — J82.83 EOSINOPHILIC ASTHMA: ICD-10-CM

## 2021-06-24 DIAGNOSIS — R73.02 IGT (IMPAIRED GLUCOSE TOLERANCE): ICD-10-CM

## 2021-06-24 PROCEDURE — 99214 OFFICE O/P EST MOD 30 MIN: CPT | Performed by: FAMILY MEDICINE

## 2021-06-24 SDOH — ECONOMIC STABILITY: FOOD INSECURITY: WITHIN THE PAST 12 MONTHS, THE FOOD YOU BOUGHT JUST DIDN'T LAST AND YOU DIDN'T HAVE MONEY TO GET MORE.: NEVER TRUE

## 2021-06-24 SDOH — ECONOMIC STABILITY: FOOD INSECURITY: WITHIN THE PAST 12 MONTHS, YOU WORRIED THAT YOUR FOOD WOULD RUN OUT BEFORE YOU GOT MONEY TO BUY MORE.: NEVER TRUE

## 2021-06-24 ASSESSMENT — ENCOUNTER SYMPTOMS
GASTROINTESTINAL NEGATIVE: 1
RESPIRATORY NEGATIVE: 1

## 2021-06-24 ASSESSMENT — SOCIAL DETERMINANTS OF HEALTH (SDOH): HOW HARD IS IT FOR YOU TO PAY FOR THE VERY BASICS LIKE FOOD, HOUSING, MEDICAL CARE, AND HEATING?: SOMEWHAT HARD
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
## 2021-06-28 ENCOUNTER — NURSE ONLY (OUTPATIENT)
Dept: ALLERGY | Age: 61
End: 2021-06-28
Payer: MEDICAID

## 2021-06-28 VITALS
SYSTOLIC BLOOD PRESSURE: 120 MMHG | TEMPERATURE: 97.3 F | RESPIRATION RATE: 16 BRPM | HEART RATE: 80 BPM | DIASTOLIC BLOOD PRESSURE: 82 MMHG

## 2021-06-28 DIAGNOSIS — J30.89 ALLERGY TO DUST: ICD-10-CM

## 2021-06-28 DIAGNOSIS — Z51.6 ENCOUNTER FOR DESENSITIZATION TO ALLERGENS: ICD-10-CM

## 2021-06-28 DIAGNOSIS — Z91.038 ALLERGY TO COCKROACHES: ICD-10-CM

## 2021-06-28 DIAGNOSIS — J30.1 ALLERGY TO TREES: Primary | ICD-10-CM

## 2021-06-28 PROCEDURE — 95117 IMMUNOTHERAPY INJECTIONS: CPT | Performed by: NURSE PRACTITIONER

## 2021-06-28 NOTE — PROGRESS NOTES
After consent obtained/verified, allergy injection given in back of R/L arm(s). VIAL COLOR OF ALL VIALS TODAY IS Red    ALLERGY INJECTION FROM VIAL A GIVEN Left  UPPER ARM IN THE AMOUNT OF 0.50 ML    ALLERGY INJECTION FROM VIAL B GIVEN Right UPPER ARM IN THE AMOUNT OF 0.50 ML    Documentation of vial injection specific to arm(s) noted on Allergy Immunotherapy Administration Form. Patient waited 30 minutes for observation. Patient tolerated well without adverse reaction. SHOT REACTION TREATMENT INSTRUCTIONS    During the 30 minute wait after an allergy injection the following symptoms should be reported:    Itching other than at the injection site  Hives or swelling other than at the injection site  Redness other than at the injection site  Difficulty breathing  Chest tightness  Difficulty swallowing  Throat tightness    If these symptoms occur, NOTIFY PROVIDER and the following treatment should be administered:    1. Epinephrine/Auvi Q 1:1000 IM - 0.3 ml if > 66 lbs or more, 0.15 ml if 33 - 63 lbs, or 0.1 ml if <33 lbs     2. Diphenhydramine - give all intramuscular:     2 to <6 years (off-label use): 6.25 mg,    6 to <12 years: 12.5 to 25 mg;    ?12 years: 25-50 mg.    3.  Famotidine:  Adults 40 mg oral    Adolescents age 12 years and >88 lbs: 40 mg    Children and Adolescents ? 12years of age: Initial: 0.25 mg/kg/dose  every 12 hours (maximum daily dose: 40 mg/day)    Epi/Auvi Q dose may me repeated in 5-15 minutes if adequate resolution of symptoms does not occur    Patient should be observed for at least one hour after final Epi/Auvi Q dose and must be seen by provider. Patients cannot drive themselves if they have received diphenhydramine.

## 2021-07-07 ENCOUNTER — HOSPITAL ENCOUNTER (OUTPATIENT)
Dept: PHARMACY | Age: 61
Setting detail: THERAPIES SERIES
Discharge: HOME | End: 2021-07-07
Payer: MEDICAID

## 2021-07-07 DIAGNOSIS — L85.3 XEROSIS OF SKIN: ICD-10-CM

## 2021-07-07 PROCEDURE — 99211 OFF/OP EST MAY X REQ PHY/QHP: CPT

## 2021-07-07 RX ORDER — HYDROCORTISONE 25 MG/ML
LOTION TOPICAL
Qty: 59 ML | Refills: 0 | Status: SHIPPED | OUTPATIENT
Start: 2021-07-07 | End: 2021-08-12

## 2021-07-07 NOTE — PROGRESS NOTES
Medication Management   [facility]  [phone] (phone)  [phone] (fax)      Patient called at this time to follow up on smoking cessation, week 12/12. Encounter completed via telephone. The following statement was review with patient regarding this virtual visit:  We want to confirm that, for purposes of billing, this is a virtual visit with your provider for which we will submit a claim for reimbursement with your insurance company. Copays, coinsurance amounts or other amounts not covered by your insurance company will be covered by the [address] [place] [address] smoking cessation micheal. If you do not accept this, unfortunately we will not be able to schedule a virtual visit with the provider. Do you accept? Yes    Have you smoked any, even 1 puff, in the last 7 days? Yes  If yes, how much? Just 5 cigarettes What were you doing/do you know what the trigger was? stress    Baseline PPD: 1PPD  Current PPD: just 5 cigarettes  Smoking Reduction Percent from initial baseline: 75%    Pt is currently using Nicotine Replacement Therapy- patch and lozenges. Compliant with NRT and/or med regiment? Yes  Any side effects or problems?  none    Scaling  Importance: 10 (previously was 10)  Confidence: 10 (previously was 10)    How have you been feeling overall since quitting/cutting back? Pt states she is feeling better since cutting back but pt states she has gained approx 10 pounds. I explained to pt that this can happen and a little weight gain is better than smoking. Withdrawal symptoms?: constipation is better per pt. Pt is currently using Nicotine Patch 14mg daily and approx 2 lozenges a day. I reminded pt she can use up to 20 lozenges a day. Pt states she probably won't use that many but may try to use a few more to help with cravings. Pt has a lot of current stressors with health and living arrangements.   Pt states she is still meeting with her counselor. We have not spoke to pt in a few weeks due to pt has had to cancel some appts, so today is week 12 of the program.     Plan:  1. Continue coping strategies - walking around, calling friends for support, deep breathes, talking to counselor  2. Use the 4D's  3. Continue Nicotine Patch as currently prescribed by Dr. Alesia Villalobos and recommended to pt going to 7 mg patch for 2 weeks. Continue Nicotine Lozenges prn.  4. We discussed ways to deal with weight gain. Pt had no questions at this time.      Total time spent on phone with pt:  22 minutes    I explained to pt that we will be calling back in 3 months for the 6 month follow up from starting the program.

## 2021-07-12 ENCOUNTER — NURSE ONLY (OUTPATIENT)
Dept: ALLERGY | Age: 61
End: 2021-07-12
Payer: MEDICAID

## 2021-07-12 VITALS
TEMPERATURE: 97.2 F | RESPIRATION RATE: 16 BRPM | HEART RATE: 80 BPM | SYSTOLIC BLOOD PRESSURE: 124 MMHG | DIASTOLIC BLOOD PRESSURE: 80 MMHG

## 2021-07-12 DIAGNOSIS — J82.83 EOSINOPHILIC ASTHMA: ICD-10-CM

## 2021-07-12 DIAGNOSIS — J30.1 ALLERGY TO TREES: Primary | ICD-10-CM

## 2021-07-12 DIAGNOSIS — Z51.6 ENCOUNTER FOR DESENSITIZATION TO ALLERGENS: ICD-10-CM

## 2021-07-12 DIAGNOSIS — J30.89 ALLERGY TO DUST: ICD-10-CM

## 2021-07-12 PROCEDURE — 95117 IMMUNOTHERAPY INJECTIONS: CPT | Performed by: NURSE PRACTITIONER

## 2021-07-12 PROCEDURE — 96372 THER/PROPH/DIAG INJ SC/IM: CPT | Performed by: NURSE PRACTITIONER

## 2021-07-12 NOTE — PROGRESS NOTES
After consent obtained/verified, allergy injection given in back of R/L arm(s). VIAL COLOR OF ALL VIALS TODAY IS RED Maintenance    ALLERGY INJECTION FROM VIAL A GIVEN Right  UPPER ARM IN THE AMOUNT OF 0.15 ML    ALLERGY INJECTION FROM VIAL B GIVEN Left UPPER ARM IN THE AMOUNT OF 0.15 ML    Documentation of vial injection specific to arm(s) noted on Allergy Immunotherapy Administration Form. Patient waited 30 minutes for observation. Patient tolerated well without adverse reaction. SHOT REACTION TREATMENT INSTRUCTIONS    During the 30 minute wait after an allergy injection the following symptoms should be reported:    Itching other than at the injection site  Hives or swelling other than at the injection site  Redness other than at the injection site  Difficulty breathing  Chest tightness  Difficulty swallowing  Throat tightness    If these symptoms occur, NOTIFY PROVIDER and the following treatment should be administered:    1. Epinephrine/Auvi Q 1:1000 IM - 0.3 ml if > 66 lbs or more, 0.15 ml if 33 - 63 lbs, or 0.1 ml if <33 lbs     2. Diphenhydramine - give all intramuscular:     2 to <6 years (off-label use): 6.25 mg,    6 to <12 years: 12.5 to 25 mg;    ?12 years: 25-50 mg.    3.  Famotidine:  Adults 40 mg oral    Adolescents age 12 years and >88 lbs: 40 mg    Children and Adolescents ? 12years of age: Initial: 0.25 mg/kg/dose  every 12 hours (maximum daily dose: 40 mg/day)    Epi/Auvi Q dose may me repeated in 5-15 minutes if adequate resolution of symptoms does not occur    Patient should be observed for at least one hour after final Epi/Auvi Q dose and must be seen by provider. Patients cannot drive themselves if they have received diphenhydramine.

## 2021-07-12 NOTE — PROGRESS NOTES
Patient here today to receive Xolair injection. Patient does not report any previous reaction from Xolair injections. Denies any nausea vomiting fever urticaria or angioedema. Denies any recent exacerbation of asthma or asthma-like symptoms. Patient was explained benefits and potential risks including anaphylaxis to patient. Following obtaining verbal and written consent (on file)  Xolair injection was prepared 30 minutes prior to injection according to manufacture guidelines. LYOPHILIZED POWDER (VIAL); reconstituted with 1.4 ml injectable sterile water per Vial.      Patient has been explained the risk and benefits including delayed anaphylaxis. Patient has EpiPen and understands how to appropriately use. Xolair 150 mg given subcutaneously in Thighs, bilaterally   for a total combined dose of 300 mg  using a 25-gauge needle and 3 ML syringe. Site injections may include the following sites:  RIGHT  / LEFT UPPER ARM,  RIGHT  / LEFT FRONT OR MIDDLE OF THIGH, OR STOMACH        Xolair  [address] 52537-552-35   Lot [id number]   Expires 03/2022    Prior to patient receiving Xolair area was cleansed with alcohol swabs. Following the administration no evidence of bleeding or bruising. Patient  observed for 2 hours following first three injections and then 30 minutes following each subsequent administration. No adverse reactions reported. Patient tolerated well without adverse reactions or side effects. Patient to continue to receive Xolair injections monthly. Will report any problems to this office.     Patient to return to clinic monthly for Xolair injections and will follow-up with provider a minimum of every 6 months for evaluation and labs     SHOT REACTION TREATMENT INSTRUCTIONS    During the 30 minute wait after an allergy injection the following symptoms should be reported:    Itching other than at the injection site  Hives or swelling other than at the injection site  Redness other than at the injection site  Difficulty breathing  Chest tightness  Difficulty swallowing  Throat tightness    If these symptoms occur, NOTIFY PROVIDER and the following treatment should be administered:    1. Epinephrine 1:1000 IM - 0.3 ml if > 66 lbs or more, 0.15 ml if 33 - 63 lbs, or 0.1 ml if <33 lbs   2. Diphenhydramine - give all intramuscular:     2 to <6 years (off-label use): 6.25 mg,    6 to <12 years: 12.5 to 25 mg;    ?12 years: 25-50 mg.  3.  Famotidine:  Adults 40 mg oral    Adolescents age 12 years and >88 lbs: 40 mg    Children and Adolescents ? 12years of age: Initial: 0.25 mg/kg/dose every 12 hours (maximum daily dose: 40 mg/day)    Epipen dose may be repeated in 5-15 minutes if adequate resolution of symptoms does not occur    Patient should be observed for at least one hour after final epi dose and must be seen by provider. Patients cannot drive themselves if they have received diphenhydramine.

## 2021-07-26 ENCOUNTER — NURSE ONLY (OUTPATIENT)
Dept: ALLERGY | Age: 61
End: 2021-07-26
Payer: MEDICAID

## 2021-07-26 VITALS
TEMPERATURE: 97.7 F | HEART RATE: 72 BPM | DIASTOLIC BLOOD PRESSURE: 80 MMHG | SYSTOLIC BLOOD PRESSURE: 122 MMHG | RESPIRATION RATE: 16 BRPM

## 2021-07-26 DIAGNOSIS — Z51.6 ENCOUNTER FOR DESENSITIZATION TO ALLERGENS: ICD-10-CM

## 2021-07-26 DIAGNOSIS — J30.1 ALLERGY TO TREES: Primary | ICD-10-CM

## 2021-07-26 DIAGNOSIS — J30.89 ALLERGY TO DUST: ICD-10-CM

## 2021-07-26 PROCEDURE — 95117 IMMUNOTHERAPY INJECTIONS: CPT | Performed by: NURSE PRACTITIONER

## 2021-07-26 NOTE — PROGRESS NOTES
After consent obtained/verified, allergy injection given in back of R/L arm(s). VIAL COLOR OF ALL VIALS TODAY IS RED MAINTENANCE    ALLERGY INJECTION FROM VIAL A GIVEN Left  UPPER ARM IN THE AMOUNT OF 0.20 ML    ALLERGY INJECTION FROM VIAL B GIVEN Right UPPER ARM IN THE AMOUNT OF 0.20 ML    Documentation of vial injection specific to arm(s) noted on Allergy Immunotherapy Administration Form. Patient waited 30 minutes for observation. Patient tolerated well without adverse reaction. SHOT REACTION TREATMENT INSTRUCTIONS    During the 30 minute wait after an allergy injection the following symptoms should be reported:    Itching other than at the injection site  Hives or swelling other than at the injection site  Redness other than at the injection site  Difficulty breathing  Chest tightness  Difficulty swallowing  Throat tightness    If these symptoms occur, NOTIFY PROVIDER and the following treatment should be administered:    1. Epinephrine/Auvi Q 1:1000 IM - 0.3 ml if > 66 lbs or more, 0.15 ml if 33 - 63 lbs, or 0.1 ml if <33 lbs     2. Diphenhydramine - give all intramuscular:     2 to <6 years (off-label use): 6.25 mg,    6 to <12 years: 12.5 to 25 mg;    ?12 years: 25-50 mg.    3.  Famotidine:  Adults 40 mg oral    Adolescents age 12 years and >88 lbs: 40 mg    Children and Adolescents ? 12years of age: Initial: 0.25 mg/kg/dose  every 12 hours (maximum daily dose: 40 mg/day)    Epi/Auvi Q dose may me repeated in 5-15 minutes if adequate resolution of symptoms does not occur    Patient should be observed for at least one hour after final Epi/Auvi Q dose and must be seen by provider. Patients cannot drive themselves if they have received diphenhydramine.

## 2021-08-02 ENCOUNTER — HOSPITAL ENCOUNTER (OUTPATIENT)
Dept: CT IMAGING | Age: 61
Discharge: HOME | End: 2021-08-02
Payer: MEDICAID

## 2021-08-02 ENCOUNTER — NURSE ONLY (OUTPATIENT)
Dept: ALLERGY | Age: 61
End: 2021-08-02
Payer: MEDICAID

## 2021-08-02 VITALS — SYSTOLIC BLOOD PRESSURE: 120 MMHG | DIASTOLIC BLOOD PRESSURE: 84 MMHG | HEART RATE: 80 BPM | TEMPERATURE: 98.4 F

## 2021-08-02 DIAGNOSIS — J30.1 ALLERGY TO TREES: ICD-10-CM

## 2021-08-02 DIAGNOSIS — R76.8 ELEVATED IGE LEVEL: ICD-10-CM

## 2021-08-02 DIAGNOSIS — J32.4 CHRONIC PANSINUSITIS: ICD-10-CM

## 2021-08-02 DIAGNOSIS — Z29.8 PROPHYLACTIC IMMUNOTHERAPY: ICD-10-CM

## 2021-08-02 PROCEDURE — 70486 CT MAXILLOFACIAL W/O DYE: CPT

## 2021-08-02 PROCEDURE — 95117 IMMUNOTHERAPY INJECTIONS: CPT | Performed by: NURSE PRACTITIONER

## 2021-08-02 NOTE — PROGRESS NOTES
After consent obtained/verified, allergy injection given in back of R/L arm(s). VIAL COLOR OF ALL VIALS TODAY IS  RED    ALLERGY INJECTION FROM VIAL A GIVEN  RIGHT UPPER ARM IN THE AMOUNT OF 0.25 ML    ALLERGY INJECTION FROM VIAL B GIVEN LEFT UPPER ARM IN THE AMOUNT OF 0.25 ML          Documentation of vial injection specific to arm(s) noted on Allergy Immunotherapy Administration Form. Patient waited 30 minutes for observation. Patient tolerated well without adverse reaction. SHOT REACTION TREATMENT INSTRUCTIONS    During the 30 minute wait after an allergy injection the following symptoms should be reported:    Itching other than at the injection site  Hives or swelling other than at the injection site  Redness other than at the injection site  Difficulty breathing  Chest tightness  Difficulty swallowing  Throat tightness    If these symptoms occur, NOTIFY PROVIDER and the following treatment should be administered:    1. Epinephrine/Auvi Q 1:1000 IM - 0.3 ml if > 66 lbs or more, 0.15 ml if 33 - 63 lbs, or 0.1 ml if <33 lbs     2. Diphenhydramine - give all intramuscular:     2 to <6 years (off-label use): 6.25 mg,    6 to <12 years: 12.5 to 25 mg;    ?12 years: 25-50 mg.    3.  Famotidine:  Adults 40 mg oral    Adolescents age 12 years and >88 lbs: 40 mg    Children and Adolescents ? 12years of age: Initial: 0.25 mg/kg/dose  every 12 hours (maximum daily dose: 40 mg/day)    Epi/Auvi Q dose may me repeated in 5-15 minutes if adequate resolution of symptoms does not occur    Patient should be observed for at least one hour after final Epi/Auvi Q dose and must be seen by provider. Patients cannot drive themselves if they have received diphenhydramine.

## 2021-08-11 ENCOUNTER — OFFICE VISIT (OUTPATIENT)
Dept: ALLERGY | Age: 61
End: 2021-08-11
Payer: MEDICAID

## 2021-08-11 ENCOUNTER — NURSE ONLY (OUTPATIENT)
Dept: ALLERGY | Age: 61
End: 2021-08-11
Payer: MEDICAID

## 2021-08-11 VITALS
HEART RATE: 74 BPM | SYSTOLIC BLOOD PRESSURE: 136 MMHG | TEMPERATURE: 97.2 F | DIASTOLIC BLOOD PRESSURE: 82 MMHG | RESPIRATION RATE: 16 BRPM

## 2021-08-11 VITALS
WEIGHT: 212.9 LBS | TEMPERATURE: 97.2 F | DIASTOLIC BLOOD PRESSURE: 82 MMHG | HEIGHT: 64 IN | BODY MASS INDEX: 36.35 KG/M2 | HEART RATE: 74 BPM | SYSTOLIC BLOOD PRESSURE: 136 MMHG | RESPIRATION RATE: 16 BRPM

## 2021-08-11 DIAGNOSIS — Z51.6 ALLERGY DESENSITIZATION THERAPY: ICD-10-CM

## 2021-08-11 DIAGNOSIS — J82.83 EOSINOPHILIC ASTHMA: ICD-10-CM

## 2021-08-11 DIAGNOSIS — J30.1 ALLERGY TO TREES: Primary | ICD-10-CM

## 2021-08-11 DIAGNOSIS — Z29.8 PROPHYLACTIC IMMUNOTHERAPY: ICD-10-CM

## 2021-08-11 DIAGNOSIS — J32.4 CHRONIC PANSINUSITIS: ICD-10-CM

## 2021-08-11 DIAGNOSIS — J30.1 ALLERGY TO TREES: ICD-10-CM

## 2021-08-11 DIAGNOSIS — J45.50 ASTHMA IN ADULT, SEVERE PERSISTENT, UNCOMPLICATED: ICD-10-CM

## 2021-08-11 DIAGNOSIS — R76.8 ELEVATED IGE LEVEL: ICD-10-CM

## 2021-08-11 DIAGNOSIS — J30.1 SEASONAL ALLERGIC RHINITIS DUE TO POLLEN: ICD-10-CM

## 2021-08-11 DIAGNOSIS — Z51.6 ENCOUNTER FOR DESENSITIZATION TO ALLERGENS: ICD-10-CM

## 2021-08-11 DIAGNOSIS — Z91.038 ALLERGY TO COCKROACHES: ICD-10-CM

## 2021-08-11 DIAGNOSIS — J82.83 EOSINOPHILIC ASTHMA: Primary | ICD-10-CM

## 2021-08-11 DIAGNOSIS — R51.9 SINUS HEADACHE: ICD-10-CM

## 2021-08-11 DIAGNOSIS — Z91.048 ALLERGY TO MOLD: ICD-10-CM

## 2021-08-11 DIAGNOSIS — J45.40 MODERATE PERSISTENT INTRINSIC ASTHMA WITHOUT STATUS ASTHMATICUS WITHOUT COMPLICATION: ICD-10-CM

## 2021-08-11 PROCEDURE — 99213 OFFICE O/P EST LOW 20 MIN: CPT | Performed by: NURSE PRACTITIONER

## 2021-08-11 PROCEDURE — 95117 IMMUNOTHERAPY INJECTIONS: CPT | Performed by: NURSE PRACTITIONER

## 2021-08-11 PROCEDURE — 96372 THER/PROPH/DIAG INJ SC/IM: CPT | Performed by: NURSE PRACTITIONER

## 2021-08-11 ASSESSMENT — ENCOUNTER SYMPTOMS
RHINORRHEA: 1
EYE DISCHARGE: 1
EYE ITCHING: 1

## 2021-08-11 NOTE — PROGRESS NOTES
Patient here today to receive Xolair injection. Patient does not report any previous reaction from Xolair injections. Denies any nausea vomiting fever urticaria or angioedema. Denies any recent exacerbation of asthma or asthma-like symptoms. Patient was explained benefits and potential risks including anaphylaxis to patient. Patient has been explained the risk and benefits including delayed anaphylaxis. Patient has EpiPen and understands how to appropriately use. Xolair 150 mg given subcutaneously in Thighs, bilaterally   for a total combined dose of 300 mg  using a 25-gauge needle and 3 ML syringe. Site injections may include the following sites:  RIGHT  / LEFT UPPER ARM,  RIGHT  / LEFT FRONT OR MIDDLE OF THIGH, OR STOMACH       SAMPLES GIVEN  Xolair  [address] [id number]   Lot [id number]   Expires 09/30/2021    Prior to patient receiving Xolair area was cleansed with alcohol swabs. Following the administration no evidence of bleeding or bruising. Patient  observed for 2 hours following first three injections and then 30 minutes following each subsequent administration. No adverse reactions reported. Patient tolerated well without adverse reactions or side effects. Patient to continue to receive Xolair injections monthly. Will report any problems to this office.

## 2021-08-11 NOTE — PROGRESS NOTES
@[facility]@    Allergy & Asthma   [address] [facility], [address]  Ph:   [phone]  Fax:[phone]    Provider:  Dr. Vasyl Corey:   Chief Complaint   Patient presents with    Results     Patient here for 6 week follow up for allergy management and imaging results.  Follow-up           HISTORY OF PRESENT ILLNESS: ESTABLISHED PATIENT HERE FOR EVALUATION   68-year-old black female here today for follow-up on allergies. Patient is very pleasant. Patient states overall she is done well. She continues to do her allergy shots and her Xolair shots. She states that she is breathing very well from her Xolair. Today she denies any nausea, vomiting, fever. She denies any asthma-like symptoms. Her allergy symptoms are very mild. She does continue to have some rhinorrhea and some itchy watery eyes. Her nasal symptoms are much better. She states that she does not have any sinus issues at this time. She continues to take her Allegra daily. This helps very much with her headaches. Onset of allergies and asthma is chronic. Severity now is mild to moderate. She states the Xolair has been very helpful in controlling her asthma. Without her asthma is horrible. The allergy shots has been very beneficial with controlling her allergy symptoms. She is now in her red vial once a week. She has been very compliant in all her medications. Is been a pleasure taking care of this patient        Review of Systems:  Review of Systems   HENT: Positive for postnasal drip and rhinorrhea. Eyes: Positive for discharge and itching. Allergic/Immunologic: Positive for environmental allergies. All other systems reviewed and are negative.         Past MedicalHistory:    Past Medical History:   Diagnosis Date    Allergic rhinitis     Anxiety     Arnold-Chiari malformation (HCC)     Asthma     Chronic bronchitis (HCC)     Fibromyalgia     GERD (gastroesophageal reflux disease)     Headache(784.0)     Hyperlipidemia     Neuropathy     Osteoarthritis     Sinusitis     Type II or unspecified type diabetes mellitus without mention of complication, not stated as uncontrolled        Past Surgical History:  Past Surgical History:   Procedure Laterality Date    APPENDECTOMY       SECTION      x2    COLONOSCOPY  [id number]    CSF SHUNT  2007    Cervical syrinx to subarachnoid shunt; thoracic syrinx to subarachnoid shunt (2 separate dural openings)    CYST REMOVAL      extradural cysts at thoracic 2-3    ECTOPIC PREGNANCY SURGERY      HEMICOLECTOMY      HERNIA REPAIR      inguinal     HYSTERECTOMY      LARYNGOSCOPY N/A 2021    MICROLARYNGOSCOPY AND BIOPSY   performed by Elizabeth Ruiz MD at [address] [place] [address]  2007    C4-5-6-7; T1-2-3 laminectomies    TOOTH EXTRACTION  2017       Family History:   Family History   Problem Relation Age of Onset    Cancer Other         colon    Mental Illness Other     High Blood Pressure Other     Diabetes Other     High Blood Pressure Sister        Social History:   Social History     Tobacco Use    Smoking status: Current Every Day Smoker     Packs/day: 0.20     Years: 25.00     Pack years: 5.00     Types: Cigarettes     Start date: 1975    Smokeless tobacco: Never Used    Tobacco comment: smokes 2 cigs a day   Substance Use Topics    Alcohol use: Not Currently     Alcohol/week: 2.0 standard drinks     Types: 2 Cans of beer per week     Comment: Patient drinks 1-2 beers a MONTH ; stomach tolerates very little alcohol        Allergies:  Bactrim [sulfamethoxazole-trimethoprim], Flexeril [cyclobenzaprine], Morphine, Peanut-containing drug products, Tessalon [benzonatate], Amoxicillin-pot clavulanate, Cefdinir, Ibuprofen [ibuprofen], Lisinopril, Macrobid [nitrofurantoin monohydrate macrocrystals], Macrodantin [nitrofurantoin], and Ranitidine    CurrentMedications:     Current albuterol sulfate  (90 Base) MCG/ACT inhaler, Inhale 2 puffs into the lungs every 4 hours as needed for Wheezing (AS NEEDED), Disp: 9 g, Rfl: 5    fexofenadine (ALLEGRA) 180 MG tablet, Take 1 tablet by mouth daily, Disp: 90 tablet, Rfl: 3    SYNTHROID 88 MCG tablet, TAKE 1 TABLET BY MOUTH ONCE DAILY ON AN EMPTY STOMACH WITH WATER. DO NOT EAT OR TAKE ANY OTHER MEDICATIONS FOR 30 MINUTES., Disp: 30 tablet, Rfl: 11    Cholecalciferol (VITAMIN D3) 50 MCG (2000 UT) CAPS, Vitamin D3 2,000 unit capsule, Disp: 30 capsule, Rfl: 11    clobetasol (TEMOVATE) 0.05 % external solution, Apply daily to scaly or itchy areas on scalp, Disp: 60 mL, Rfl: 11    fluocinonide (LIDEX) 0.05 % cream, Apply topically 2 times daily to rash on hands a needed, Disp: 30 g, Rfl: 11    levomilnacipran (FETZIMA) 40 MG CP24 extended release capsule, Take 1 capsule by mouth daily, Disp: 30 capsule, Rfl: 2    Multiple Vitamins-Minerals (MULTIVITAMIN PO), 1 tablet daily , Disp: , Rfl:     Probiotic Product (PROBIOTIC PO), 1 tablet daily , Disp: , Rfl:     Cyanocobalamin (B-12 PO), daily, Disp: , Rfl:     ondansetron (ZOFRAN) 4 MG tablet, TAKE 1 TABLET BY MOUTH EVERY 8 HOURS AS NEEDED FOR NAUSEA OR VOMITING, Disp: 15 tablet, Rfl: 0    meloxicam (MOBIC) 7.5 MG tablet, TK 1 T PO BID, Disp: , Rfl: 4    LINZESS 290 MCG CAPS capsule, 290 mcg every morning (before breakfast) , Disp: , Rfl:     lidocaine (XYLOCAINE) 5 % ointment, Apply topically as needed for Pain Apply topically as needed. , Disp: , Rfl:     conjugated estrogens (PREMARIN) 0.625 MG/GM vaginal cream, Place vaginally as needed Place vaginally daily. , Disp: , Rfl:     lidocaine (LIDODERM) 5 %, Place 1 patch onto the skin daily 12 hours on, 12 hours off., Disp: , Rfl:     hydrocortisone (ANUSOL-HC) 25 MG suppository, Place 25 mg rectally as needed for Hemorrhoids, Disp: , Rfl:     fluticasone (FLONASE ALLERGY RELIEF) 50 MCG/ACT nasal spray, 1 spray by Nasal route daily, Disp: , Rfl:     HYDROcodone-acetaminophen (NORCO) 5-325 MG per tablet, 1 tablet nightly as needed for Pain. Take 1 tablet every 4-6 hours as needed for pain , Disp: , Rfl:     NONFORMULARY, Immunotherapy allergy shot, Disp: , Rfl:     zinc 50 MG CAPS, Take 1 capsule by mouth daily , Disp: , Rfl:     Ascorbic Acid (VITAMIN C) 500 MG CAPS, Take 1 tablet by mouth daily , Disp: , Rfl:     orphenadrine (NORFLEX) 100 MG tablet, Take 100 mg by mouth 2 times daily as needed. , Disp: , Rfl:     tramadol (ULTRAM) 50 MG tablet, Take 50 mg by mouth every 6 hours as needed. Take 1-2 tabs every 6 hrs prn , Disp: , Rfl:     fluconazole (DIFLUCAN) 150 MG tablet, as needed (as need for yeast infection)  (Patient not taking: Reported on 8/11/2021), Disp: , Rfl:     nicotine (NICODERM CQ) 14 MG/24HR, Place 1 patch onto the skin every 24 hours May wear 24 hours. May remove at bedtime if problems with insomnia. Re-Apply new patch immediately on awakening. Diagnosis: Tobacco dependence, Disp: 42 patch, Rfl: 0    Biotin 1 MG CAPS, Take 1 capsule by mouth daily  (Patient not taking: Reported on 8/11/2021), Disp: , Rfl:       Physical Exam:      Vitals:    Vitals:    08/11/21 0831   BP: 136/82   Pulse: 74   Resp: 16   Temp: 97.2 °F (36.2 °C)       212 lb 14.4 oz (96.6 kg)       Temp: 97.2 °F (36.2 °C) I @FLOWSTAT(6)@ IPulse: 74 I @FLOWSTAT(8)@ I BP: 136/82 I @VLDAQK(40)@; @LEUCCY(82)@ I Resp: 16 I @FLOWSTAT(9)@ I   I @FLOWSTAT(10)@ I   I Height: 5' 4\" (162.6 cm) I   I Facility age limit for growth percentiles is 20 years. I     Facility age limit for growth percentiles is 20 years. Facility age limit for growth percentiles is 20 years. Facility age limit for growth percentiles is 20 years. Facility age limit for growth percentiles is 20 years. Physical Exam:    Physical Exam  Vitals and nursing note reviewed. Constitutional:       Appearance: Normal appearance. She is normal weight.    HENT:      Head: Normocephalic and atraumatic. Right Ear: Ear canal and external ear normal.      Left Ear: Ear canal and external ear normal.      Nose: Nose normal.      Mouth/Throat:      Mouth: Mucous membranes are moist.      Pharynx: Oropharynx is clear. Eyes:      Extraocular Movements: Extraocular movements intact. Conjunctiva/sclera: Conjunctivae normal.      Pupils: Pupils are equal, round, and reactive to light. Cardiovascular:      Rate and Rhythm: Normal rate and regular rhythm. Pulses: Normal pulses. Heart sounds: Normal heart sounds. Pulmonary:      Effort: Pulmonary effort is normal.      Breath sounds: Normal breath sounds. Musculoskeletal:         General: Normal range of motion. Cervical back: Normal range of motion and neck supple. Skin:     General: Skin is warm and dry. Capillary Refill: Capillary refill takes less than 2 seconds. Neurological:      General: No focal deficit present. Mental Status: She is alert and oriented to person, place, and time. Mental status is at baseline. Psychiatric:         Mood and Affect: Mood normal.         Behavior: Behavior normal.         Thought Content:  Thought content normal.         Judgment: Judgment normal.             DATA:  Lab Review:    CBC:   Lab Results   Component Value Date    WBC 5.1 05/11/2021    RBC 4.62 05/11/2021    RBC 4.36 01/17/2012    HGB 13.2 05/11/2021    HCT 44.6 05/11/2021    MCV 96.5 05/11/2021    MCH 28.6 05/11/2021    MCHC 29.6 05/11/2021    RDW 14.5 01/16/2018     05/11/2021          IgE   Date/Time Value Ref Range Status   05/11/2021 08:17  (H) <101 IU/mL Final     Comment:     [facility] [address] [address] [phone]      IgG   Date/Time Value Ref Range Status   05/11/2021 08:16  700 - 1600 mg/dL Final     Comment:     [facility] [address] [address] [phone]     IgA   Date/Time Value Ref Range Status   05/11/2021 08:25  70 - 400 mg/dL Final     Comment:     [facility] [address] [facility], [address] [place] [address] [phone]      IgM   Date/Time Value Ref Range Status   05/11/2021 08:15  (H) 40 - 230 mg/dL Final     Comment:     [facility] [address] [facility], [address] [place] [address] [phone]       No results found for: LOIS   Rheumatoid Factor   Date/Time Value Ref Range Status   04/07/2020 09:35 AM 13 0 - 13 IU/mL Final     Comment:     Performed at [facility], [address]          No results found for this or any previous visit. No results found for this or any previous visit. All skin medial labs have been reviewed including allergy labs which were ordered     PROCEDURES:      Skin Testing performed on:08/21/19    Assessment/Orders:    Diagnosis Orders   1. Allergy to trees     2. Elevated IgE level     3. Prophylactic immunotherapy     4. Allergy to mold     5. Asthma in adult, severe persistent, uncomplicated     6. Allergy desensitization therapy     7. Seasonal allergic rhinitis due to pollen     8. Eosinophilic asthma     9. Moderate persistent intrinsic asthma without status asthmaticus without complication     10. Chronic pansinusitis     11. Sinus headache     12. Allergy to cockroaches          Continue allergy injections weekly - started 04/05/2021  Continue weekly until 04/2024  Continue Xolair injections monthly to be given at this office. Patient is unable to give  Continue allergy injections weekly at this office    All diagnostic imaging  have been personally reviewed     Plan:  Follow Up:6 months    Spent 20 minutes of face-to-face time with the patient with well more than half of the visit being dedicated to the discussion of the various symptom problems, provided education of medications and disease process, as well as discussion of a therapeutic plan for each. Face-to-face education time does not include any time that may have been spent for procedures.     (Please note that portions of this note may have been completed with a voice recognition program.  Efforts were made to edit the dictation but occasionally words are mis-transcribed.)         Signed:  COREEN Kapoor Ma - CNP  8/11/2021  8:48 AM

## 2021-08-12 DIAGNOSIS — L85.3 XEROSIS OF SKIN: ICD-10-CM

## 2021-08-12 RX ORDER — HYOSCYAMINE SULFATE 0.38 MG/1
TABLET, EXTENDED RELEASE ORAL
Qty: 30 TABLET | Refills: 0 | Status: SHIPPED | OUTPATIENT
Start: 2021-08-12 | End: 2021-11-15

## 2021-08-12 RX ORDER — HYDROCORTISONE 25 MG/ML
LOTION TOPICAL
Qty: 59 ML | Refills: 0 | Status: SHIPPED | OUTPATIENT
Start: 2021-08-12 | End: 2021-10-14

## 2021-08-12 RX ORDER — ACETAMINOPHEN 160 MG
TABLET,DISINTEGRATING ORAL
Qty: 30 CAPSULE | Refills: 0 | Status: SHIPPED | OUTPATIENT
Start: 2021-08-12 | End: 2021-11-15

## 2021-08-23 ENCOUNTER — NURSE ONLY (OUTPATIENT)
Dept: ALLERGY | Age: 61
End: 2021-08-23
Payer: MEDICAID

## 2021-08-23 VITALS
TEMPERATURE: 97.8 F | SYSTOLIC BLOOD PRESSURE: 132 MMHG | DIASTOLIC BLOOD PRESSURE: 84 MMHG | HEART RATE: 80 BPM | RESPIRATION RATE: 16 BRPM

## 2021-08-23 DIAGNOSIS — Z51.6 ENCOUNTER FOR DESENSITIZATION TO ALLERGENS: Primary | ICD-10-CM

## 2021-08-23 DIAGNOSIS — J30.1 ALLERGY TO TREES: ICD-10-CM

## 2021-08-23 DIAGNOSIS — Z91.048 ALLERGY TO MOLD: ICD-10-CM

## 2021-08-23 PROCEDURE — 95117 IMMUNOTHERAPY INJECTIONS: CPT | Performed by: NURSE PRACTITIONER

## 2021-08-23 NOTE — PROGRESS NOTES
After consent obtained/verified, allergy injection given in back of R/L arm(s). VIAL COLOR OF ALL VIALS TODAY IS MAINTENANCE RED    ALLERGY INJECTION FROM VIAL A GIVEN RIGHT  UPPER ARM IN THE AMOUNT OF 0.35 ML    ALLERGY INJECTION FROM VIAL B GIVEN LEFT UPPER ARM IN THE AMOUNT OF 0.35 ML      Documentation of vial injection specific to arm(s) noted on Allergy Immunotherapy Administration Form. Patient declined to wait for 30 minutes. SHOT REACTION TREATMENT INSTRUCTIONS    During the 30 minute wait after an allergy injection the following symptoms should be reported:    Itching other than at the injection site  Hives or swelling other than at the injection site  Redness other than at the injection site  Difficulty breathing  Chest tightness  Difficulty swallowing  Throat tightness    If these symptoms occur, NOTIFY PROVIDER and the following treatment should be administered:    1. Epinephrine/Auvi Q 1:1000 IM - 0.3 ml if > 66 lbs or more, 0.15 ml if 33 - 63 lbs, or 0.1 ml if <33 lbs     2. Diphenhydramine - give all intramuscular:     2 to <6 years (off-label use): 6.25 mg,    6 to <12 years: 12.5 to 25 mg;    ?12 years: 25-50 mg.    3.  Famotidine:  Adults 40 mg oral    Adolescents age 12 years and >88 lbs: 40 mg    Children and Adolescents ? 12years of age: Initial: 0.25 mg/kg/dose  every 12 hours (maximum daily dose: 40 mg/day)    Epi/Auvi Q dose may me repeated in 5-15 minutes if adequate resolution of symptoms does not occur    Patient should be observed for at least one hour after final Epi/Auvi Q dose and must be seen by provider. Patients cannot drive themselves if they have received diphenhydramine.

## 2021-08-30 ENCOUNTER — NURSE ONLY (OUTPATIENT)
Dept: ALLERGY | Age: 61
End: 2021-08-30
Payer: MEDICAID

## 2021-08-30 VITALS
RESPIRATION RATE: 16 BRPM | TEMPERATURE: 97.4 F | HEART RATE: 72 BPM | SYSTOLIC BLOOD PRESSURE: 130 MMHG | DIASTOLIC BLOOD PRESSURE: 82 MMHG

## 2021-08-30 DIAGNOSIS — Z51.6 ENCOUNTER FOR DESENSITIZATION TO ALLERGENS: ICD-10-CM

## 2021-08-30 DIAGNOSIS — J30.1 ALLERGY TO TREES: Primary | ICD-10-CM

## 2021-08-30 DIAGNOSIS — Z91.048 ALLERGY TO MOLD: ICD-10-CM

## 2021-08-30 PROCEDURE — 95117 IMMUNOTHERAPY INJECTIONS: CPT | Performed by: NURSE PRACTITIONER

## 2021-08-30 NOTE — PROGRESS NOTES
After consent obtained/verified, allergy injection given in back of R/L arm(s). VIAL COLOR OF ALL VIALS TODAY IS RED MAINTENANCE    ALLERGY INJECTION FROM VIAL A GIVEN left  UPPER ARM IN THE AMOUNT OF 0.40 ML    ALLERGY INJECTION FROM VIAL B GIVEN Right UPPER ARM IN THE AMOUNT OF 0.40 ML    Documentation of vial injection specific to arm(s) noted on Allergy Immunotherapy Administration Form. Patient declined to wait for 30 minutes            SHOT REACTION TREATMENT INSTRUCTIONS    During the 30 minute wait after an allergy injection the following symptoms should be reported:    Itching other than at the injection site  Hives or swelling other than at the injection site  Redness other than at the injection site  Difficulty breathing  Chest tightness  Difficulty swallowing  Throat tightness    If these symptoms occur, NOTIFY PROVIDER and the following treatment should be administered:    1. Epinephrine/Auvi Q 1:1000 IM - 0.3 ml if > 66 lbs or more, 0.15 ml if 33 - 63 lbs, or 0.1 ml if <33 lbs     2. Diphenhydramine - give all intramuscular:     2 to <6 years (off-label use): 6.25 mg,    6 to <12 years: 12.5 to 25 mg;    ?12 years: 25-50 mg.    3.  Famotidine:  Adults 40 mg oral    Adolescents age 12 years and >88 lbs: 40 mg    Children and Adolescents ? 12years of age: Initial: 0.25 mg/kg/dose  every 12 hours (maximum daily dose: 40 mg/day)    Epi/Auvi Q dose may me repeated in 5-15 minutes if adequate resolution of symptoms does not occur    Patient should be observed for at least one hour after final Epi/Auvi Q dose and must be seen by provider. Patients cannot drive themselves if they have received diphenhydramine.

## 2021-09-05 ENCOUNTER — HOSPITAL ENCOUNTER (EMERGENCY)
Age: 61
Discharge: HOME | End: 2021-09-05
Attending: INTERNAL MEDICINE
Payer: MEDICAID

## 2021-09-05 ENCOUNTER — APPOINTMENT (OUTPATIENT)
Dept: CT IMAGING | Age: 61
End: 2021-09-05
Payer: MEDICAID

## 2021-09-05 ENCOUNTER — APPOINTMENT (OUTPATIENT)
Dept: GENERAL RADIOLOGY | Age: 61
End: 2021-09-05
Payer: MEDICAID

## 2021-09-05 VITALS
OXYGEN SATURATION: 99 % | DIASTOLIC BLOOD PRESSURE: 80 MMHG | HEIGHT: 64 IN | BODY MASS INDEX: 34.15 KG/M2 | WEIGHT: 200 LBS | RESPIRATION RATE: 16 BRPM | HEART RATE: 88 BPM | SYSTOLIC BLOOD PRESSURE: 124 MMHG | TEMPERATURE: 98.5 F

## 2021-09-05 DIAGNOSIS — K59.00 CONSTIPATION, UNSPECIFIED CONSTIPATION TYPE: Primary | ICD-10-CM

## 2021-09-05 DIAGNOSIS — J30.9 ALLERGIC RHINITIS, UNSPECIFIED SEASONALITY, UNSPECIFIED TRIGGER: ICD-10-CM

## 2021-09-05 LAB
ALBUMIN SERPL-MCNC: 4.8 G/DL (ref 3.5–5.1)
ALP BLD-CCNC: 122 U/L (ref 38–126)
ALT SERPL-CCNC: 14 U/L (ref 11–66)
ANION GAP SERPL CALCULATED.3IONS-SCNC: 12 MEQ/L (ref 8–16)
AST SERPL-CCNC: 13 U/L (ref 5–40)
BACTERIA: ABNORMAL /HPF
BASOPHILS # BLD: 1 %
BASOPHILS ABSOLUTE: 0.1 THOU/MM3 (ref 0–0.1)
BILIRUB SERPL-MCNC: 0.2 MG/DL (ref 0.3–1.2)
BILIRUBIN URINE: NEGATIVE
BLOOD, URINE: ABNORMAL
BUN BLDV-MCNC: 12 MG/DL (ref 7–22)
CALCIUM SERPL-MCNC: 10.1 MG/DL (ref 8.5–10.5)
CASTS 2: ABNORMAL /LPF
CASTS UA: ABNORMAL /LPF
CHARACTER, URINE: CLEAR
CHLORIDE BLD-SCNC: 105 MEQ/L (ref 98–111)
CO2: 24 MEQ/L (ref 23–33)
COLOR: YELLOW
CREAT SERPL-MCNC: 0.6 MG/DL (ref 0.4–1.2)
CRYSTALS, UA: ABNORMAL
EOSINOPHIL # BLD: 2.9 %
EOSINOPHILS ABSOLUTE: 0.2 THOU/MM3 (ref 0–0.4)
EPITHELIAL CELLS, UA: ABNORMAL /HPF
ERYTHROCYTE [DISTWIDTH] IN BLOOD BY AUTOMATED COUNT: 14.2 % (ref 11.5–14.5)
ERYTHROCYTE [DISTWIDTH] IN BLOOD BY AUTOMATED COUNT: 47.5 FL (ref 35–45)
GFR SERPL CREATININE-BSD FRML MDRD: > 90 ML/MIN/1.73M2
GLUCOSE BLD-MCNC: 98 MG/DL (ref 70–108)
GLUCOSE URINE: NEGATIVE MG/DL
HCT VFR BLD CALC: 42.5 % (ref 37–47)
HEMOGLOBIN: 13.5 GM/DL (ref 12–16)
IMMATURE GRANS (ABS): 0.02 THOU/MM3 (ref 0–0.07)
IMMATURE GRANULOCYTES: 0.3 %
KETONES, URINE: NEGATIVE
LACTIC ACID: 2.2 MMOL/L (ref 0.5–2)
LEUKOCYTE ESTERASE, URINE: NEGATIVE
LIPASE: 21.7 U/L (ref 5.6–51.3)
LYMPHOCYTES # BLD: 35.6 %
LYMPHOCYTES ABSOLUTE: 2.2 THOU/MM3 (ref 1–4.8)
MCH RBC QN AUTO: 29 PG (ref 26–33)
MCHC RBC AUTO-ENTMCNC: 31.8 GM/DL (ref 32.2–35.5)
MCV RBC AUTO: 91.4 FL (ref 81–99)
MISCELLANEOUS 2: ABNORMAL
MONOCYTES # BLD: 5.9 %
MONOCYTES ABSOLUTE: 0.4 THOU/MM3 (ref 0.4–1.3)
NITRITE, URINE: NEGATIVE
NUCLEATED RED BLOOD CELLS: 0 /100 WBC
OSMOLALITY CALCULATION: 281 MOSMOL/KG (ref 275–300)
PH UA: 7.5 (ref 5–9)
PLATELET # BLD: 336 THOU/MM3 (ref 130–400)
PMV BLD AUTO: 10.5 FL (ref 9.4–12.4)
POTASSIUM REFLEX MAGNESIUM: 3.8 MEQ/L (ref 3.5–5.2)
PROTEIN UA: NEGATIVE
RBC # BLD: 4.65 MILL/MM3 (ref 4.2–5.4)
RBC URINE: ABNORMAL /HPF
RENAL EPITHELIAL, UA: ABNORMAL
SEG NEUTROPHILS: 54.3 %
SEGMENTED NEUTROPHILS ABSOLUTE COUNT: 3.4 THOU/MM3 (ref 1.8–7.7)
SODIUM BLD-SCNC: 141 MEQ/L (ref 135–145)
SPECIFIC GRAVITY, URINE: 1.01 (ref 1–1.03)
TOTAL PROTEIN: 8.1 G/DL (ref 6.1–8)
TROPONIN T: < 0.01 NG/ML
UROBILINOGEN, URINE: 1 EU/DL (ref 0–1)
WBC # BLD: 6.3 THOU/MM3 (ref 4.8–10.8)
WBC UA: ABNORMAL /HPF
YEAST: ABNORMAL

## 2021-09-05 PROCEDURE — 84484 ASSAY OF TROPONIN QUANT: CPT

## 2021-09-05 PROCEDURE — 85025 COMPLETE CBC W/AUTO DIFF WBC: CPT

## 2021-09-05 PROCEDURE — 83605 ASSAY OF LACTIC ACID: CPT

## 2021-09-05 PROCEDURE — 99284 EMERGENCY DEPT VISIT MOD MDM: CPT

## 2021-09-05 PROCEDURE — 6360000004 HC RX CONTRAST MEDICATION: Performed by: INTERNAL MEDICINE

## 2021-09-05 PROCEDURE — 2580000003 HC RX 258: Performed by: INTERNAL MEDICINE

## 2021-09-05 PROCEDURE — 81001 URINALYSIS AUTO W/SCOPE: CPT

## 2021-09-05 PROCEDURE — 74177 CT ABD & PELVIS W/CONTRAST: CPT

## 2021-09-05 PROCEDURE — 80053 COMPREHEN METABOLIC PANEL: CPT

## 2021-09-05 PROCEDURE — 70450 CT HEAD/BRAIN W/O DYE: CPT

## 2021-09-05 PROCEDURE — 36415 COLL VENOUS BLD VENIPUNCTURE: CPT

## 2021-09-05 PROCEDURE — 83690 ASSAY OF LIPASE: CPT

## 2021-09-05 PROCEDURE — 71045 X-RAY EXAM CHEST 1 VIEW: CPT

## 2021-09-05 RX ORDER — POLYETHYLENE GLYCOL 3350 17 G/17G
17 POWDER, FOR SOLUTION ORAL DAILY
Qty: 283 G | Refills: 0 | Status: SHIPPED | OUTPATIENT
Start: 2021-09-05 | End: 2021-09-22

## 2021-09-05 RX ORDER — 0.9 % SODIUM CHLORIDE 0.9 %
500 INTRAVENOUS SOLUTION INTRAVENOUS ONCE
Status: COMPLETED | OUTPATIENT
Start: 2021-09-05 | End: 2021-09-05

## 2021-09-05 RX ADMIN — SODIUM CHLORIDE 500 ML: 9 INJECTION, SOLUTION INTRAVENOUS at 19:27

## 2021-09-05 RX ADMIN — IOPAMIDOL 80 ML: 755 INJECTION, SOLUTION INTRAVENOUS at 18:57

## 2021-09-05 ASSESSMENT — PAIN SCALES - GENERAL: PAINLEVEL_OUTOF10: 7

## 2021-09-05 ASSESSMENT — PAIN DESCRIPTION - LOCATION: LOCATION: GENERALIZED

## 2021-09-05 ASSESSMENT — PAIN DESCRIPTION - PAIN TYPE: TYPE: CHRONIC PAIN

## 2021-09-05 NOTE — ED NOTES
Upon first contact with patient this RN receives bedside shift report from Arben. Pt resting in bed.  Denies any needs will monitor        Janette Corbin RN  09/05/21 Denny Carter

## 2021-09-05 NOTE — ED TRIAGE NOTES
Pt presents to the ED through triage with c/c \"not feeling well\". Pt states she recently moved into a new apartment complex. Pt states her new home is filled with mildew and sewage smell and it has been making her feel worse. Pt is tearful during triage. States her pain is 7/10 \"all over\". Vitals stable.
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
## 2021-09-07 ENCOUNTER — TELEPHONE (OUTPATIENT)
Dept: FAMILY MEDICINE CLINIC | Age: 61
End: 2021-09-07

## 2021-09-08 ENCOUNTER — NURSE ONLY (OUTPATIENT)
Dept: ALLERGY | Age: 61
End: 2021-09-08
Payer: MEDICAID

## 2021-09-08 VITALS
SYSTOLIC BLOOD PRESSURE: 122 MMHG | RESPIRATION RATE: 14 BRPM | TEMPERATURE: 96.9 F | HEART RATE: 78 BPM | DIASTOLIC BLOOD PRESSURE: 78 MMHG

## 2021-09-08 DIAGNOSIS — Z51.6 ENCOUNTER FOR DESENSITIZATION TO ALLERGENS: Primary | ICD-10-CM

## 2021-09-08 DIAGNOSIS — J30.1 ALLERGY TO TREES: ICD-10-CM

## 2021-09-08 DIAGNOSIS — Z91.048 ALLERGY TO MOLD: ICD-10-CM

## 2021-09-08 PROCEDURE — 95117 IMMUNOTHERAPY INJECTIONS: CPT | Performed by: NURSE PRACTITIONER

## 2021-09-08 NOTE — PROGRESS NOTES
After consent obtained/verified, allergy injection given in back of R/L arm(s). VIAL COLOR OF ALL VIALS TODAY IS MAINTENANCE RED     ALLERGY INJECTION FROM VIAL A GIVEN RIGHT  UPPER ARM IN THE AMOUNT OF 0.40 ML    ALLERGY INJECTION FROM VIAL B GIVEN LEFT UPPER ARM IN THE AMOUNT OF 0.40 ML        Documentation of vial injection specific to arm(s) noted on Allergy Immunotherapy Administration Form. Patient declined to wait for 30 minutes. SHOT REACTION TREATMENT INSTRUCTIONS    During the 30 minute wait after an allergy injection the following symptoms should be reported:    Itching other than at the injection site  Hives or swelling other than at the injection site  Redness other than at the injection site  Difficulty breathing  Chest tightness  Difficulty swallowing  Throat tightness    If these symptoms occur, NOTIFY PROVIDER and the following treatment should be administered:    1. Epinephrine/Auvi Q 1:1000 IM - 0.3 ml if > 66 lbs or more, 0.15 ml if 33 - 63 lbs, or 0.1 ml if <33 lbs     2. Diphenhydramine - give all intramuscular:     2 to <6 years (off-label use): 6.25 mg,    6 to <12 years: 12.5 to 25 mg;    ?12 years: 25-50 mg.    3.  Famotidine:  Adults 40 mg oral    Adolescents age 12 years and >88 lbs: 40 mg    Children and Adolescents ? 12years of age: Initial: 0.25 mg/kg/dose  every 12 hours (maximum daily dose: 40 mg/day)    Epi/Auvi Q dose may me repeated in 5-15 minutes if adequate resolution of symptoms does not occur    Patient should be observed for at least one hour after final Epi/Auvi Q dose and must be seen by provider. Patients cannot drive themselves if they have received diphenhydramine.

## 2021-09-20 ENCOUNTER — NURSE ONLY (OUTPATIENT)
Dept: ALLERGY | Age: 61
End: 2021-09-20

## 2021-09-20 ENCOUNTER — TELEPHONE (OUTPATIENT)
Dept: ALLERGY | Age: 61
End: 2021-09-20

## 2021-09-20 VITALS — TEMPERATURE: 96.8 F | SYSTOLIC BLOOD PRESSURE: 118 MMHG | DIASTOLIC BLOOD PRESSURE: 72 MMHG | HEART RATE: 74 BPM

## 2021-09-20 DIAGNOSIS — J30.1 ALLERGY TO TREES: ICD-10-CM

## 2021-09-20 DIAGNOSIS — Z91.048 ALLERGY TO MOLD: ICD-10-CM

## 2021-09-20 DIAGNOSIS — Z51.6 ENCOUNTER FOR DESENSITIZATION TO ALLERGENS: Primary | ICD-10-CM

## 2021-09-20 NOTE — PROGRESS NOTES
After consent obtained/verified, allergy injection given in back of R/L arm(s). VIAL COLOR OF ALL VIALS TODAY IS maintenance    ALLERGY INJECTION FROM VIAL A GIVEN left  UPPER ARM IN THE AMOUNT OF 0.40 ML    ALLERGY INJECTION FROM VIAL B GIVEN right UPPER ARM IN THE AMOUNT OF 0.40 ML        Documentation of vial injection specific to arm(s) noted on Allergy Immunotherapy Administration Form. Patient declined to wait for 30 minutes    SHOT REACTION TREATMENT INSTRUCTIONS    During the 30 minute wait after an allergy injection the following symptoms should be reported:    Itching other than at the injection site  Hives or swelling other than at the injection site  Redness other than at the injection site  Difficulty breathing  Chest tightness  Difficulty swallowing  Throat tightness    If these symptoms occur, NOTIFY PROVIDER and the following treatment should be administered:    1. Epinephrine/Auvi Q 1:1000 IM - 0.3 ml if > 66 lbs or more, 0.15 ml if 33 - 63 lbs, or 0.1 ml if <33 lbs     2. Diphenhydramine - give all intramuscular:     2 to <6 years (off-label use): 6.25 mg,    6 to <12 years: 12.5 to 25 mg;    ?12 years: 25-50 mg.    3.  Famotidine:  Adults 40 mg oral    Adolescents age 12 years and >88 lbs: 40 mg    Children and Adolescents ? 12years of age: Initial: 0.25 mg/kg/dose  every 12 hours (maximum daily dose: 40 mg/day)    Epi/Auvi Q dose may me repeated in 5-15 minutes if adequate resolution of symptoms does not occur    Patient should be observed for at least one hour after final Epi/Auvi Q dose and must be seen by provider. Patients cannot drive themselves if they have received diphenhydramine.

## 2021-09-20 NOTE — TELEPHONE ENCOUNTER
[facility] needs to [facility] for Xolair. She has not contacted them to set up delivery nor have they contacted her.   I have given the patient the correct telephone number to call

## 2021-09-22 DIAGNOSIS — J45.40 MODERATE PERSISTENT ASTHMA WITHOUT COMPLICATION: ICD-10-CM

## 2021-09-29 ENCOUNTER — NURSE ONLY (OUTPATIENT)
Dept: ALLERGY | Age: 61
End: 2021-09-29
Payer: MEDICAID

## 2021-09-29 VITALS
SYSTOLIC BLOOD PRESSURE: 132 MMHG | DIASTOLIC BLOOD PRESSURE: 80 MMHG | OXYGEN SATURATION: 98 % | RESPIRATION RATE: 16 BRPM | TEMPERATURE: 97.2 F | HEART RATE: 94 BPM

## 2021-09-29 DIAGNOSIS — Z51.6 ENCOUNTER FOR DESENSITIZATION TO ALLERGENS: ICD-10-CM

## 2021-09-29 DIAGNOSIS — Z91.048 ALLERGY TO MOLD: ICD-10-CM

## 2021-09-29 DIAGNOSIS — J82.83 EOSINOPHILIC ASTHMA: Primary | ICD-10-CM

## 2021-09-29 DIAGNOSIS — J30.1 ALLERGY TO TREES: ICD-10-CM

## 2021-09-29 PROCEDURE — 95117 IMMUNOTHERAPY INJECTIONS: CPT | Performed by: NURSE PRACTITIONER

## 2021-09-29 PROCEDURE — 96372 THER/PROPH/DIAG INJ SC/IM: CPT | Performed by: NURSE PRACTITIONER

## 2021-09-29 RX ORDER — ESTRADIOL 0.1 MG/G
CREAM VAGINAL
COMMUNITY
Start: 2021-09-14

## 2021-09-29 NOTE — PROGRESS NOTES
Patient here today to receive Xolair injection. Patient does not report any previous reaction from Xolair injections. Denies any nausea vomiting fever urticaria or angioedema. Denies any recent exacerbation of asthma or asthma-like symptoms. Patient was explained benefits and potential risks including anaphylaxis to patient. Patient has been explained the risk and benefits including delayed anaphylaxis. Patient has EpiPen and understands how to appropriately use. Xolair 150 mg given subcutaneously in Thighs, bilaterally  for a total combined dose of 300 mg  using a 25-gauge needle and 3 ML syringe. Site injections may include the following sites:  RIGHT  / LEFT UPPER ARM,  RIGHT  / LEFT FRONT OR MIDDLE OF THIGH, OR STOMACH        Xolair  [facility] [id number]   Lot [id number]   Expires 08/2022    Prior to patient receiving Xolair area was cleansed with alcohol swabs. Following the administration no evidence of bleeding or bruising. Patient  observed for 2 hours following first three injections and then 30 minutes following each subsequent administration. No adverse reactions reported. Patient tolerated well without adverse reactions or side effects. Patient to continue to receive Xolair injections monthly. Will report any problems to this office.

## 2021-10-04 ENCOUNTER — NURSE ONLY (OUTPATIENT)
Dept: ALLERGY | Age: 61
End: 2021-10-04
Payer: MEDICAID

## 2021-10-04 VITALS
TEMPERATURE: 97.2 F | SYSTOLIC BLOOD PRESSURE: 130 MMHG | RESPIRATION RATE: 16 BRPM | OXYGEN SATURATION: 97 % | HEART RATE: 87 BPM | DIASTOLIC BLOOD PRESSURE: 82 MMHG

## 2021-10-04 DIAGNOSIS — Z91.048 ALLERGY TO MOLD: ICD-10-CM

## 2021-10-04 DIAGNOSIS — Z51.6 ENCOUNTER FOR DESENSITIZATION TO ALLERGENS: ICD-10-CM

## 2021-10-04 DIAGNOSIS — J30.1 ALLERGY TO TREES: Primary | ICD-10-CM

## 2021-10-04 PROCEDURE — 95117 IMMUNOTHERAPY INJECTIONS: CPT | Performed by: NURSE PRACTITIONER

## 2021-10-04 NOTE — PROGRESS NOTES
After consent obtained/verified, allergy injection given in back of R/L arm(s). VIAL COLOR OF ALL VIALS TODAY IS RED MAINTENANCE    ALLERGY INJECTION FROM VIAL A GIVEN Left  UPPER ARM IN THE AMOUNT OF 0.50 ML    ALLERGY INJECTION FROM VIAL B GIVEN Right UPPER ARM IN THE AMOUNT OF 0.50 ML      Documentation of vial injection specific to arm(s) noted on Allergy Immunotherapy Administration Form. Patient declined to wait for 30 minutes      SHOT REACTION TREATMENT INSTRUCTIONS    During the 30 minute wait after an allergy injection the following symptoms should be reported:    Itching other than at the injection site  Hives or swelling other than at the injection site  Redness other than at the injection site  Difficulty breathing  Chest tightness  Difficulty swallowing  Throat tightness    If these symptoms occur, NOTIFY PROVIDER and the following treatment should be administered:    1. Epinephrine/Auvi Q 1:1000 IM - 0.3 ml if > 66 lbs or more, 0.15 ml if 33 - 63 lbs, or 0.1 ml if <33 lbs     2. Diphenhydramine - give all intramuscular:     2 to <6 years (off-label use): 6.25 mg,    6 to <12 years: 12.5 to 25 mg;    ?12 years: 25-50 mg.    3.  Famotidine:  Adults 40 mg oral    Adolescents age 12 years and >88 lbs: 40 mg    Children and Adolescents ? 12years of age: Initial: 0.25 mg/kg/dose  every 12 hours (maximum daily dose: 40 mg/day)    Epi/Auvi Q dose may me repeated in 5-15 minutes if adequate resolution of symptoms does not occur    Patient should be observed for at least one hour after final Epi/Auvi Q dose and must be seen by provider. Patients cannot drive themselves if they have received diphenhydramine.

## 2021-10-06 ENCOUNTER — TELEPHONE (OUTPATIENT)
Dept: PHARMACY | Age: 61
End: 2021-10-06

## 2021-10-06 NOTE — TELEPHONE ENCOUNTER
Medication Management   [facility]  [phone] (phone)  [phone] (fax)      Patient called at this time for 6 month follow up since starting the smoking cessation program.  There was no answer so I left message asking pt to call us back at [phone], option 2.

## 2021-10-13 ENCOUNTER — NURSE ONLY (OUTPATIENT)
Dept: ALLERGY | Age: 61
End: 2021-10-13
Payer: MEDICAID

## 2021-10-13 VITALS — TEMPERATURE: 97.3 F | HEART RATE: 84 BPM | SYSTOLIC BLOOD PRESSURE: 124 MMHG | DIASTOLIC BLOOD PRESSURE: 80 MMHG

## 2021-10-13 DIAGNOSIS — Z91.048 ALLERGY TO MOLD: ICD-10-CM

## 2021-10-13 DIAGNOSIS — J30.1 ALLERGY TO TREES: ICD-10-CM

## 2021-10-13 DIAGNOSIS — Z51.6 ENCOUNTER FOR DESENSITIZATION TO ALLERGENS: Primary | ICD-10-CM

## 2021-10-13 PROCEDURE — 95117 IMMUNOTHERAPY INJECTIONS: CPT | Performed by: NURSE PRACTITIONER

## 2021-10-13 NOTE — PROGRESS NOTES
After consent obtained/verified, allergy injection given in back of R/L arm(s). VIAL COLOR OF ALL VIALS TODAY IS red    ALLERGY INJECTION FROM VIAL A GIVEN right  UPPER ARM IN THE AMOUNT OF 0.50 ML    ALLERGY INJECTION FROM VIAL B GIVEN left UPPER ARM IN THE AMOUNT OF 0.50 ML    Documentation of vial injection specific to arm(s) noted on Allergy Immunotherapy Administration Form. Patient declined to wait for 30 minutes      SHOT REACTION TREATMENT INSTRUCTIONS    During the 30 minute wait after an allergy injection the following symptoms should be reported:    Itching other than at the injection site  Hives or swelling other than at the injection site  Redness other than at the injection site  Difficulty breathing  Chest tightness  Difficulty swallowing  Throat tightness    If these symptoms occur, NOTIFY PROVIDER and the following treatment should be administered:    1. Epinephrine/Auvi Q 1:1000 IM - 0.3 ml if > 66 lbs or more, 0.15 ml if 33 - 63 lbs, or 0.1 ml if <33 lbs     2. Diphenhydramine - give all intramuscular:     2 to <6 years (off-label use): 6.25 mg,    6 to <12 years: 12.5 to 25 mg;    ?12 years: 25-50 mg.    3.  Famotidine:  Adults 40 mg oral    Adolescents age 12 years and >88 lbs: 40 mg    Children and Adolescents ? 12years of age: Initial: 0.25 mg/kg/dose  every 12 hours (maximum daily dose: 40 mg/day)    Epi/Auvi Q dose may me repeated in 5-15 minutes if adequate resolution of symptoms does not occur    Patient should be observed for at least one hour after final Epi/Auvi Q dose and must be seen by provider. Patients cannot drive themselves if they have received diphenhydramine.

## 2021-10-14 DIAGNOSIS — R73.02 IGT (IMPAIRED GLUCOSE TOLERANCE): ICD-10-CM

## 2021-10-14 DIAGNOSIS — L85.3 XEROSIS OF SKIN: ICD-10-CM

## 2021-10-14 RX ORDER — LEVOTHYROXINE SODIUM 88 MCG
TABLET ORAL
Qty: 30 TABLET | Refills: 0 | Status: SHIPPED | OUTPATIENT
Start: 2021-10-14 | End: 2021-11-15

## 2021-10-14 RX ORDER — HYDROCORTISONE 25 MG/ML
LOTION TOPICAL
Qty: 118 ML | Refills: 0 | Status: SHIPPED | OUTPATIENT
Start: 2021-10-14 | End: 2021-11-15

## 2021-10-14 RX ORDER — ATORVASTATIN CALCIUM 10 MG/1
TABLET, FILM COATED ORAL
Qty: 30 TABLET | Refills: 0 | Status: SHIPPED | OUTPATIENT
Start: 2021-10-14 | End: 2021-11-15

## 2021-10-14 RX ORDER — ALBUTEROL SULFATE 90 UG/1
AEROSOL, METERED RESPIRATORY (INHALATION)
Qty: 108 G | Refills: 0 | Status: SHIPPED | OUTPATIENT
Start: 2021-10-14 | End: 2021-11-15

## 2021-10-18 RX ORDER — CETIRIZINE HYDROCHLORIDE 10 MG/1
10 TABLET ORAL DAILY
Qty: 90 TABLET | Refills: 3 | Status: SHIPPED | OUTPATIENT
Start: 2021-10-18 | End: 2022-03-02

## 2021-10-25 ENCOUNTER — OFFICE VISIT (OUTPATIENT)
Dept: DERMATOLOGY | Age: 61
End: 2021-10-25
Payer: MEDICAID

## 2021-10-25 VITALS
DIASTOLIC BLOOD PRESSURE: 80 MMHG | SYSTOLIC BLOOD PRESSURE: 120 MMHG | BODY MASS INDEX: 35.65 KG/M2 | OXYGEN SATURATION: 93 % | WEIGHT: 208.8 LBS | HEIGHT: 64 IN | HEART RATE: 88 BPM

## 2021-10-25 DIAGNOSIS — L66.9 CENTRAL CENTRIFUGAL SCARRING ALOPECIA: Primary | ICD-10-CM

## 2021-10-25 DIAGNOSIS — M79.2 NEURALGIA INVOLVING SCALP: ICD-10-CM

## 2021-10-25 PROCEDURE — 99214 OFFICE O/P EST MOD 30 MIN: CPT | Performed by: DERMATOLOGY

## 2021-10-25 PROCEDURE — 11900 INJECT SKIN LESIONS </W 7: CPT | Performed by: DERMATOLOGY

## 2021-10-25 NOTE — PROGRESS NOTES
09/12/2011    Hyperlipemia 09/12/2011    Interstitial cystitis 09/12/2011    ETD (eustachian tube dysfunction) 09/12/2011     chronic      Sinusitis, chronic 09/12/2011       CURRENT MEDICATIONS:   Current Outpatient Medications   Medication Sig Dispense Refill    cetirizine (ZYRTEC) 10 MG tablet Take 1 tablet by mouth daily 90 tablet 3    atorvastatin (LIPITOR) 10 MG tablet Take 1 tablet by mouth once daily 30 tablet 0    albuterol sulfate  (90 Base) MCG/ACT inhaler INHALE 2 PUFFS BY MOUTH EVERY 4 HOURS AS NEEDED FOR WHEEZING 108 g 0    metFORMIN (GLUCOPHAGE) 500 MG tablet TAKE 1 TABLET BY MOUTH TWICE DAILY WITH MEALS 60 tablet 0    hydrocortisone (HYTONE) 2.5 % lotion APPLY TOPICALLY TO AFFECTED AREA DAILY 118 mL 0    SYNTHROID 88 MCG tablet TAKE 1 TABLET BY MOUTH ONCE DAILY ON AN EMPTY STOMACH WITH WATER. DO NOT EAT OR TAKE ANY OTHER MEDICATIONS FOR 30 MINUTES. 30 tablet 0    estradiol (ESTRACE) 0.1 MG/GM vaginal cream INSERT 1 GRAM VAGINALLY 3 TIMES PER WEEK      omalizumab (XOLAIR) 150 MG/ML SOSY injection Inject 300 mg into the skin every 28 days Administer 300 mg every 28 days PREFILLED SYRINGE.  MEDICATION MUST BE MAILED TO PROVIDERS OFFICE 2 each 5    Cholecalciferol (VITAMIN D3) 50 MCG (2000 UT) CAPS Take 1 capsule by mouth once daily 30 capsule 0    OSCIMIN SR 0.375 MG extended release tablet Take 1/2 (one-half) tablet by mouth twice daily 30 tablet 0    Estradiol 10 % CREA by Does not apply route      ketoconazole (NIZORAL) 2 % shampoo Apply 3-4 times weekly to scalp, leave on for five minutes prior to washing off 120 mL 11    clobetasol (TEMOVATE) 0.05 % ointment Apply to scalp daily 60 g 2    esomeprazole (NEXIUM) 40 MG delayed release capsule Take 1 capsule by mouth 2 times daily 180 capsule 3    albuterol (PROVENTIL) (2.5 MG/3ML) 0.083% nebulizer solution USE 1 VIAL IN NEBULIZER EVERY 6 HOURS AS NEEDED FOR WHEEZING (FOR ASTHMA) 375 mL 5    gabapentin (NEURONTIN) 100 MG capsule Take 100 mg by mouth 4 times daily.  amLODIPine (NORVASC) 5 MG tablet TAKE 1 TABLET BY MOUTH ONCE DAILY 90 tablet 3    azelastine (OPTIVAR) 0.05 % ophthalmic solution Place 1 drop into both eyes 2 times daily 1 Bottle 11    azelastine (ASTELIN) 0.1 % nasal spray 1 spray by Nasal route 2 times daily Use in each nostril as directed 1 Bottle 11    SYMBICORT 160-4.5 MCG/ACT AERO INHALE 2 PUFFS BY MOUTH TWICE DAILY. RINSE MOUTH WELL AFTER USE. 11 g 5    clobetasol (TEMOVATE) 0.05 % external solution Apply daily to scaly or itchy areas on scalp 60 mL 11    fluocinonide (LIDEX) 0.05 % cream Apply topically 2 times daily to rash on hands a needed 30 g 11    levomilnacipran (FETZIMA) 40 MG CP24 extended release capsule Take 1 capsule by mouth daily 30 capsule 2    Multiple Vitamins-Minerals (MULTIVITAMIN PO) 1 tablet daily       Cyanocobalamin (B-12 PO) daily      meloxicam (MOBIC) 7.5 MG tablet TK 1 T PO BID  4    LINZESS 290 MCG CAPS capsule 290 mcg every morning (before breakfast)       lidocaine (XYLOCAINE) 5 % ointment Apply topically as needed for Pain Apply topically as needed.  fluticasone (FLONASE ALLERGY RELIEF) 50 MCG/ACT nasal spray 1 spray by Nasal route daily      HYDROcodone-acetaminophen (NORCO) 5-325 MG per tablet 1 tablet nightly as needed for Pain. Take 1 tablet every 4-6 hours as needed for pain       NONFORMULARY Immunotherapy allergy shot      zinc 50 MG CAPS Take 1 capsule by mouth daily       Ascorbic Acid (VITAMIN C) 500 MG CAPS Take 1 tablet by mouth daily       orphenadrine (NORFLEX) 100 MG tablet Take 100 mg by mouth 2 times daily as needed.  tramadol (ULTRAM) 50 MG tablet Take 50 mg by mouth every 6 hours as needed.  Take 1-2 tabs every 6 hrs prn       EPIPEN 2-VENESSA 0.3 MG/0.3ML SOAJ injection INJECT 1 PEN IM AS A SINGLE DOSE PRN (Patient not taking: Reported on 10/25/2021) 1 each 1    nicotine polacrilex (COMMIT) 2 MG lozenge Take 1 lozenge by mouth as needed for Smoking cessation (max 20 per day) (Patient not taking: Reported on 10/25/2021) 100 each 3    fluconazole (DIFLUCAN) 150 MG tablet as needed (as need for yeast infection)  (Patient not taking: Reported on 10/25/2021)      nicotine (NICODERM CQ) 14 MG/24HR Place 1 patch onto the skin every 24 hours May wear 24 hours. May remove at bedtime if problems with insomnia. Re-Apply new patch immediately on awakening. Diagnosis: Tobacco dependence 42 patch 0    traZODone (DESYREL) 50 MG tablet Take 50 mg by mouth nightly as needed for Sleep  (Patient not taking: Reported on 10/25/2021)      Biotin 1 MG CAPS Take 1 capsule by mouth daily  (Patient not taking: Reported on 10/25/2021)      Probiotic Product (PROBIOTIC PO) 1 tablet daily  (Patient not taking: Reported on 10/25/2021)      ondansetron (ZOFRAN) 4 MG tablet TAKE 1 TABLET BY MOUTH EVERY 8 HOURS AS NEEDED FOR NAUSEA OR VOMITING (Patient not taking: Reported on 10/25/2021) 15 tablet 0    conjugated estrogens (PREMARIN) 0.625 MG/GM vaginal cream Place vaginally as needed Place vaginally daily. (Patient not taking: Reported on 10/25/2021)      lidocaine (LIDODERM) 5 % Place 1 patch onto the skin daily 12 hours on, 12 hours off.  (Patient not taking: Reported on 10/25/2021)      hydrocortisone (ANUSOL-HC) 25 MG suppository Place 25 mg rectally as needed for Hemorrhoids (Patient not taking: Reported on 10/25/2021)       Current Facility-Administered Medications   Medication Dose Route Frequency Provider Last Rate Last Admin    omalizumab Jadyn Bah) injection 150 mg  150 mg SubCUTAneous Q28 Days COREEN Hunt - CNP   150 mg at 09/29/21 0850    omalizumab Jadyn Bah) injection 150 mg  150 mg SubCUTAneous Q28 Days COREEN Hunt - CNP   150 mg at 09/29/21 9726       ALLERGIES:   Allergies   Allergen Reactions    Bactrim [Sulfamethoxazole-Trimethoprim]     Flexeril [Cyclobenzaprine]     Morphine Other (See Comments)     \"I hallucinate\"    Peanut-Containing Drug Products     Tessalon [Benzonatate] Hives    Amoxicillin-Pot Clavulanate Rash    Cefdinir Rash    Ibuprofen [Ibuprofen] Rash    Lisinopril Rash    Macrobid [Nitrofurantoin Monohydrate Macrocrystals] Rash    Macrodantin [Nitrofurantoin] Rash    Ranitidine Rash       SOCIAL HISTORY:  Social History     Tobacco Use    Smoking status: Current Every Day Smoker     Packs/day: 0.20     Years: 25.00     Pack years: 5.00     Types: Cigarettes     Start date: 6/1/1975    Smokeless tobacco: Never Used    Tobacco comment: smokes 2 cigs a day   Substance Use Topics    Alcohol use: Not Currently     Alcohol/week: 2.0 standard drinks     Types: 2 Cans of beer per week     Comment: Patient drinks 1-2 beers a MONTH ; stomach tolerates very little alcohol       Pertinent ROS:  Review of Systems  Skin: Denies any new changing, growing or bleeding lesions or rashes except as described in the HPI   Constitutional: Denies fevers, chills, and malaise. PHYSICAL EXAM:   /80   Pulse 88   Ht 5' 4\" (1.626 m)   Wt 208 lb 12.8 oz (94.7 kg)   SpO2 93%   BMI 35.84 kg/m²     The patient is generally well appearing, well nourished, alert and conversational. Affect is normal.    Cutaneous Exam:  Physical Exam  Focused exam of scalp was performed    Facial covering was not removed during examination. Diagnoses/exam findings/medical history pertinent to this visit are listed below:    Assessment:   Diagnosis Orders   1. Central centrifugal scarring alopecia     2.  Neuralgia involving scalp          Plan:  Central centrifugal scarring alopecia with possible additional scalp neuralgia    - chronic illness, responding to treatment but not yet at goal   - has stabilization of hair density with current treatment  - continue topicals   - trial compounded topical for scalp neuralgia - ketamine/amitriptyline/menthol 10/5/1% applied daily  Intralesional Injection: After discussion of risks including atrophy and dyspigmentation, patient gives verbal consent to proceed. After cleansing with alcohol the alopecic patches on the scalp were injected with 1 ml of Kenalog 10 mg/mL   - ILK q2 months     RTC 2 months     Future Appointments   Date Time Provider Quinten Garcia   11/3/2021  8:40 AM SCHEDULE, [address] [facility] N [facility]   11/8/2021  8:20 AM SCHEDULE, [facility]   11/15/2021  8:20 AM SCHEDULE, [facility]   11/22/2021  8:20 AM SCHEDULE, [facility]   12/6/2021  8:20 AM SCHEDULE, [facility] N [facility]   12/7/2021  8:15 AM Damon Fairbanks MD N ENT [facility]   1/18/2022  8:40 AM STR CT IMAGING RM1  [facility] OUT EXP STR Radiolog   1/18/2022  9:00 AM STR PULMONARY FUNCTION ROOM 1 STRZ PFT [facility] HOD   1/25/2022  8:00 AM Dashawn Mcmillan MD [address] [place] [address]         Patient Instructions   Continue topicals. Follow up in 2 months for injection. This note was created with the assistance of a speech-recognition program.  Although the intention is to generate a document that actually reflects the content of the visit, no guarantees can be provided that every mistake has been identified and corrected by editing. I, Dr. Errol Carrington, personally performed the services described in this documentation, as scribed by [facility] in my presence, and it is both accurate and complete.      Electronically signed by Ashley Banuelos MD on 10/25/21 at 8:42 AM EDT

## 2021-10-25 NOTE — Clinical Note
Chem rx: ketamine/amitriptyline/menthol 10/5/1% 60 g applied twice daily as needed for scalp pain/itching

## 2021-10-26 NOTE — PROGRESS NOTES
Chem rx: ketamine/amitriptyline/menthol 10/5/1% 60 g applied twice daily as needed for scalp pain/itching   Order faxed

## 2021-10-28 ENCOUNTER — PATIENT MESSAGE (OUTPATIENT)
Dept: FAMILY MEDICINE CLINIC | Age: 61
End: 2021-10-28

## 2021-10-28 ENCOUNTER — TELEPHONE (OUTPATIENT)
Dept: FAMILY MEDICINE CLINIC | Age: 61
End: 2021-10-28

## 2021-10-28 DIAGNOSIS — R31.9 HEMATURIA, UNSPECIFIED TYPE: Primary | ICD-10-CM

## 2021-10-28 NOTE — TELEPHONE ENCOUNTER
From: Meme Feliciano  To: Rubén Jain DO  Sent: 10/28/2021 10:09 AM EDT  Subject: Prescription Question    Hello! Dr. Lesa Garcia,  1). Lexis Christopher told me last week that I needed REFILLS by NEXT MONTH (NOVEMBER) on my RX's that I get prescribed from you. 2). Also, do I need to take another URINE TEST FOR the \"TRACE OF BLOOD\" in my urine noted in my records from going to the ER, OR do not concern myself with it? IF so, where do I need to go to take it?   Thank you

## 2021-10-28 NOTE — TELEPHONE ENCOUNTER
Attempted to contact the patient to update her that she is due for her mammogram and to transfer her to scheduling so she can make her appt.   Left a VM asking her to return the call

## 2021-11-03 ENCOUNTER — NURSE ONLY (OUTPATIENT)
Dept: LAB | Age: 61
End: 2021-11-03

## 2021-11-03 DIAGNOSIS — R31.9 HEMATURIA, UNSPECIFIED TYPE: ICD-10-CM

## 2021-11-03 LAB
BACTERIA: ABNORMAL /HPF
BILIRUBIN URINE: NEGATIVE
BLOOD, URINE: ABNORMAL
CASTS 2: ABNORMAL /LPF
CASTS UA: ABNORMAL /LPF
CHARACTER, URINE: CLEAR
COLOR: YELLOW
CRYSTALS, UA: ABNORMAL
EPITHELIAL CELLS, UA: ABNORMAL /HPF
GLUCOSE URINE: NEGATIVE MG/DL
KETONES, URINE: NEGATIVE
LEUKOCYTE ESTERASE, URINE: ABNORMAL
MISCELLANEOUS 2: ABNORMAL
NITRITE, URINE: NEGATIVE
PH UA: 6 (ref 5–9)
PROTEIN UA: NEGATIVE
RBC URINE: ABNORMAL /HPF
RENAL EPITHELIAL, UA: ABNORMAL
SPECIFIC GRAVITY, URINE: 1.02 (ref 1–1.03)
UROBILINOGEN, URINE: 1 EU/DL (ref 0–1)
WBC UA: ABNORMAL /HPF
YEAST: ABNORMAL

## 2021-11-06 LAB
ORGANISM: ABNORMAL
ORGANISM: ABNORMAL
URINE CULTURE REFLEX: ABNORMAL
URINE CULTURE REFLEX: ABNORMAL

## 2021-11-08 ENCOUNTER — NURSE ONLY (OUTPATIENT)
Dept: ALLERGY | Age: 61
End: 2021-11-08
Payer: MEDICAID

## 2021-11-08 VITALS
RESPIRATION RATE: 16 BRPM | SYSTOLIC BLOOD PRESSURE: 124 MMHG | TEMPERATURE: 97.3 F | OXYGEN SATURATION: 98 % | HEART RATE: 88 BPM | DIASTOLIC BLOOD PRESSURE: 78 MMHG

## 2021-11-08 DIAGNOSIS — J82.83 EOSINOPHILIC ASTHMA: ICD-10-CM

## 2021-11-08 DIAGNOSIS — Z91.048 ALLERGY TO MOLD: Primary | ICD-10-CM

## 2021-11-08 DIAGNOSIS — Z51.6 ENCOUNTER FOR DESENSITIZATION TO ALLERGENS: ICD-10-CM

## 2021-11-08 DIAGNOSIS — J30.1 ALLERGY TO TREES: ICD-10-CM

## 2021-11-08 PROCEDURE — 96372 THER/PROPH/DIAG INJ SC/IM: CPT | Performed by: NURSE PRACTITIONER

## 2021-11-08 PROCEDURE — 95117 IMMUNOTHERAPY INJECTIONS: CPT | Performed by: NURSE PRACTITIONER

## 2021-11-08 RX ORDER — CIPROFLOXACIN 250 MG/1
250 TABLET, FILM COATED ORAL 2 TIMES DAILY
Qty: 6 TABLET | Refills: 0 | Status: SHIPPED | OUTPATIENT
Start: 2021-11-08 | End: 2021-11-11

## 2021-11-08 RX ORDER — TRIAMCINOLONE ACETONIDE 10 MG/ML
10 INJECTION, SUSPENSION INTRA-ARTICULAR; INTRALESIONAL ONCE
COMMUNITY

## 2021-11-08 NOTE — PROGRESS NOTES
Patient here today to receive Xolair injection. Patient does not report any previous reaction from Xolair injections. Denies any nausea vomiting fever urticaria or angioedema. Denies any recent exacerbation of asthma or asthma-like symptoms. Patient was explained benefits and potential risks including anaphylaxis to patient. Patient has been explained the risk and benefits including delayed anaphylaxis. Patient has EpiPen and understands how to appropriately use. Xolair 150 mg given subcutaneously in thighs, bilaterally,  for a total combined dose of 300 mg  using a 25-gauge needle and 3 ML syringe. Site injections may include the following sites:  RIGHT  / LEFT UPPER ARM,  RIGHT  / LEFT FRONT OR MIDDLE OF THIGH, OR STOMACH        Xolair  [address] [id number]   Lot [id number]   Expires 11/2022    Prior to patient receiving Xolair area was cleansed with alcohol swabs. Following the administration no evidence of bleeding or bruising. Patient  observed for 2 hours following first three injections and then 30 minutes following each subsequent administration. No adverse reactions reported. Patient tolerated well without adverse reactions or side effects. Patient to continue to receive Xolair injections monthly. Will report any problems to this office.

## 2021-11-08 NOTE — PROGRESS NOTES
After consent obtained/verified, allergy injection given in back of R/L arm(s). VIAL COLOR OF ALL VIALS TODAY IS RED MAINTENANCE    ALLERGY INJECTION FROM VIAL A GIVEN Left  UPPER ARM IN THE AMOUNT OF 0.30 ML    ALLERGY INJECTION FROM VIAL B GIVEN Right UPPER ARM IN THE AMOUNT OF 0.30 ML      Documentation of vial injection specific to arm(s) noted on Allergy Immunotherapy Administration Form. Patient declined to wait for 30 minutes               SHOT REACTION TREATMENT INSTRUCTIONS    During the 30 minute wait after an allergy injection the following symptoms should be reported:    Itching other than at the injection site  Hives or swelling other than at the injection site  Redness other than at the injection site  Difficulty breathing  Chest tightness  Difficulty swallowing  Throat tightness    If these symptoms occur, NOTIFY PROVIDER and the following treatment should be administered:    1. Epinephrine/Auvi Q 1:1000 IM - 0.3 ml if > 66 lbs or more, 0.15 ml if 33 - 63 lbs, or 0.1 ml if <33 lbs     2. Diphenhydramine - give all intramuscular:     2 to <6 years (off-label use): 6.25 mg,    6 to <12 years: 12.5 to 25 mg;    ?12 years: 25-50 mg.    3.  Famotidine:  Adults 40 mg oral    Adolescents age 12 years and >88 lbs: 40 mg    Children and Adolescents ? 12years of age: Initial: 0.25 mg/kg/dose  every 12 hours (maximum daily dose: 40 mg/day)    Epi/Auvi Q dose may me repeated in 5-15 minutes if adequate resolution of symptoms does not occur    Patient should be observed for at least one hour after final Epi/Auvi Q dose and must be seen by provider. Patients cannot drive themselves if they have received diphenhydramine.

## 2021-11-12 ENCOUNTER — TELEPHONE (OUTPATIENT)
Dept: ENT CLINIC | Age: 61
End: 2021-11-12

## 2021-11-12 NOTE — TELEPHONE ENCOUNTER
[address] to schedule delivery of patient's Xolair. Delivery scheduled for 11/16/21. Spoke to [facility]

## 2021-11-15 ENCOUNTER — NURSE ONLY (OUTPATIENT)
Dept: ALLERGY | Age: 61
End: 2021-11-15
Payer: MEDICAID

## 2021-11-15 VITALS
RESPIRATION RATE: 16 BRPM | DIASTOLIC BLOOD PRESSURE: 82 MMHG | TEMPERATURE: 97 F | OXYGEN SATURATION: 98 % | HEART RATE: 82 BPM | SYSTOLIC BLOOD PRESSURE: 124 MMHG

## 2021-11-15 DIAGNOSIS — L66.9 CENTRAL CENTRIFUGAL SCARRING ALOPECIA: ICD-10-CM

## 2021-11-15 DIAGNOSIS — Z91.048 ALLERGY TO MOLD: Primary | ICD-10-CM

## 2021-11-15 DIAGNOSIS — J30.1 ALLERGY TO TREES: ICD-10-CM

## 2021-11-15 DIAGNOSIS — Z51.6 ENCOUNTER FOR DESENSITIZATION TO ALLERGENS: ICD-10-CM

## 2021-11-15 PROCEDURE — 95117 IMMUNOTHERAPY INJECTIONS: CPT | Performed by: NURSE PRACTITIONER

## 2021-11-15 NOTE — PROGRESS NOTES
After consent obtained/verified, allergy injection given in back of R/L arm(s). VIAL COLOR OF ALL VIALS TODAY IS RED MAINTENANCE    ALLERGY INJECTION FROM VIAL A GIVEN Right  UPPER ARM IN THE AMOUNT OF 0.35 ML    ALLERGY INJECTION FROM VIAL B GIVEN Left UPPER ARM IN THE AMOUNT OF 0.35 ML      Documentation of vial injection specific to arm(s) noted on Allergy Immunotherapy Administration Form. Patient declined to wait for 30 minutes              SHOT REACTION TREATMENT INSTRUCTIONS    During the 30 minute wait after an allergy injection the following symptoms should be reported:    Itching other than at the injection site  Hives or swelling other than at the injection site  Redness other than at the injection site  Difficulty breathing  Chest tightness  Difficulty swallowing  Throat tightness    If these symptoms occur, NOTIFY PROVIDER and the following treatment should be administered:    1. Epinephrine/Auvi Q 1:1000 IM - 0.3 ml if > 66 lbs or more, 0.15 ml if 33 - 63 lbs, or 0.1 ml if <33 lbs     2. Diphenhydramine - give all intramuscular:     2 to <6 years (off-label use): 6.25 mg,    6 to <12 years: 12.5 to 25 mg;    ?12 years: 25-50 mg.    3.  Famotidine:  Adults 40 mg oral    Adolescents age 12 years and >88 lbs: 40 mg    Children and Adolescents ? 12years of age: Initial: 0.25 mg/kg/dose  every 12 hours (maximum daily dose: 40 mg/day)    Epi/Auvi Q dose may me repeated in 5-15 minutes if adequate resolution of symptoms does not occur    Patient should be observed for at least one hour after final Epi/Auvi Q dose and must be seen by provider. Patients cannot drive themselves if they have received diphenhydramine.

## 2021-11-16 RX ORDER — CLOBETASOL PROPIONATE 0.5 MG/G
OINTMENT TOPICAL
Qty: 60 G | Refills: 0 | Status: SHIPPED | OUTPATIENT
Start: 2021-11-16 | End: 2021-12-27 | Stop reason: SDUPTHER

## 2021-11-22 ENCOUNTER — TELEPHONE (OUTPATIENT)
Dept: FAMILY MEDICINE CLINIC | Age: 61
End: 2021-11-22

## 2021-11-22 ENCOUNTER — NURSE ONLY (OUTPATIENT)
Dept: ALLERGY | Age: 61
End: 2021-11-22
Payer: MEDICAID

## 2021-11-22 VITALS — SYSTOLIC BLOOD PRESSURE: 121 MMHG | DIASTOLIC BLOOD PRESSURE: 76 MMHG | TEMPERATURE: 98.6 F

## 2021-11-22 DIAGNOSIS — J30.1 ALLERGY TO TREES: ICD-10-CM

## 2021-11-22 DIAGNOSIS — Z51.6 ENCOUNTER FOR DESENSITIZATION TO ALLERGENS: ICD-10-CM

## 2021-11-22 DIAGNOSIS — Z91.048 ALLERGY TO MOLD: Primary | ICD-10-CM

## 2021-11-22 PROCEDURE — 95117 IMMUNOTHERAPY INJECTIONS: CPT | Performed by: NURSE PRACTITIONER

## 2021-11-22 RX ORDER — HYOSCYAMINE SULFATE 0.125 MG
125 TABLET ORAL 2 TIMES DAILY
Qty: 180 TABLET | Refills: 3 | Status: SHIPPED | OUTPATIENT
Start: 2021-11-22 | End: 2021-12-07 | Stop reason: ALTCHOICE

## 2021-11-22 NOTE — PROGRESS NOTES
After consent obtained/verified, allergy injection given in back of R/L arm(s). VIAL COLOR OF ALL VIALS TODAY IS MAINTENANCE RED    ALLERGY INJECTION FROM VIAL A GIVEN LEFT  UPPER ARM IN THE AMOUNT OF 0.40 ML    ALLERGY INJECTION FROM VIAL B GIVEN RIGHT UPPER ARM IN THE AMOUNT OF 0.40 ML      Documentation of vial injection specific to arm(s) noted on Allergy Immunotherapy Administration Form. Patient declined to wait for 30 minutes         SHOT REACTION TREATMENT INSTRUCTIONS    During the 30 minute wait after an allergy injection the following symptoms should be reported:    Itching other than at the injection site  Hives or swelling other than at the injection site  Redness other than at the injection site  Difficulty breathing  Chest tightness  Difficulty swallowing  Throat tightness    If these symptoms occur, NOTIFY PROVIDER and the following treatment should be administered:    1. Epinephrine/Auvi Q 1:1000 IM - 0.3 ml if > 66 lbs or more, 0.15 ml if 33 - 63 lbs, or 0.1 ml if <33 lbs     2. Diphenhydramine - give all intramuscular:     2 to <6 years (off-label use): 6.25 mg,    6 to <12 years: 12.5 to 25 mg;    ?12 years: 25-50 mg.    3.  Famotidine:  Adults 40 mg oral    Adolescents age 12 years and >88 lbs: 40 mg    Children and Adolescents ? 12years of age: Initial: 0.25 mg/kg/dose  every 12 hours (maximum daily dose: 40 mg/day)    Epi/Auvi Q dose may me repeated in 5-15 minutes if adequate resolution of symptoms does not occur    Patient should be observed for at least one hour after final Epi/Auvi Q dose and must be seen by provider. Patients cannot drive themselves if they have received diphenhydramine.

## 2021-11-23 ENCOUNTER — TELEPHONE (OUTPATIENT)
Dept: FAMILY MEDICINE CLINIC | Age: 61
End: 2021-11-23

## 2021-11-23 RX ORDER — CIPROFLOXACIN 250 MG/1
250 TABLET, FILM COATED ORAL 2 TIMES DAILY
Qty: 6 TABLET | Refills: 0 | Status: SHIPPED | OUTPATIENT
Start: 2021-11-23 | End: 2021-11-26

## 2021-11-23 NOTE — TELEPHONE ENCOUNTER
Patient has on going burning with urination and low back pain since 11/3/21 she was treated with Cipro 250 mg for 3 days. Patient continues to push fluids. Pharmacy is [facility]. Patient would like a call back with advice.

## 2021-12-07 ENCOUNTER — OFFICE VISIT (OUTPATIENT)
Dept: ENT CLINIC | Age: 61
End: 2021-12-07
Payer: MEDICAID

## 2021-12-07 VITALS
WEIGHT: 208 LBS | TEMPERATURE: 97.1 F | OXYGEN SATURATION: 98 % | HEART RATE: 76 BPM | SYSTOLIC BLOOD PRESSURE: 120 MMHG | RESPIRATION RATE: 14 BRPM | BODY MASS INDEX: 35.7 KG/M2 | DIASTOLIC BLOOD PRESSURE: 78 MMHG

## 2021-12-07 DIAGNOSIS — R49.0 HOARSE: ICD-10-CM

## 2021-12-07 DIAGNOSIS — F17.200 TOBACCO DEPENDENCE: ICD-10-CM

## 2021-12-07 DIAGNOSIS — J38.3 POLYPOID DEGENERATION OF VOCAL CORDS: Primary | ICD-10-CM

## 2021-12-07 DIAGNOSIS — K21.9 GASTROESOPHAGEAL REFLUX DISEASE, UNSPECIFIED WHETHER ESOPHAGITIS PRESENT: ICD-10-CM

## 2021-12-07 DIAGNOSIS — E03.9 ACQUIRED HYPOTHYROIDISM: ICD-10-CM

## 2021-12-07 PROCEDURE — 31575 DIAGNOSTIC LARYNGOSCOPY: CPT | Performed by: OTOLARYNGOLOGY

## 2021-12-07 PROCEDURE — 99213 OFFICE O/P EST LOW 20 MIN: CPT | Performed by: OTOLARYNGOLOGY

## 2021-12-07 RX ORDER — HYOSCYAMINE SULFATE 0.125 MG
125 TABLET ORAL EVERY 4 HOURS PRN
COMMUNITY

## 2021-12-07 ASSESSMENT — ENCOUNTER SYMPTOMS
WHEEZING: 0
STRIDOR: 0
VOMITING: 0
SHORTNESS OF BREATH: 0
SORE THROAT: 0
COUGH: 0
ABDOMINAL PAIN: 0
DIARRHEA: 0
CHEST TIGHTNESS: 0
APNEA: 0
FACIAL SWELLING: 0
RHINORRHEA: 0
TROUBLE SWALLOWING: 0
CHOKING: 0
SINUS PRESSURE: 0
VOICE CHANGE: 0
COLOR CHANGE: 0
NAUSEA: 0

## 2021-12-07 NOTE — PROGRESS NOTES
[facility] PHYSICIANS [place] [facility]  [facility] EAR, NOSE AND THROAT  [address] [place] [address] [place] [address]  Dept: [phone]  Dept Fax: [phone]  Loc: Belkys Ochoa is a 59 y.o. female who was referred byNo ref. provider found for:  Chief Complaint   Patient presents with   [facility]     Patient is here for hoarseness   . HPI:     Lillie Perez is a 59 y.o. female who presents today for follow-up on her hoarseness. She has history of polypoid degeneration. She is following a GERD regimen as well. She still is a little hoarse. Cloteal Mera History: Allergies   Allergen Reactions    Bactrim [Sulfamethoxazole-Trimethoprim]     Flexeril [Cyclobenzaprine]     Morphine Other (See Comments)     \"I hallucinate\"    Peanut-Containing Drug Products     Tessalon [Benzonatate] Hives    Amoxicillin-Pot Clavulanate Rash    Cefdinir Rash    Ibuprofen [Ibuprofen] Rash    Lisinopril Rash    Macrobid [Nitrofurantoin Monohydrate Macrocrystals] Rash    Macrodantin [Nitrofurantoin] Rash    Ranitidine Rash     Current Outpatient Medications   Medication Sig Dispense Refill    hyoscyamine (ANASPAZ;LEVSIN) 125 MCG tablet Take 125 mcg by mouth every 4 hours as needed for Cramping      SYNTHROID 88 MCG tablet TAKE 1 TABLET BY MOUTH ONCE DAILY ON AN EMPTY STOMACH WITH WATER. DO NOT EAT OR TAKE ANY OTHER MEDICATIONS FOR 30 MINUTES.  90 tablet 3    metFORMIN (GLUCOPHAGE) 500 MG tablet TAKE 1 TABLET BY MOUTH TWICE DAILY WITH MEALS 180 tablet 3    atorvastatin (LIPITOR) 10 MG tablet Take 1 tablet by mouth once daily 90 tablet 3    Cholecalciferol (VITAMIN D3) 50 MCG (2000 UT) CAPS Take 1 capsule by mouth once daily 90 capsule 3    amLODIPine (NORVASC) 5 MG tablet Take 1 tablet by mouth once daily 90 tablet 3    hydrocortisone (HYTONE) 2.5 % lotion APPLY TOPICALLY TO AFFECTED AREA DAILY (Patient taking differently: APPLY TOPICALLY TO AFFECTED AREA (Feet) DAILY) 118 mL 0     ondansetron (ZOFRAN) 4 MG tablet TAKE 1 TABLET BY MOUTH EVERY 8 HOURS AS NEEDED FOR NAUSEA OR VOMITING 15 tablet 0    meloxicam (MOBIC) 7.5 MG tablet TK 1 T PO BID  4    LINZESS 290 MCG CAPS capsule 290 mcg every morning (before breakfast)       lidocaine (XYLOCAINE) 5 % ointment Apply topically as needed for Pain Apply topically as needed.  hydrocortisone (ANUSOL-HC) 25 MG suppository Place 25 mg rectally as needed for Hemorrhoids       fluticasone (FLONASE ALLERGY RELIEF) 50 MCG/ACT nasal spray 1 spray by Nasal route daily      HYDROcodone-acetaminophen (NORCO) 5-325 MG per tablet 1 tablet nightly as needed for Pain. Take 1 tablet every 4-6 hours as needed for pain       NONFORMULARY Immunotherapy allergy shot      zinc 50 MG CAPS Take 1 capsule by mouth daily       Ascorbic Acid (VITAMIN C) 500 MG CAPS Take 1 tablet by mouth daily       orphenadrine (NORFLEX) 100 MG tablet Take 100 mg by mouth 2 times daily as needed.  tramadol (ULTRAM) 50 MG tablet Take 50 mg by mouth every 6 hours as needed. Take 1-2 tabs every 6 hrs prn       ciprofloxacin (CIPRO) 250 MG tablet Take 250 mg by mouth 2 times daily      clobetasol (TEMOVATE) 0.05 % ointment Apply to scalp daily 60 g 5    azelastine (OPTIVAR) 0.05 % ophthalmic solution INSTILL 1 DROP INTO EACH EYE TWICE DAILY 6 mL 5    nicotine polacrilex (COMMIT) 2 MG lozenge Take 1 lozenge by mouth as needed for Smoking cessation (max 20 per day) (Patient not taking: Reported on 12/7/2021) 100 each 3    nicotine (NICODERM CQ) 14 MG/24HR Place 1 patch onto the skin every 24 hours May wear 24 hours. May remove at bedtime if problems with insomnia. Re-Apply new patch immediately on awakening. Diagnosis: Tobacco dependence 42 patch 0    lidocaine (LIDODERM) 5 % Place 1 patch onto the skin daily 12 hours on, 12 hours off.        Current Facility-Administered Medications   Medication Dose Route Frequency Provider Last Rate Last Admin    omalizumab [place]) injection 150 mg  150 mg SubCUTAneous Q28 Days [place], APRN - CNP        omalizumab [place] Netter) injection 150 mg  150 mg SubCUTAneous Q28 Days [place], APRN - CNP        omalizumab [place] Netter) injection 150 mg  150 mg SubCUTAneous Q28 Days [place], APRN - CNP   150 mg at 21 5694    omalizumab [place] Netter) injection 150 mg  150 mg SubCUTAneous Q28 Days [place], APRN - CNP   150 mg at 21 1386     Past Medical History:   Diagnosis Date    Allergic rhinitis     Anxiety     Arnold-Chiari malformation (HCC)     Asthma     Chronic bronchitis (HCC)     Fibromyalgia     GERD (gastroesophageal reflux disease)     Headache(784.0)     Hyperlipidemia     Neuropathy     Osteoarthritis     Sinusitis     Type II or unspecified type diabetes mellitus without mention of complication, not stated as uncontrolled       Past Surgical History:   Procedure Laterality Date    APPENDECTOMY       SECTION      x2    COLONOSCOPY  [id number]    CSF SHUNT  2007    Cervical syrinx to subarachnoid shunt; thoracic syrinx to subarachnoid shunt (2 separate dural openings)    CYST REMOVAL      extradural cysts at thoracic 2-3    ECTOPIC PREGNANCY SURGERY      HEMICOLECTOMY      HERNIA REPAIR      inguinal     HYSTERECTOMY      LARYNGOSCOPY N/A 2021    MICROLARYNGOSCOPY AND BIOPSY   performed by Aric Ying MD at [address] [place] [address]  2007    C4-5-6-7; T1-2-3 laminectomies    TOOTH EXTRACTION  2017     Family History   Problem Relation Age of Onset    Cancer Other         colon    Mental Illness Other     High Blood Pressure Other     Diabetes Other     High Blood Pressure Sister      Social History     Tobacco Use    Smoking status: Current Every Day Smoker     Packs/day: 0.20     Years: 25.00     Pack years: 5.00     Types: Cigarettes     Start date: 1975    Smokeless tobacco: Never Used    Tobacco comment: smokes 2 cigs a day Substance Use Topics    Alcohol use: Not Currently     Alcohol/week: 2.0 standard drinks     Types: 2 Cans of beer per week     Comment: Patient drinks 1-2 beers a MONTH ; stomach tolerates very little alcohol       Subjective:      Review of Systems   Constitutional: Negative for activity change, appetite change, chills, diaphoresis, fatigue, fever and unexpected weight change. HENT: Negative for congestion, dental problem, ear discharge, ear pain, facial swelling, hearing loss, mouth sores, nosebleeds, postnasal drip, rhinorrhea, sinus pressure, sneezing, sore throat, tinnitus, trouble swallowing and voice change. Eyes: Negative for visual disturbance. Respiratory: Negative for apnea, cough, choking, chest tightness, shortness of breath, wheezing and stridor. Cardiovascular: Negative for chest pain, palpitations and leg swelling. Gastrointestinal: Negative for abdominal pain, diarrhea, nausea and vomiting. Endocrine: Negative for cold intolerance, heat intolerance, polydipsia and polyuria. Genitourinary: Negative for dysuria, enuresis and hematuria. Musculoskeletal: Negative for arthralgias, gait problem, neck pain and neck stiffness. Skin: Negative for color change and rash. Allergic/Immunologic: Negative for environmental allergies, food allergies and immunocompromised state. Neurological: Negative for dizziness, syncope, facial asymmetry, speech difficulty, light-headedness and headaches. Hematological: Negative for adenopathy. Does not bruise/bleed easily. Psychiatric/Behavioral: Negative for confusion and sleep disturbance. The patient is not nervous/anxious.         Objective:   /78 (Site: Left Upper Arm, Position: Sitting)   Pulse 76   Temp 97.1 °F (36.2 °C) (Infrared)   Resp 14   Wt 208 lb (94.3 kg)   SpO2 98%   BMI 35.70 kg/m²     Physical Exam   Voice coarse mild hoarseness    PROCEDURE: FIBEROPTIC LARYNGOSCOPY    A fiberoptic laryngoscopy was performed under topical anesthesia, after using Afrin and Lidocaine spray in the nasal fossa. The nasal fossa, nasopharynx, hypopharynx and larynx were carefully examined. Base of tongue was symmetrical. Epiglottis appeared normal and was not retrodisplaced. True vocal cords had normal mobility, with mild bilateral symmetrical polypoid degeneration. . There was no erythema. No mucosal lesions or masses were noted. No pooling in the pyriform sinuses. Data:  All of the past medical history, past surgical history, family history,social history, allergies and current medications were reviewed with the patient. Assessment & Plan   Diagnoses and all orders for this visit:     Diagnosis Orders   1. Polypoid degeneration of vocal cords  IN LARYNGOSCOPY FLEXIBLE DIAGNOSTIC   2. Tobacco dependence  IN LARYNGOSCOPY FLEXIBLE DIAGNOSTIC   3. Acquired hypothyroidism  IN LARYNGOSCOPY FLEXIBLE DIAGNOSTIC   4. Hoarse  IN LARYNGOSCOPY FLEXIBLE DIAGNOSTIC   5. Gastroesophageal reflux disease, unspecified whether esophagitis present  IN LARYNGOSCOPY FLEXIBLE DIAGNOSTIC       The findings were explained and her questions were answered. We discussed acid reflux and tobacco abuse. She once again had many many questions which I answered. Return in about 6 months (around 6/7/2022) for hoarseness, smoking . Matheus Sanchez. Kathleen Rosenthal MD    **This report has been created using voice recognition software. It may contain minor errors which are inherent in voicerecognition technology. **

## 2021-12-10 LAB — TSH SERPL DL<=0.05 MIU/L-ACNC: 0.76 UIU/ML

## 2021-12-17 ENCOUNTER — TELEPHONE (OUTPATIENT)
Dept: FAMILY MEDICINE CLINIC | Age: 61
End: 2021-12-17

## 2021-12-17 NOTE — TELEPHONE ENCOUNTER
PA request received from pharmacy for Synthroid 88[facility]. PA submitted online at [email or web address] and pending review.

## 2021-12-27 ENCOUNTER — OFFICE VISIT (OUTPATIENT)
Dept: DERMATOLOGY | Age: 61
End: 2021-12-27
Payer: MEDICAID

## 2021-12-27 VITALS
SYSTOLIC BLOOD PRESSURE: 110 MMHG | HEIGHT: 64 IN | BODY MASS INDEX: 35.68 KG/M2 | WEIGHT: 209 LBS | HEART RATE: 84 BPM | DIASTOLIC BLOOD PRESSURE: 68 MMHG

## 2021-12-27 DIAGNOSIS — M79.2 NEURALGIA INVOLVING SCALP: ICD-10-CM

## 2021-12-27 DIAGNOSIS — L66.9 CENTRAL CENTRIFUGAL SCARRING ALOPECIA: Primary | ICD-10-CM

## 2021-12-27 PROCEDURE — 11900 INJECT SKIN LESIONS </W 7: CPT | Performed by: DERMATOLOGY

## 2021-12-27 PROCEDURE — 99214 OFFICE O/P EST MOD 30 MIN: CPT | Performed by: DERMATOLOGY

## 2021-12-27 RX ORDER — CIPROFLOXACIN 250 MG/1
250 TABLET, FILM COATED ORAL 2 TIMES DAILY
COMMUNITY
Start: 2021-12-16 | End: 2022-03-28

## 2021-12-27 RX ORDER — CLOBETASOL PROPIONATE 0.5 MG/G
OINTMENT TOPICAL
Qty: 60 G | Refills: 5 | Status: SHIPPED | OUTPATIENT
Start: 2021-12-27 | End: 2022-02-14

## 2021-12-27 NOTE — PROGRESS NOTES
Dermatology Patient Note  Pamella Garcia [address]  [address]  Dept: [phone]  Dept Fax: [phone]: [phone]      VISITDATE: 12/27/2021   REFERRING PROVIDER: No ref. provider found      Delfino Spencer is a 59 y.o. female  who presents today in the office for:    Alopecia (ILK) and Rash (breaking out at times over various parts of body, red, itching, and stinging.)      HISTORY OF PRESENT ILLNESS:  2 month ILK for scalp for CCCA and scalp neuralgia. Patient has been using compounded cream (ketamine/amitriptyline/menthol) and states that it has helped some. She would like repeat ILK as this has helped the most with itching. Patient denies new hair loss and has noticed some hair growth.      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    Tobacco dependence 04/15/2021    Multiple thyroid nodules 04/15/2021    Syringomyelia and syringobulbia ([facility] 02/22/2021    Pulmonary eosinophilia ([address] 02/22/2021    Right sided facial pain 02/14/2021     Mastoid tip, transverse process C2, right TMJ       (ventriculoperitoneal) shunt status 02/14/2021    Status post spinal surgery 58/37/4145    Eosinophilic asthma 42/91/5440    Esophageal reflux 12/30/2014    COPD (chronic obstructive pulmonary disease) ([address] 12/30/2014    Tobacco abuse 09/26/2013    Arnold-Chiari malformation ([address] 03/26/2013    IGT (impaired glucose tolerance) 09/18/2012    Vitamin D deficiency 09/18/2012    Vestibular dizziness 12/21/2011     Bilateral caloric weakness sp vestibular therapy      Nasal septal deviation 12/21/2011    Hypothyroid 09/12/2011    Fibromyalgia 09/12/2011    Obesity 09/12/2011    Hyperlipemia 09/12/2011    Interstitial cystitis 09/12/2011    ETD (eustachian tube dysfunction) 09/12/2011     chronic      Sinusitis, chronic 09/12/2011       CURRENT MEDICATIONS:   Current Outpatient Medications   Medication Sig Dispense Refill    ciprofloxacin (CIPRO) 250 MG tablet Take 250 mg by mouth 2 times daily      clobetasol (TEMOVATE) 0.05 % ointment Apply to scalp daily 60 g 5    azelastine (OPTIVAR) 0.05 % ophthalmic solution INSTILL 1 DROP INTO EACH EYE TWICE DAILY 6 mL 5    hyoscyamine (ANASPAZ;LEVSIN) 125 MCG tablet Take 125 mcg by mouth every 4 hours as needed for Cramping      SYNTHROID 88 MCG tablet TAKE 1 TABLET BY MOUTH ONCE DAILY ON AN EMPTY STOMACH WITH WATER. DO NOT EAT OR TAKE ANY OTHER MEDICATIONS FOR 30 MINUTES. 90 tablet 3    metFORMIN (GLUCOPHAGE) 500 MG tablet TAKE 1 TABLET BY MOUTH TWICE DAILY WITH MEALS 180 tablet 3    atorvastatin (LIPITOR) 10 MG tablet Take 1 tablet by mouth once daily 90 tablet 3    Cholecalciferol (VITAMIN D3) 50 MCG (2000 UT) CAPS Take 1 capsule by mouth once daily 90 capsule 3    amLODIPine (NORVASC) 5 MG tablet Take 1 tablet by mouth once daily 90 tablet 3    hydrocortisone (HYTONE) 2.5 % lotion APPLY TOPICALLY TO AFFECTED AREA DAILY (Patient taking differently: APPLY TOPICALLY TO AFFECTED AREA (Feet) DAILY) 118 mL 0    albuterol sulfate  (90 Base) MCG/ACT inhaler INHALE 2 PUFFS INTO LUNGS EVERY 4 HOURS AS NEEDED FOR WHEEZING 18 g 1    triamcinolone acetonide (KENALOG) 10 MG/ML injection Inject 10 mg into the articular space once      cetirizine (ZYRTEC) 10 MG tablet Take 1 tablet by mouth daily 90 tablet 3    estradiol (ESTRACE) 0.1 MG/GM vaginal cream INSERT 1 GRAM VAGINALLY 3 TIMES PER WEEK      omalizumab (XOLAIR) 150 MG/ML SOSY injection Inject 300 mg into the skin every 28 days Administer 300 mg every 28 days PREFILLED SYRINGE.  MEDICATION MUST BE MAILED TO PROVIDERS OFFICE 2 each 5    EPIPEN 2-VENESSA 0.3 MG/0.3ML SOAJ injection INJECT 1 PEN IM AS A SINGLE DOSE PRN 1 each 1    per tablet 1 tablet nightly as needed for Pain. Take 1 tablet every 4-6 hours as needed for pain       NONFORMULARY Immunotherapy allergy shot      zinc 50 MG CAPS Take 1 capsule by mouth daily       Ascorbic Acid (VITAMIN C) 500 MG CAPS Take 1 tablet by mouth daily       orphenadrine (NORFLEX) 100 MG tablet Take 100 mg by mouth 2 times daily as needed.  tramadol (ULTRAM) 50 MG tablet Take 50 mg by mouth every 6 hours as needed. Take 1-2 tabs every 6 hrs prn       nicotine polacrilex (COMMIT) 2 MG lozenge Take 1 lozenge by mouth as needed for Smoking cessation (max 20 per day) (Patient not taking: Reported on 12/7/2021) 100 each 3    nicotine (NICODERM CQ) 14 MG/24HR Place 1 patch onto the skin every 24 hours May wear 24 hours. May remove at bedtime if problems with insomnia. Re-Apply new patch immediately on awakening.   Diagnosis: Tobacco dependence 42 patch 0     Current Facility-Administered Medications   Medication Dose Route Frequency Provider Last Rate Last Admin    omalizumab [place]) injection 150 mg  150 mg SubCUTAneous Q28 Days [place], APRN - CNP        omalizumab [place] Scale) injection 150 mg  150 mg SubCUTAneous Q28 Days [place], APRN - CNP        omalizumab [place] Scale) injection 150 mg  150 mg SubCUTAneous Q28 Days [place] Lisa, APRN - CNP   150 mg at 11/08/21 4034    omalizumab [place] Scale) injection 150 mg  150 mg SubCUTAneous Q28 Days [place] Lisa, APRN - CNP   150 mg at 11/08/21 8238       ALLERGIES:   Allergies   Allergen Reactions    Bactrim [Sulfamethoxazole-Trimethoprim]     Flexeril [Cyclobenzaprine]     Morphine Other (See Comments)     \"I hallucinate\"    Peanut-Containing Drug Products     Tessalon [Benzonatate] Hives    Amoxicillin-Pot Clavulanate Rash    Cefdinir Rash    Ibuprofen [Ibuprofen] Rash    Lisinopril Rash    Macrobid [Nitrofurantoin Monohydrate Macrocrystals] Rash    Macrodantin [Nitrofurantoin] Rash    Ranitidine Rash       SOCIAL HISTORY:  Social History     Tobacco Use    Smoking status: Current Every Day Smoker     Packs/day: 0.20     Years: 25.00     Pack years: 5.00     Types: Cigarettes     Start date: 6/1/1975    Smokeless tobacco: Never Used    Tobacco comment: smokes 2 cigs a day   Substance Use Topics    Alcohol use: Not Currently     Alcohol/week: 2.0 standard drinks     Types: 2 Cans of beer per week     Comment: Patient drinks 1-2 beers a MONTH ; stomach tolerates very little alcohol       Pertinent ROS:  Review of Systems  Skin: Denies any new changing, growing or bleeding lesions or rashes except as described in the HPI   Constitutional: Denies fevers, chills, and malaise. PHYSICAL EXAM:   /68 (Site: Left Upper Arm, Position: Sitting, Cuff Size: Large Adult)   Pulse 84   Ht 5' 4\" (1.626 m)   Wt 209 lb (94.8 kg)   BMI 35.87 kg/m²     The patient is generally well appearing, well nourished, alert and conversational. Affect is normal.    Cutaneous Exam:  Physical Exam  Focused exam of scalp was performed    Facial covering was not removed during examination. Diagnoses/exam findings/medical history pertinent to this visit are listed below:    Assessment:   Diagnosis Orders   1. Central centrifugal scarring alopecia  clobetasol (TEMOVATE) 0.05 % ointment    INJECTION INTO SKIN LESIONS, UP TO 7   2. Neuralgia involving scalp          Plan:  Central centrifugal scarring alopecia with possible additional scalp neuralgia   - chronic illness, responding to treatment but not yet at goal   - can continue compounded topical (ketamine/amitriptyline/menthol)  - clobetasol ointment daily  - repeat ILK in 2 months   Intralesional Injection: After discussion of risks including atrophy and dyspigmentation, patient gives verbal consent to proceed.  After cleansing with alcohol the alopecic patches on the scalp were injected with 1 ml of Kenalog 10 mg/mL     RTC 2 months     Future Appointments   Date Time Provider Quinten Garcia   1/3/2022 8:15 AM SCHEDULE, [facility] ALLERGY LAB N [facility] ALLER P - [address]   1/10/2022  8:15 AM SCHEDULE, [facility] ALLERGY LAB N [facility] ALLER P - [address]   1/17/2022  8:15 AM SCHEDULE, [facility] ALLERGY LAB N [facility] ALLER P - [address]   1/18/2022  8:40 AM STR CT IMAGING RM1  [facility] STRZ OUT EXP STR Radiolog   1/18/2022  9:00 AM STR PULMONARY FUNCTION ROOM 1 STRZ PFT [address] HOD   1/24/2022  8:20 AM SCHEDULE, [facility] ALLERGY LAB N SRPX ALLER P - [address]   1/25/2022  8:00 AM Denisha Mcbride MD [facility] - [address] [place] [address]   1/31/2022  8:15 AM SCHEDULE, [address] [place] ALLERGY LAB N SR[facility] ALLER P - [place]   3/28/2022  9:15 AM MD Shiva Araiza [address] [place] [address]   6/7/2022  8:15 AM oYlis Agee MD N ENT [facility] - [address] [place] [address]         There are no Patient Instructions on file for this visit. This note was created with the assistance of a speech-recognition program.  Although the intention is to generate a document that actually reflects the content of the visit, no guarantees can be provided that every mistake has been identified and corrected by editing. I, Dr. Rian Yip, personally performed the services described in this documentation, as scribed by [facility] in my presence, and it is both accurate and complete.      Electronically signed by Mart Hayes MD on 12/27/21 at 9:04 AM EST

## 2022-01-10 ENCOUNTER — NURSE ONLY (OUTPATIENT)
Dept: ALLERGY | Age: 62
End: 2022-01-10
Payer: MEDICAID

## 2022-01-10 DIAGNOSIS — J45.40 MODERATE PERSISTENT ASTHMA WITHOUT COMPLICATION: Primary | ICD-10-CM

## 2022-01-10 DIAGNOSIS — Z51.6 ENCOUNTER FOR DESENSITIZATION TO ALLERGENS: ICD-10-CM

## 2022-01-10 DIAGNOSIS — J30.1 ALLERGY TO TREES: ICD-10-CM

## 2022-01-10 DIAGNOSIS — Z91.048 ALLERGY TO MOLD: ICD-10-CM

## 2022-01-10 PROCEDURE — 96372 THER/PROPH/DIAG INJ SC/IM: CPT | Performed by: NURSE PRACTITIONER

## 2022-01-10 PROCEDURE — 95117 IMMUNOTHERAPY INJECTIONS: CPT | Performed by: NURSE PRACTITIONER

## 2022-01-10 NOTE — PROGRESS NOTES
After consent obtained/verified, allergy injection given in back of R/L arm(s). VIAL COLOR OF ALL VIALS TODAY IS RED    ALLERGY INJECTION FROM VIAL A GIVEN right  UPPER ARM IN THE AMOUNT OF 0.05 ML    ALLERGY INJECTION FROM VIAL B GIVEN left upper ARM IN THE AMOUNT OF 0.05 ML    Documentation of vial injection specific to arm(s) noted on Allergy Immunotherapy Administration Form. Patient waited 30 minutes for observation. No      Patient tolerated well without adverse reaction WHILE IN OFFICE    SHOT REACTION TREATMENT INSTRUCTIONS    During the 30 minute wait after an allergy injection the following symptoms should be reported:    Itching other than at the injection site  Hives or swelling other than at the injection site  Redness other than at the injection site  Difficulty breathing  Chest tightness  Difficulty swallowing  Throat tightness    If these symptoms occur, NOTIFY PROVIDER and the following treatment should be administered:    1. Epinephrine/Auvi Q 1:1000 IM - 0.3 ml if > 66 lbs or more, 0.15 ml if 33 - 63 lbs, or 0.1 ml if <33 lbs     2. Diphenhydramine - give all intramuscular:     2 to <6 years (off-label use): 6.25 mg,    6 to <12 years: 12.5 to 25 mg;    ?12 years: 25-50 mg.    3.  Famotidine:  Adults 40 mg oral    Adolescents age 12 years and >88 lbs: 40 mg    Children and Adolescents ? 12years of age: Initial: 0.25 mg/kg/dose  every 12 hours (maximum daily dose: 40 mg/day)    Epi/Auvi Q dose may me repeated in 5-15 minutes if adequate resolution of symptoms does not occur    Patient should be observed for at least one hour after final Epi/Auvi Q dose and must be seen by provider. Patients cannot drive themselves if they have received diphenhydramine.

## 2022-01-10 NOTE — PROGRESS NOTES
Patient here today to receive Xolair injection. Patient does not report any previous reaction from Xolair injections. Denies any nausea vomiting fever urticaria or angioedema. Denies any recent exacerbation of asthma or asthma-like symptoms. Patient was explained benefits and potential risks including anaphylaxis to patient. Patient has been explained the risk and benefits including delayed anaphylaxis. Patient has EpiPen and understands how to appropriately use. Xolair 150 mg given subcutaneously X 2 doses in    left both arms   thigh(s)     for a total combined dose of 300 mg using a 25-gauge needle and 3 ML syringe. RIGHT  / LEFT UPPER ARM,  RIGHT  / LEFT FRONT OR MIDDLE OF THIGH, OR STOMACH        Xolair  [address] [phone]   Lot [id number]   Expires 07/30/2022    Patient observed for 30 minutes No  If NO Patient left against medical advice    Medication was supplied by patient:  YES/NO: Yes      Medication was supplied as a sample:  YES/NO: No                     Prior to patient receiving Xolair area was cleansed with alcohol swabs. Following the administration no evidence of bleeding or bruising. No adverse reactions reported. Patient tolerated well without adverse reactions or side effects. Patient to continue to receive Xolair injections monthly. Will report any problems to this office. No evidence of allergic reaction including anaphylaxis.

## 2022-01-17 ENCOUNTER — TELEPHONE (OUTPATIENT)
Dept: PULMONOLOGY | Age: 62
End: 2022-01-17

## 2022-01-17 ENCOUNTER — NURSE ONLY (OUTPATIENT)
Dept: ALLERGY | Age: 62
End: 2022-01-17
Payer: MEDICAID

## 2022-01-17 VITALS — TEMPERATURE: 98 F | HEART RATE: 97 BPM | DIASTOLIC BLOOD PRESSURE: 88 MMHG | SYSTOLIC BLOOD PRESSURE: 128 MMHG

## 2022-01-17 DIAGNOSIS — Z51.6 ENCOUNTER FOR DESENSITIZATION TO ALLERGENS: Primary | ICD-10-CM

## 2022-01-17 DIAGNOSIS — J30.1 ALLERGY TO TREES: ICD-10-CM

## 2022-01-17 DIAGNOSIS — J82.83 EOSINOPHILIC ASTHMA: ICD-10-CM

## 2022-01-17 PROCEDURE — 95117 IMMUNOTHERAPY INJECTIONS: CPT | Performed by: NURSE PRACTITIONER

## 2022-01-17 NOTE — TELEPHONE ENCOUNTER
Patient was returning call about her appt needing r/s to the after noon on 01/25/2022. Patient prefers early morning appts, preferably 8 am. She is r/s to 0820 on 03/15. She has testing on 01/18/2022. If her appt needs moved up, please contact the patient.

## 2022-01-17 NOTE — PROGRESS NOTES
After consent obtained/verified, allergy injection given in back of R/L arm(s). VIAL COLOR OF ALL VIALS TODAY IS red    ALLERGY INJECTION FROM VIAL A GIVEN left  UPPER ARM IN THE AMOUNT OF 0.10 ML    ALLERGY INJECTION FROM VIAL B GIVEN right upper ARM IN THE AMOUNT OF 0.10 ML      Documentation of vial injection specific to arm(s) noted on Allergy Immunotherapy Administration Form. Patient waited 30 minutes for observation. No      Patient tolerated well without adverse reaction WHILE IN OFFICE    SHOT REACTION TREATMENT INSTRUCTIONS    During the 30 minute wait after an allergy injection the following symptoms should be reported:    Itching other than at the injection site  Hives or swelling other than at the injection site  Redness other than at the injection site  Difficulty breathing  Chest tightness  Difficulty swallowing  Throat tightness    If these symptoms occur, NOTIFY PROVIDER and the following treatment should be administered:    1. Epinephrine/Auvi Q 1:1000 IM - 0.3 ml if > 66 lbs or more, 0.15 ml if 33 - 63 lbs, or 0.1 ml if <33 lbs     2. Diphenhydramine - give all intramuscular:     2 to <6 years (off-label use): 6.25 mg,    6 to <12 years: 12.5 to 25 mg;    ?12 years: 25-50 mg.    3.  Famotidine:  Adults 40 mg oral    Adolescents age 12 years and >88 lbs: 40 mg    Children and Adolescents ? 12years of age: Initial: 0.25 mg/kg/dose  every 12 hours (maximum daily dose: 40 mg/day)    Epi/Auvi Q dose may me repeated in 5-15 minutes if adequate resolution of symptoms does not occur    Patient should be observed for at least one hour after final Epi/Auvi Q dose and must be seen by provider. Patients cannot drive themselves if they have received diphenhydramine.

## 2022-01-18 ENCOUNTER — HOSPITAL ENCOUNTER (OUTPATIENT)
Dept: PULMONOLOGY | Age: 62
Discharge: HOME | End: 2022-01-18
Payer: MEDICAID

## 2022-01-18 ENCOUNTER — HOSPITAL ENCOUNTER (OUTPATIENT)
Dept: CT IMAGING | Age: 62
Discharge: HOME | End: 2022-01-18
Payer: MEDICAID

## 2022-01-18 DIAGNOSIS — Z72.0 TOBACCO ABUSE: ICD-10-CM

## 2022-01-18 DIAGNOSIS — Z87.891 PERSONAL HISTORY OF TOBACCO USE, PRESENTING HAZARDS TO HEALTH: ICD-10-CM

## 2022-01-18 DIAGNOSIS — J45.40 MODERATE PERSISTENT ASTHMA WITHOUT COMPLICATION: ICD-10-CM

## 2022-01-18 DIAGNOSIS — R91.1 INCIDENTAL LUNG NODULE, > 3MM AND < 8MM: ICD-10-CM

## 2022-01-18 PROCEDURE — 94726 PLETHYSMOGRAPHY LUNG VOLUMES: CPT

## 2022-01-18 PROCEDURE — 94729 DIFFUSING CAPACITY: CPT

## 2022-01-18 PROCEDURE — 94010 BREATHING CAPACITY TEST: CPT

## 2022-01-18 PROCEDURE — 71271 CT THORAX LUNG CANCER SCR C-: CPT

## 2022-01-18 NOTE — PROGRESS NOTES
Prescreening performed prior to testing. The following symptoms may indicate COVID-19 infection:        One of the following criteria:   Temperature taken, patient temperature was 97.9 F. Fever greater 100.0 F -no  New onset cough -  no  New onset shortness of breath -no  New onset difficulty breathing -no        And/or   Two or more of the following criteria:  New onset muscle aches -no  New onset headache -no  New onset sore throat -no  New onset loss of smell/taste -no  New onset diarrhea -no    Patient's screening was negative. PFT will be performed.

## 2022-01-31 ENCOUNTER — NURSE ONLY (OUTPATIENT)
Dept: ALLERGY | Age: 62
End: 2022-01-31
Payer: MEDICAID

## 2022-01-31 VITALS
SYSTOLIC BLOOD PRESSURE: 118 MMHG | DIASTOLIC BLOOD PRESSURE: 78 MMHG | HEART RATE: 88 BPM | TEMPERATURE: 97 F | OXYGEN SATURATION: 99 %

## 2022-01-31 DIAGNOSIS — J30.1 ALLERGY TO TREES: ICD-10-CM

## 2022-01-31 DIAGNOSIS — Z91.048 ALLERGY TO MOLD: ICD-10-CM

## 2022-01-31 DIAGNOSIS — Z51.6 ENCOUNTER FOR DESENSITIZATION TO ALLERGENS: Primary | ICD-10-CM

## 2022-01-31 PROCEDURE — 95117 IMMUNOTHERAPY INJECTIONS: CPT | Performed by: NURSE PRACTITIONER

## 2022-01-31 NOTE — PROGRESS NOTES
After consent obtained/verified, allergy injection given in back of R/L arm(s). VIAL COLOR OF ALL VIALS TODAY IS red    ALLERGY INJECTION FROM VIAL A GIVEN right  UPPER ARM IN THE AMOUNT OF 0.10 ML    ALLERGY INJECTION FROM VIAL B GIVEN left upper ARM IN THE AMOUNT OF 0.10 ML        Documentation of vial injection specific to arm(s) noted on Allergy Immunotherapy Administration Form. Patient waited 30 minutes for observation. No      Patient tolerated well without adverse reaction WHILE IN OFFICE    SHOT REACTION TREATMENT INSTRUCTIONS    During the 30 minute wait after an allergy injection the following symptoms should be reported:    Itching other than at the injection site  Hives or swelling other than at the injection site  Redness other than at the injection site  Difficulty breathing  Chest tightness  Difficulty swallowing  Throat tightness    If these symptoms occur, NOTIFY PROVIDER and the following treatment should be administered:    1. Epinephrine/Auvi Q 1:1000 IM - 0.3 ml if > 66 lbs or more, 0.15 ml if 33 - 63 lbs, or 0.1 ml if <33 lbs     2. Diphenhydramine - give all intramuscular:     2 to <6 years (off-label use): 6.25 mg,    6 to <12 years: 12.5 to 25 mg;    ?12 years: 25-50 mg.    3.  Famotidine:  Adults 40 mg oral    Adolescents age 12 years and >88 lbs: 40 mg    Children and Adolescents ? 12years of age: Initial: 0.25 mg/kg/dose  every 12 hours (maximum daily dose: 40 mg/day)    Epi/Auvi Q dose may me repeated in 5-15 minutes if adequate resolution of symptoms does not occur    Patient should be observed for at least one hour after final Epi/Auvi Q dose and must be seen by provider. Patients cannot drive themselves if they have received diphenhydramine.

## 2022-02-07 ENCOUNTER — NURSE ONLY (OUTPATIENT)
Dept: ALLERGY | Age: 62
End: 2022-02-07
Payer: MEDICAID

## 2022-02-07 ENCOUNTER — TELEPHONE (OUTPATIENT)
Dept: ALLERGY | Age: 62
End: 2022-02-07

## 2022-02-07 VITALS
DIASTOLIC BLOOD PRESSURE: 70 MMHG | HEART RATE: 99 BPM | RESPIRATION RATE: 18 BRPM | TEMPERATURE: 97.2 F | OXYGEN SATURATION: 97 % | SYSTOLIC BLOOD PRESSURE: 122 MMHG

## 2022-02-07 DIAGNOSIS — Z51.6 ENCOUNTER FOR DESENSITIZATION TO ALLERGENS: ICD-10-CM

## 2022-02-07 DIAGNOSIS — J30.1 ALLERGY TO TREES: Primary | ICD-10-CM

## 2022-02-07 DIAGNOSIS — Z91.048 ALLERGY TO MOLD: ICD-10-CM

## 2022-02-07 PROCEDURE — 95117 IMMUNOTHERAPY INJECTIONS: CPT | Performed by: NURSE PRACTITIONER

## 2022-02-07 NOTE — PROGRESS NOTES
After consent obtained/verified, allergy injection given in back of R/L arm(s). VIAL COLOR OF ALL VIALS TODAY IS Red Maintenance    ALLERGY INJECTION FROM VIAL A GIVEN left  UPPER ARM IN THE AMOUNT OF 0.15 ML    ALLERGY INJECTION FROM VIAL B GIVEN right upper ARM IN THE AMOUNT OF 0.15 ML      Documentation of vial injection specific to arm(s) noted on Allergy Immunotherapy Administration Form. Patient waited 30 minutes for observation. No      Patient tolerated well without adverse reaction WHILE IN OFFICE    SHOT REACTION TREATMENT INSTRUCTIONS    During the 30 minute wait after an allergy injection the following symptoms should be reported:    Itching other than at the injection site  Hives or swelling other than at the injection site  Redness other than at the injection site  Difficulty breathing  Chest tightness  Difficulty swallowing  Throat tightness    If these symptoms occur, NOTIFY PROVIDER and the following treatment should be administered:    1. Epinephrine/Auvi Q 1:1000 IM - 0.3 ml if > 66 lbs or more, 0.15 ml if 33 - 63 lbs, or 0.1 ml if <33 lbs     2. Diphenhydramine - give all intramuscular:     2 to <6 years (off-label use): 6.25 mg,    6 to <12 years: 12.5 to 25 mg;    ?12 years: 25-50 mg.    3.  Famotidine:  Adults 40 mg oral    Adolescents age 12 years and >88 lbs: 40 mg    Children and Adolescents ? 12years of age: Initial: 0.25 mg/kg/dose  every 12 hours (maximum daily dose: 40 mg/day)    Epi/Auvi Q dose may me repeated in 5-15 minutes if adequate resolution of symptoms does not occur    Patient should be observed for at least one hour after final Epi/Auvi Q dose and must be seen by provider. Patients cannot drive themselves if they have received diphenhydramine.

## 2022-02-14 ENCOUNTER — NURSE ONLY (OUTPATIENT)
Dept: ALLERGY | Age: 62
End: 2022-02-14
Payer: MEDICAID

## 2022-02-14 VITALS
RESPIRATION RATE: 16 BRPM | TEMPERATURE: 96.8 F | HEART RATE: 97 BPM | OXYGEN SATURATION: 98 % | SYSTOLIC BLOOD PRESSURE: 120 MMHG | DIASTOLIC BLOOD PRESSURE: 76 MMHG

## 2022-02-14 DIAGNOSIS — J30.1 ALLERGY TO TREES: Primary | ICD-10-CM

## 2022-02-14 DIAGNOSIS — Z91.048 ALLERGY TO MOLD: ICD-10-CM

## 2022-02-14 DIAGNOSIS — J82.83 EOSINOPHILIC ASTHMA: ICD-10-CM

## 2022-02-14 DIAGNOSIS — L66.9 CENTRAL CENTRIFUGAL SCARRING ALOPECIA: ICD-10-CM

## 2022-02-14 DIAGNOSIS — L85.3 XEROSIS OF SKIN: ICD-10-CM

## 2022-02-14 DIAGNOSIS — Z51.6 ENCOUNTER FOR DESENSITIZATION TO ALLERGENS: ICD-10-CM

## 2022-02-14 PROCEDURE — 95117 IMMUNOTHERAPY INJECTIONS: CPT | Performed by: NURSE PRACTITIONER

## 2022-02-14 PROCEDURE — 96372 THER/PROPH/DIAG INJ SC/IM: CPT | Performed by: NURSE PRACTITIONER

## 2022-02-14 RX ORDER — ESOMEPRAZOLE MAGNESIUM 40 MG/1
CAPSULE, DELAYED RELEASE ORAL
Qty: 180 CAPSULE | Refills: 0 | Status: SHIPPED | OUTPATIENT
Start: 2022-02-14 | End: 2022-07-11

## 2022-02-14 RX ORDER — CLOBETASOL PROPIONATE 0.5 MG/G
OINTMENT TOPICAL
Qty: 60 G | Refills: 0 | Status: SHIPPED | OUTPATIENT
Start: 2022-02-14 | End: 2022-03-28 | Stop reason: SDUPTHER

## 2022-02-14 RX ORDER — HYDROCORTISONE 25 MG/ML
LOTION TOPICAL
Qty: 118 ML | Refills: 0 | Status: SHIPPED | OUTPATIENT
Start: 2022-02-14 | End: 2022-04-11

## 2022-02-14 RX ORDER — ALBUTEROL SULFATE 90 UG/1
AEROSOL, METERED RESPIRATORY (INHALATION)
Qty: 18 G | Refills: 2 | Status: SHIPPED | OUTPATIENT
Start: 2022-02-14 | End: 2022-03-02

## 2022-02-14 RX ORDER — AZELASTINE HYDROCHLORIDE 0.5 MG/ML
SOLUTION/ DROPS OPHTHALMIC
Qty: 6 ML | Refills: 5 | Status: SHIPPED | OUTPATIENT
Start: 2022-02-14 | End: 2022-03-02

## 2022-02-14 NOTE — PROGRESS NOTES
[facility]  Pulmonary and sleep medicine clinic follow up note. Patient: Kate Cervantes  : 1960     Lung Nodule Screening     [x]? Qualifies    []? Does not qualify   []? Declined    []? Completed      Chief complaint/Nikolski: Kate Cervantes is a 59 y. o.oldfemale came for follow up regarding her 4 mm pulmonary nodule in the left lung noted on CT chest done on Dec 24, 2019 after having a low-dose CT scan of chest without contrast. She did not undergo full PFTS as requested at the last visit due to requirement of COVID19 testing. She underwent sleep study as recommended at the last visit. She was initially referredl from Ms. Narcisa Hseih CNP . She is following with Ms. Narcisa Hsieh CNP for her moderate persistent Bronchial asthma. She is currently not using any oxygen supplementation at rest, exercise or during sleep/at night time. On today's questioning:  She admits to minimal cough with no expectoration. She denies hemoptysis. She denies fever or chills. She denies recent hospitalizations or emergency room visits. She is using her prescribed inhalers with good compliance. She is using rescue inhaler/nebs rarely. She denies recent loss of weight or appetite changes. She denies recent decline in functional status. She was never diagnosed with pulmonary diseases I.e,Pulmonary fibrosis, Sarcoidosis, pulmonary embolism,pulmonary hypertension or pleural effusion/s in the past. How ever his ophthalmologist MD éCsar ( [phone]) in [facility] had a suspicion for Sarcoidosis involving his right eye. How ever she never had any biopsy to confirm Sarcoidosis. She follows with [facility] in [address] [place] [address]. She denies any hemoptysis. She was never diagnosed with pulmonary tuberculosis in the past. She denies any recent exposure to any patients with tuberculosis.  She was exposed to her step father >20year who suffered with pulmonary tuberculosis in the past. Her step father . She denies any recent travel to endemic places of tuberculosis. Social History:  Occupation:  She is current working: No  Type of profession: unemployed. She is disabled since 54. She used to work at a [employer] for [employer]. History of tobacco smoking:Yes  Amount of tobacco smokin.0 PPD. Years of tobacco smokin.75                                    Quit smoking: No           Current smoker: Yes  Amount of current tobacco smokin.5 PPD  . History of recreational or IV drug use in the past:NO     History of exposure to coal mines/coal dust: NO  History of exposure to foundry dust/welding: NO  History of exposure to quarry/silica/sandblasting: NO  History of exposure to asbestos/working with breaks/ships: NO  History of exposure to farm dust: NO  History of recent travel to long distances: NO  History of exposure to birds, pigeons, or chickens in the past:NO  Pet animals at home:No    History of pulmonary embolism in the past: No            History of DVT in the past:No                          Review of Systems:   General/Constitutional: Please see HPI section regarding weight, appetite, fever and chills. HENT: Negative. Eyes: Negative. Upper respiratory tract: No nasal stuffiness or post nasal drip. Lower respiratory tract/ lungs: Please see HPI section. Cardiovascular: No palpitations or chest pain. Gastrointestinal: No nausea or vomiting. Neurological: No focal neurologiacal weakness. Extremities: No edema. Musculoskeletal: No complaints. Genitourinary: No complaints. Hematological: Negative. Psychiatric/Behavioral: Negative. Skin: No itching.     Current Medications:      Past Medical History:   Diagnosis Date    Allergic rhinitis     Anxiety     Arnold-Chiari malformation (HCC)     Asthma     Chronic bronchitis (HCC)     Fibromyalgia     GERD (gastroesophageal reflux disease)     Headache(784.0)     Hyperlipidemia     Neuropathy     Osteoarthritis     Sinusitis     Type II or unspecified type diabetes mellitus without mention of complication, not stated as uncontrolled        Past Surgical History:   Procedure Laterality Date    APPENDECTOMY       SECTION      x2    COLONOSCOPY  [id number]    CSF SHUNT  2007    Cervical syrinx to subarachnoid shunt; thoracic syrinx to subarachnoid shunt (2 separate dural openings)    CYST REMOVAL      extradural cysts at thoracic 2-3    ECTOPIC PREGNANCY SURGERY      HEMICOLECTOMY      HERNIA REPAIR      inguinal     HYSTERECTOMY      LARYNGOSCOPY N/A 2021    MICROLARYNGOSCOPY AND BIOPSY   performed by Kristal Gardner MD at [address] [place] [address]  2007    C4-5-6-7; T1-2-3 laminectomies    TOOTH EXTRACTION  2017       Allergies   Allergen Reactions    Bactrim [Sulfamethoxazole-Trimethoprim]     Flexeril [Cyclobenzaprine]     Morphine Other (See Comments)     \"I hallucinate\"    Peanut-Containing Drug Products     Tessalon [Benzonatate] Hives    Amoxicillin-Pot Clavulanate Rash    Cefdinir Rash    Ibuprofen [Ibuprofen] Rash    Lisinopril Rash    Macrobid [Nitrofurantoin Monohydrate Macrocrystals] Rash    Macrodantin [Nitrofurantoin] Rash    Ranitidine Rash       Current Outpatient Medications   Medication Sig Dispense Refill    ciprofloxacin (CIPRO) 250 MG tablet Take 250 mg by mouth 2 times daily      clobetasol (TEMOVATE) 0.05 % ointment Apply to scalp daily 60 g 5    azelastine (OPTIVAR) 0.05 % ophthalmic solution INSTILL 1 DROP INTO EACH EYE TWICE DAILY 6 mL 5    hyoscyamine (ANASPAZ;LEVSIN) 125 MCG tablet Take 125 mcg by mouth every 4 hours as needed for Cramping      SYNTHROID 88 MCG tablet TAKE 1 TABLET BY MOUTH ONCE DAILY ON AN EMPTY STOMACH WITH WATER. DO NOT EAT OR TAKE ANY OTHER MEDICATIONS FOR 30 MINUTES.  90 tablet 3    metFORMIN (GLUCOPHAGE) 500 MG tablet TAKE 1 TABLET BY MOUTH TWICE DAILY WITH MEALS 180 tablet 3    atorvastatin (LIPITOR) 10 MG tablet Take 1 tablet by mouth once daily 90 tablet 3    Cholecalciferol (VITAMIN D3) 50 MCG (2000 UT) CAPS Take 1 capsule by mouth once daily 90 capsule 3    amLODIPine (NORVASC) 5 MG tablet Take 1 tablet by mouth once daily 90 tablet 3    hydrocortisone (HYTONE) 2.5 % lotion APPLY TOPICALLY TO AFFECTED AREA DAILY (Patient taking differently: APPLY TOPICALLY TO AFFECTED AREA (Feet) DAILY) 118 mL 0    albuterol sulfate  (90 Base) MCG/ACT inhaler INHALE 2 PUFFS INTO LUNGS EVERY 4 HOURS AS NEEDED FOR WHEEZING 18 g 1    triamcinolone acetonide (KENALOG) 10 MG/ML injection Inject 10 mg into the articular space once      cetirizine (ZYRTEC) 10 MG tablet Take 1 tablet by mouth daily 90 tablet 3    estradiol (ESTRACE) 0.1 MG/GM vaginal cream INSERT 1 GRAM VAGINALLY 3 TIMES PER WEEK      omalizumab (XOLAIR) 150 MG/ML SOSY injection Inject 300 mg into the skin every 28 days Administer 300 mg every 28 days PREFILLED SYRINGE. MEDICATION MUST BE MAILED TO PROVIDERS OFFICE 2 each 5    EPIPEN 2-VENESSA 0.3 MG/0.3ML SOAJ injection INJECT 1 PEN IM AS A SINGLE DOSE PRN 1 each 1    nicotine polacrilex (COMMIT) 2 MG lozenge Take 1 lozenge by mouth as needed for Smoking cessation (max 20 per day) 100 each 3    fluconazole (DIFLUCAN) 150 MG tablet as needed (as need for yeast infection)       nicotine (NICODERM CQ) 14 MG/24HR Place 1 patch onto the skin every 24 hours May wear 24 hours. May remove at bedtime if problems with insomnia. Re-Apply new patch immediately on awakening. Diagnosis: Tobacco dependence 42 patch 0    esomeprazole (NEXIUM) 40 MG delayed release capsule Take 1 capsule by mouth 2 times daily 180 capsule 3    albuterol (PROVENTIL) (2.5 MG/3ML) 0.083% nebulizer solution USE 1 VIAL IN NEBULIZER EVERY 6 HOURS AS NEEDED FOR WHEEZING (FOR ASTHMA) 375 mL 5    gabapentin (NEURONTIN) 100 MG capsule Take 100 mg by mouth 4 times daily.  traZODone (DESYREL) 50 MG tablet Take 50 mg by mouth nightly as needed for Sleep       azelastine (ASTELIN) 0.1 % nasal spray 1 spray by Nasal route 2 times daily Use in each nostril as directed 1 Bottle 11    SYMBICORT 160-4.5 MCG/ACT AERO INHALE 2 PUFFS BY MOUTH TWICE DAILY. RINSE MOUTH WELL AFTER USE. 11 g 5    Biotin 1 MG CAPS Take 1 capsule by mouth daily       clobetasol (TEMOVATE) 0.05 % external solution Apply daily to scaly or itchy areas on scalp 60 mL 11    fluocinonide (LIDEX) 0.05 % cream Apply topically 2 times daily to rash on hands a needed 30 g 11    levomilnacipran (FETZIMA) 40 MG CP24 extended release capsule Take 1 capsule by mouth daily 30 capsule 2    Multiple Vitamins-Minerals (MULTIVITAMIN PO) 1 tablet daily       Probiotic Product (PROBIOTIC PO) 1 tablet daily       Cyanocobalamin (B-12 PO) daily      ondansetron (ZOFRAN) 4 MG tablet TAKE 1 TABLET BY MOUTH EVERY 8 HOURS AS NEEDED FOR NAUSEA OR VOMITING 15 tablet 0    meloxicam (MOBIC) 7.5 MG tablet TK 1 T PO BID  4    LINZESS 290 MCG CAPS capsule 290 mcg every morning (before breakfast)       lidocaine (XYLOCAINE) 5 % ointment Apply topically as needed for Pain Apply topically as needed.  lidocaine (LIDODERM) 5 % Place 1 patch onto the skin daily 12 hours on, 12 hours off.  hydrocortisone (ANUSOL-HC) 25 MG suppository Place 25 mg rectally as needed for Hemorrhoids       fluticasone (FLONASE ALLERGY RELIEF) 50 MCG/ACT nasal spray 1 spray by Nasal route daily      HYDROcodone-acetaminophen (NORCO) 5-325 MG per tablet 1 tablet nightly as needed for Pain. Take 1 tablet every 4-6 hours as needed for pain       NONFORMULARY Immunotherapy allergy shot      zinc 50 MG CAPS Take 1 capsule by mouth daily       Ascorbic Acid (VITAMIN C) 500 MG CAPS Take 1 tablet by mouth daily       orphenadrine (NORFLEX) 100 MG tablet Take 100 mg by mouth 2 times daily as needed.         tramadol (ULTRAM) 50 MG tablet Take 50 mg by mouth every 6 hours as needed. Take 1-2 tabs every 6 hrs prn        Current Facility-Administered Medications   Medication Dose Route Frequency Provider Last Rate Last Admin    omalizumab Merlynn Mu-ism) injection 150 mg  150 mg SubCUTAneous Q28 Days [facility], APRN - CNP   150 mg at 01/10/22 1003    omalizumab (XOLAIR) injection 150 mg  150 mg SubCUTAneous Q28 Days [facility], APRN - CNP   150 mg at 01/10/22 1002    omalizumab (XOLAIR) injection 150 mg  150 mg SubCUTAneous Q28 Days [facility], APRN - CNP        omalizumab Merlynn Mu-ism) injection 150 mg  150 mg SubCUTAneous Q28 Days [facility], APRN - CNP        omalizumab Merlynn Mu-ism) injection 150 mg  150 mg SubCUTAneous Q28 Days [facility], APRN - CNP   150 mg at 11/08/21 5937    omalizumab Merlynn Mu-ism) injection 150 mg  150 mg SubCUTAneous Q28 Days [facility], APRN - CNP   150 mg at 11/08/21 0946       Family History   Problem Relation Age of Onset    Cancer Other         colon    Mental Illness Other     High Blood Pressure Other     Diabetes Other     High Blood Pressure Sister            Physical Exam:    VITALS:  There were no vitals taken for this visit. Nursing note and vitals reviewed. Constitutional: Patient appears moderately built and moderately nourished. No distress. Patient is oriented to person, place, and time. HENT:   Head: Normocephalic and atraumatic. Right Ear: External ear normal.   Left Ear: External ear normal.   Mouth/Throat: Oropharynx is clear and moist.  No oral thrush. Eyes: Conjunctivae are normal. Pupils are equal, round, and reactive to light. No scleral icterus. Neck: Neck supple. No JVD present. No tracheal deviation present. Cardiovascular: Normal rate, regular rhythm, normal heart sounds. No murmur heard. Pulmonary/Chest: Effort normal and breath sounds normal. No stridor. No respiratory distress. No wheezes. No rales. Patient exhibits no tenderness. Abdominal: Soft. Patient exhibits no distension.  No tenderness. Musculoskeletal: Normal range of motion. Extremities: Patient exhibits no edema and no tenderness. Lymphadenopathy:  No cervical adenopathy. Neurological: Patient is alert and oriented to person, place, and time. Skin: Skin is warm and dry. Patient is not diaphoretic. Psychiatric: Patient  has a normal mood and affect. Patient behavior is normal.     Neck Circumference -   14  Mallampati - 3    Diagnostic Data:    Radiological Data:    Chest CT low dose:  Dec 24, 2019   NONCONTRAST SCREENING CT CHEST:   1. There is a 4 mm pulmonary nodule in the left lung. 2. There are no pathologically enlarged lymph nodes. 3. LUNGRADS ASSESSMENT VALUE: 2        Pulmonary function tests: 3/3/2020          Sleep study: 2020         SLEEP STUDY REPORT     PATIENT NAME: Gilbert Garcia                   :        1960  MED REC NO:   [id number]                           ROOM:  ACCOUNT NO:   [de-identified]                           ADMIT DATE: 2020  PROVIDER:     Laly Damico. MD Kate     DATE OF STUDY:  2020     REFERRING PROVIDER:  Angelica Wheeler CNP. IMPRESSION:  1. Mild-to-moderate snoring with no clinically significant obstructive  sleep apnea. 2.  The patient had a good amount of REM sleep during study. 3.  Rare periodic limb movements with no significant arousals. 4.  Hypersomnia with [place] Sleepiness Score of 11 of uncertain  etiology. 5.  Moderate persistent bronchial asthma. 6.  Arnold-Chiari malformation. 7.  Fibromyalgia. 8.  Anxiety disorder. 9.  Allergic rhinitis.      Results of lab work: 2020  -Antinuclear antibody (LOIS):  Negative                                 -Rheumatoid factor (RA):  Negative                                                               -Angiotensin converting enzyme level (ACE): Negative         -ESR (Erythrocyte sedimentation rate): Negative                     -Quantiferon gold test:    Negative -Serum fungal serologies:   Negative      Low-dose CT scan of chest without contrast:    8:25 AM.  NONCONTRAST SCREENING CT CHEST:   1. There are no suspicious masses or nodules within the lung fields. Again noted is a stable appearing 4 mm noncalcified nodule left upper lobe, likely a poorly calcified granuloma. 2. There are no pathologically enlarged lymph nodes. Lungs are fibroemphysematous in appearance with scattered fibrotic stranding bilaterally. . 3. LUNGRADS ASSESSMENT VALUE: 2, continue annual CT lung screening. Full PFTs:    Patient refused to have her full PFTs due to requirement of COVID-19 testing prior to the PFT. Low dose CT Chest with out contrast for Lung Nodule Screening:      Assessment:  -Moderate persistent bronchial asthma under control. She follows with Ms. Narcisa Hsieh CNP. She is currently taking Xolair injections along with Symbicort 160/4.52 puffs twice daily, albuterol HFA 2 puffs every 6 hourly as needed and and nebulizers as needed. -4 mm pulmonary nodule in the left lung noted on low dose CT chest dated Dec 24, 2019-stable for 1 year.  -Hypersomnia ( Excessive daytime sleepiness) most likely due to her current medications I. Narco. She takes Narco for her chronic pain. She underwent sleep study on 15 May 2020 and found to be having a no clinically significant obstructive sleep apnea  -She gave a hx of exposure to her step father >20year who suffered with pulmonary tuberculosis in the past. Her step father . -Arnold-Chiari malformation (HCC) S/p surgery on her neck ( back). -Her ophthalmologist MD César ( [phone]) in [address] had a suspicion for Sarcoidosis involving his right eye. How ever she never had any biopsy to confirm Sarcoidosis. She follows with [facility] [place] in [facility].  -Fibromyalgia. -GERD (gastroesophageal reflux disease)  -Anxiety.  -Allergic rhinitis.   -Type II or unspecified type diabetes mellitus without mention of complication, not stated as uncontrolled       Recommendations/Plan:  - Patient educated to quit smoking as soon as possible. Patient verbalizes understanding of adverse consequences of tobacco smoking. She will be referred to [address] smoking cessation clinic to consider for nicotine replacement therapy along with the counseling.  -She was advised to loose weight by controlling diet and doing exercise once cleared by her family physician.   -Derian Sorensen was advised to make early appointment with my clinic if she develops any constitutional symptoms including loss of weight, poor appetite or hemoptysis. She verbalizes under standing.  -She was advised to discuss with MD Diogo- her pain management physician to consider alternative non sedative pain medication/Medicatin regimen to improve her Hypersomnia ( Excessive daytime sleepiness). -She was instructed to not to drive any motor vehicles or operate heavy equipment if she feels sleepy. - Patient educated to update his pneumococcal vaccine with family physician and take influenza vaccine in coming season with out fail.   -Derian Sorensen educated about environmental safety precautions she need to practice to prevent exacerbation ofher Asthma. Gely Archer verbalizes understanding.  -We will schedule patient for low dose CT scan of chest with out IV contrast as recommended by the  U.S. Preventive Services Task Force and the NCCN for lung Cancer Screening 1 year from now  -She was advised to keep scheduled follow up appointment with Ms. Narcisa Hsieh CNP regarding management of her asthma.  - Schedule patient for follow up with my clinic in 1 year from now with low dose CT chest along with full PFTs. Patient advised to make early appointment if needed. - Gely Campbell educated about my impression and plan.  She verbalizes understanding.    -I personally reviewed and updated the Past medical hx, Past surgical hx,Social hx, Family hx, Medications, Allergies in the discrete data section of the patient chart. I also reviewed pertaining labs and Pulmonary medicine,Sleep medicine related, Pathological, Microbiological and Radiological investigations.

## 2022-02-16 RX ORDER — AZELASTINE HYDROCHLORIDE 137 UG/1
SPRAY, METERED NASAL
Qty: 30 ML | Refills: 11 | Status: SHIPPED | OUTPATIENT
Start: 2022-02-16 | End: 2022-03-02 | Stop reason: SDUPTHER

## 2022-02-18 NOTE — TELEPHONE ENCOUNTER
[facility] to check on PA status due to cover my meds still has waiting determination. Viviana Waters from pharmacy stated medication was switched to brand name for insurance to cover. Pt already picked up prescription.

## 2022-02-21 ENCOUNTER — OFFICE VISIT (OUTPATIENT)
Dept: PULMONOLOGY | Age: 62
End: 2022-02-21
Payer: MEDICAID

## 2022-02-21 VITALS
SYSTOLIC BLOOD PRESSURE: 122 MMHG | BODY MASS INDEX: 36.16 KG/M2 | DIASTOLIC BLOOD PRESSURE: 76 MMHG | HEIGHT: 64 IN | HEART RATE: 88 BPM | TEMPERATURE: 98.4 F | OXYGEN SATURATION: 97 % | WEIGHT: 211.8 LBS

## 2022-02-21 DIAGNOSIS — Z72.0 TOBACCO ABUSE: ICD-10-CM

## 2022-02-21 DIAGNOSIS — R91.1 LEFT UPPER LOBE PULMONARY NODULE: ICD-10-CM

## 2022-02-21 DIAGNOSIS — J45.20 MILD INTERMITTENT ASTHMA WITHOUT COMPLICATION: Primary | ICD-10-CM

## 2022-02-21 PROCEDURE — 99213 OFFICE O/P EST LOW 20 MIN: CPT | Performed by: NURSE PRACTITIONER

## 2022-02-21 RX ORDER — BUDESONIDE AND FORMOTEROL FUMARATE DIHYDRATE 160; 4.5 UG/1; UG/1
2 AEROSOL RESPIRATORY (INHALATION) 2 TIMES DAILY
Qty: 1 EACH | Refills: 11 | Status: SHIPPED | OUTPATIENT
Start: 2022-02-21 | End: 2022-03-02

## 2022-02-21 ASSESSMENT — ENCOUNTER SYMPTOMS
WHEEZING: 1
GASTROINTESTINAL NEGATIVE: 1
EYES NEGATIVE: 1
SHORTNESS OF BREATH: 1
COUGH: 0

## 2022-02-21 NOTE — PATIENT INSTRUCTIONS
-LDCT in one year- no concerning findings will continue annual screenings  -PFT reviewed no obstruction seen slightly decreased DLCO   -Smoking cessation encouraged  -Recommend patient stop smoking,Smoking cessation discussed for 3-5 min. Educated patient on health hazards and negative effects of smoking. Counseled on ways to quit,  Offered cessation assistance with prescription mediations,  over the counter remedies, community resources/ phone APPs. Patient refuses additional assistance at this time. Nicotine gum and lozenges  ¨ Nicotine inhaler  · Some people find hypnosis, acupuncture, and massage helpful for ending the smoking habit.   -Advised to maintain pneumonia vaccine with PCP and to take flu vaccine this coming season.  -Advised patient to call office with any changes, questions, or concerns regarding respiratory status  -Continue Xolair injection every 28 days  -Continue Symbicort 2 puffs BID- rinse mouth afterwards- RX done today   -Continue Albuterol PRN for SOB/wheezing- carry this inhaler with you its for EMERGENCY USE!!!!!!

## 2022-02-21 NOTE — PROGRESS NOTES
[facility]    Patient: Lupe Paige, 59 y.o.   : 1960  2022    Pt of Dr. Marlys Craig   Patient presents with    Follow-up     1 yr f/u with pft, ct        HPI  Gely is here for follow up for Moderate persistent Asthma. Currently on Xolair every 28 days  Current inhaler use of Symbicort BID and Albuterol PRN- patient states she doesn't use her KATELYN \"at all\"  No Singulair use  Current every day smoker roughly 10 cigarettes per day   PFT done on 22- Normal spirometry no obstruction seen  LDCT showed no worrisome findings known 3mm nodule CARYL- no change is size   Allergy shots every Monday per Braden Smith. APRN  PSG in the past no RENE seen     Asthma control (Severity) questionnaire:  Asthma symptoms:  > 2 times per week -> No   Night time awakenings: > 2 times per month -> No  Use of short acting beta agonist for symptoms control (Other than pre exercise use) :> 2times per week  -> No  Interference with normal activity  -> none  Lung function: Fev1 >60% Predicted  -> Yes  Number of exacerbations per year: > 2 -> No      Progress History:   Since last visit any new medical issues? Yes R hip injections for pain- going to do left hip as well   New ER or hospital visits? No  Any new or changes in medicines? No  Using inhalers? Yes   Are they helpful?  Yes   Flu vaccine- UTD  Pneumonia vaccine- UTD  Covid vaccinations done x 2 plus booster   Past Medical hx   PMH:  Past Medical History:   Diagnosis Date    Allergic rhinitis     Anxiety     Arnold-Chiari malformation (HCC)     Asthma     Chronic bronchitis (HCC)     Fibromyalgia     GERD (gastroesophageal reflux disease)     Headache(784.0)     Hyperlipidemia     Neuropathy     Osteoarthritis     Sinusitis     Type II or unspecified type diabetes mellitus without mention of complication, not stated as uncontrolled      SURGICAL HISTORY:  Past Surgical History: Outpatient Medications   Medication Sig Dispense Refill    SYMBICORT 160-4.5 MCG/ACT AERO Inhale 2 puffs into the lungs 2 times daily INHALE 2 PUFFS BY MOUTH TWICE DAILY. RINSE MOUTH WELL AFTER USE. 1 each 11    Azelastine HCl 137 MCG/SPRAY SOLN USE 1 SPRAY(S) IN EACH NOSTRIL TWICE DAILY AS DIRECTED 30 mL 11    hydrocortisone (HYTONE) 2.5 % lotion APPLY TOPICALLY TO AFFECTED AREA ONCE DAILY 118 mL 0    esomeprazole (NEXIUM) 40 MG delayed release capsule Take 1 capsule by mouth twice daily 180 capsule 0    clobetasol (TEMOVATE) 0.05 % ointment APPLY  OINTMENT TOPICALLY TO SCALP ONCE DAILY 60 g 0    albuterol sulfate  (90 Base) MCG/ACT inhaler INHALE 2 PUFFS INTO LUNGS EVERY 4 HOURS AS NEEDED FOR WHEEZING 18 g 2    azelastine (OPTIVAR) 0.05 % ophthalmic solution INSTILL 1 DROP INTO EACH EYE TWICE DAILY 6 mL 5    ciprofloxacin (CIPRO) 250 MG tablet Take 250 mg by mouth 2 times daily      hyoscyamine (ANASPAZ;LEVSIN) 125 MCG tablet Take 125 mcg by mouth every 4 hours as needed for Cramping      SYNTHROID 88 MCG tablet TAKE 1 TABLET BY MOUTH ONCE DAILY ON AN EMPTY STOMACH WITH WATER. DO NOT EAT OR TAKE ANY OTHER MEDICATIONS FOR 30 MINUTES.  90 tablet 3    metFORMIN (GLUCOPHAGE) 500 MG tablet TAKE 1 TABLET BY MOUTH TWICE DAILY WITH MEALS 180 tablet 3    atorvastatin (LIPITOR) 10 MG tablet Take 1 tablet by mouth once daily 90 tablet 3    Cholecalciferol (VITAMIN D3) 50 MCG (2000 UT) CAPS Take 1 capsule by mouth once daily 90 capsule 3    amLODIPine (NORVASC) 5 MG tablet Take 1 tablet by mouth once daily 90 tablet 3    triamcinolone acetonide (KENALOG) 10 MG/ML injection Inject 10 mg into the articular space once      cetirizine (ZYRTEC) 10 MG tablet Take 1 tablet by mouth daily 90 tablet 3    estradiol (ESTRACE) 0.1 MG/GM vaginal cream INSERT 1 GRAM VAGINALLY 3 TIMES PER WEEK      omalizumab (XOLAIR) 150 MG/ML SOSY injection Inject 300 mg into the skin every 28 days Administer 300 mg every 28 days PREFILLED SYRINGE. MEDICATION MUST BE MAILED TO PROVIDERS OFFICE 2 each 5    EPIPEN 2-VENESSA 0.3 MG/0.3ML SOAJ injection INJECT 1 PEN IM AS A SINGLE DOSE PRN 1 each 1    nicotine polacrilex (COMMIT) 2 MG lozenge Take 1 lozenge by mouth as needed for Smoking cessation (max 20 per day) 100 each 3    fluconazole (DIFLUCAN) 150 MG tablet as needed (as need for yeast infection)       nicotine (NICODERM CQ) 14 MG/24HR Place 1 patch onto the skin every 24 hours May wear 24 hours. May remove at bedtime if problems with insomnia. Re-Apply new patch immediately on awakening. Diagnosis: Tobacco dependence 42 patch 0    albuterol (PROVENTIL) (2.5 MG/3ML) 0.083% nebulizer solution USE 1 VIAL IN NEBULIZER EVERY 6 HOURS AS NEEDED FOR WHEEZING (FOR ASTHMA) 375 mL 5    gabapentin (NEURONTIN) 100 MG capsule Take 100 mg by mouth 4 times daily.  traZODone (DESYREL) 50 MG tablet Take 50 mg by mouth nightly as needed for Sleep       Biotin 1 MG CAPS Take 1 capsule by mouth daily       clobetasol (TEMOVATE) 0.05 % external solution Apply daily to scaly or itchy areas on scalp 60 mL 11    fluocinonide (LIDEX) 0.05 % cream Apply topically 2 times daily to rash on hands a needed 30 g 11    levomilnacipran (FETZIMA) 40 MG CP24 extended release capsule Take 1 capsule by mouth daily 30 capsule 2    Multiple Vitamins-Minerals (MULTIVITAMIN PO) 1 tablet daily       Probiotic Product (PROBIOTIC PO) 1 tablet daily       Cyanocobalamin (B-12 PO) daily      ondansetron (ZOFRAN) 4 MG tablet TAKE 1 TABLET BY MOUTH EVERY 8 HOURS AS NEEDED FOR NAUSEA OR VOMITING 15 tablet 0    meloxicam (MOBIC) 7.5 MG tablet TK 1 T PO BID  4    LINZESS 290 MCG CAPS capsule 290 mcg every morning (before breakfast)       lidocaine (XYLOCAINE) 5 % ointment Apply topically as needed for Pain Apply topically as needed.  lidocaine (LIDODERM) 5 % Place 1 patch onto the skin daily 12 hours on, 12 hours off.       hydrocortisone (ANUSOL-HC) 25 MG suppository Place 25 mg rectally as needed for Hemorrhoids       fluticasone (FLONASE ALLERGY RELIEF) 50 MCG/ACT nasal spray 1 spray by Nasal route daily      HYDROcodone-acetaminophen (NORCO) 5-325 MG per tablet 1 tablet nightly as needed for Pain. Take 1 tablet every 4-6 hours as needed for pain       NONFORMULARY Immunotherapy allergy shot      zinc 50 MG CAPS Take 1 capsule by mouth daily       Ascorbic Acid (VITAMIN C) 500 MG CAPS Take 1 tablet by mouth daily       orphenadrine (NORFLEX) 100 MG tablet Take 100 mg by mouth 2 times daily as needed.  tramadol (ULTRAM) 50 MG tablet Take 50 mg by mouth every 6 hours as needed. Take 1-2 tabs every 6 hrs prn        Current Facility-Administered Medications   Medication Dose Route Frequency Provider Last Rate Last Admin    omalizumab [place] F[place]pe) injection 150 mg  150 mg SubCUTAneous Q28 Days Henrry Loop, APRN - CNP   150 mg at 02/14/22 0800    omalizumab (XOLAIR) injection 150 mg  150 mg SubCUTAneous Q28 Days Henrry Loop, APRN - CNP   150 mg at 02/14/22 0800    omalizumab (XOLAIR) injection 150 mg  150 mg SubCUTAneous Q28 Days Henrry Loop, APRN - CNP   150 mg at 01/10/22 1003    omalizumab (XOLAIR) injection 150 mg  150 mg SubCUTAneous Q28 Days Henrry Loop, APRN - CNP   150 mg at 01/10/22 1002    omalizumab (XOLAIR) injection 150 mg  150 mg SubCUTAneous Q28 Days Henrry Loop, APRN - CNP        omalizumab [place] Filipe) injection 150 mg  150 mg SubCUTAneous Q28 Days Henrry Loop, APRN - CNP        omalizumab [place] Filipe) injection 150 mg  150 mg SubCUTAneous Q28 Days Henrry Loop, APRN - CNP   150 mg at 11/08/21 0947    omalizumab [place] Filipe) injection 150 mg  150 mg SubCUTAneous Q28 Days Henrry Loop, APRN - CNP   150 mg at 11/08/21 0946        ROS   Review of Systems   Constitutional: Negative. Negative for chills and fever. HENT: Negative. Negative for congestion. Eyes: Negative.     Respiratory: Positive for shortness of breath (mainly with exertion ) and wheezing (very intermittent ). Negative for cough. Cardiovascular: Negative. Negative for chest pain and leg swelling. Gastrointestinal: Negative. Endocrine: Negative. Genitourinary: Negative. Musculoskeletal: Positive for arthralgias (bilateral hip pain- recieving injections ). Allergic/Immunologic: Positive for environmental allergies. Neurological: Negative. Hematological: Negative. Psychiatric/Behavioral: Negative. Negative for sleep disturbance. Physical exam   /76 (Site: Left Upper Arm, Position: Sitting, Cuff Size: Large Adult)   Pulse 88   Temp 98.4 °F (36.9 °C)   Ht 5' 4\" (1.626 m)   Wt 211 lb 12.8 oz (96.1 kg)   SpO2 97% Comment: on r/a  BMI 36.36 kg/m²    Wt Readings from Last 3 Encounters:   02/21/22 211 lb 12.8 oz (96.1 kg)   12/27/21 209 lb (94.8 kg)   12/07/21 208 lb (94.3 kg)       Physical Exam  Vitals and nursing note reviewed. Constitutional:       Appearance: She is well-developed. She is obese. HENT:      Head: Normocephalic and atraumatic. Eyes:      Conjunctiva/sclera: Conjunctivae normal.      Pupils: Pupils are equal, round, and reactive to light. Neck:      Vascular: No JVD. Cardiovascular:      Rate and Rhythm: Normal rate and regular rhythm. Heart sounds: Normal heart sounds. No murmur heard. No friction rub. No gallop. Pulmonary:      Effort: Pulmonary effort is normal. No respiratory distress. Breath sounds: Examination of the right-lower field reveals decreased breath sounds. Examination of the left-lower field reveals decreased breath sounds. Decreased breath sounds present. No wheezing or rales. Abdominal:      General: Bowel sounds are normal.      Palpations: Abdomen is soft. Musculoskeletal:         General: Normal range of motion. Cervical back: Normal range of motion and neck supple. Skin:     General: Skin is warm and dry. Capillary Refill: Capillary refill takes less than 2 seconds. Neurological:      Mental Status: She is alert and oriented to person, place, and time. Psychiatric:         Behavior: Behavior normal.         Thought Content: Thought content normal.         Judgment: Judgment normal.          results   Lung Nodule Screening     [x] Qualifies    [] Does not qualify   [] Declined    [x] Completed  The USPSTF recommends annual screening for lung cancer with low-dose computed tomography (LDCT) in adults aged 48 to [de-identified] years who have a 30 pack-year smoking history and currently smoke or have quit within the past 20 years. Screening should be discontinued once a person has not smoked for 20 years or develops a health problem that substantially limits life expectancy or the ability or willingness to have curative lung surgery. 1/18/2022   NONCONTRAST SCREENING CT CHEST:     FINDINGS: LUNGS NODULES: 1. There is a 3 mm pulmonary nodule within the medial left upper lobe seen on axial image 140 which appears unchanged from prior examination. 2. There is parenchymal bandlike scarring or atelectasis is demonstrated at the medial right lung base. This is unchanged from prior examination. There is also mild parenchymal bandlike scarring within the right middle lobe and lingula. LYMPHADENOPATHY: 1. There are no pathologically enlarged lymph nodes. OTHER (LUNGS/MEDIASTINUM/MUSCULOSKELETAL/ABDOMEN): 1. Paraseptal emphysematous changes at the apices are noted. 1. There is a 3 mm pulmonary nodule within the medial left upper lobe seen on axial image 140 which appears unchanged from prior examination. 2. There are no pathologically enlarged lymph nodes. 3. LUNGRADS ASSESSMENT VALUE: 2,       Assessment      Diagnosis Orders   1. Mild intermittent asthma without complication  SYMBICORT 442-5.1 MCG/ACT AERO   2. Left upper lobe pulmonary nodule  CT LUNG SCREENING    3mm stable nodule    3.  Tobacco abuse  CT LUNG SCREENING         Plan   -LDCT in one year- no concerning findings will continue annual screenings  -PFT reviewed no obstruction seen slightly decreased DLCO - will do every 2-3 years unless breathing changes   -Smoking cessation encouraged  -Recommend patient stop smoking,Smoking cessation discussed for 3-5 min. Educated patient on health hazards and negative effects of smoking. Counseled on ways to quit,  Offered cessation assistance with prescription mediations,  over the counter remedies, community resources/ phone APPs. Patient refuses additional assistance at this time. Nicotine gum and lozenges  ¨ Nicotine inhaler  · Some people find hypnosis, acupuncture, and massage helpful for ending the smoking habit. -Advised to maintain pneumonia vaccine with PCP and to take flu vaccine this coming season.  -Advised patient to call office with any changes, questions, or concerns regarding respiratory status  -Continue Xolair injection every 28 days  -Continue Symbicort 2 puffs BID- rinse mouth afterwards- RX done today   -Continue Albuterol PRN for SOB/wheezing  -Office note forwarded to PCP and Allergy specialist    Will see Yusuf Lancaster back in: 1 year    Jackelyn Yee, HALI  2/21/2022      Low Dose CT (LDCT) Lung Screening criteria met              Age 55-77              Pack year smoking >30              Still smoking or less than 15 year since quit              No sign or symptoms of lung cancer              > 11 months since last LDCT      Risks and benefits of lung cancer screening with LDCT scans discussed:     Significance of positive screen - False-positive LDCT results often occur. 95% of all positive results do not lead to a diagnosis of cancer. Usually further imaging can resolve most false-positive results; however, some patients may require invasive procedures. Over diagnosis risk - 10% to 12% of screen-detected lung cancer cases are over diagnosedthat is, the cancer would not have been detected in the patient's lifetime without the screening.      Need for follow up screens annually to continue lung cancer screening effectiveness      Risks associated with radiation from annual LDCT- Radiation exposure is about the same as for a mammogram, which is about 1/3 of the annual background radiation exposure from everyday life. Starting screening at age 54 is not likely to increase cancer risk from radiation exposure. Patients with comorbidities resulting in life expectancy of < 10 years, or that would preclude treatment of an abnormality identified on CT, should not be screened due to lack of benefit.      To obtain maximal benefit from this screening, smoking cessation and long-term abstinence from smoking is critical

## 2022-03-02 ENCOUNTER — PATIENT MESSAGE (OUTPATIENT)
Dept: ALLERGY | Age: 62
End: 2022-03-02

## 2022-03-02 DIAGNOSIS — J45.50 SEVERE PERSISTENT ASTHMA WITHOUT COMPLICATION: ICD-10-CM

## 2022-03-02 DIAGNOSIS — J30.89 ALLERGIC RHINOCONJUNCTIVITIS, SEASONAL AND PERENNIAL: ICD-10-CM

## 2022-03-02 DIAGNOSIS — J30.2 ALLERGIC RHINOCONJUNCTIVITIS, SEASONAL AND PERENNIAL: ICD-10-CM

## 2022-03-02 DIAGNOSIS — H10.10 ALLERGIC RHINOCONJUNCTIVITIS, SEASONAL AND PERENNIAL: ICD-10-CM

## 2022-03-02 DIAGNOSIS — J30.89 NON-SEASONAL ALLERGIC RHINITIS, UNSPECIFIED TRIGGER: Primary | ICD-10-CM

## 2022-03-02 RX ORDER — KETOTIFEN FUMARATE 0.35 MG/ML
SOLUTION/ DROPS OPHTHALMIC
Qty: 1 EACH | Refills: 11 | Status: SHIPPED | OUTPATIENT
Start: 2022-03-02

## 2022-03-02 RX ORDER — BUDESONIDE AND FORMOTEROL FUMARATE DIHYDRATE 160; 4.5 UG/1; UG/1
AEROSOL RESPIRATORY (INHALATION)
Qty: 1 EACH | Refills: 11
Start: 2022-03-02

## 2022-03-02 RX ORDER — ALBUTEROL SULFATE 2.5 MG/3ML
SOLUTION RESPIRATORY (INHALATION)
Qty: 375 ML | Refills: 5 | Status: SHIPPED | OUTPATIENT
Start: 2022-03-02

## 2022-03-02 RX ORDER — FEXOFENADINE HCL 180 MG/1
180 TABLET ORAL DAILY
Qty: 30 TABLET | Refills: 11 | Status: SHIPPED | OUTPATIENT
Start: 2022-03-02 | End: 2022-03-21

## 2022-03-02 RX ORDER — FLUTICASONE PROPIONATE 50 MCG
2 SPRAY, SUSPENSION (ML) NASAL DAILY
Qty: 1 EACH | Refills: 11 | Status: SHIPPED | OUTPATIENT
Start: 2022-03-02

## 2022-03-02 RX ORDER — ALBUTEROL SULFATE 90 UG/1
AEROSOL, METERED RESPIRATORY (INHALATION)
Qty: 18 G | Refills: 2
Start: 2022-03-02 | End: 2022-03-10 | Stop reason: SDUPTHER

## 2022-03-02 RX ORDER — AZELASTINE HYDROCHLORIDE 137 UG/1
SPRAY, METERED NASAL
Qty: 30 ML | Refills: 11 | Status: SHIPPED | OUTPATIENT
Start: 2022-03-02 | End: 2022-06-22

## 2022-03-03 NOTE — TELEPHONE ENCOUNTER
Received fax and message from pt needing PA for xolair. PA was sent into plan on cover my meds. Waiting on a response. Updated pt.

## 2022-03-04 NOTE — TELEPHONE ENCOUNTER
Received fax from [address] [place] [address]  of Medicaid stating PA has been approved and is until 03/02/2023. Informed pt of this. Scanned into media.

## 2022-03-04 NOTE — TELEPHONE ENCOUNTER
Called [facility] and verified the status of the PA for pt. Spoke with Joann Haley to confirm PA approval until 3/02/2023. States [facility] does not have that on file and states to fax at [phone]. Will fax.

## 2022-03-07 ENCOUNTER — NURSE ONLY (OUTPATIENT)
Dept: ALLERGY | Age: 62
End: 2022-03-07
Payer: MEDICAID

## 2022-03-07 VITALS — OXYGEN SATURATION: 97 % | HEART RATE: 90 BPM | TEMPERATURE: 97.1 F

## 2022-03-07 DIAGNOSIS — J30.1 ALLERGY TO TREES: ICD-10-CM

## 2022-03-07 DIAGNOSIS — Z91.048 ALLERGY TO MOLD: ICD-10-CM

## 2022-03-07 DIAGNOSIS — Z51.6 ENCOUNTER FOR DESENSITIZATION TO ALLERGENS: Primary | ICD-10-CM

## 2022-03-07 PROCEDURE — 95117 IMMUNOTHERAPY INJECTIONS: CPT | Performed by: NURSE PRACTITIONER

## 2022-03-07 RX ORDER — MONTELUKAST SODIUM 10 MG/1
10 TABLET ORAL NIGHTLY
Qty: 30 TABLET | Refills: 1 | Status: SHIPPED | OUTPATIENT
Start: 2022-03-07 | End: 2022-05-11 | Stop reason: SDUPTHER

## 2022-03-07 NOTE — PROGRESS NOTES
After consent obtained/verified, allergy injection given in back of R/L arm(s). VIAL COLOR OF ALL VIALS TODAY IS RED    ALLERGY INJECTION FROM VIAL A GIVEN left  UPPER ARM IN THE AMOUNT OF 0.20 ML    ALLERGY INJECTION FROM VIAL B GIVEN right upper ARM IN THE AMOUNT OF 0.20 ML      Documentation of vial injection specific to arm(s) noted on Allergy Immunotherapy Administration Form. Patient waited 30 minutes for observation. No      Patient tolerated well without adverse reaction WHILE IN OFFICE    SHOT REACTION TREATMENT INSTRUCTIONS    During the 30 minute wait after an allergy injection the following symptoms should be reported:    Itching other than at the injection site  Hives or swelling other than at the injection site  Redness other than at the injection site  Difficulty breathing  Chest tightness  Difficulty swallowing  Throat tightness    If these symptoms occur, NOTIFY PROVIDER and the following treatment should be administered:    1. Epinephrine/Auvi Q 1:1000 IM - 0.3 ml if > 66 lbs or more, 0.15 ml if 33 - 63 lbs, or 0.1 ml if <33 lbs     2. Diphenhydramine - give all intramuscular:     2 to <6 years (off-label use): 6.25 mg,    6 to <12 years: 12.5 to 25 mg;    ?12 years: 25-50 mg.    3.  Famotidine:  Adults 40 mg oral    Adolescents age 12 years and >88 lbs: 40 mg    Children and Adolescents ? 12years of age: Initial: 0.25 mg/kg/dose  every 12 hours (maximum daily dose: 40 mg/day)    Epi/Auvi Q dose may me repeated in 5-15 minutes if adequate resolution of symptoms does not occur    Patient should be observed for at least one hour after final Epi/Auvi Q dose and must be seen by provider. Patients cannot drive themselves if they have received diphenhydramine.

## 2022-03-09 ENCOUNTER — PATIENT MESSAGE (OUTPATIENT)
Dept: ALLERGY | Age: 62
End: 2022-03-09

## 2022-03-10 RX ORDER — ALBUTEROL SULFATE 90 UG/1
AEROSOL, METERED RESPIRATORY (INHALATION)
Qty: 18 G | Refills: 2 | Status: SHIPPED | OUTPATIENT
Start: 2022-03-10

## 2022-03-10 NOTE — PROGRESS NOTES
Prescription sent in again.   [address] states that they never received according to the patient

## 2022-03-10 NOTE — TELEPHONE ENCOUNTER
From: Harvey Simental  To: Braden Smith  Sent: 3/9/2022 11:16 AM EST  Subject: PHARMACY HAS NO RX FOR ALBUTEROL    Good morning Galdino De Paz  [address] [place] [address] [facility] says they have no RX for ALBUTEROL; 2 puffs, for the one that I put in my purse in public. Thank you  P.  [address] [phone]

## 2022-03-10 NOTE — TELEPHONE ENCOUNTER
[facility] DR ANAHY MYRICK called and they do have her prescription. She apologizes, it was a tech that she talked to that said she didn't have the RX. I called pt and I let her know that it was there and sorry for the confusion.

## 2022-03-14 ENCOUNTER — NURSE ONLY (OUTPATIENT)
Dept: ALLERGY | Age: 62
End: 2022-03-14
Payer: MEDICAID

## 2022-03-14 VITALS
OXYGEN SATURATION: 98 % | TEMPERATURE: 97.3 F | DIASTOLIC BLOOD PRESSURE: 80 MMHG | RESPIRATION RATE: 16 BRPM | SYSTOLIC BLOOD PRESSURE: 122 MMHG | HEART RATE: 91 BPM

## 2022-03-14 DIAGNOSIS — Z51.6 ENCOUNTER FOR DESENSITIZATION TO ALLERGENS: ICD-10-CM

## 2022-03-14 DIAGNOSIS — J30.1 ALLERGY TO TREES: Primary | ICD-10-CM

## 2022-03-14 DIAGNOSIS — J82.83 EOSINOPHILIC ASTHMA: ICD-10-CM

## 2022-03-14 DIAGNOSIS — Z91.048 ALLERGY TO MOLD: ICD-10-CM

## 2022-03-14 PROCEDURE — 96372 THER/PROPH/DIAG INJ SC/IM: CPT | Performed by: NURSE PRACTITIONER

## 2022-03-14 PROCEDURE — 95117 IMMUNOTHERAPY INJECTIONS: CPT | Performed by: NURSE PRACTITIONER

## 2022-03-14 NOTE — PROGRESS NOTES
Patient here today to receive Xolair injection. Patient does not report any previous reaction from Xolair injections. Denies any nausea vomiting fever urticaria or angioedema. Denies any recent exacerbation of asthma or asthma-like symptoms. Patient was explained benefits and potential risks including anaphylaxis to patient. Patient has been explained the risk and benefits including delayed anaphylaxis. Patient has EpiPen and understands how to appropriately use. Xolair 150 mg given subcutaneously bilaterally, Thighs doses in    bilateral   thigh(s)     for a total combined dose of 300 mg using a 25-gauge needle and 3 ML syringe. RIGHT  / LEFT UPPER ARM,  RIGHT  / LEFT FRONT OR MIDDLE OF THIGH, OR STOMACH        Xolair  [address] [phone]   Lot [id number]   Expires 11/2022    Patient observed for 30 minutes No  If NO Patient left against medical advice    Medication was supplied by patient:  YES/NO: Yes      Medication was supplied as a sample:  YES/NO: No                     Prior to patient receiving Xolair area was cleansed with alcohol swabs. Following the administration no evidence of bleeding or bruising. No adverse reactions reported. Patient tolerated well without adverse reactions or side effects. Patient to continue to receive Xolair injections monthly. Will report any problems to this office. No evidence of allergic reaction including anaphylaxis.

## 2022-03-14 NOTE — PROGRESS NOTES
After consent obtained/verified, allergy injection given in back of R/L arm(s). VIAL COLOR OF ALL VIALS TODAY IS RED MAINTENANCE    ALLERGY INJECTION FROM VIAL A GIVEN right  UPPER ARM IN THE AMOUNT OF 0.25 ML    ALLERGY INJECTION FROM VIAL B GIVEN left upper ARM IN THE AMOUNT OF 0.25 ML    Documentation of vial injection specific to arm(s) noted on Allergy Immunotherapy Administration Form. Patient waited 30 minutes for observation. No      Patient tolerated well without adverse reaction WHILE IN OFFICE    SHOT REACTION TREATMENT INSTRUCTIONS    During the 30 minute wait after an allergy injection the following symptoms should be reported:    Itching other than at the injection site  Hives or swelling other than at the injection site  Redness other than at the injection site  Difficulty breathing  Chest tightness  Difficulty swallowing  Throat tightness    If these symptoms occur, NOTIFY PROVIDER and the following treatment should be administered:    1. Epinephrine/Auvi Q 1:1000 IM - 0.3 ml if > 66 lbs or more, 0.15 ml if 33 - 63 lbs, or 0.1 ml if <33 lbs     2. Diphenhydramine - give all intramuscular:     2 to <6 years (off-label use): 6.25 mg,    6 to <12 years: 12.5 to 25 mg;    ?12 years: 25-50 mg.    3.  Famotidine:  Adults 40 mg oral    Adolescents age 12 years and >88 lbs: 40 mg    Children and Adolescents ? 12years of age: Initial: 0.25 mg/kg/dose  every 12 hours (maximum daily dose: 40 mg/day)    Epi/Auvi Q dose may me repeated in 5-15 minutes if adequate resolution of symptoms does not occur    Patient should be observed for at least one hour after final Epi/Auvi Q dose and must be seen by provider. Patients cannot drive themselves if they have received diphenhydramine.

## 2022-03-21 ENCOUNTER — NURSE ONLY (OUTPATIENT)
Dept: ALLERGY | Age: 62
End: 2022-03-21
Payer: MEDICAID

## 2022-03-21 VITALS
DIASTOLIC BLOOD PRESSURE: 70 MMHG | SYSTOLIC BLOOD PRESSURE: 112 MMHG | RESPIRATION RATE: 18 BRPM | HEART RATE: 95 BPM | OXYGEN SATURATION: 98 % | TEMPERATURE: 97 F

## 2022-03-21 DIAGNOSIS — J30.1 ALLERGY TO TREES: Primary | ICD-10-CM

## 2022-03-21 DIAGNOSIS — Z51.6 ENCOUNTER FOR DESENSITIZATION TO ALLERGENS: ICD-10-CM

## 2022-03-21 DIAGNOSIS — Z91.048 ALLERGY TO MOLD: ICD-10-CM

## 2022-03-21 PROCEDURE — 95117 IMMUNOTHERAPY INJECTIONS: CPT | Performed by: NURSE PRACTITIONER

## 2022-03-21 RX ORDER — LORATADINE 10 MG/1
10 TABLET ORAL DAILY
Qty: 90 TABLET | Refills: 3 | Status: SHIPPED | OUTPATIENT
Start: 2022-03-21 | End: 2022-06-22

## 2022-03-21 NOTE — PROGRESS NOTES
After consent obtained/verified, allergy injection given in back of R/L arm(s). VIAL COLOR OF ALL VIALS TODAY IS red    ALLERGY INJECTION FROM VIAL A GIVEN left  UPPER ARM IN THE AMOUNT OF 0.30 ML    ALLERGY INJECTION FROM VIAL B GIVEN right upper ARM IN THE AMOUNT OF 0.30 ML          Documentation of vial injection specific to arm(s) noted on Allergy Immunotherapy Administration Form. Patient waited 30 minutes for observation. No      Patient tolerated well without adverse reaction WHILE IN OFFICE    SHOT REACTION TREATMENT INSTRUCTIONS    During the 30 minute wait after an allergy injection the following symptoms should be reported:    Itching other than at the injection site  Hives or swelling other than at the injection site  Redness other than at the injection site  Difficulty breathing  Chest tightness  Difficulty swallowing  Throat tightness    If these symptoms occur, NOTIFY PROVIDER and the following treatment should be administered:    1. Epinephrine/Auvi Q 1:1000 IM - 0.3 ml if > 66 lbs or more, 0.15 ml if 33 - 63 lbs, or 0.1 ml if <33 lbs     2. Diphenhydramine - give all intramuscular:     2 to <6 years (off-label use): 6.25 mg,    6 to <12 years: 12.5 to 25 mg;    ?12 years: 25-50 mg.    3.  Famotidine:  Adults 40 mg oral    Adolescents age 12 years and >88 lbs: 40 mg    Children and Adolescents ? 12years of age: Initial: 0.25 mg/kg/dose  every 12 hours (maximum daily dose: 40 mg/day)    Epi/Auvi Q dose may me repeated in 5-15 minutes if adequate resolution of symptoms does not occur    Patient should be observed for at least one hour after final Epi/Auvi Q dose and must be seen by provider. Patients cannot drive themselves if they have received diphenhydramine.

## 2022-03-28 ENCOUNTER — OFFICE VISIT (OUTPATIENT)
Dept: DERMATOLOGY | Age: 62
End: 2022-03-28
Payer: MEDICAID

## 2022-03-28 VITALS
HEART RATE: 76 BPM | BODY MASS INDEX: 36.22 KG/M2 | HEIGHT: 64 IN | DIASTOLIC BLOOD PRESSURE: 82 MMHG | WEIGHT: 212.13 LBS | SYSTOLIC BLOOD PRESSURE: 120 MMHG

## 2022-03-28 DIAGNOSIS — L66.9 CENTRAL CENTRIFUGAL SCARRING ALOPECIA: ICD-10-CM

## 2022-03-28 PROCEDURE — 99214 OFFICE O/P EST MOD 30 MIN: CPT | Performed by: DERMATOLOGY

## 2022-03-28 PROCEDURE — 99213 OFFICE O/P EST LOW 20 MIN: CPT | Performed by: DERMATOLOGY

## 2022-03-28 RX ORDER — CLOBETASOL PROPIONATE 0.5 MG/G
OINTMENT TOPICAL
Qty: 60 G | Refills: 5 | Status: SHIPPED | OUTPATIENT
Start: 2022-03-28 | End: 2022-04-08 | Stop reason: SDUPTHER

## 2022-03-28 RX ORDER — KETOCONAZOLE 20 MG/ML
SHAMPOO TOPICAL
COMMUNITY
Start: 2022-03-16 | End: 2022-03-28 | Stop reason: SDUPTHER

## 2022-03-28 RX ORDER — KETOCONAZOLE 20 MG/ML
SHAMPOO TOPICAL
Qty: 120 ML | Refills: 2 | Status: SHIPPED | OUTPATIENT
Start: 2022-03-28 | End: 2022-07-11

## 2022-03-28 NOTE — PROGRESS NOTES
fDermatology Patient Note  [address] [address]  [address]  [address]  Dept: [phone]  Dept Fax: [phone]      VISITDATE: 3/28/2022   REFERRING PROVIDER: No ref. provider found      Ellen Whiteside is a 59 y.o. female  who presents today in the office for:    Alopecia (ILK/ 2 mo follow up )      HISTORY OF PRESENT ILLNESS:  As above. Pt is concerned about scabs that have appeared on her scalp. She states she is using the topicals regularly. She notices that when hair grows in one area of her scalp, it tends to fall in another area.     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    Tobacco dependence 04/15/2021    Multiple thyroid nodules 04/15/2021    Syringomyelia and syringobulbia ([address] 02/22/2021    Pulmonary eosinophilia ([address] 02/22/2021    Right sided facial pain 02/14/2021     Mastoid tip, transverse process C2, right TMJ       (ventriculoperitoneal) shunt status 02/14/2021    Status post spinal surgery 94/94/1164    Eosinophilic asthma 00/55/2762    Esophageal reflux 12/30/2014    COPD (chronic obstructive pulmonary disease) ([address] 12/30/2014    Tobacco abuse 09/26/2013    Arnold-Chiari malformation ([address] 03/26/2013    IGT (impaired glucose tolerance) 09/18/2012    Vitamin D deficiency 09/18/2012    Vestibular dizziness 12/21/2011     Bilateral caloric weakness sp vestibular therapy      Nasal septal deviation 12/21/2011    Hypothyroid 09/12/2011    Fibromyalgia 09/12/2011    Obesity 09/12/2011    Hyperlipemia 09/12/2011    Interstitial cystitis 09/12/2011    ETD (eustachian tube dysfunction) 09/12/2011     chronic      Sinusitis, chronic 09/12/2011       CURRENT MEDICATIONS: Current Outpatient Medications   Medication Sig Dispense Refill    clobetasol (TEMOVATE) 0.05 % ointment APPLY  OINTMENT TOPICALLY TO SCALP ONCE DAILY 60 g 5    ketoconazole (NIZORAL) 2 % shampoo APPLY 3-4 TIMES WEEKLY TO SCALP, LEAVE ON FOR 5 MINUTES PRIOR TO WASHING  mL 2    loratadine (CLARITIN) 10 MG tablet Take 1 tablet by mouth daily 90 tablet 3    albuterol sulfate  (90 Base) MCG/ACT inhaler RESCUE INHALER CARRY IN PURSE -  AS NEEDED - INHALE 2 PUFFS INTO LUNGS EVERY 4 HOURS AS NEEDED FOR WHEEZING 18 g 2    montelukast (SINGULAIR) 10 MG tablet Take 1 tablet by mouth nightly For allergies 30 tablet 1    albuterol (PROVENTIL) (2.5 MG/3ML) 0.083% nebulizer solution USE 1 VIAL IN NEBULIZER EVERY 6 HOURS AS NEEDED FOR WHEEZING (FOR ASTHMA) 375 mL 5    budesonide-formoterol (SYMBICORT) 160-4.5 MCG/ACT AERO USE EVERY DAY - 2 puffs inhaled twice daily. Rinse mouth well after use. 1 each 11    Azelastine HCl 137 MCG/SPRAY SOLN USE 1 SPRAY(S) IN EACH NOSTRIL TWICE DAILY AS DIRECTED 30 mL 11    ketotifen (ZADITOR) 0.025 % ophthalmic solution One drop in each eye twice daily as needed 1 each 11    fluticasone (FLONASE) 50 MCG/ACT nasal spray 2 sprays by Each Nostril route daily 1 each 11    hydrocortisone (HYTONE) 2.5 % lotion APPLY TOPICALLY TO AFFECTED AREA ONCE DAILY 118 mL 0    esomeprazole (NEXIUM) 40 MG delayed release capsule Take 1 capsule by mouth twice daily 180 capsule 0    hyoscyamine (ANASPAZ;LEVSIN) 125 MCG tablet Take 125 mcg by mouth every 4 hours as needed for Cramping      SYNTHROID 88 MCG tablet TAKE 1 TABLET BY MOUTH ONCE DAILY ON AN EMPTY STOMACH WITH WATER. DO NOT EAT OR TAKE ANY OTHER MEDICATIONS FOR 30 MINUTES.  90 tablet 3    metFORMIN (GLUCOPHAGE) 500 MG tablet TAKE 1 TABLET BY MOUTH TWICE DAILY WITH MEALS 180 tablet 3    atorvastatin (LIPITOR) 10 MG tablet Take 1 tablet by mouth once daily 90 tablet 3    Cholecalciferol (VITAMIN D3) 50 MCG (2000 UT) CAPS Take 1 capsule by mouth once daily 90 capsule 3    amLODIPine (NORVASC) 5 MG tablet Take 1 tablet by mouth once daily 90 tablet 3    triamcinolone acetonide (KENALOG) 10 MG/ML injection Inject 10 mg into the articular space once      estradiol (ESTRACE) 0.1 MG/GM vaginal cream INSERT 1 GRAM VAGINALLY 3 TIMES PER WEEK      omalizumab (XOLAIR) 150 MG/ML SOSY injection Inject 300 mg into the skin every 28 days Administer 300 mg every 28 days PREFILLED SYRINGE. MEDICATION MUST BE MAILED TO PROVIDERS OFFICE 2 each 5    EPIPEN 2-VENESSA 0.3 MG/0.3ML SOAJ injection INJECT 1 PEN IM AS A SINGLE DOSE PRN 1 each 1    fluconazole (DIFLUCAN) 150 MG tablet as needed (as need for yeast infection)       gabapentin (NEURONTIN) 100 MG capsule Take 100 mg by mouth 4 times daily.  Biotin 1 MG CAPS Take 1 capsule by mouth daily       clobetasol (TEMOVATE) 0.05 % external solution Apply daily to scaly or itchy areas on scalp 60 mL 11    fluocinonide (LIDEX) 0.05 % cream Apply topically 2 times daily to rash on hands a needed 30 g 11    levomilnacipran (FETZIMA) 40 MG CP24 extended release capsule Take 1 capsule by mouth daily 30 capsule 2    Multiple Vitamins-Minerals (MULTIVITAMIN PO) 1 tablet daily       Probiotic Product (PROBIOTIC PO) 1 tablet daily       Cyanocobalamin (B-12 PO) daily      meloxicam (MOBIC) 7.5 MG tablet TK 1 T PO BID  4    LINZESS 290 MCG CAPS capsule 290 mcg every morning (before breakfast)       lidocaine (XYLOCAINE) 5 % ointment Apply topically as needed for Pain Apply topically as needed.  lidocaine (LIDODERM) 5 % Place 1 patch onto the skin daily 12 hours on, 12 hours off.  hydrocortisone (ANUSOL-HC) 25 MG suppository Place 25 mg rectally as needed for Hemorrhoids       HYDROcodone-acetaminophen (NORCO) 5-325 MG per tablet 1 tablet nightly as needed for Pain.  Take 1 tablet every 4-6 hours as needed for pain       NONFORMULARY Immunotherapy allergy shot      zinc 50 MG CAPS Take 1 capsule [Cyclobenzaprine]     Morphine Other (See Comments)     \"I hallucinate\"    Peanut-Containing Drug Products     Tessalon [Benzonatate] Hives    Amoxicillin-Pot Clavulanate Rash    Cefdinir Rash    Ibuprofen [Ibuprofen] Rash    Lisinopril Rash    Macrobid [Nitrofurantoin Monohydrate Macrocrystals] Rash    Macrodantin [Nitrofurantoin] Rash    Ranitidine Rash       SOCIAL HISTORY:  Social History     Tobacco Use    Smoking status: Current Every Day Smoker     Packs/day: 0.20     Years: 25.00     Pack years: 5.00     Types: Cigarettes     Start date: 6/1/1975    Smokeless tobacco: Never Used    Tobacco comment: smokes 2 cigs a day   Substance Use Topics    Alcohol use: Not Currently     Alcohol/week: 2.0 standard drinks     Types: 2 Cans of beer per week     Comment: Patient drinks 1-2 beers a MONTH ; stomach tolerates very little alcohol       Pertinent ROS:  Review of Systems  Skin: Denies any new changing, growing or bleeding lesions or rashes except as described in the HPI   Constitutional: Denies fevers, chills, and malaise. PHYSICAL EXAM:   /82 (Site: Left Upper Arm, Position: Sitting)   Pulse 76   Ht 5' 4\" (1.626 m)   Wt 212 lb 2 oz (96.2 kg)   BMI 36.41 kg/m²     The patient is generally well appearing, well nourished, alert and conversational. Affect is normal.    Cutaneous Exam:  Physical Exam  Focused exam of scalp and limited UE was performed    Facial covering was not removed during examination. Diagnoses/exam findings/medical history pertinent to this visit are listed below:    Assessment:   Diagnosis Orders   1.  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 After cleansing with alcohol the alopecic patches on the vertex and temporal scalp were injected with 1 ml of Kenalog 10 mg/mL    RTC 4 months    Future Appointments   Date Time Provider Quinten Garcia   4/4/2022  8:15 AM SCHEDULE, [facility] N [facility] - [address] [place] [address]   4/18/2022  8:15 AM SCHEDULE, [facility] ALLERGY LAB N [facility] ALLER [facility] - [address] [place] [address]   4/25/2022  8:15 AM SCHEDULE, [facility] ALLERGY LAB N [facility] ALLER [address]   5/2/2022  8:15 AM SCHEDULE, [facility] ALLERGY LAB N [facility] ALLER [address]   5/9/2022  8:15 AM SCHEDULE, [facility] ALLERGY LAB N SR[facility] ALLER [address]   5/16/2022  8:15 AM SCHEDULE, [facility] ALLERGY LAB N [facility] ALLER [address]   5/23/2022  8:15 AM SCHEDULE, [facility] ALLERGY LAB N [facility] ALLER [address]   6/2/2022  8:15 AM SCHEDULE, [facility] ALLERGY LAB N [facility] ALLER [address]   6/6/2022  8:15 AM SCHEDULE, [facility] ALLERGY LAB N [facility] ALL[facility] [place]   6/7/2022  8:15 AM Glenna Hawk MD N ENT [address]   6/13/2022  8:15 AM SCHEDULE, [facility] ALLERGY LAB N [facility] ALLER [address]   6/20/2022  8:15 AM SCHEDULE, [facility] ALLERGY LAB N [facility] ALLER [address]   7/11/2022  8:15 AM SCHEDULE, [facility] ALLERGY LAB N SR[facility]X ALLER [address]   7/18/2022  8:15 AM SCHEDULE, [facility] ALLERGY LAB N SRPX ALLER [address]   7/25/2022  8:15 AM SCHEDULE, [facility] ALLERGY LAB N SRPX ALLER [facility] - [address] [place] [address]   8/22/2022  8:30 AM MD Danisha [address] [facility]   1/24/2023  7:00 AM STR CT IMAGING RM1  [facility] STR[facility] OUT EXP STR Radiolog   2/15/2023  8:00 AM [facility] [address]         There are no Patient Instructions on file for this visit. I, Alireza Todd, personally scribed the services dictated to me by Dr. Carolynn Cornejo in this documentation.     I, Dr. Carolynn Cornejo, personally performed the services described in this documentation, as scribed by María Miller in my presence, and it is both accurate and complete.     Electronically signed by Santosh Moise MD on 3/28/2022 at 10:23 AM

## 2022-03-30 ENCOUNTER — TELEPHONE (OUTPATIENT)
Dept: PHARMACY | Age: 62
End: 2022-03-30

## 2022-03-30 NOTE — TELEPHONE ENCOUNTER
Medication Management   [facility]  [phone] (phone)  [phone] (fax)      Patient called at this time for 1 year follow up since starting the smoking cessation program.  There was no answer so I left a message asking pt to call us back.

## 2022-04-02 ENCOUNTER — HOSPITAL ENCOUNTER (EMERGENCY)
Age: 62
Discharge: HOME | End: 2022-04-02
Payer: MEDICAID

## 2022-04-02 ENCOUNTER — APPOINTMENT (OUTPATIENT)
Dept: GENERAL RADIOLOGY | Age: 62
End: 2022-04-02
Payer: MEDICAID

## 2022-04-02 VITALS
TEMPERATURE: 98 F | WEIGHT: 200 LBS | RESPIRATION RATE: 18 BRPM | SYSTOLIC BLOOD PRESSURE: 120 MMHG | BODY MASS INDEX: 34.15 KG/M2 | HEART RATE: 79 BPM | DIASTOLIC BLOOD PRESSURE: 63 MMHG | HEIGHT: 64 IN | OXYGEN SATURATION: 98 %

## 2022-04-02 DIAGNOSIS — R30.0 DYSURIA: ICD-10-CM

## 2022-04-02 DIAGNOSIS — R35.0 URINARY FREQUENCY: ICD-10-CM

## 2022-04-02 DIAGNOSIS — J06.9 VIRAL URI: Primary | ICD-10-CM

## 2022-04-02 DIAGNOSIS — N30.11 INTERSTITIAL CYSTITIS (CHRONIC) WITH HEMATURIA: ICD-10-CM

## 2022-04-02 LAB
ANION GAP SERPL CALCULATED.3IONS-SCNC: 16 MEQ/L (ref 8–16)
BACTERIA: ABNORMAL /HPF
BASOPHILS # BLD: 0.6 %
BASOPHILS ABSOLUTE: 0 THOU/MM3 (ref 0–0.1)
BILIRUBIN URINE: NEGATIVE
BLOOD, URINE: ABNORMAL
BUN BLDV-MCNC: 11 MG/DL (ref 7–22)
CALCIUM SERPL-MCNC: 10.6 MG/DL (ref 8.5–10.5)
CASTS 2: ABNORMAL /LPF
CASTS UA: ABNORMAL /LPF
CHARACTER, URINE: ABNORMAL
CHLORIDE BLD-SCNC: 99 MEQ/L (ref 98–111)
CO2: 24 MEQ/L (ref 23–33)
COLOR: YELLOW
CREAT SERPL-MCNC: 0.7 MG/DL (ref 0.4–1.2)
CRYSTALS, UA: ABNORMAL
EOSINOPHIL # BLD: 0.8 %
EOSINOPHILS ABSOLUTE: 0.1 THOU/MM3 (ref 0–0.4)
EPITHELIAL CELLS, UA: ABNORMAL /HPF
ERYTHROCYTE [DISTWIDTH] IN BLOOD BY AUTOMATED COUNT: 14.5 % (ref 11.5–14.5)
ERYTHROCYTE [DISTWIDTH] IN BLOOD BY AUTOMATED COUNT: 48.4 FL (ref 35–45)
FLU A ANTIGEN: NEGATIVE
FLU B ANTIGEN: NEGATIVE
GLUCOSE BLD-MCNC: 106 MG/DL (ref 70–108)
GLUCOSE URINE: NEGATIVE MG/DL
HCT VFR BLD CALC: 45 % (ref 37–47)
HEMOGLOBIN: 14.2 GM/DL (ref 12–16)
IMMATURE GRANS (ABS): 0.04 THOU/MM3 (ref 0–0.07)
IMMATURE GRANULOCYTES: 0.5 %
KETONES, URINE: NEGATIVE
LACTIC ACID: 2.5 MMOL/L (ref 0.5–2)
LEUKOCYTE ESTERASE, URINE: NEGATIVE
LYMPHOCYTES # BLD: 26.1 %
LYMPHOCYTES ABSOLUTE: 2.2 THOU/MM3 (ref 1–4.8)
MAGNESIUM: 2.3 MG/DL (ref 1.6–2.4)
MCH RBC QN AUTO: 29.1 PG (ref 26–33)
MCHC RBC AUTO-ENTMCNC: 31.6 GM/DL (ref 32.2–35.5)
MCV RBC AUTO: 92.2 FL (ref 81–99)
MISCELLANEOUS 2: ABNORMAL
MONOCYTES # BLD: 4.8 %
MONOCYTES ABSOLUTE: 0.4 THOU/MM3 (ref 0.4–1.3)
NITRITE, URINE: NEGATIVE
NUCLEATED RED BLOOD CELLS: 0 /100 WBC
OSMOLALITY CALCULATION: 277.4 MOSMOL/KG (ref 275–300)
PH UA: 6.5 (ref 5–9)
PLATELET # BLD: 363 THOU/MM3 (ref 130–400)
PMV BLD AUTO: 9.7 FL (ref 9.4–12.4)
POTASSIUM SERPL-SCNC: 4.2 MEQ/L (ref 3.5–5.2)
PROCALCITONIN: 0.04 NG/ML (ref 0.01–0.09)
PROTEIN UA: NEGATIVE
RBC # BLD: 4.88 MILL/MM3 (ref 4.2–5.4)
RBC URINE: ABNORMAL /HPF
RENAL EPITHELIAL, UA: ABNORMAL
SARS-COV-2, NAAT: NOT DETECTED
SEG NEUTROPHILS: 67.2 %
SEGMENTED NEUTROPHILS ABSOLUTE COUNT: 5.6 THOU/MM3 (ref 1.8–7.7)
SODIUM BLD-SCNC: 139 MEQ/L (ref 135–145)
SPECIFIC GRAVITY, URINE: 1.02 (ref 1–1.03)
TROPONIN T: < 0.01 NG/ML
UROBILINOGEN, URINE: 0.2 EU/DL (ref 0–1)
WBC # BLD: 8.3 THOU/MM3 (ref 4.8–10.8)
WBC UA: ABNORMAL /HPF
YEAST: ABNORMAL

## 2022-04-02 PROCEDURE — 71046 X-RAY EXAM CHEST 2 VIEWS: CPT

## 2022-04-02 PROCEDURE — 80048 BASIC METABOLIC PNL TOTAL CA: CPT

## 2022-04-02 PROCEDURE — 85025 COMPLETE CBC W/AUTO DIFF WBC: CPT

## 2022-04-02 PROCEDURE — 87040 BLOOD CULTURE FOR BACTERIA: CPT

## 2022-04-02 PROCEDURE — 84484 ASSAY OF TROPONIN QUANT: CPT

## 2022-04-02 PROCEDURE — 81001 URINALYSIS AUTO W/SCOPE: CPT

## 2022-04-02 PROCEDURE — 87635 SARS-COV-2 COVID-19 AMP PRB: CPT

## 2022-04-02 PROCEDURE — 99282 EMERGENCY DEPT VISIT SF MDM: CPT

## 2022-04-02 PROCEDURE — 83735 ASSAY OF MAGNESIUM: CPT

## 2022-04-02 PROCEDURE — 36415 COLL VENOUS BLD VENIPUNCTURE: CPT

## 2022-04-02 PROCEDURE — 84145 PROCALCITONIN (PCT): CPT

## 2022-04-02 PROCEDURE — 87804 INFLUENZA ASSAY W/OPTIC: CPT

## 2022-04-02 PROCEDURE — 93005 ELECTROCARDIOGRAM TRACING: CPT | Performed by: NURSE PRACTITIONER

## 2022-04-02 PROCEDURE — 83605 ASSAY OF LACTIC ACID: CPT

## 2022-04-02 RX ORDER — OXYBUTYNIN CHLORIDE 5 MG/1
5 TABLET, EXTENDED RELEASE ORAL DAILY
Qty: 30 TABLET | Refills: 0 | Status: SHIPPED | OUTPATIENT
Start: 2022-04-02 | End: 2022-05-19 | Stop reason: SDUPTHER

## 2022-04-02 RX ORDER — PHENAZOPYRIDINE HYDROCHLORIDE 200 MG/1
200 TABLET, FILM COATED ORAL 3 TIMES DAILY PRN
Qty: 9 TABLET | Refills: 0 | Status: SHIPPED | OUTPATIENT
Start: 2022-04-02 | End: 2022-04-05

## 2022-04-02 RX ORDER — KETOROLAC TROMETHAMINE 30 MG/ML
15 INJECTION, SOLUTION INTRAMUSCULAR; INTRAVENOUS ONCE
Status: DISCONTINUED | OUTPATIENT
Start: 2022-04-02 | End: 2022-04-02 | Stop reason: HOSPADM

## 2022-04-02 NOTE — ED NOTES
Pt presents with c/o cough, sneezing, chills, generalized fatigue for 1 month. She has been getting treated for UTI with abx over last 2 months and also on diflucan for yeast infections. Pt tearful in room. Visitor at bedside. She reports getting allergy shots weekly. She is unsure of fever at home as she doesn't have thermometer but does have hot and cold flashes.       Morgan Starr RN  04/02/22 [id number]

## 2022-04-02 NOTE — ED NOTES
Patient resting quietly in cot showing no signs of distress at this time. Warm blankets provided. Updated on POC. Will continue to monitor.       Jia Echols RN  04/02/22 [id number]

## 2022-04-02 NOTE — ED NOTES
Pt moved from intake B to 18. While moving rooms pt wanted to add that she has been experiencing headache as well. Flu and covid swab collected and sent to lab. Teddy HENDRICKSON and Deena Hopkins NP at bedside.       Itz Chacon RN  04/02/22 [id number]

## 2022-04-02 NOTE — ED NOTES
Patient resting quietly in cot showing no signs of distress at this time. Updated on POC. Will continue to monitor.       Loraine Bullard RN  04/02/22 0738

## 2022-04-03 LAB
EKG ATRIAL RATE: 78 BPM
EKG P AXIS: 62 DEGREES
EKG P-R INTERVAL: 166 MS
EKG Q-T INTERVAL: 380 MS
EKG QRS DURATION: 68 MS
EKG QTC CALCULATION (BAZETT): 433 MS
EKG R AXIS: 70 DEGREES
EKG T AXIS: 54 DEGREES
EKG VENTRICULAR RATE: 78 BPM

## 2022-04-03 PROCEDURE — 93010 ELECTROCARDIOGRAM REPORT: CPT | Performed by: NUCLEAR MEDICINE

## 2022-04-04 ENCOUNTER — TELEPHONE (OUTPATIENT)
Dept: FAMILY MEDICINE CLINIC | Age: 62
End: 2022-04-04

## 2022-04-04 ENCOUNTER — NURSE ONLY (OUTPATIENT)
Dept: ALLERGY | Age: 62
End: 2022-04-04
Payer: MEDICAID

## 2022-04-04 VITALS
DIASTOLIC BLOOD PRESSURE: 80 MMHG | OXYGEN SATURATION: 97 % | HEART RATE: 86 BPM | SYSTOLIC BLOOD PRESSURE: 118 MMHG | TEMPERATURE: 97.8 F | RESPIRATION RATE: 20 BRPM

## 2022-04-04 DIAGNOSIS — J30.1 ALLERGY TO TREES: Primary | ICD-10-CM

## 2022-04-04 DIAGNOSIS — Z51.6 ENCOUNTER FOR DESENSITIZATION TO ALLERGENS: ICD-10-CM

## 2022-04-04 DIAGNOSIS — Z91.048 ALLERGY TO MOLD: ICD-10-CM

## 2022-04-04 LAB — GFR SERPL CREATININE-BSD FRML MDRD: 84 ML/MIN/1.73M2

## 2022-04-04 PROCEDURE — 95117 IMMUNOTHERAPY INJECTIONS: CPT | Performed by: NURSE PRACTITIONER

## 2022-04-04 NOTE — TELEPHONE ENCOUNTER
Pt called office stating when she was at the ED Saturday they advised to follow up with PCP. pt is asking if she needs to schedule an appt. Pt says she was referred to Urology and has an appt this Friday for hematuria. Pt continues to have back pain and \"I would still have burning if they didn't give me the medication they did in the emergency room\". Pt would like you to look at her UA result. The cough is a little better, but still has a few chills. Pt would like to have a call back today after 9:30am \"I am at my allergy shot appointment right now\". Please advise.

## 2022-04-04 NOTE — PROGRESS NOTES
After consent obtained/verified, allergy injection given in back of R/L arm(s). VIAL COLOR OF ALL VIALS TODAY IS RED MAINTENANCE    ALLERGY INJECTION FROM VIAL A GIVEN right  UPPER ARM IN THE AMOUNT OF 0.30 ML    ALLERGY INJECTION FROM VIAL B GIVEN left upper ARM IN THE AMOUNT OF 0.30 ML    Documentation of vial injection specific to arm(s) noted on Allergy Immunotherapy Administration Form. Patient waited 30 minutes for observation. No      Patient tolerated well without adverse reaction WHILE IN OFFICE    SHOT REACTION TREATMENT INSTRUCTIONS    During the 30 minute wait after an allergy injection the following symptoms should be reported:    Itching other than at the injection site  Hives or swelling other than at the injection site  Redness other than at the injection site  Difficulty breathing  Chest tightness  Difficulty swallowing  Throat tightness    If these symptoms occur, NOTIFY PROVIDER and the following treatment should be administered:    1. Epinephrine/Auvi Q 1:1000 IM - 0.3 ml if > 66 lbs or more, 0.15 ml if 33 - 63 lbs, or 0.1 ml if <33 lbs     2. Diphenhydramine - give all intramuscular:     2 to <6 years (off-label use): 6.25 mg,    6 to <12 years: 12.5 to 25 mg;    ?12 years: 25-50 mg.    3.  Famotidine:  Adults 40 mg oral    Adolescents age 12 years and >88 lbs: 40 mg    Children and Adolescents ? 12years of age: Initial: 0.25 mg/kg/dose  every 12 hours (maximum daily dose: 40 mg/day)    Epi/Auvi Q dose may me repeated in 5-15 minutes if adequate resolution of symptoms does not occur    Patient should be observed for at least one hour after final Epi/Auvi Q dose and must be seen by provider. Patients cannot drive themselves if they have received diphenhydramine.

## 2022-04-06 ENCOUNTER — TELEPHONE (OUTPATIENT)
Dept: PHARMACY | Age: 62
End: 2022-04-06

## 2022-04-06 NOTE — TELEPHONE ENCOUNTER
Medication Management   [facility]  [facility]  [phone] (phone)  [phone] (fax)      Patient called at this time for 1 year follow up since starting smoking cessation program.      Encounter completed via telephone. Have you smoked any, even 1 puff, in the last 7 days? Yes  If yes, how much? Just 5 cigarettes a day. Baseline PPD: 1ppd  Current PPD: just 5 cigarettes  Smoking Reduction Percent from initial baseline: 75%    Pharmacological Agent used: None. Plan: This is 1 year follow up since starting program.  Pt still at just 5 cigarettes a day and that is 75% reduction from when she started the program.  I congratulated pt on remaining at just 5 cigarettes a day(the last time we spoke with pt was on 7/7/21 and she was at 5 cigarettes a day then). Pt states she still really wants to quit and importance level still a 10. I told pt that if she is interested in going through program again and possibly attending the group sessions that she can do that. Pt states she may be interested but not at this time. I told pt to call us anytime if changes her mind and reminded pt that we are only in the office on Wednesday. Pt had no questions at this time.    Total time spent on phone with pt:  9 minutes    I explained to pt that this was the last call of the program.

## 2022-04-07 LAB
BLOOD CULTURE, ROUTINE: NORMAL
BLOOD CULTURE, ROUTINE: NORMAL

## 2022-04-08 ENCOUNTER — TELEPHONE (OUTPATIENT)
Dept: UROLOGY | Age: 62
End: 2022-04-08

## 2022-04-08 ENCOUNTER — OFFICE VISIT (OUTPATIENT)
Dept: UROLOGY | Age: 62
End: 2022-04-08
Payer: MEDICAID

## 2022-04-08 VITALS
DIASTOLIC BLOOD PRESSURE: 78 MMHG | WEIGHT: 213 LBS | BODY MASS INDEX: 36.37 KG/M2 | SYSTOLIC BLOOD PRESSURE: 128 MMHG | HEIGHT: 64 IN

## 2022-04-08 DIAGNOSIS — N34.2 URETHRITIS: ICD-10-CM

## 2022-04-08 DIAGNOSIS — R31.9 HEMATURIA, UNSPECIFIED TYPE: Primary | ICD-10-CM

## 2022-04-08 DIAGNOSIS — R10.9 FLANK PAIN: ICD-10-CM

## 2022-04-08 DIAGNOSIS — R35.0 URINARY FREQUENCY: ICD-10-CM

## 2022-04-08 DIAGNOSIS — N30.10 INTERSTITIAL CYSTITIS: ICD-10-CM

## 2022-04-08 DIAGNOSIS — R39.15 URINARY URGENCY: ICD-10-CM

## 2022-04-08 LAB
BILIRUBIN URINE: NEGATIVE
BLOOD URINE, POC: ABNORMAL
CHARACTER, URINE: CLEAR
COLOR, URINE: YELLOW
GLUCOSE URINE: NEGATIVE MG/DL
KETONES, URINE: NEGATIVE
LEUKOCYTE CLUMPS, URINE: ABNORMAL
NITRITE, URINE: NEGATIVE
PH, URINE: 6 (ref 5–9)
PROTEIN, URINE: NEGATIVE MG/DL
SPECIFIC GRAVITY, URINE: 1.01 (ref 1–1.03)
UROBILINOGEN, URINE: 0.2 EU/DL (ref 0–1)

## 2022-04-08 PROCEDURE — 99204 OFFICE O/P NEW MOD 45 MIN: CPT | Performed by: UROLOGY

## 2022-04-08 PROCEDURE — 81003 URINALYSIS AUTO W/O SCOPE: CPT | Performed by: UROLOGY

## 2022-04-08 RX ORDER — PENTOSAN POLYSULFATE SODIUM 100 MG/1
200 CAPSULE, GELATIN COATED ORAL 2 TIMES DAILY
Qty: 120 CAPSULE | Refills: 1 | Status: SHIPPED | OUTPATIENT
Start: 2022-04-08 | End: 2022-05-08

## 2022-04-08 RX ORDER — AZITHROMYCIN 500 MG/1
1000 TABLET, FILM COATED ORAL ONCE
Qty: 2 TABLET | Refills: 0 | Status: SHIPPED | OUTPATIENT
Start: 2022-04-08 | End: 2022-04-08

## 2022-04-08 RX ORDER — DOXYCYCLINE 100 MG/1
100 CAPSULE ORAL 2 TIMES DAILY
Qty: 14 CAPSULE | Refills: 0 | Status: SHIPPED | OUTPATIENT
Start: 2022-04-08

## 2022-04-08 ASSESSMENT — ENCOUNTER SYMPTOMS
COUGH: 1
VOMITING: 0
NAUSEA: 0
CHEST TIGHTNESS: 0
RHINORRHEA: 0
ABDOMINAL PAIN: 0
EYE REDNESS: 0
BACK PAIN: 0
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
## 2022-04-08 NOTE — TELEPHONE ENCOUNTER
Prior Auth initiated for [facility] . [place] sent to [facility] [address]. Should receive response in 3-5 days.

## 2022-04-08 NOTE — PROGRESS NOTES
Tamiko Dan MD  Urology Clinic office visit  NEW PATIENT    Patient:  Jackson Moy  YOB: 1960  Date: 4/8/2022    HISTORY OF PRESENT ILLNESS:   The patient is a 59 y.o. female who presents today for evaluation of the following problems:      1. Hematuria, unspecified type    2. Interstitial cystitis    3. Urinary urgency    4. Urinary frequency    5. Urethritis    6. Flank pain         Overall the problem(s) : are worsening. Associated Symptoms: No dysuria, gross hematuria. Pain Severity:      Summary of old records: hx of IC controlled with Elmiron  (Patient's old records, notes and chart reviewed and summarized above.)      Onset recent burning and hematuria  Went to ER  Has been increasing soda intake recently  Hx of yeast infections and atrophic vaginitis  Severity is described as moderate. The course of symptoms over time is chronic.   Alleviating factors: none  Worsening factors: none  Lower urinary tract symptoms: dysuria  Right now on pyridium and oxybutynin  Trace blood on UA      Urinalysis today:  Results for POC orders placed in visit on 04/08/22   POCT Urinalysis No Micro (Auto)   Result Value Ref Range    Glucose, Ur Negative NEGATIVE mg/dl    Bilirubin Urine Negative     Ketones, Urine Negative NEGATIVE    Specific Gravity, Urine 1.015 1.002 - 1.030    Blood, UA POC Trace-intact NEGATIVE    pH, Urine 6.00 5.0 - 9.0    Protein, Urine Negative NEGATIVE mg/dl    Urobilinogen, Urine 0.20 0.0 - 1.0 eu/dl    Nitrite, Urine Negative NEGATIVE    Leukocyte Clumps, Urine Trace (A) NEGATIVE    Color, Urine Yellow YELLOW-STRAW    Character, Urine Clear [id number]       Last BUN and creatinine:  Lab Results   Component Value Date    BUN 11 04/02/2022     Lab Results   Component Value Date    CREATININE 0.7 04/02/2022       Imaging Reviewed during this Office Visit:   (results were independently reviewed by physician and radiology report verified)  I independently reviewed and verified the images and reports from:    XR CHEST (2 VW)    Result Date: 2022  PROCEDURE: XR CHEST (2 VW) CLINICAL INFORMATION: Infection. COMPARISON: 2021 TECHNIQUE: PA and lateral views the chest. FINDINGS: The heart size is normal.  The mediastinum is not widened. There are no pulmonary infiltrates or effusions. The pulmonary vascularity is normal. No suspicious osseous lesions are present. EKG leads overlie the chest.     Stable radiographic appearance of the chest. No evidence of an acute process. **This report has been created using voice recognition software. It may contain minor errors which are inherent in voice recognition technology. ** Final report electronically signed by Dr. Akash Wiggins on 2022 12:49 PM        PAST MEDICAL, FAMILY AND SOCIAL HISTORY:  Past Medical History:   Diagnosis Date    Allergic rhinitis     Anxiety     Arnold-Chiari malformation (HCC)     Asthma     Chronic bronchitis (HCC)     Fibromyalgia     GERD (gastroesophageal reflux disease)     Headache(784.0)     Hyperlipidemia     Neuropathy     Osteoarthritis     Sinusitis     Type II or unspecified type diabetes mellitus without mention of complication, not stated as uncontrolled      Past Surgical History:   Procedure Laterality Date    APPENDECTOMY       SECTION      x2    COLONOSCOPY  2010    CSF SHUNT  2007    Cervical syrinx to subarachnoid shunt; thoracic syrinx to subarachnoid shunt (2 separate dural openings)    CYST REMOVAL      extradural cysts at thoracic 2-3    ECTOPIC PREGNANCY SURGERY      HEMICOLECTOMY      HERNIA REPAIR      inguinal     HYSTERECTOMY      LARYNGOSCOPY N/A 2021    MICROLARYNGOSCOPY AND BIOPSY   performed by Noah Mendoza MD at [address] [place] [address]  2020    Bilateral great toe symes amputation,Bilateral toes 1-5 matrixectomy by [facility]    SPINAL CORD DECOMPRESSION  2007    C4-5-6-7; T1-2-3 laminectomies    TOOTH EXTRACTION  09/2017     Family History   Problem Relation Age of Onset    Cancer Other         colon    Mental Illness Other     High Blood Pressure Other     Diabetes Other     High Blood Pressure Sister      Outpatient Medications Marked as Taking for the 4/8/22 encounter (Office Visit) with Jodee Arteaga MD   Medication Sig Dispense Refill    azithromycin (ZITHROMAX) 500 MG tablet Take 2 tablets by mouth once for 1 dose 2 tablet 0    doxycycline monohydrate (MONODOX) 100 MG capsule Take 1 capsule by mouth 2 times daily 14 capsule 0    pentosan polysulfate (ELMIRON) 100 MG capsule Take 2 capsules by mouth in the morning and at bedtime 120 capsule 1    oxybutynin (DITROPAN XL) 5 MG extended release tablet Take 1 tablet by mouth daily 30 tablet 0    ketoconazole (NIZORAL) 2 % shampoo APPLY 3-4 TIMES WEEKLY TO SCALP, LEAVE ON FOR 5 MINUTES PRIOR TO WASHING  mL 2    loratadine (CLARITIN) 10 MG tablet Take 1 tablet by mouth daily 90 tablet 3    albuterol sulfate  (90 Base) MCG/ACT inhaler RESCUE INHALER CARRY IN PURSE -  AS NEEDED - INHALE 2 PUFFS INTO LUNGS EVERY 4 HOURS AS NEEDED FOR WHEEZING 18 g 2    montelukast (SINGULAIR) 10 MG tablet Take 1 tablet by mouth nightly For allergies 30 tablet 1    albuterol (PROVENTIL) (2.5 MG/3ML) 0.083% nebulizer solution USE 1 VIAL IN NEBULIZER EVERY 6 HOURS AS NEEDED FOR WHEEZING (FOR ASTHMA) 375 mL 5    budesonide-formoterol (SYMBICORT) 160-4.5 MCG/ACT AERO USE EVERY DAY - 2 puffs inhaled twice daily. Rinse mouth well after use.  1 each 11    Azelastine HCl 137 MCG/SPRAY SOLN USE 1 SPRAY(S) IN EACH NOSTRIL TWICE DAILY AS DIRECTED 30 mL 11    ketotifen (ZADITOR) 0.025 % ophthalmic solution One drop in each eye twice daily as needed 1 each 11    fluticasone (FLONASE) 50 MCG/ACT nasal spray 2 sprays by Each Nostril route daily 1 each 11    hydrocortisone (HYTONE) 2.5 % lotion APPLY TOPICALLY TO AFFECTED AREA ONCE DAILY 118 mL 0    esomeprazole (NEXIUM) 40 MG delayed release capsule Take 1 capsule by mouth twice daily 180 capsule 0    hyoscyamine (ANASPAZ;LEVSIN) 125 MCG tablet Take 125 mcg by mouth every 4 hours as needed for Cramping      SYNTHROID 88 MCG tablet TAKE 1 TABLET BY MOUTH ONCE DAILY ON AN EMPTY STOMACH WITH WATER. DO NOT EAT OR TAKE ANY OTHER MEDICATIONS FOR 30 MINUTES. 90 tablet 3    metFORMIN (GLUCOPHAGE) 500 MG tablet TAKE 1 TABLET BY MOUTH TWICE DAILY WITH MEALS 180 tablet 3    atorvastatin (LIPITOR) 10 MG tablet Take 1 tablet by mouth once daily 90 tablet 3    Cholecalciferol (VITAMIN D3) 50 MCG (2000 UT) CAPS Take 1 capsule by mouth once daily 90 capsule 3    amLODIPine (NORVASC) 5 MG tablet Take 1 tablet by mouth once daily 90 tablet 3    triamcinolone acetonide (KENALOG) 10 MG/ML injection Inject 10 mg into the articular space once      estradiol (ESTRACE) 0.1 MG/GM vaginal cream INSERT 1 GRAM VAGINALLY 3 TIMES PER WEEK      omalizumab (XOLAIR) 150 MG/ML SOSY injection Inject 300 mg into the skin every 28 days Administer 300 mg every 28 days PREFILLED SYRINGE. MEDICATION MUST BE MAILED TO PROVIDERS OFFICE 2 each 5    EPIPEN 2-VENESSA 0.3 MG/0.3ML SOAJ injection INJECT 1 PEN IM AS A SINGLE DOSE PRN 1 each 1    fluconazole (DIFLUCAN) 150 MG tablet as needed (as need for yeast infection)       gabapentin (NEURONTIN) 100 MG capsule Take 100 mg by mouth 4 times daily.       traZODone (DESYREL) 50 MG tablet Take 50 mg by mouth nightly as needed for Sleep       Biotin 1 MG CAPS Take 1 capsule by mouth daily       clobetasol (TEMOVATE) 0.05 % external solution Apply daily to scaly or itchy areas on scalp 60 mL 11    fluocinonide (LIDEX) 0.05 % cream Apply topically 2 times daily to rash on hands a needed 30 g 11    levomilnacipran (FETZIMA) 40 MG CP24 extended release capsule Take 1 capsule by mouth daily 30 capsule 2    Multiple Vitamins-Minerals (MULTIVITAMIN PO) 1 tablet daily       Probiotic Product (PROBIOTIC PO) 1 tablet daily       Cyanocobalamin (B-12 PO) daily      ondansetron (ZOFRAN) 4 MG tablet TAKE 1 TABLET BY MOUTH EVERY 8 HOURS AS NEEDED FOR NAUSEA OR VOMITING 15 tablet 0    meloxicam (MOBIC) 7.5 MG tablet TK 1 T PO BID  4    LINZESS 290 MCG CAPS capsule 290 mcg every morning (before breakfast)       lidocaine (XYLOCAINE) 5 % ointment Apply topically as needed for Pain Apply topically as needed.  lidocaine (LIDODERM) 5 % Place 1 patch onto the skin daily 12 hours on, 12 hours off.  hydrocortisone (ANUSOL-HC) 25 MG suppository Place 25 mg rectally as needed for Hemorrhoids       HYDROcodone-acetaminophen (NORCO) 5-325 MG per tablet 1 tablet nightly as needed for Pain. Take 1 tablet every 4-6 hours as needed for pain       NONFORMULARY Immunotherapy allergy shot      zinc 50 MG CAPS Take 1 capsule by mouth daily       Ascorbic Acid (VITAMIN C) 500 MG CAPS Take 1 tablet by mouth daily       orphenadrine (NORFLEX) 100 MG tablet Take 100 mg by mouth 2 times daily as needed.  tramadol (ULTRAM) 50 MG tablet Take 50 mg by mouth every 6 hours as needed.  Take 1-2 tabs every 6 hrs prn          Bactrim [sulfamethoxazole-trimethoprim], Flexeril [cyclobenzaprine], Morphine, Peanut-containing drug products, Tessalon [benzonatate], Amoxicillin-pot clavulanate, Cefdinir, Ibuprofen [ibuprofen], Lisinopril, Macrobid [nitrofurantoin monohydrate macrocrystals], Macrodantin [nitrofurantoin], and Ranitidine  Social History     Tobacco Use   Smoking Status Current Every Day Smoker    Packs/day: 0.20    Years: 25.00    Pack years: 5.00    Types: Cigarettes    Start date: 6/1/1975   Smokeless Tobacco Never Used   Tobacco Comment    smokes 2 cigs a day       Social History     Substance and Sexual Activity   Alcohol Use Not Currently    Alcohol/week: 2.0 standard drinks    Types: 2 Cans of beer per week    Comment: Patient drinks 1-2 beers a MONTH ; stomach tolerates very little alcohol       REVIEW OF SYSTEMS:  Constitutional: negative  Eyes: negative  Respiratory: negative  Cardiovascular: negative  Gastrointestinal: negative  Genitourinary: negative except for what is in HPI  Musculoskeletal: negative  Skin: negative   Neurological: negative  Hematological/Lymphatic: negative  Psychological: negative    Physical Exam:    This a 59 y.o. female      Vitals:    04/08/22 0818   BP: 128/78     Constitutional: Patient in no acute distress. Neuro: Alert and oriented to person, place and time. Psych: mood and affect normal  HEENT negative  Lungs: Respiratory effort is normal  Cardiovascular: Normal peripheral pulses  Abdomen: Soft, non-tender, non-distended   Lymphatics: No palpable lymphadenopathy. Bladder non-tender and not distended. Assessment and Plan      1. Hematuria, unspecified type    2. Interstitial cystitis    3. Urinary urgency    4. Urinary frequency    5. Urethritis    6. Flank pain           Plan:       Flank pain, could be MSK. KUB and US  Azithro and Doxy for possible urethritis  Continue Ditropan 5 mg daily  Restart Elmiron  Follow up 6 weeks to review symptoms and tests  Likely will need cystoscopy in future         Prescriptions Ordered:  Orders Placed This Encounter   Medications    azithromycin (ZITHROMAX) 500 MG tablet     Sig: Take 2 tablets by mouth once for 1 dose     Dispense:  2 tablet     Refill:  0    doxycycline monohydrate (MONODOX) 100 MG capsule     Sig: Take 1 capsule by mouth 2 times daily     Dispense:  14 capsule     Refill:  0    pentosan polysulfate (ELMIRON) 100 MG capsule     Sig: Take 2 capsules by mouth in the morning and at bedtime     Dispense:  120 capsule     Refill:  1      Orders Placed:  Orders Placed This Encounter   Procedures    US RENAL LIMITED     This procedure can be scheduled via [facility]. Access your [facility] account by visiting [email or web address].      Standing Status:   Future     Standing Expiration Date:   4/3/2023    XR ABDOMEN (KUB) (SINGLE AP VIEW)     Standing Status:   Future     Standing Expiration Date:   4/8/2023    POCT Urinalysis No Micro (Auto)            MD Lorena Pena M.D, MD  [facility]

## 2022-04-11 ENCOUNTER — TELEPHONE (OUTPATIENT)
Dept: FAMILY MEDICINE CLINIC | Age: 62
End: 2022-04-11

## 2022-04-11 DIAGNOSIS — L85.3 XEROSIS OF SKIN: ICD-10-CM

## 2022-04-11 RX ORDER — HYDROCORTISONE 25 MG/ML
LOTION TOPICAL
Qty: 118 ML | Refills: 0 | Status: SHIPPED | OUTPATIENT
Start: 2022-04-11 | End: 2022-05-11

## 2022-04-11 NOTE — TELEPHONE ENCOUNTER
PA request received from pharmacy for Esomeprazole Magnesium 40mg BID #180 for 90 days. PA submitted online at [email or web address] and pending review.

## 2022-04-13 ENCOUNTER — TELEPHONE (OUTPATIENT)
Dept: UROLOGY | Age: 62
End: 2022-04-13

## 2022-04-13 NOTE — TELEPHONE ENCOUNTER
Patient scheduled for US RENAL LIMITED  at [facility] MR on 5/12/2022 ARRIVAL OF 645AM FOR A 7AM SCAN. NO CARBONATED BEVERAGES, MUST BE WELL HYDRATED; EMPTY BLADDER ONE HOUR BEFORE APPT, THEN DRINK 32 OZ WATER FINISH WITHIN 15 MIN; DO NOT URINATE BEFORE APPT. Patient advised of instructions.   Order mailed/given to patient

## 2022-04-15 ENCOUNTER — TELEPHONE (OUTPATIENT)
Dept: UROLOGY | Age: 62
End: 2022-04-15

## 2022-04-15 NOTE — TELEPHONE ENCOUNTER
Patient calling in stating she finished her antibiotics yesterday 4/14/2022 and she is still having flank pain and upper back pain in her kidney area and would like to know what she can do. Advised her at time of call the office was closed and they would not call her back until sometime Monday 4/18/2022 when the office reopened and that she was welcome to reach out to her pcp or urgent care/ER if needed. She was also very upset because she said no one from the office called her about the renal ultrasound that was scheduled. Provided that info as well and advise her she would get instructions in the mail also. She was again very upset stating no one called her at all about that (in phone encounter stated \"Patient advised of instructions\" but not one advised her). Please call her to follow up. ,DirectAddress_Unknown

## 2022-04-18 ENCOUNTER — NURSE ONLY (OUTPATIENT)
Dept: ALLERGY | Age: 62
End: 2022-04-18
Payer: MEDICAID

## 2022-04-18 VITALS
TEMPERATURE: 97.1 F | DIASTOLIC BLOOD PRESSURE: 70 MMHG | SYSTOLIC BLOOD PRESSURE: 132 MMHG | HEART RATE: 73 BPM | OXYGEN SATURATION: 98 % | RESPIRATION RATE: 18 BRPM

## 2022-04-18 DIAGNOSIS — Z91.048 ALLERGY TO MOLD: ICD-10-CM

## 2022-04-18 DIAGNOSIS — J30.1 ALLERGY TO TREES: Primary | ICD-10-CM

## 2022-04-18 DIAGNOSIS — J82.83 EOSINOPHILIC ASTHMA: ICD-10-CM

## 2022-04-18 DIAGNOSIS — Z51.6 ENCOUNTER FOR DESENSITIZATION TO ALLERGENS: ICD-10-CM

## 2022-04-18 PROCEDURE — 96372 THER/PROPH/DIAG INJ SC/IM: CPT | Performed by: NURSE PRACTITIONER

## 2022-04-18 PROCEDURE — 95117 IMMUNOTHERAPY INJECTIONS: CPT | Performed by: NURSE PRACTITIONER

## 2022-04-18 NOTE — TELEPHONE ENCOUNTER
Patient states the pain is a little better rated 2 on scale of 0-10. Her kidney has been hurting more early morning. She may be having cramping from irritable bowel also. On Friday the pain was in the right upper back and flank and on her side of the stomach which had her in tears. She does have norco if she needs it. .    C/o burning with urination some chills, urgency, and frequency. She finished the azithromycin and doxycycline. She is taking the ditropan. She has not been able start elmiron because it needed a PA. She is scheduled for renal US on 05/12/2022. Is there anything else she needs to do? Please advise.  Thank you

## 2022-04-18 NOTE — TELEPHONE ENCOUNTER
Restart Elmiron when authorized  ER for uncontrolled Sx  Can also forward to Dr Olivia Baird for additional recs

## 2022-04-27 ENCOUNTER — NURSE ONLY (OUTPATIENT)
Dept: ALLERGY | Age: 62
End: 2022-04-27
Payer: MEDICAID

## 2022-04-27 VITALS
HEART RATE: 90 BPM | TEMPERATURE: 97.3 F | OXYGEN SATURATION: 95 % | RESPIRATION RATE: 18 BRPM | DIASTOLIC BLOOD PRESSURE: 78 MMHG | SYSTOLIC BLOOD PRESSURE: 122 MMHG

## 2022-04-27 DIAGNOSIS — J30.1 ALLERGY TO TREES: Primary | ICD-10-CM

## 2022-04-27 DIAGNOSIS — Z51.6 ENCOUNTER FOR DESENSITIZATION TO ALLERGENS: ICD-10-CM

## 2022-04-27 DIAGNOSIS — Z91.048 ALLERGY TO MOLD: ICD-10-CM

## 2022-04-27 PROCEDURE — 95117 IMMUNOTHERAPY INJECTIONS: CPT | Performed by: NURSE PRACTITIONER

## 2022-04-27 NOTE — PROGRESS NOTES
After consent obtained/verified, allergy injection given in back of R/L arm(s). VIAL COLOR OF ALL VIALS TODAY IS red    ALLERGY INJECTION FROM VIAL A GIVEN right  UPPER ARM IN THE AMOUNT OF 0.35 ML    ALLERGY INJECTION FROM VIAL B GIVEN left upper ARM IN THE AMOUNT OF 0.35 ML      Documentation of vial injection specific to arm(s) noted on Allergy Immunotherapy Administration Form. Patient waited 30 minutes for observation. No      Patient tolerated well without adverse reaction WHILE IN OFFICE    SHOT REACTION TREATMENT INSTRUCTIONS    During the 30 minute wait after an allergy injection the following symptoms should be reported:    Itching other than at the injection site  Hives or swelling other than at the injection site  Redness other than at the injection site  Difficulty breathing  Chest tightness  Difficulty swallowing  Throat tightness    If these symptoms occur, NOTIFY PROVIDER and the following treatment should be administered:    1. Epinephrine/Auvi Q 1:1000 IM - 0.3 ml if > 66 lbs or more, 0.15 ml if 33 - 63 lbs, or 0.1 ml if <33 lbs     2. Diphenhydramine - give all intramuscular:     2 to <6 years (off-label use): 6.25 mg,    6 to <12 years: 12.5 to 25 mg;    ?12 years: 25-50 mg.    3.  Famotidine:  Adults 40 mg oral    Adolescents age 12 years and >88 lbs: 40 mg    Children and Adolescents ? 12years of age: Initial: 0.25 mg/kg/dose  every 12 hours (maximum daily dose: 40 mg/day)    Epi/Auvi Q dose may me repeated in 5-15 minutes if adequate resolution of symptoms does not occur    Patient should be observed for at least one hour after final Epi/Auvi Q dose and must be seen by provider. Patients cannot drive themselves if they have received diphenhydramine.

## 2022-05-04 ENCOUNTER — PATIENT MESSAGE (OUTPATIENT)
Dept: ALLERGY | Age: 62
End: 2022-05-04

## 2022-05-04 DIAGNOSIS — J30.9 CHRONIC ALLERGIC RHINITIS: Primary | ICD-10-CM

## 2022-05-04 DIAGNOSIS — Z29.8 IMMUNOTHERAPY: ICD-10-CM

## 2022-05-04 PROCEDURE — 95165 ANTIGEN THERAPY SERVICES: CPT | Performed by: NURSE PRACTITIONER

## 2022-05-04 NOTE — PROGRESS NOTES
ALLERGY EXTRACT MAINTENANCE MIXING RED VIAL ONLY    Current vials will be expiring    Provider onsite during allergy extract preparation. TOTAL VIALS=  2  TOTAL DOSES PER VIAL = 20  TOTAL ANTICIPATED DOSES PREPARED = 40       An allergy extract maintenance solution was prepared in red vial according to the maintenance prescription. The refrigerator shelf-life for each red vial is 12 months or as indicated on the label. Patient vials were labeled with name, date-of-birth, vial number, concentration, and expiration. When injecting from a new maintenance vial, the first injections should be three doses below the previous maintenance dose. This is done because the refill may be slightly stronger than the vial that was just emptied. (Example: if the maintenance dose is 0.5, start at 0.35 and proceed to 0.4, 0.45, 0.5). The three build up injections may be given weekly until the maintenance dosage is reached again. Then every other week injections may be resumed. Patients on maintenance should be seen by the allergy provider every 6-12 months and as needed. The injection record and serum is reviewed and documented at every injection appointment. When down to the last 1/3 of the bottle, a maintenance refill will be prepared (pending patient is without reactions) for next refill. Patients requesting refills that receive injections at outside medical facilities must include the patient's demographic sheet, current insurance information and the injection records. Allow 2-3 weeks for delivery after the refill has been requested. PROTOCOL FOR LATE INJECTIONS  Days late determined from the due date of the injection    Shot Frequency 5-10 Days Late 11-16 Days Late 17-30 Days Late 31-60 Days Late 61+ Days Late   Twice Weekly Repeat last dose Reduce last dose . 2 Reduce last dose . 4 Dilute back 1 vial, start at .05 Patient must start over     Weekly Repeat last dose Reduce last dose . 2 Reduce last dose .4 Dilute back 1 vial, start at .05 Patient must start over   Every 2 Weeks Repeat last dose Reduce last dose . 2. Reduce last dose . 4 Ask provider for instruction Ask provider for instruction   Every 3 Weeks Repeat last dose Reduce last dose . 2 Reduce last dose . 4 Ask provider for instruction Ask provider for instruction   Every 4 Weeks Repeat last dose Reduce last dose . 2 Reduce last dose . 4 Ask provider for instruction Ask provider for instruction

## 2022-05-09 ENCOUNTER — NURSE ONLY (OUTPATIENT)
Dept: ALLERGY | Age: 62
End: 2022-05-09
Payer: MEDICAID

## 2022-05-09 VITALS
DIASTOLIC BLOOD PRESSURE: 84 MMHG | SYSTOLIC BLOOD PRESSURE: 120 MMHG | OXYGEN SATURATION: 96 % | HEART RATE: 95 BPM | TEMPERATURE: 97.7 F

## 2022-05-09 DIAGNOSIS — J30.1 ALLERGY TO TREES: Primary | ICD-10-CM

## 2022-05-09 DIAGNOSIS — Z91.048 ALLERGY TO MOLD: ICD-10-CM

## 2022-05-09 DIAGNOSIS — Z51.6 ENCOUNTER FOR DESENSITIZATION TO ALLERGENS: ICD-10-CM

## 2022-05-09 PROCEDURE — 95117 IMMUNOTHERAPY INJECTIONS: CPT | Performed by: NURSE PRACTITIONER

## 2022-05-09 RX ORDER — PENTOSAN POLYSULFATE SODIUM 100 MG/1
100 CAPSULE, GELATIN COATED ORAL 2 TIMES DAILY
COMMUNITY
End: 2022-05-19 | Stop reason: SDUPTHER

## 2022-05-09 NOTE — PROGRESS NOTES
After consent obtained/verified, allergy injection given in back of R/L arm(s). VIAL COLOR OF ALL VIALS TODAY IS RED MAINTENANCE    ALLERGY INJECTION FROM VIAL A GIVEN left  UPPER ARM IN THE AMOUNT OF 0.35 ML    ALLERGY INJECTION FROM VIAL B GIVEN right upper ARM IN THE AMOUNT OF 0.35 ML    Documentation of vial injection specific to arm(s) noted on Allergy Immunotherapy Administration Form. Patient waited 30 minutes for observation. No      Patient tolerated well without adverse reaction WHILE IN OFFICE    SHOT REACTION TREATMENT INSTRUCTIONS    During the 30 minute wait after an allergy injection the following symptoms should be reported:    Itching other than at the injection site  Hives or swelling other than at the injection site  Redness other than at the injection site  Difficulty breathing  Chest tightness  Difficulty swallowing  Throat tightness    If these symptoms occur, NOTIFY PROVIDER and the following treatment should be administered:    1. Epinephrine/Auvi Q 1:1000 IM - 0.3 ml if > 66 lbs or more, 0.15 ml if 33 - 63 lbs, or 0.1 ml if <33 lbs     2. Diphenhydramine - give all intramuscular:     2 to <6 years (off-label use): 6.25 mg,    6 to <12 years: 12.5 to 25 mg;    ?12 years: 25-50 mg.    3.  Famotidine:  Adults 40 mg oral    Adolescents age 12 years and >88 lbs: 40 mg    Children and Adolescents ? 12years of age: Initial: 0.25 mg/kg/dose  every 12 hours (maximum daily dose: 40 mg/day)    Epi/Auvi Q dose may me repeated in 5-15 minutes if adequate resolution of symptoms does not occur    Patient should be observed for at least one hour after final Epi/Auvi Q dose and must be seen by provider. Patients cannot drive themselves if they have received diphenhydramine.

## 2022-05-11 DIAGNOSIS — L85.3 XEROSIS OF SKIN: ICD-10-CM

## 2022-05-11 RX ORDER — MONTELUKAST SODIUM 10 MG/1
10 TABLET ORAL NIGHTLY
Qty: 30 TABLET | Refills: 5 | Status: SHIPPED | OUTPATIENT
Start: 2022-05-11

## 2022-05-11 RX ORDER — HYDROCORTISONE 25 MG/ML
LOTION TOPICAL
Qty: 118 ML | Refills: 0 | Status: SHIPPED | OUTPATIENT
Start: 2022-05-11 | End: 2022-07-11

## 2022-05-11 RX ORDER — MONTELUKAST SODIUM 10 MG/1
10 TABLET ORAL NIGHTLY
Qty: 30 TABLET | Refills: 0 | OUTPATIENT
Start: 2022-05-11

## 2022-05-12 ENCOUNTER — HOSPITAL ENCOUNTER (OUTPATIENT)
Dept: ULTRASOUND IMAGING | Age: 62
Discharge: HOME | End: 2022-05-12
Payer: MEDICAID

## 2022-05-12 ENCOUNTER — HOSPITAL ENCOUNTER (OUTPATIENT)
Dept: GENERAL RADIOLOGY | Age: 62
Discharge: HOME | End: 2022-05-12
Payer: MEDICAID

## 2022-05-12 DIAGNOSIS — R10.9 FLANK PAIN: ICD-10-CM

## 2022-05-12 DIAGNOSIS — J82.83 EOSINOPHILIC ASTHMA: Primary | ICD-10-CM

## 2022-05-12 PROCEDURE — 76770 US EXAM ABDO BACK WALL COMP: CPT

## 2022-05-12 PROCEDURE — 74018 RADEX ABDOMEN 1 VIEW: CPT

## 2022-05-16 ENCOUNTER — NURSE ONLY (OUTPATIENT)
Dept: ALLERGY | Age: 62
End: 2022-05-16
Payer: MEDICAID

## 2022-05-16 VITALS
HEART RATE: 88 BPM | DIASTOLIC BLOOD PRESSURE: 76 MMHG | TEMPERATURE: 98.1 F | OXYGEN SATURATION: 96 % | SYSTOLIC BLOOD PRESSURE: 110 MMHG

## 2022-05-16 DIAGNOSIS — Z51.6 ENCOUNTER FOR DESENSITIZATION TO ALLERGENS: Primary | ICD-10-CM

## 2022-05-16 DIAGNOSIS — Z91.048 ALLERGY TO MOLD: ICD-10-CM

## 2022-05-16 DIAGNOSIS — J30.1 ALLERGY TO TREES: ICD-10-CM

## 2022-05-16 PROCEDURE — 95117 IMMUNOTHERAPY INJECTIONS: CPT | Performed by: NURSE PRACTITIONER

## 2022-05-16 NOTE — PROGRESS NOTES
After consent obtained/verified, allergy injection given in back of R/L arm(s). VIAL COLOR OF ALL VIALS TODAY IS RED    ALLERGY INJECTION FROM VIAL A GIVEN right  UPPER ARM IN THE AMOUNT OF 0.40 ML    ALLERGY INJECTION FROM VIAL B GIVEN left upper ARM IN THE AMOUNT OF 0.40 ML          Documentation of vial injection specific to arm(s) noted on Allergy Immunotherapy Administration Form. Patient waited 30 minutes for observation. No      Patient tolerated well without adverse reaction WHILE IN OFFICE    SHOT REACTION TREATMENT INSTRUCTIONS    During the 30 minute wait after an allergy injection the following symptoms should be reported:    Itching other than at the injection site  Hives or swelling other than at the injection site  Redness other than at the injection site  Difficulty breathing  Chest tightness  Difficulty swallowing  Throat tightness    If these symptoms occur, NOTIFY PROVIDER and the following treatment should be administered:    1. Epinephrine/Auvi Q 1:1000 IM - 0.3 ml if > 66 lbs or more, 0.15 ml if 33 - 63 lbs, or 0.1 ml if <33 lbs     2. Diphenhydramine - give all intramuscular:     2 to <6 years (off-label use): 6.25 mg,    6 to <12 years: 12.5 to 25 mg;    ?12 years: 25-50 mg.    3.  Famotidine:  Adults 40 mg oral    Adolescents age 12 years and >88 lbs: 40 mg    Children and Adolescents ? 12years of age: Initial: 0.25 mg/kg/dose  every 12 hours (maximum daily dose: 40 mg/day)    Epi/Auvi Q dose may me repeated in 5-15 minutes if adequate resolution of symptoms does not occur    Patient should be observed for at least one hour after final Epi/Auvi Q dose and must be seen by provider. Patients cannot drive themselves if they have received diphenhydramine.

## 2022-05-16 NOTE — H&P
Right clavicle mass felt on physical exam, patient can be scheduled for imaging with possible biopsy per Dr. Gray.   i    [facility] [place] [facility]  [facility] EAR, NOSE AND THROAT  [facility]  Dept: [phone]  Dept Fax: [phone]  Loc: Tony Ann is a 56 y.o. female who was referred byNo ref. provider found for:  Chief Complaint   Patient presents with    Follow-up     Patient is here for CT neck review. Sigridkaya Bealfei HPI:     Ashley [place] is a 56 y.o. female who presents today for CT neck review. CT Soft Tissue Neck:  1. Motion artifact at the level of the vocal cords. No gross abnormality is noted. No paraglottic abnormality is present. 2. 1.1 cm nodule in the right lower neck. The differential diagnosis includes a mildly enlarged lymph node and a small parathyroid adenoma. 3. Enlarged right lacrimal gland. Clinical correlation is recommended.                   **This report has been created using voice recognition software. It may contain minor errors which are inherent in voice recognition technology. **       Final report electronically signed by Dr. Lis Caban on 2/5/2021      Her right ear is hurting. It hurts more when she talks. The cold air hurts her ear as well. She puts cotton in her ear to help it. She states the hoarseness and voice change is the same as the last time but she now notices the hoarseness more  that she clears her throat. She is trying to cough and not clear her throat. Her nose hurts. She has facial pain and she has sinus issues. Has a deviated septum. She does not have any kidney stone issues, no calcium. She takes vitamin D 3. She quit smoking post talking with me. She had a shunt put in by Dr. Ronald Dominguez. History:      Allergies   Allergen Reactions    Bactrim [Sulfamethoxazole-Trimethoprim]     Flexeril [Cyclobenzaprine]     Morphine Other (See Comments)     \"I hallucinate\"    Peanut-Containing Drug Products     Tessalon [Benzonatate] Hives    Amoxicillin-Pot Clavulanate Rash  Ibuprofen [Ibuprofen] Rash    Lisinopril Rash    Macrobid [Nitrofurantoin Monohydrate Macrocrystals] Rash    Macrodantin [Nitrofurantoin] Rash    Ranitidine Rash     Current Outpatient Medications   Medication Sig Dispense Refill    gabapentin (NEURONTIN) 100 MG capsule Take 100 mg by mouth 4 times daily.  nicotine (NICODERM CQ) 21 MG/24HR Place 1 patch onto the skin daily for 28 days 28 patch 0    nicotine (NICOTROL) 10 MG inhaler Inhale 1 puff into the lungs as needed for Smoking cessation 1 Inhaler 3    amLODIPine (NORVASC) 5 MG tablet TAKE 1 TABLET BY MOUTH ONCE DAILY 90 tablet 3    hydrocortisone 2.5 % cream APPLY TOPICALLY TO AFFECTED AREA TWICE DAILY      hydrocortisone (HYTONE) 2.5 % lotion APPLY TOPICALLY TO AFFECTED AREA DAILY 59 mL 0    omalizumab (OMALIZUMAB) 150 MG injection Inject 300 mg into the skin every 28 days 1 each 11    traZODone (DESYREL) 50 MG tablet Take 50 mg by mouth nightly as needed for Sleep       azelastine (OPTIVAR) 0.05 % ophthalmic solution Place 1 drop into both eyes 2 times daily 1 Bottle 11    azelastine (ASTELIN) 0.1 % nasal spray 1 spray by Nasal route 2 times daily Use in each nostril as directed 1 Bottle 11    SYMBICORT 160-4.5 MCG/ACT AERO INHALE 2 PUFFS BY MOUTH TWICE DAILY. RINSE MOUTH WELL AFTER USE. 11 g 5    albuterol sulfate  (90 Base) MCG/ACT inhaler Inhale 2 puffs into the lungs every 4 hours as needed for Wheezing (AS NEEDED) 9 g 5    fexofenadine (ALLEGRA) 180 MG tablet Take 1 tablet by mouth daily 90 tablet 3    SYNTHROID 88 MCG tablet TAKE 1 TABLET BY MOUTH ONCE DAILY ON AN EMPTY STOMACH WITH WATER. DO NOT EAT OR TAKE ANY OTHER MEDICATIONS FOR 30 MINUTES.  30 tablet 11    hyoscyamine (LEVBID) 375 MCG extended release tablet TAKE 1/2 TABLET BY MOUTH TWICE DAILY 30 tablet 11    Cholecalciferol (VITAMIN D3) 50 MCG (2000 UT) CAPS Vitamin D3 2,000 unit capsule 30 capsule 11  albuterol (PROVENTIL) (2.5 MG/3ML) 0.083% nebulizer solution USE 1 VIAL IN NEBULIZER EVERY 6 HOURS AS NEEDED FOR WHEEZING (FOR ASTHMA) 375 mL 5    Biotin 1 MG CAPS Take 1 capsule by mouth daily       metFORMIN (GLUCOPHAGE) 500 MG tablet TAKE 1 TABLET BY MOUTH TWICE DAILY WITH MEALS 180 tablet 3    atorvastatin (LIPITOR) 10 MG tablet Take 1 tablet by mouth once daily 90 tablet 3    esomeprazole (NEXIUM) 40 MG delayed release capsule Take 1 capsule by mouth 2 times daily 180 capsule 3    clobetasol (TEMOVATE) 0.05 % external solution Apply daily to scaly or itchy areas on scalp 60 mL 11    ketoconazole (NIZORAL) 2 % shampoo Apply 3-4 times weekly to scalp, leave on for five minutes prior to washing off 120 mL 11    fluocinonide (LIDEX) 0.05 % cream Apply topically 2 times daily to rash on hands a needed 30 g 11    levomilnacipran (FETZIMA) 40 MG CP24 extended release capsule Take 1 capsule by mouth daily 30 capsule 2    Multiple Vitamins-Minerals (MULTIVITAMIN PO) 1 tablet daily       Probiotic Product (PROBIOTIC PO) 1 tablet daily       Cyanocobalamin (B-12 PO) daily      EPIPEN 2-VENESSA 0.3 MG/0.3ML SOAJ injection INJECT 1 PEN IM AS A SINGLE DOSE PRN  0    SF 5000 PLUS 1.1 % CREA BRUSH TEETH TWICE DAILY FOR 2 MINUTES, SPIT EXCESS AFTER BRUSHING. DO NOT RINSE WITH WATER , WAIT 30 MINUTES  FOR ANY FOOD OR DRINK  3    ondansetron (ZOFRAN) 4 MG tablet TAKE 1 TABLET BY MOUTH EVERY 8 HOURS AS NEEDED FOR NAUSEA OR VOMITING 15 tablet 0    meloxicam (MOBIC) 7.5 MG tablet TK 1 T PO BID  4    LINZESS 290 MCG CAPS capsule 290 mcg every morning (before breakfast)       lidocaine (XYLOCAINE) 5 % ointment Apply topically as needed for Pain Apply topically as needed.  conjugated estrogens (PREMARIN) 0.625 MG/GM vaginal cream Place vaginally as needed Place vaginally daily.  lidocaine (LIDODERM) 5 % Place 1 patch onto the skin daily 12 hours on, 12 hours off.  hydrocortisone (ANUSOL-HC) 25 MG suppository Place 25 mg rectally as needed for Hemorrhoids      fluticasone (FLONASE ALLERGY RELIEF) 50 MCG/ACT nasal spray 1 spray by Nasal route daily      HYDROcodone-acetaminophen (NORCO) 5-325 MG per tablet 1 tablet nightly as needed for Pain. Take 1 tablet every 4-6 hours as needed for pain       NONFORMULARY Immunotherapy allergy shot      zinc 50 MG CAPS Take 1 capsule by mouth daily       Ascorbic Acid (VITAMIN C) 500 MG CAPS Take 1 tablet by mouth daily       orphenadrine (NORFLEX) 100 MG tablet Take 100 mg by mouth 2 times daily as needed.  tramadol (ULTRAM) 50 MG tablet Take 50 mg by mouth every 6 hours as needed. Take 1-2 tabs every 6 hrs prn       Respiratory Therapy Supplies (NEBULIZER COMPRESSOR) KIT 1 kit by Does not apply route once for 1 dose 360 kit 0     No current facility-administered medications for this visit.       Past Medical History:   Diagnosis Date    Allergic rhinitis     Anxiety     Arnold-Chiari malformation (HCC)     Asthma     Chronic bronchitis (HCC)     Fibromyalgia     GERD (gastroesophageal reflux disease)     Headache(784.0)     Hyperlipidemia     Neuropathy     Osteoarthritis     Sinusitis     Type II or unspecified type diabetes mellitus without mention of complication, not stated as uncontrolled       Past Surgical History:   Procedure Laterality Date    APPENDECTOMY       SECTION      x2    COLONOSCOPY  [id number]    CSF SHUNT  2007    Cervical syrinx to subarachnoid shunt; thoracic syrinx to subarachnoid shunt (2 separate dural openings)    CYST REMOVAL      extradural cysts at thoracic 2-3    ECTOPIC PREGNANCY SURGERY      HEMICOLECTOMY      HERNIA REPAIR      inguinal     HYSTERECTOMY      SPINAL CORD DECOMPRESSION  2007    C4-5-6-7; T1-2-3 laminectomies    TOOTH EXTRACTION  2017     Family History   Problem Relation Age of Onset    Cancer Other         colon Hematological: Negative for adenopathy. Does not bruise/bleed easily. Psychiatric/Behavioral: Negative for confusion and sleep disturbance. The patient is not nervous/anxious. Objective:     /82 (Site: Left Upper Arm)   Pulse 80   Temp 97.3 °F (36.3 °C) (Infrared)   Resp 16   Ht 5' 4\" (1.626 m)   Wt 200 lb (90.7 kg)   BMI 34.33 kg/m²     Physical Exam  Vitals signs and nursing note reviewed. Constitutional:       Appearance: She is well-developed. She is obese. HENT:      Head: Normocephalic and atraumatic. No laceration. Comments:        Right Ear: Hearing, ear canal and external ear normal. No drainage or swelling. No middle ear effusion. Tympanic membrane is not perforated or erythematous. Left Ear: Hearing, tympanic membrane, ear canal and external ear normal. No drainage or swelling. No middle ear effusion. Tympanic membrane is not perforated or erythematous. Nose: Nose normal. No septal deviation, mucosal edema or rhinorrhea. Mouth/Throat:      Mouth: Mucous membranes are not pale and not dry. No oral lesions. Dentition: Abnormal dentition ( Edentulous maxilla, all lower molars are absent). Pharynx: Oropharynx is clear. Uvula midline. No oropharyngeal exudate or posterior oropharyngeal erythema. Comments: LIps: lips normal     Mallampati 1  Base of tongue: symmetric,  Voice is slightly gravelly  Eyes:      Extraocular Movements:      Right eye: Normal extraocular motion and no nystagmus. Left eye: Normal extraocular motion and no nystagmus. Comments: Conjugate gaze   Neck:      Musculoskeletal: Spinous process tenderness ( Transverse process C2) present. No neck rigidity ( But, there is somewhat limited neck extension). Thyroid: No thyroid mass or thyromegaly. Trachea: Phonation normal. No tracheal deviation.       Comments:   Salivary glands not enlarged and normal to palpation She has mild tenderness at the right mastoid tip, transverse process of C2, and right TMJ. Her crepitus of the right TMJ has improved significantly. Cardiovascular:      Rate and Rhythm: Normal rate and regular rhythm. Heart sounds: No murmur. Pulmonary:      Effort: Pulmonary effort is normal. No retractions. Breath sounds: Normal breath sounds. No stridor. Chest:      Chest wall: There is no dullness to percussion. Neurological:      Mental Status: She is alert and oriented to person, place, and time. Cranial Nerves: No cranial nerve deficit (VIIth N function intact bilat). Psychiatric:         Mood and Affect: Mood and affect normal.         Behavior: Behavior is cooperative. Data:  All of the past medical history, past surgical history, family history,social history, allergies and current medications were reviewed with the patient. Assessment & Plan   Diagnoses and all orders for this visit:     Diagnosis Orders   1. Vocal cord mass right  [place] LARYNGOSCOPY,DIRCT,OP SCOPE,BIOPSY    US HEAD NECK SOFT TISSUE THYROID   2. Referred otalgia of right ear  [place] LARYNGOSCOPY,DIRCT,OP SCOPE,BIOPSY   3. Gastroesophageal reflux disease, unspecified whether esophagitis present     4. Status post spinal surgery     5.  (ventriculoperitoneal) shunt status     6. Tobacco dependence     7. Hoarse  US HEAD NECK SOFT TISSUE THYROID   8. Mass of right side of neck  US HEAD NECK SOFT TISSUE THYROID       The findings were explained and her questions were answered. Options were discussed including ordering a microlaryngoscopy & biopsy and Ultrasound of neck. She agreed. Benefits and risks are discussed, along with alternatives, and their questions were answered. No guarantees were made. They request we proceed.      Return after testing I, Satinder Yen CMA ([facility]), am scribing for, and in the presence of Dr. Justin Rebolledo. Electronically signed by Aida Martinez CMA ([facility]) on 2/19/21 at 9:42 AM EST. (Please note that portions of this note were completed with a voice recognition program. Efforts were made to edit the dictations butoccasionally words are mis-transcribed.)    I agree to the above documentation placed by my scribe. I have personally evaluated this patient. Additional findings are as noted. I reviewed the scribe's note and agree with the documented findings and plan of care. Any areas of disagreement are corrected. I agree with the chief complaint, past medical history, past surgical history, allergies, medications, social and family history as documented unless otherwise noted below.      Electronically signed by Letty Garcia MD on 2/23/2021 at 8:23 PM

## 2022-05-17 NOTE — TELEPHONE ENCOUNTER
Called pt to follow up with Leyla. Pt states [facility] will be calling her back and they are verifying her insurance at this time. Pt states she got a new insurance card but it is the same number. Pt states she will reach out to the office when she hears back from [address] [facility]. Pt states she needs nothing else at this time.

## 2022-05-19 ENCOUNTER — OFFICE VISIT (OUTPATIENT)
Dept: UROLOGY | Age: 62
End: 2022-05-19
Payer: MEDICAID

## 2022-05-19 VITALS
BODY MASS INDEX: 36.54 KG/M2 | SYSTOLIC BLOOD PRESSURE: 122 MMHG | DIASTOLIC BLOOD PRESSURE: 78 MMHG | HEIGHT: 64 IN | WEIGHT: 214 LBS

## 2022-05-19 DIAGNOSIS — N28.1 RENAL CYST: ICD-10-CM

## 2022-05-19 DIAGNOSIS — R31.9 HEMATURIA, UNSPECIFIED TYPE: ICD-10-CM

## 2022-05-19 DIAGNOSIS — R10.9 FLANK PAIN: Primary | ICD-10-CM

## 2022-05-19 DIAGNOSIS — N30.10 INTERSTITIAL CYSTITIS: ICD-10-CM

## 2022-05-19 DIAGNOSIS — R35.0 URINARY FREQUENCY: ICD-10-CM

## 2022-05-19 DIAGNOSIS — R39.15 URINARY URGENCY: ICD-10-CM

## 2022-05-19 LAB
BILIRUBIN URINE: NEGATIVE
BLOOD URINE, POC: NORMAL
CHARACTER, URINE: CLEAR
COLOR, URINE: YELLOW
GLUCOSE URINE: NEGATIVE MG/DL
KETONES, URINE: NEGATIVE
LEUKOCYTE CLUMPS, URINE: NEGATIVE
NITRITE, URINE: NEGATIVE
PH, URINE: 6 (ref 5–9)
PROTEIN, URINE: NEGATIVE MG/DL
SPECIFIC GRAVITY, URINE: 1.01 (ref 1–1.03)
UROBILINOGEN, URINE: 0.2 EU/DL (ref 0–1)

## 2022-05-19 PROCEDURE — 99214 OFFICE O/P EST MOD 30 MIN: CPT | Performed by: UROLOGY

## 2022-05-19 RX ORDER — PENTOSAN POLYSULFATE SODIUM 100 MG/1
100 CAPSULE, GELATIN COATED ORAL 2 TIMES DAILY
Qty: 180 CAPSULE | Refills: 2 | Status: SHIPPED | OUTPATIENT
Start: 2022-05-19 | End: 2022-08-17

## 2022-05-19 RX ORDER — OXYBUTYNIN CHLORIDE 5 MG/1
5 TABLET, EXTENDED RELEASE ORAL DAILY
Qty: 90 TABLET | Refills: 2 | Status: SHIPPED | OUTPATIENT
Start: 2022-05-19 | End: 2022-08-17

## 2022-05-19 NOTE — PROGRESS NOTES
Harrison Barthel, MD  Urology Clinic office visit      Patient:  Vicky Gilliam  YOB: 1960  Date: 5/19/2022    HISTORY OF PRESENT ILLNESS:   The patient is a 59 y.o. female who presents today for evaluation of the following problems:      1. Flank pain    2. Hematuria, unspecified type    3. Interstitial cystitis    4. Urinary urgency    5. Urinary frequency    6. Renal cyst         Overall the problem(s) : are worsening. Associated Symptoms: No dysuria, gross hematuria. Pain Severity:      Summary of old records: hx of IC controlled with Elmiron  Hx of yeast infections and atrophic vaginitis  (Patient's old records, notes and chart reviewed and summarized above.)      Onset months  Recently had right flank pain, reports if drinks more water it helps with the pain  meloxicam helps as well  Sounds arthritis given that it is only in the morning, and gets better     on pyridium and oxybutynin  Trace blood on UA    I independently reviewed and verified the images and reports from:    XR ABDOMEN (KUB) (SINGLE AP VIEW)  Result Date: 5/12/2022  Nonobstructive bowel gas pattern. US RENAL COMPLETE    Result Date: 5/12/2022  PROCEDURE: US RENAL COMPLETE CLINICAL INFORMATION: Flank pain TECHNIQUE: Ultrasound of the kidneys and urinary bladder was performed. A scale and color images were obtained. COMPARISON: None FINDINGS: The right kidney measures 11.3 x 5.1 x 6.4 cm and the left kidney measures 10.2 x 5.2 x 5.0 cm. Renal cortical thickness is normal bilaterally. An avascular anechoic structure at the right lower kidney measures 1.1 cm. There is no hydronephrosis or renal calculi on either side. Color Doppler demonstrates expected wave forms in the bilateral renal arteries. Arcuate resistive indices are at the upper limits of normal bilaterally. The distended urinary bladder is unremarkable. There is a moderate amount of postvoid residual urine in the bladder with a post void volume of 64 mL.      1. Probable right renal cyst. 2. Moderate amount of postvoid residual urine but otherwise unremarkable urinary bladder. Urinalysis today:  Results for POC orders placed in visit on 05/19/22   POCT Urinalysis No Micro (Auto)   Result Value Ref Range    Glucose, Ur Negative NEGATIVE mg/dl    Bilirubin Urine Negative     Ketones, Urine Negative NEGATIVE    Specific Gravity, Urine 1.010 1.002 - 1.030    Blood, UA POC Trace-intact NEGATIVE    pH, Urine 6.00 5.0 - 9.0    Protein, Urine Negative NEGATIVE mg/dl    Urobilinogen, Urine 0.20 0.0 - 1.0 eu/dl    Nitrite, Urine Negative NEGATIVE    Leukocyte Clumps, Urine Negative NEGATIVE    Color, Urine Yellow [id number]    Character, Urine Clear [id number]       Last BUN and creatinine:  Lab Results   Component Value Date    BUN 11 04/02/2022     Lab Results   Component Value Date    CREATININE 0.7 04/02/2022       Imaging Reviewed during this Office Visit:   (results were independently reviewed by physician and radiology report verified)  I independently reviewed and verified the images and reports from:    XR CHEST (2 VW)    Result Date: 4/2/2022  PROCEDURE: XR CHEST (2 VW) CLINICAL INFORMATION: Infection. COMPARISON: 9/5/2021 TECHNIQUE: PA and lateral views the chest. FINDINGS: The heart size is normal.  The mediastinum is not widened. There are no pulmonary infiltrates or effusions. The pulmonary vascularity is normal. No suspicious osseous lesions are present. EKG leads overlie the chest.     Stable radiographic appearance of the chest. No evidence of an acute process. **This report has been created using voice recognition software. It may contain minor errors which are inherent in voice recognition technology. ** Final report electronically signed by Dr. Dalton Payne on 4/2/2022 12:49 PM        PAST MEDICAL, FAMILY AND SOCIAL HISTORY:  Past Medical History:   Diagnosis Date    Allergic rhinitis     Anxiety     [facility]Chiari malformation ([facility] [address]     Asthma  Chronic bronchitis (HCC)     Fibromyalgia     GERD (gastroesophageal reflux disease)     Headache(784.0)     Hyperlipidemia     Neuropathy     Osteoarthritis     Sinusitis     Type II or unspecified type diabetes mellitus without mention of complication, not stated as uncontrolled      Past Surgical History:   Procedure Laterality Date    APPENDECTOMY       SECTION      x2    COLONOSCOPY  2010    CSF SHUNT  2007    Cervical syrinx to subarachnoid shunt; thoracic syrinx to subarachnoid shunt (2 separate dural openings)    CYST REMOVAL      extradural cysts at thoracic 2-3    ECTOPIC PREGNANCY SURGERY      HEMICOLECTOMY      HERNIA REPAIR      inguinal     HYSTERECTOMY      LARYNGOSCOPY N/A 2021    MICROLARYNGOSCOPY AND BIOPSY   performed by Noah Mendoza MD at [place] [address]  2020    Bilateral great toe symes amputation,Bilateral toes 1-5 matrixectomy by [address] [place] [address] DECOMPRESSION  2007    C4-5-6-7; T1-2-3 laminectomies    TOOTH EXTRACTION  2017     Family History   Problem Relation Age of Onset    Cancer Other         colon    Mental Illness Other     High Blood Pressure Other     Diabetes Other     High Blood Pressure Sister      Outpatient Medications Marked as Taking for the 22 encounter (Office Visit) with Seema Fierro MD   Medication Sig Dispense Refill    oxybutynin (DITROPAN XL) 5 MG extended release tablet Take 1 tablet by mouth daily 90 tablet 2    pentosan polysulfate (ELMIRON) 100 MG capsule Take 1 capsule by mouth 2 times daily 180 capsule 2    omalizumab (XOLAIR) 150 MG/ML SOSY injection Inject 300 mg into the skin every 28 days Administer 300 mg every 28 days PREFILLED SYRINGE.  MEDICATION MUST BE MAILED TO PROVIDERS OFFICE 2 each 5    hydrocortisone (HYTONE) 2.5 % lotion APPLY  LOTION TOPICALLY TO AFFECTED AREA ONCE DAILY 118 mL 0    montelukast (SINGULAIR) 10 MG tablet Take 1 tablet by mouth nightly For allergies 30 tablet 5    doxycycline monohydrate (MONODOX) 100 MG capsule Take 1 capsule by mouth 2 times daily 14 capsule 0    ketoconazole (NIZORAL) 2 % shampoo APPLY 3-4 TIMES WEEKLY TO SCALP, LEAVE ON FOR 5 MINUTES PRIOR TO WASHING  mL 2    loratadine (CLARITIN) 10 MG tablet Take 1 tablet by mouth daily 90 tablet 3    albuterol sulfate  (90 Base) MCG/ACT inhaler RESCUE INHALER CARRY IN PURSE -  AS NEEDED - INHALE 2 PUFFS INTO LUNGS EVERY 4 HOURS AS NEEDED FOR WHEEZING 18 g 2    albuterol (PROVENTIL) (2.5 MG/3ML) 0.083% nebulizer solution USE 1 VIAL IN NEBULIZER EVERY 6 HOURS AS NEEDED FOR WHEEZING (FOR ASTHMA) 375 mL 5    budesonide-formoterol (SYMBICORT) 160-4.5 MCG/ACT AERO USE EVERY DAY - 2 puffs inhaled twice daily. Rinse mouth well after use. 1 each 11    Azelastine HCl 137 MCG/SPRAY SOLN USE 1 SPRAY(S) IN EACH NOSTRIL TWICE DAILY AS DIRECTED 30 mL 11    ketotifen (ZADITOR) 0.025 % ophthalmic solution One drop in each eye twice daily as needed 1 each 11    fluticasone (FLONASE) 50 MCG/ACT nasal spray 2 sprays by Each Nostril route daily 1 each 11    esomeprazole (NEXIUM) 40 MG delayed release capsule Take 1 capsule by mouth twice daily 180 capsule 0    hyoscyamine (ANASPAZ;LEVSIN) 125 MCG tablet Take 125 mcg by mouth every 4 hours as needed for Cramping      SYNTHROID 88 MCG tablet TAKE 1 TABLET BY MOUTH ONCE DAILY ON AN EMPTY STOMACH WITH WATER. DO NOT EAT OR TAKE ANY OTHER MEDICATIONS FOR 30 MINUTES.  90 tablet 3    metFORMIN (GLUCOPHAGE) 500 MG tablet TAKE 1 TABLET BY MOUTH TWICE DAILY WITH MEALS 180 tablet 3    atorvastatin (LIPITOR) 10 MG tablet Take 1 tablet by mouth once daily 90 tablet 3    Cholecalciferol (VITAMIN D3) 50 MCG (2000 UT) CAPS Take 1 capsule by mouth once daily 90 capsule 3    amLODIPine (NORVASC) 5 MG tablet Take 1 tablet by mouth once daily 90 tablet 3    triamcinolone acetonide (KENALOG) 10 MG/ML injection Inject 10 mg into the articular space once      estradiol (ESTRACE) 0.1 MG/GM vaginal cream INSERT 1 GRAM VAGINALLY 3 TIMES PER WEEK      EPIPEN 2-VENESSA 0.3 MG/0.3ML SOAJ injection INJECT 1 PEN IM AS A SINGLE DOSE PRN 1 each 1    fluconazole (DIFLUCAN) 150 MG tablet as needed (as need for yeast infection)       gabapentin (NEURONTIN) 100 MG capsule Take 100 mg by mouth 4 times daily.  traZODone (DESYREL) 50 MG tablet Take 50 mg by mouth nightly as needed for Sleep       Biotin 1 MG CAPS Take 1 capsule by mouth daily       clobetasol (TEMOVATE) 0.05 % external solution Apply daily to scaly or itchy areas on scalp 60 mL 11    fluocinonide (LIDEX) 0.05 % cream Apply topically 2 times daily to rash on hands a needed 30 g 11    levomilnacipran (FETZIMA) 40 MG CP24 extended release capsule Take 1 capsule by mouth daily 30 capsule 2    Multiple Vitamins-Minerals (MULTIVITAMIN PO) 1 tablet daily       Probiotic Product (PROBIOTIC PO) 1 tablet daily       Cyanocobalamin (B-12 PO) daily      ondansetron (ZOFRAN) 4 MG tablet TAKE 1 TABLET BY MOUTH EVERY 8 HOURS AS NEEDED FOR NAUSEA OR VOMITING 15 tablet 0    meloxicam (MOBIC) 7.5 MG tablet TK 1 T PO BID  4    LINZESS 290 MCG CAPS capsule 290 mcg every morning (before breakfast)       lidocaine (XYLOCAINE) 5 % ointment Apply topically as needed for Pain Apply topically as needed.  lidocaine (LIDODERM) 5 % Place 1 patch onto the skin daily 12 hours on, 12 hours off.  hydrocortisone (ANUSOL-HC) 25 MG suppository Place 25 mg rectally as needed for Hemorrhoids       HYDROcodone-acetaminophen (NORCO) 5-325 MG per tablet 1 tablet nightly as needed for Pain. Take 1 tablet every 4-6 hours as needed for pain       NONFORMULARY Immunotherapy allergy shot      zinc 50 MG CAPS Take 1 capsule by mouth daily       Ascorbic Acid (VITAMIN C) 500 MG CAPS Take 1 tablet by mouth daily       orphenadrine (NORFLEX) 100 MG tablet Take 100 mg by mouth 2 times daily as needed.         tramadol (ULTRAM) 50 MG tablet Take 50 mg by mouth every 6 hours as needed. Take 1-2 tabs every 6 hrs prn          Bactrim [sulfamethoxazole-trimethoprim], Flexeril [cyclobenzaprine], Morphine, Peanut-containing drug products, Tessalon [benzonatate], Amoxicillin-pot clavulanate, Cefdinir, Ibuprofen [ibuprofen], Lisinopril, Macrobid [nitrofurantoin monohydrate macrocrystals], Macrodantin [nitrofurantoin], and Ranitidine  Social History     Tobacco Use   Smoking Status Current Every Day Smoker    Packs/day: 0.20    Years: 25.00    Pack years: 5.00    Types: Cigarettes    Start date: 6/1/1975   Smokeless Tobacco Never Used   Tobacco Comment    smokes 5 cigs a day       Social History     Substance and Sexual Activity   Alcohol Use Not Currently    Alcohol/week: 2.0 standard drinks    Types: 2 Cans of beer per week    Comment: Patient drinks 1-2 beers a MONTH ; stomach tolerates very little alcohol       REVIEW OF SYSTEMS:  Constitutional: negative  Eyes: negative  Respiratory: negative  Cardiovascular: negative  Gastrointestinal: negative  Genitourinary: negative except for what is in HPI  Musculoskeletal: negative  Skin: negative   Neurological: negative  Hematological/Lymphatic: negative  Psychological: negative    Physical Exam:    This a 59 y.o. female      Vitals:    05/19/22 0958   BP: 122/78     Constitutional: Patient in no acute distress. Neuro: Alert and oriented to person, place and time. Psych: mood and affect normal        Assessment and Plan      1. Flank pain    2. Hematuria, unspecified type    3. Interstitial cystitis    4. Urinary urgency    5. Urinary frequency    6. Renal cyst           Plan:       Flank pain, likely to MSK/arthritis.  Reviewed KUB and renal US  Status post Azithro and Doxy, improved urethritis  Continue Ditropan 5 mg daily  Continue Elmiron  Increase fluids daily  Follow up 4-5 months  May need cystoscopy and pelvic exam in future         Prescriptions Ordered:  Orders Placed This Encounter   Medications    oxybutynin (DITROPAN XL) 5 MG extended release tablet     Sig: Take 1 tablet by mouth daily     Dispense:  90 tablet     Refill:  2    pentosan polysulfate (ELMIRON) 100 MG capsule     Sig: Take 1 capsule by mouth 2 times daily     Dispense:  180 capsule     Refill:  2      Orders Placed:  Orders Placed This Encounter   Procedures    poct post void residual     Bladder scan    POCT Urinalysis No Micro (Auto)     Ordered by an unspecified provider              Romero Bravo M.D, MD Harrison Barthel, MD [facility]

## 2022-05-23 ENCOUNTER — NURSE ONLY (OUTPATIENT)
Dept: ALLERGY | Age: 62
End: 2022-05-23
Payer: MEDICAID

## 2022-05-23 ENCOUNTER — TELEPHONE (OUTPATIENT)
Dept: ALLERGY | Age: 62
End: 2022-05-23

## 2022-05-23 VITALS
OXYGEN SATURATION: 98 % | HEART RATE: 84 BPM | RESPIRATION RATE: 16 BRPM | DIASTOLIC BLOOD PRESSURE: 78 MMHG | SYSTOLIC BLOOD PRESSURE: 128 MMHG | TEMPERATURE: 97 F

## 2022-05-23 DIAGNOSIS — Z51.6 ENCOUNTER FOR DESENSITIZATION TO ALLERGENS: Primary | ICD-10-CM

## 2022-05-23 DIAGNOSIS — Z91.048 ALLERGY TO MOLD: ICD-10-CM

## 2022-05-23 DIAGNOSIS — J30.1 ALLERGY TO TREES: ICD-10-CM

## 2022-05-23 PROCEDURE — 95117 IMMUNOTHERAPY INJECTIONS: CPT | Performed by: NURSE PRACTITIONER

## 2022-05-23 NOTE — TELEPHONE ENCOUNTER
Received call from [facility] [address] [place] [address] for delivery confirmation for xolair. Spoke with [facility] and confirmed delivery for 6/1/22. Stated pt has been behind on this medication due to account verification through [facility] and pt needs this medication for her appointment on 5/31/22 or as soon as possible. Informed [facility] pt was due on 5/18/22 for this. States they are putting in a request for this and will call back for another confirmation if they can deliver by 5/31/22.

## 2022-05-23 NOTE — PROGRESS NOTES
After consent obtained/verified, allergy injection given in back of R/L arm(s). VIAL COLOR OF ALL VIALS TODAY IS red    ALLERGY INJECTION FROM VIAL A GIVEN left  UPPER ARM IN THE AMOUNT OF 0.45 ML    ALLERGY INJECTION FROM VIAL B GIVEN right upper ARM IN THE AMOUNT OF 0.45 ML        Documentation of vial injection specific to arm(s) noted on Allergy Immunotherapy Administration Form. Patient waited 30 minutes for observation. No      Patient tolerated well without adverse reaction WHILE IN OFFICE    SHOT REACTION TREATMENT INSTRUCTIONS    During the 30 minute wait after an allergy injection the following symptoms should be reported:    Itching other than at the injection site  Hives or swelling other than at the injection site  Redness other than at the injection site  Difficulty breathing  Chest tightness  Difficulty swallowing  Throat tightness    If these symptoms occur, NOTIFY PROVIDER and the following treatment should be administered:    1. Epinephrine/Auvi Q 1:1000 IM - 0.3 ml if > 66 lbs or more, 0.15 ml if 33 - 63 lbs, or 0.1 ml if <33 lbs     2. Diphenhydramine - give all intramuscular:     2 to <6 years (off-label use): 6.25 mg,    6 to <12 years: 12.5 to 25 mg;    ?12 years: 25-50 mg.    3.  Famotidine:  Adults 40 mg oral    Adolescents age 12 years and >88 lbs: 40 mg    Children and Adolescents ? 12years of age: Initial: 0.25 mg/kg/dose  every 12 hours (maximum daily dose: 40 mg/day)    Epi/Auvi Q dose may me repeated in 5-15 minutes if adequate resolution of symptoms does not occur    Patient should be observed for at least one hour after final Epi/Auvi Q dose and must be seen by provider. Patients cannot drive themselves if they have received diphenhydramine.

## 2022-05-23 NOTE — TELEPHONE ENCOUNTER
Pt was in the office for allergy injection. Pt states [facility] [address] was unable to schedule delivery due to processing her account from 2019 and she states this was last week. I told pt I would call to speak to them to see what the delay was getting medication xolair delivered to the office. Called [facility] and spoke with Carolina. She stated account was verified on 5/20/22 and they reached out to the pt and heard nothing from her. Asked to reach out to the pt while I was on the line also. Carolina did and was unable to reach pt. She stated she left a message.

## 2022-05-23 NOTE — TELEPHONE ENCOUNTER
Called pt and informed her [facility] was trying to reach out to her and left her a message a few minutes ago to schedule delivery of xolair to the office. Pt states she will call back and will receive injection on 5/31/22. Pt states she would not have a ride anymore this week to come. Pt thanked me.

## 2022-05-24 NOTE — TELEPHONE ENCOUNTER
Called [facility] and spoke with [address]. Informed her I have not heard back from yesterday if delivery can be set up for pt for 5/31/22. Cristal states [facility] has a flag on pt's account for insurance. PA is valid, it just won't let them set up delivery due to lapsed insurance. I asked that I thought this was verified already by pt. She states yes but can take up to 7 days to update account, until then delivery cannot be scheduled. She states she does she that they process was expedited yesterday. She states to call later today to see how process is going. I informed I would update the pt.

## 2022-05-31 ENCOUNTER — NURSE ONLY (OUTPATIENT)
Dept: ALLERGY | Age: 62
End: 2022-05-31
Payer: MEDICAID

## 2022-05-31 VITALS
OXYGEN SATURATION: 98 % | TEMPERATURE: 97 F | HEART RATE: 87 BPM | SYSTOLIC BLOOD PRESSURE: 122 MMHG | RESPIRATION RATE: 16 BRPM | DIASTOLIC BLOOD PRESSURE: 68 MMHG

## 2022-05-31 DIAGNOSIS — Z91.048 ALLERGY TO MOLD: ICD-10-CM

## 2022-05-31 DIAGNOSIS — Z51.6 ENCOUNTER FOR DESENSITIZATION TO ALLERGENS: Primary | ICD-10-CM

## 2022-05-31 DIAGNOSIS — J30.1 ALLERGY TO TREES: ICD-10-CM

## 2022-05-31 PROCEDURE — 95117 IMMUNOTHERAPY INJECTIONS: CPT | Performed by: NURSE PRACTITIONER

## 2022-05-31 NOTE — TELEPHONE ENCOUNTER
Called [facility] and spoke with Sherron. Asked if pt's xolair medication was able to be shipped out due to pt stating she spoke with [facility] on 5/26/22 and they had all insurance information on track. She stated yes they are able to ship out medication now and will be delivered 6/2/22.

## 2022-05-31 NOTE — TELEPHONE ENCOUNTER
Pt was in the office for allergy injection. Stated she heard back from [facility] on 5/26/22 and [facility] stated to the pt they had everything they needed for scheduling shipment. I informed the pt I would call and check on this because we have not heard anything from [facility].

## 2022-05-31 NOTE — PROGRESS NOTES
After consent obtained/verified, allergy injection given in back of R/L arm(s). VIAL COLOR OF ALL VIALS TODAY IS red    ALLERGY INJECTION FROM VIAL A GIVEN right  UPPER ARM IN THE AMOUNT OF 0.30 ML    ALLERGY INJECTION FROM VIAL B GIVEN left upper ARM IN THE AMOUNT OF 0.30 ML          Documentation of vial injection specific to arm(s) noted on Allergy Immunotherapy Administration Form. Patient waited 30 minutes for observation. No      Patient tolerated well without adverse reaction WHILE IN OFFICE    SHOT REACTION TREATMENT INSTRUCTIONS    During the 30 minute wait after an allergy injection the following symptoms should be reported:    Itching other than at the injection site  Hives or swelling other than at the injection site  Redness other than at the injection site  Difficulty breathing  Chest tightness  Difficulty swallowing  Throat tightness    If these symptoms occur, NOTIFY PROVIDER and the following treatment should be administered:    1. Epinephrine/Auvi Q 1:1000 IM - 0.3 ml if > 66 lbs or more, 0.15 ml if 33 - 63 lbs, or 0.1 ml if <33 lbs     2. Diphenhydramine - give all intramuscular:     2 to <6 years (off-label use): 6.25 mg,    6 to <12 years: 12.5 to 25 mg;    ?12 years: 25-50 mg.    3.  Famotidine:  Adults 40 mg oral    Adolescents age 12 years and >88 lbs: 40 mg    Children and Adolescents ? 12years of age: Initial: 0.25 mg/kg/dose  every 12 hours (maximum daily dose: 40 mg/day)    Epi/Auvi Q dose may me repeated in 5-15 minutes if adequate resolution of symptoms does not occur    Patient should be observed for at least one hour after final Epi/Auvi Q dose and must be seen by provider. Patients cannot drive themselves if they have received diphenhydramine.

## 2022-06-06 ENCOUNTER — NURSE ONLY (OUTPATIENT)
Dept: ALLERGY | Age: 62
End: 2022-06-06
Payer: MEDICAID

## 2022-06-06 VITALS — DIASTOLIC BLOOD PRESSURE: 80 MMHG | SYSTOLIC BLOOD PRESSURE: 124 MMHG | TEMPERATURE: 97.4 F | HEART RATE: 80 BPM

## 2022-06-06 DIAGNOSIS — Z51.6 ENCOUNTER FOR DESENSITIZATION TO ALLERGENS: Primary | ICD-10-CM

## 2022-06-06 DIAGNOSIS — Z91.048 ALLERGY TO MOLD: ICD-10-CM

## 2022-06-06 DIAGNOSIS — J30.1 ALLERGY TO TREES: ICD-10-CM

## 2022-06-06 DIAGNOSIS — J82.83 EOSINOPHILIC ASTHMA: ICD-10-CM

## 2022-06-06 PROCEDURE — 95117 IMMUNOTHERAPY INJECTIONS: CPT | Performed by: NURSE PRACTITIONER

## 2022-06-06 PROCEDURE — 96372 THER/PROPH/DIAG INJ SC/IM: CPT | Performed by: NURSE PRACTITIONER

## 2022-06-06 NOTE — PROGRESS NOTES
After consent obtained/verified, allergy injection given in back of R/L arm(s). VIAL COLOR OF ALL VIALS TODAY IS RED    ALLERGY INJECTION FROM VIAL A GIVEN left  UPPER ARM IN THE AMOUNT OF 0.35 ML    ALLERGY INJECTION FROM VIAL B GIVEN right upper ARM IN THE AMOUNT OF 0.35 ML      Documentation of vial injection specific to arm(s) noted on Allergy Immunotherapy Administration Form. Patient waited 30 minutes for observation. No      Patient tolerated well without adverse reaction WHILE IN OFFICE    SHOT REACTION TREATMENT INSTRUCTIONS    During the 30 minute wait after an allergy injection the following symptoms should be reported:    Itching other than at the injection site  Hives or swelling other than at the injection site  Redness other than at the injection site  Difficulty breathing  Chest tightness  Difficulty swallowing  Throat tightness    If these symptoms occur, NOTIFY PROVIDER and the following treatment should be administered:    1. Epinephrine/Auvi Q 1:1000 IM - 0.3 ml if > 66 lbs or more, 0.15 ml if 33 - 63 lbs, or 0.1 ml if <33 lbs     2. Diphenhydramine - give all intramuscular:     2 to <6 years (off-label use): 6.25 mg,    6 to <12 years: 12.5 to 25 mg;    ?12 years: 25-50 mg.    3.  Famotidine:  Adults 40 mg oral    Adolescents age 12 years and >88 lbs: 40 mg    Children and Adolescents ? 12years of age: Initial: 0.25 mg/kg/dose  every 12 hours (maximum daily dose: 40 mg/day)    Epi/Auvi Q dose may me repeated in 5-15 minutes if adequate resolution of symptoms does not occur    Patient should be observed for at least one hour after final Epi/Auvi Q dose and must be seen by provider. Patients cannot drive themselves if they have received diphenhydramine.

## 2022-06-06 NOTE — PROGRESS NOTES
Patient here today to receive Xolair injection. Patient does not report any previous reaction from Xolair injections. Denies any nausea vomiting fever urticaria or angioedema. Denies any recent exacerbation of asthma or asthma-like symptoms. Patient was explained benefits and potential risks including anaphylaxis to patient. Patient has been explained the risk and benefits including delayed anaphylaxis. Patient has EpiPen and understands how to appropriately use. Xolair 150 mg given subcutaneously X 300 doses in    Bilateral THIGH  thigh(s)     for a total combined dose of 300 mg using a 25-gauge needle and 3 ML syringe. RIGHT  / LEFT UPPER ARM,  RIGHT  / LEFT FRONT OR MIDDLE OF THIGH, OR STOMACH        Xolair  [id number]   Lot [id number]   Expires [id number]    Patient observed for 30 minutes No  If NO Patient left against medical advice    Medication was supplied by patient:  YES/NO: Yes      Medication was supplied as a sample:  YES/NO: No                     Prior to patient receiving Xolair area was cleansed with alcohol swabs. Following the administration no evidence of bleeding or bruising. No adverse reactions reported. Patient tolerated well without adverse reactions or side effects. Patient to continue to receive Xolair injections monthly. Will report any problems to this office. No evidence of allergic reaction including anaphylaxis.

## 2022-06-07 ENCOUNTER — OFFICE VISIT (OUTPATIENT)
Dept: ENT CLINIC | Age: 62
End: 2022-06-07
Payer: MEDICAID

## 2022-06-07 ENCOUNTER — TELEPHONE (OUTPATIENT)
Dept: ALLERGY | Age: 62
End: 2022-06-07

## 2022-06-07 VITALS
RESPIRATION RATE: 16 BRPM | BODY MASS INDEX: 36.97 KG/M2 | SYSTOLIC BLOOD PRESSURE: 118 MMHG | DIASTOLIC BLOOD PRESSURE: 76 MMHG | TEMPERATURE: 97.1 F | WEIGHT: 215.4 LBS | OXYGEN SATURATION: 96 % | HEART RATE: 83 BPM

## 2022-06-07 DIAGNOSIS — J38.3 POLYPOID DEGENERATION OF VOCAL CORDS: Primary | ICD-10-CM

## 2022-06-07 DIAGNOSIS — E03.9 ACQUIRED HYPOTHYROIDISM: ICD-10-CM

## 2022-06-07 DIAGNOSIS — R49.0 HOARSE: ICD-10-CM

## 2022-06-07 DIAGNOSIS — F17.200 TOBACCO DEPENDENCE: ICD-10-CM

## 2022-06-07 DIAGNOSIS — J98.8 RESPIRATORY INFECTION: ICD-10-CM

## 2022-06-07 DIAGNOSIS — Z77.120 EXPOSURE TO MOLD: Primary | ICD-10-CM

## 2022-06-07 PROCEDURE — 99212 OFFICE O/P EST SF 10 MIN: CPT | Performed by: OTOLARYNGOLOGY

## 2022-06-07 ASSESSMENT — ENCOUNTER SYMPTOMS
WHEEZING: 0
NAUSEA: 0
VOMITING: 0
VOICE CHANGE: 0
COLOR CHANGE: 0
SINUS PRESSURE: 0
APNEA: 0
TROUBLE SWALLOWING: 0
STRIDOR: 0
COUGH: 0
DIARRHEA: 0
CHEST TIGHTNESS: 0
CHOKING: 0
SHORTNESS OF BREATH: 0
ABDOMINAL PAIN: 0
RHINORRHEA: 0
FACIAL SWELLING: 0
SORE THROAT: 0

## 2022-06-07 NOTE — TELEPHONE ENCOUNTER
Called pt and informed her blood work was ordered by Jamie Vasquez and respiratory culture. Informed pt to complete this as soon as possible. Pt verbalized understanding and thanked me.

## 2022-06-07 NOTE — PROGRESS NOTES
[facility] [place] [facility]  [facility] EAR, NOSE AND THROAT  [address] [address]  Dept: [phone]  Dept Fax: [phone]  Loc: Vanessa Monique is a 59 y.o. female who was referred byNo ref. provider found for:  Chief Complaint   Patient presents with    Follow-up     pt is here for hoarseness and smoking   . HPI:     Jackson Moy is a 59 y.o. 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 She has had a head cold for about a week. This is improving    Is also concerned that she has mold where she lives and is trying to move out. Mil Gomes History: Allergies   Allergen Reactions    Bactrim [Sulfamethoxazole-Trimethoprim]     Flexeril [Cyclobenzaprine]     Morphine Other (See Comments)     \"I hallucinate\"    Peanut-Containing Drug Products     Tessalon [Benzonatate] Hives    Amoxicillin-Pot Clavulanate Rash    Cefdinir Rash    Ibuprofen [Ibuprofen] Rash    Lisinopril Rash    Macrobid [Nitrofurantoin Monohydrate Macrocrystals] Rash    Macrodantin [Nitrofurantoin] Rash    Ranitidine Rash     Current Outpatient Medications   Medication Sig Dispense Refill    ALLERGEN EXTRACT once a week      oxybutynin (DITROPAN XL) 5 MG extended release tablet Take 1 tablet by mouth daily 90 tablet 2    pentosan polysulfate (ELMIRON) 100 MG capsule Take 1 capsule by mouth 2 times daily 180 capsule 2    omalizumab (XOLAIR) 150 MG/ML SOSY injection Inject 300 mg into the skin every 28 days Administer 300 mg every 28 days PREFILLED SYRINGE.  MEDICATION MUST BE MAILED TO PROVIDERS OFFICE 2 each 5    hydrocortisone (HYTONE) 2.5 % lotion APPLY  LOTION TOPICALLY TO AFFECTED AREA ONCE DAILY 118 mL 0    montelukast (SINGULAIR) 10 MG tablet Take 1 tablet by mouth nightly For allergies 30 tablet 5    doxycycline monohydrate (MONODOX) 100 MG capsule Take 1 capsule by mouth 2 times daily 14 capsule 0    ketoconazole (NIZORAL) 2 % shampoo APPLY 3-4 TIMES WEEKLY TO SCALP, LEAVE ON FOR 5 MINUTES PRIOR TO WASHING  mL 2    albuterol sulfate  (90 Base) MCG/ACT inhaler RESCUE INHALER CARRY IN PURSE -  AS NEEDED - INHALE 2 PUFFS INTO LUNGS EVERY 4 HOURS AS NEEDED FOR WHEEZING 18 g 2    albuterol (PROVENTIL) (2.5 MG/3ML) 0.083% nebulizer solution USE 1 VIAL IN NEBULIZER EVERY 6 HOURS AS NEEDED FOR WHEEZING (FOR ASTHMA) 375 mL 5    budesonide-formoterol (SYMBICORT) 160-4.5 MCG/ACT AERO USE EVERY DAY - 2 puffs inhaled twice daily. Rinse mouth well after use. 1 each 11    ketotifen (ZADITOR) 0.025 % ophthalmic solution One drop in each eye twice daily as needed 1 each 11    fluticasone (FLONASE) 50 MCG/ACT nasal spray 2 sprays by Each Nostril route daily 1 each 11    esomeprazole (NEXIUM) 40 MG delayed release capsule Take 1 capsule by mouth twice daily 180 capsule 0    hyoscyamine (ANASPAZ;LEVSIN) 125 MCG tablet Take 125 mcg by mouth every 4 hours as needed for Cramping      SYNTHROID 88 MCG tablet TAKE 1 TABLET BY MOUTH ONCE DAILY ON AN EMPTY STOMACH WITH WATER. DO NOT EAT OR TAKE ANY OTHER MEDICATIONS FOR 30 MINUTES.  90 tablet 3    metFORMIN (GLUCOPHAGE) 500 MG tablet TAKE 1 TABLET BY MOUTH TWICE DAILY WITH MEALS 180 tablet 3    atorvastatin (LIPITOR) 10 MG tablet Take 1 tablet by mouth once daily 90 tablet 3    Cholecalciferol (VITAMIN D3) 50 MCG (2000 UT) CAPS Take 1 capsule by mouth once daily 90 capsule 3    amLODIPine (NORVASC) 5 MG tablet Take 1 tablet by mouth once daily 90 tablet 3    triamcinolone acetonide (KENALOG) 10 MG/ML injection Inject 10 mg into the articular space once      estradiol (ESTRACE) 0.1 MG/GM vaginal cream INSERT 1 GRAM VAGINALLY 3 TIMES PER WEEK      EPIPEN 2-VENESSA 0.3 MG/0.3ML SOAJ injection INJECT 1 PEN IM AS A SINGLE DOSE PRN 1 each 1    fluconazole (DIFLUCAN) 150 MG tablet as needed (as need for yeast infection)       gabapentin (NEURONTIN) 100 MG capsule Take 100 mg by mouth 4 times daily.  traZODone (DESYREL) 50 MG tablet Take 50 mg by mouth nightly as needed for Sleep       Biotin 1 MG CAPS Take 1 capsule by mouth daily       clobetasol (TEMOVATE) 0.05 % external solution Apply daily to scaly or itchy areas on scalp 60 mL 11    fluocinonide (LIDEX) 0.05 % cream Apply topically 2 times daily to rash on hands a needed 30 g 11    levomilnacipran (FETZIMA) 40 MG CP24 extended release capsule Take 1 capsule by mouth daily 30 capsule 2    Multiple Vitamins-Minerals (MULTIVITAMIN PO) 1 tablet daily       Probiotic Product (PROBIOTIC PO) 1 tablet daily       Cyanocobalamin (B-12 PO) daily      ondansetron (ZOFRAN) 4 MG tablet TAKE 1 TABLET BY MOUTH EVERY 8 HOURS AS NEEDED FOR NAUSEA OR VOMITING 15 tablet 0    meloxicam (MOBIC) 7.5 MG tablet TK 1 T PO BID  4    LINZESS 290 MCG CAPS capsule 290 mcg every morning (before breakfast)       lidocaine (XYLOCAINE) 5 % ointment Apply topically as needed for Pain Apply topically as needed.  lidocaine (LIDODERM) 5 % Place 1 patch onto the skin daily 12 hours on, 12 hours off.  hydrocortisone (ANUSOL-HC) 25 MG suppository Place 25 mg rectally as needed for Hemorrhoids       HYDROcodone-acetaminophen (NORCO) 5-325 MG per tablet 1 tablet nightly as needed for Pain. Take 1 tablet every 4-6 hours as needed for pain       zinc 50 MG CAPS Take 1 capsule by mouth daily       Ascorbic Acid (VITAMIN C) 500 MG CAPS Take 1 tablet by mouth daily       orphenadrine (NORFLEX) 100 MG tablet Take 100 mg by mouth 2 times daily as needed.  tramadol (ULTRAM) 50 MG tablet Take 50 mg by mouth every 6 hours as needed.  Take 1-2 tabs every 6 hrs prn       Hypertonic Nasal Wash (SINUS RINSE BOTTLE KIT) PACK Use with distilled water and salt packet once daily 1 each 11    ipratropium (ATROVENT) 0.03 % nasal spray 2 sprays by Each Nostril route every 12 hours 30 mL 5    fexofenadine (ALLEGRA) 180 MG tablet Take 1 tablet by mouth daily 30 tablet 5     Current Facility-Administered Medications   Medication Dose Route Frequency Provider Last Rate Last Admin    omalizumab James Van Vleck) injection 300 mg  300 mg SubCUTAneous Q28 Days Gar Senegal, APRN - CNP   300 mg at 22 1053    omalizumab (XOLAIR) injection 150 mg  150 mg SubCUTAneous Q28 Days Gar Senegal, APRN - CNP   150 mg at 22 1055    omalizumab (XOLAIR) injection 150 mg  150 mg SubCUTAneous Q28 Days Gar Senegal, APRN - CNP   150 mg at 22 1518    omalizumab (XOLAIR) injection 150 mg  150 mg SubCUTAneous Q28 Days Gar Sen[place], APRN - CNP         Past Medical History:   Diagnosis Date    Allergic rhinitis     Anxiety     Arnold-Chiari malformation (HCC)     Asthma     Chronic bronchitis (HCC)     Fibromyalgia     GERD (gastroesophageal reflux disease)     Headache(784.0)     Hyperlipidemia     Neuropathy     Osteoarthritis     Sinusitis     Type II or unspecified type diabetes mellitus without mention of complication, not stated as uncontrolled       Past Surgical History:   Procedure Laterality Date    APPENDECTOMY       SECTION      x2    COLONOSCOPY  2010    CSF SHUNT  2007    Cervical syrinx to subarachnoid shunt; thoracic syrinx to subarachnoid shunt (2 separate dural openings)    CYST REMOVAL      extradural cysts at thoracic 2-3    ECTOPIC PREGNANCY SURGERY      HEMICOLECTOMY      HERNIA REPAIR      inguinal     HYSTERECTOMY (CERVIX STATUS UNKNOWN)      LARYNGOSCOPY N/A 2021    MICROLARYNGOSCOPY AND BIOPSY   performed by Sally Todd MD at [facility]  2020    Bilateral great toe symes amputation,Bilateral toes 1-5 matrixectomy by OIO    SPINAL CORD DECOMPRESSION  2007    C4-5-6-7; T1-2-3 laminectomies    TOOTH EXTRACTION  2017     Family History   Problem Relation Age of Onset    Cancer Other         colon    Mental Illness Other     High Blood Pressure Other  Diabetes Other     High Blood Pressure Sister      Social History     Tobacco Use    Smoking status: Current Every Day Smoker     Packs/day: 0.20     Years: 25.00     Pack years: 5.00     Types: Cigarettes     Start date: 6/1/1975    Smokeless tobacco: Never Used    Tobacco comment: smokes 5 cigs a day   Substance Use Topics    Alcohol use: Not Currently     Alcohol/week: 2.0 standard drinks     Types: 2 Cans of beer per week     Comment: Patient drinks 1-2 beers a MONTH ; stomach tolerates very little alcohol       Subjective:      Review of Systems   Constitutional: Negative for activity change, appetite change, chills, diaphoresis, fatigue, fever and unexpected weight change. HENT: Negative for congestion, dental problem, ear discharge, ear pain, facial swelling, hearing loss, mouth sores, nosebleeds, postnasal drip, rhinorrhea, sinus pressure, sneezing, sore throat, tinnitus, trouble swallowing and voice change. Eyes: Negative for visual disturbance. Respiratory: Negative for apnea, cough, choking, chest tightness, shortness of breath, wheezing and stridor. Cardiovascular: Negative for chest pain, palpitations and leg swelling. Gastrointestinal: Negative for abdominal pain, diarrhea, nausea and vomiting. Endocrine: Negative for cold intolerance, heat intolerance, polydipsia and polyuria. Genitourinary: Negative for dysuria, enuresis and hematuria. Musculoskeletal: Negative for arthralgias, gait problem, neck pain and neck stiffness. Skin: Negative for color change and rash. Allergic/Immunologic: Negative for environmental allergies, food allergies and immunocompromised state. Neurological: Negative for dizziness, syncope, facial asymmetry, speech difficulty, light-headedness and headaches. Hematological: Negative for adenopathy. Does not bruise/bleed easily. Psychiatric/Behavioral: Negative for confusion and sleep disturbance. The patient is not nervous/anxious. Objective:   /76 (Site: Right Upper Arm, Position: Sitting)   Pulse 83   Temp 97.1 °F (36.2 °C) (Infrared)   Resp 16   Wt 215 lb 6.4 oz (97.7 kg)   SpO2 96%   BMI 36.97 kg/m²     Physical Exam   She has definite hoarseness. Fiberoptic exam is deferred because of her upper respiratory infection. Data:  All of the past medical history, past surgical history, family history,social history, allergies and current medications were reviewed with the patient. Assessment & Plan   Diagnoses and all orders for this visit:     Diagnosis Orders   1. Polypoid degeneration of vocal cords     2. Tobacco dependence     3. Acquired hypothyroidism     4. Hoarse         The findings were explained and her questions were answered. Endoscopy is deferred so her head cold findings will not confuse the issue. Recheck in 6 weeks. Plan tip chip scope       Sandeep Morgan MD    **This report has been created using voice recognition software. It may contain minor errors which are inherent in voicerecognition technology. **

## 2022-06-07 NOTE — TELEPHONE ENCOUNTER
Pt was in the office for office visit with Dr James Delong. Pt states in April she was dx with viral URI. Pt states she is still coughing up green phlegm at times. Pt states she is still currently living in bad house conditions with mold and mildew. Pt states she moves out in July. Pt states Dr James Delong suggesting getting labs for mold. Informed pt she is allergic to mold from allergy testing performed. Informed pt she is getting mold desensitization in her allergy shots weekly. Pt is asking for other advice on her phlegm since Dr James Delong did not want to do a scope on her at her office visit today on 6/7/22. Please advise.

## 2022-06-08 ENCOUNTER — NURSE ONLY (OUTPATIENT)
Dept: LAB | Age: 62
End: 2022-06-08

## 2022-06-08 ENCOUNTER — TELEPHONE (OUTPATIENT)
Dept: ALLERGY | Age: 62
End: 2022-06-08

## 2022-06-08 DIAGNOSIS — Z77.120 EXPOSURE TO MOLD: ICD-10-CM

## 2022-06-08 LAB
IGE: 256 IU/ML
REASON FOR REJECTION: NORMAL
REJECTED TEST: NORMAL

## 2022-06-08 NOTE — TELEPHONE ENCOUNTER
Mick from [facility] called and the sputum culture is contaminated because there is to much salvia, if you want her to redo it she said they need a new order.

## 2022-06-09 ENCOUNTER — NURSE ONLY (OUTPATIENT)
Dept: LAB | Age: 62
End: 2022-06-09

## 2022-06-09 DIAGNOSIS — J32.4 CHRONIC PANSINUSITIS: ICD-10-CM

## 2022-06-09 DIAGNOSIS — J32.4 CHRONIC PANSINUSITIS: Primary | ICD-10-CM

## 2022-06-09 LAB
REASON FOR REJECTION: NORMAL
REJECTED TEST: NORMAL

## 2022-06-09 NOTE — TELEPHONE ENCOUNTER
[facility] lab called and stated order needed to be changed to lab collect for new order for sputum culture. Ordered changed. Informed Mick and Baldev Half at the lab at [facility].

## 2022-06-09 NOTE — TELEPHONE ENCOUNTER
Rosario Foster, can you put new orders in for pt's lab that was rejected so pt can get done.  Thanks

## 2022-06-09 NOTE — TELEPHONE ENCOUNTER
Provider informed me to call pt to inform her sputum culture was rejected. Called pt and left a message on this. Waiting on a response.

## 2022-06-09 NOTE — TELEPHONE ENCOUNTER
Pt called in on 6/8/22 and informed me she was at the lab getting labwork and culture done and they cannot find the orders. Called [facility] and spoke with Deepali Stewart. She stated have some time of looking through pt's chart, the orders were placed under active and not active future. Deepali Stewart states she can print off the order now but tell provider for future orders to put under active future. Called pt back and stated they have the orders they can see that provider ordered. Pt was upset and asking who's fault it was for that and could not answer that for her. Gave pt reassurance.

## 2022-06-09 NOTE — TELEPHONE ENCOUNTER
Bereket Victor, COREEN - CNP  to Me    CC      8:59 AM  I have to know the name of the test.  I ordered several and I do not know which one was rejected

## 2022-06-09 NOTE — PROGRESS NOTES
Order put in for another respiratory culture as the first 1 was not performed correctly by the patient.   This was done at the request the lab

## 2022-06-11 LAB
ALLERGEN INTERPRETATION/SCORE: NORMAL
MISC. #1 REFERENCE GROUP TEST: NORMAL
MISC. #1 REFERENCE GROUP TEST: NORMAL

## 2022-06-12 LAB
ASPERGILLUS ANTIBODY CF: NORMAL
CANDIDA ANTIBODIES, QUAL: NORMAL

## 2022-06-13 ENCOUNTER — TELEPHONE (OUTPATIENT)
Dept: ALLERGY | Age: 62
End: 2022-06-13

## 2022-06-13 ENCOUNTER — NURSE ONLY (OUTPATIENT)
Dept: ALLERGY | Age: 62
End: 2022-06-13
Payer: MEDICAID

## 2022-06-13 VITALS
SYSTOLIC BLOOD PRESSURE: 122 MMHG | DIASTOLIC BLOOD PRESSURE: 76 MMHG | HEART RATE: 78 BPM | OXYGEN SATURATION: 98 % | RESPIRATION RATE: 16 BRPM | TEMPERATURE: 97.2 F

## 2022-06-13 DIAGNOSIS — J30.1 ALLERGY TO TREES: ICD-10-CM

## 2022-06-13 DIAGNOSIS — Z51.6 ENCOUNTER FOR DESENSITIZATION TO ALLERGENS: Primary | ICD-10-CM

## 2022-06-13 DIAGNOSIS — Z91.048 ALLERGY TO MOLD: ICD-10-CM

## 2022-06-13 DIAGNOSIS — J30.1 NON-SEASONAL ALLERGIC RHINITIS DUE TO POLLEN: Primary | ICD-10-CM

## 2022-06-13 PROCEDURE — 95117 IMMUNOTHERAPY INJECTIONS: CPT | Performed by: NURSE PRACTITIONER

## 2022-06-13 RX ORDER — FEXOFENADINE HCL 180 MG/1
180 TABLET ORAL DAILY
Qty: 30 TABLET | Refills: 5 | Status: SHIPPED | OUTPATIENT
Start: 2022-06-13

## 2022-06-13 NOTE — PROGRESS NOTES
Patient is taking Claritin but it is ineffective. Has tried it for greater than 6 months. Allergies or horrific at this time, itchy watery eyes, severe nasal congestion, nasal pressure, ear pressure, postnasal drainage and sneezing. Patient has taken Allegra previously which was very beneficial in her allergy treatment.   I make in the recommendation patient switch back to [facility]

## 2022-06-13 NOTE — TELEPHONE ENCOUNTER
Provider has changed pt's antihistamine to Allegra. PA completed on covermymeds. Waiting on a response.

## 2022-06-14 LAB — MISC. #1 REFERENCE GROUP TEST: NORMAL

## 2022-06-15 LAB — VIRAL CULTURE,RAPID,RESPIRATOR: NORMAL

## 2022-06-20 ENCOUNTER — NURSE ONLY (OUTPATIENT)
Dept: ALLERGY | Age: 62
End: 2022-06-20
Payer: MEDICAID

## 2022-06-20 VITALS
RESPIRATION RATE: 16 BRPM | HEART RATE: 85 BPM | TEMPERATURE: 96.9 F | SYSTOLIC BLOOD PRESSURE: 122 MMHG | OXYGEN SATURATION: 96 % | DIASTOLIC BLOOD PRESSURE: 76 MMHG

## 2022-06-20 DIAGNOSIS — Z91.048 ALLERGY TO MOLD: ICD-10-CM

## 2022-06-20 DIAGNOSIS — J30.1 ALLERGY TO TREES: ICD-10-CM

## 2022-06-20 DIAGNOSIS — Z51.6 ENCOUNTER FOR DESENSITIZATION TO ALLERGENS: Primary | ICD-10-CM

## 2022-06-20 PROCEDURE — 95117 IMMUNOTHERAPY INJECTIONS: CPT | Performed by: NURSE PRACTITIONER

## 2022-06-20 NOTE — TELEPHONE ENCOUNTER
Received fax from [facility] stating [address] approved for [facility] from 6/20/22 to 6/20/23. PA # [id number]. Fax scanned into media.

## 2022-06-21 NOTE — TELEPHONE ENCOUNTER
[address] [place] [address] and spoke with [facility] and informed on PA approval for allegra. Approval 6/20/22 to 6/20/23 PA # [id number].

## 2022-06-22 ENCOUNTER — OFFICE VISIT (OUTPATIENT)
Dept: ALLERGY | Age: 62
End: 2022-06-22
Payer: MEDICAID

## 2022-06-22 ENCOUNTER — TELEPHONE (OUTPATIENT)
Dept: ALLERGY | Age: 62
End: 2022-06-22

## 2022-06-22 VITALS
BODY MASS INDEX: 36.54 KG/M2 | DIASTOLIC BLOOD PRESSURE: 78 MMHG | RESPIRATION RATE: 16 BRPM | OXYGEN SATURATION: 96 % | SYSTOLIC BLOOD PRESSURE: 122 MMHG | HEART RATE: 75 BPM | WEIGHT: 214 LBS | TEMPERATURE: 96.4 F | HEIGHT: 64 IN

## 2022-06-22 DIAGNOSIS — Z51.6 ENCOUNTER FOR DESENSITIZATION TO ALLERGENS: ICD-10-CM

## 2022-06-22 DIAGNOSIS — R76.8 ELEVATED IGE LEVEL: ICD-10-CM

## 2022-06-22 DIAGNOSIS — J30.1 ALLERGY TO TREES: ICD-10-CM

## 2022-06-22 DIAGNOSIS — J45.50 SEVERE PERSISTENT ASTHMA WITHOUT COMPLICATION: Primary | ICD-10-CM

## 2022-06-22 DIAGNOSIS — Z29.8 IMMUNOTHERAPY: ICD-10-CM

## 2022-06-22 DIAGNOSIS — Z77.120 EXPOSURE TO MOLD: ICD-10-CM

## 2022-06-22 DIAGNOSIS — J82.83 EOSINOPHILIC ASTHMA: ICD-10-CM

## 2022-06-22 DIAGNOSIS — Z91.048 ALLERGY TO MOLD: ICD-10-CM

## 2022-06-22 DIAGNOSIS — J30.9 CHRONIC ALLERGIC RHINITIS: ICD-10-CM

## 2022-06-22 PROCEDURE — 99215 OFFICE O/P EST HI 40 MIN: CPT | Performed by: NURSE PRACTITIONER

## 2022-06-22 RX ORDER — SOD CHLOR,BICARB/SQUEEZ BOTTLE
PACKET, WITH RINSE DEVICE NASAL
Qty: 1 EACH | Refills: 11
Start: 2022-06-22

## 2022-06-22 RX ORDER — IPRATROPIUM BROMIDE 21 UG/1
2 SPRAY, METERED NASAL EVERY 12 HOURS
Qty: 30 ML | Refills: 5 | Status: SHIPPED | OUTPATIENT
Start: 2022-06-22

## 2022-06-22 ASSESSMENT — ENCOUNTER SYMPTOMS
COUGH: 1
SINUS PRESSURE: 0
CHOKING: 0
WHEEZING: 0
ABDOMINAL PAIN: 0
APNEA: 0
SHORTNESS OF BREATH: 0
TROUBLE SWALLOWING: 0
FACIAL SWELLING: 0
COLOR CHANGE: 0
VOICE CHANGE: 0
VOMITING: 0
CHEST TIGHTNESS: 0
STRIDOR: 0
RHINORRHEA: 1
NAUSEA: 0
DIARRHEA: 0
SORE THROAT: 0

## 2022-06-22 NOTE — TELEPHONE ENCOUNTER
Pt was at the check out desk in the office after visit and wanted clarification again on medications she is supposed to stop. Pt states she will  allegra but wondering what else allergy pill she is on. Will inform provider.

## 2022-06-22 NOTE — TELEPHONE ENCOUNTER
Called pt and went over medication clarification with her. Pt verbalized understanding and thanked me.

## 2022-06-22 NOTE — PROGRESS NOTES
[email or web address]    Allergy & Asthma   [address] [facility], [address]  Ph:   [phone]  Fax:[phone]    Provider:  Dr. Mukesh Tate:   Chief Complaint   Patient presents with    Follow-up     allergy management and xolair    Results     lab review           HISTORY OF PRESENT ILLNESS: ESTABLISHED PATIENT HERE FOR EVALUATION   20-year-old female here today for follow-up on asthma and allergies. Patient recently had a sputum culture and mold labs which were all negative. Patient states that she continues to have rhinorrhea. She is using her Astelin, Flonase and taking allergy medications as well as allergy shots. Patient does have an elevated IgE. She states that her eyes continue to have tearing. Patient does have dry eyes. No nausea, vomiting, fever noted today. Patient is stable as far as her allergies are today. She has a little bit of postnasal drainage. However she does not complain of sore throat. Onset of allergies are chronic in nature. Patient states that they gradually developed later on in life. Her asthma has not had any asthmatic attacks. Severity of allergies are mild to moderate at this time. She does see Dr. Melida Miguel for  sinus problems. Review of Systems:  Review of Systems   Constitutional: Negative for activity change, appetite change, chills, diaphoresis, fatigue, fever and unexpected weight change. HENT: Positive for postnasal drip and rhinorrhea. Negative for congestion, dental problem, ear discharge, ear pain, facial swelling, hearing loss, mouth sores, nosebleeds, sinus pressure, sneezing, sore throat, tinnitus, trouble swallowing and voice change. Eyes: Negative for visual disturbance. Respiratory: Positive for cough. Negative for apnea, choking, chest tightness, shortness of breath, wheezing and stridor. Cardiovascular: Negative for chest pain, palpitations and leg swelling.    Gastrointestinal: Negative for abdominal pain, diarrhea, nausea and vomiting. Endocrine: Negative for cold intolerance, heat intolerance, polydipsia and polyuria. Genitourinary: Negative for difficulty urinating, dysuria, enuresis, hematuria and urgency. Musculoskeletal: Negative for arthralgias, gait problem, neck pain and neck stiffness. Skin: Negative for color change and rash. Allergic/Immunologic: Positive for environmental allergies and immunocompromised state. Negative for food allergies. Neurological: Negative for dizziness, syncope, facial asymmetry, speech difficulty, light-headedness and headaches. Hematological: Negative for adenopathy. Does not bruise/bleed easily. Psychiatric/Behavioral: Negative for confusion and sleep disturbance. The patient is not nervous/anxious. All other systems reviewed and are negative.         Past MedicalHistory:    Past Medical History:   Diagnosis Date    Allergic rhinitis     Anxiety     Arnold-Chiari malformation (HCC)     Asthma     Chronic bronchitis (HCC)     Fibromyalgia     GERD (gastroesophageal reflux disease)     Headache(784.0)     Hyperlipidemia     Neuropathy     Osteoarthritis     Sinusitis     Type II or unspecified type diabetes mellitus without mention of complication, not stated as uncontrolled        Past Surgical History:  Past Surgical History:   Procedure Laterality Date    APPENDECTOMY       SECTION      x2    COLONOSCOPY  2010    CSF SHUNT  2007    Cervical syrinx to subarachnoid shunt; thoracic syrinx to subarachnoid shunt (2 separate dural openings)    CYST REMOVAL      extradural cysts at thoracic 2-3    ECTOPIC PREGNANCY SURGERY      HEMICOLECTOMY      HERNIA REPAIR      inguinal     HYSTERECTOMY (CERVIX STATUS UNKNOWN)      LARYNGOSCOPY N/A 2021    MICROLARYNGOSCOPY AND BIOPSY   performed by Elizabeth Ruiz MD at [facility]  2020    Bilateral great toe symes amputation,Bilateral toes 1-5 matrixectomy by OIO    SPINAL CORD DECOMPRESSION  01/09/2007    C4-5-6-7; T1-2-3 laminectomies    TOOTH EXTRACTION  09/2017       Family History:   Family History   Problem Relation Age of Onset    Cancer Other         colon    Mental Illness Other     High Blood Pressure Other     Diabetes Other     High Blood Pressure Sister        Social History:   Social History     Tobacco Use    Smoking status: Current Every Day Smoker     Packs/day: 0.20     Years: 25.00     Pack years: 5.00     Types: Cigarettes     Start date: 6/1/1975    Smokeless tobacco: Never Used    Tobacco comment: smokes 5 cigs a day   Substance Use Topics    Alcohol use: Not Currently     Alcohol/week: 2.0 standard drinks     Types: 2 Cans of beer per week     Comment: Patient drinks 1-2 beers a MONTH ; stomach tolerates very little alcohol        Allergies:  Bactrim [sulfamethoxazole-trimethoprim], Flexeril [cyclobenzaprine], Morphine, Peanut-containing drug products, Tessalon [benzonatate], Amoxicillin-pot clavulanate, Cefdinir, Ibuprofen [ibuprofen], Lisinopril, Macrobid [nitrofurantoin monohydrate macrocrystals], Macrodantin [nitrofurantoin], and Ranitidine    CurrentMedications:     Current Outpatient Medications:     Hypertonic Nasal Wash (SINUS RINSE BOTTLE KIT) PACK, Use with distilled water and salt packet once daily, Disp: 1 each, Rfl: 11    ipratropium (ATROVENT) 0.03 % nasal spray, 2 sprays by Each Nostril route every 12 hours, Disp: 30 mL, Rfl: 5    fexofenadine (ALLEGRA) 180 MG tablet, Take 1 tablet by mouth daily, Disp: 30 tablet, Rfl: 5    ALLERGEN EXTRACT, once a week, Disp: , Rfl:     oxybutynin (DITROPAN XL) 5 MG extended release tablet, Take 1 tablet by mouth daily, Disp: 90 tablet, Rfl: 2    pentosan polysulfate (ELMIRON) 100 MG capsule, Take 1 capsule by mouth 2 times daily, Disp: 180 capsule, Rfl: 2    omalizumab (XOLAIR) 150 MG/ML SOSY injection, Inject 300 mg into the skin every 28 days Administer 300 mg every 28 days PREFILLED SYRINGE. MEDICATION MUST BE MAILED TO PROVIDERS OFFICE, Disp: 2 each, Rfl: 5    hydrocortisone (HYTONE) 2.5 % lotion, APPLY  LOTION TOPICALLY TO AFFECTED AREA ONCE DAILY, Disp: 118 mL, Rfl: 0    montelukast (SINGULAIR) 10 MG tablet, Take 1 tablet by mouth nightly For allergies, Disp: 30 tablet, Rfl: 5    doxycycline monohydrate (MONODOX) 100 MG capsule, Take 1 capsule by mouth 2 times daily, Disp: 14 capsule, Rfl: 0    ketoconazole (NIZORAL) 2 % shampoo, APPLY 3-4 TIMES WEEKLY TO SCALP, LEAVE ON FOR 5 MINUTES PRIOR TO WASHING OFF, Disp: 120 mL, Rfl: 2    albuterol sulfate  (90 Base) MCG/ACT inhaler, RESCUE INHALER CARRY IN PURSE -  AS NEEDED - INHALE 2 PUFFS INTO LUNGS EVERY 4 HOURS AS NEEDED FOR WHEEZING, Disp: 18 g, Rfl: 2    albuterol (PROVENTIL) (2.5 MG/3ML) 0.083% nebulizer solution, USE 1 VIAL IN NEBULIZER EVERY 6 HOURS AS NEEDED FOR WHEEZING (FOR ASTHMA), Disp: 375 mL, Rfl: 5    budesonide-formoterol (SYMBICORT) 160-4.5 MCG/ACT AERO, USE EVERY DAY - 2 puffs inhaled twice daily. Rinse mouth well after use., Disp: 1 each, Rfl: 11    ketotifen (ZADITOR) 0.025 % ophthalmic solution, One drop in each eye twice daily as needed, Disp: 1 each, Rfl: 11    fluticasone (FLONASE) 50 MCG/ACT nasal spray, 2 sprays by Each Nostril route daily, Disp: 1 each, Rfl: 11    esomeprazole (NEXIUM) 40 MG delayed release capsule, Take 1 capsule by mouth twice daily, Disp: 180 capsule, Rfl: 0    hyoscyamine (ANASPAZ;LEVSIN) 125 MCG tablet, Take 125 mcg by mouth every 4 hours as needed for Cramping, Disp: , Rfl:     SYNTHROID 88 MCG tablet, TAKE 1 TABLET BY MOUTH ONCE DAILY ON AN EMPTY STOMACH WITH WATER.  DO NOT EAT OR TAKE ANY OTHER MEDICATIONS FOR 30 MINUTES., Disp: 90 tablet, Rfl: 3    metFORMIN (GLUCOPHAGE) 500 MG tablet, TAKE 1 TABLET BY MOUTH TWICE DAILY WITH MEALS, Disp: 180 tablet, Rfl: 3    atorvastatin (LIPITOR) 10 MG tablet, Take 1 tablet by mouth once daily, Disp: 90 tablet, Rfl: 3    Cholecalciferol (VITAMIN D3) 50 MCG (2000 UT) CAPS, Take 1 capsule by mouth once daily, Disp: 90 capsule, Rfl: 3    amLODIPine (NORVASC) 5 MG tablet, Take 1 tablet by mouth once daily, Disp: 90 tablet, Rfl: 3    triamcinolone acetonide (KENALOG) 10 MG/ML injection, Inject 10 mg into the articular space once, Disp: , Rfl:     estradiol (ESTRACE) 0.1 MG/GM vaginal cream, INSERT 1 GRAM VAGINALLY 3 TIMES PER WEEK, Disp: , Rfl:     EPIPEN 2-VENESSA 0.3 MG/0.3ML SOAJ injection, INJECT 1 PEN IM AS A SINGLE DOSE PRN, Disp: 1 each, Rfl: 1    fluconazole (DIFLUCAN) 150 MG tablet, as needed (as need for yeast infection) , Disp: , Rfl:     gabapentin (NEURONTIN) 100 MG capsule, Take 100 mg by mouth 4 times daily. , Disp: , Rfl:     traZODone (DESYREL) 50 MG tablet, Take 50 mg by mouth nightly as needed for Sleep , Disp: , Rfl:     Biotin 1 MG CAPS, Take 1 capsule by mouth daily , Disp: , Rfl:     clobetasol (TEMOVATE) 0.05 % external solution, Apply daily to scaly or itchy areas on scalp, Disp: 60 mL, Rfl: 11    fluocinonide (LIDEX) 0.05 % cream, Apply topically 2 times daily to rash on hands a needed, Disp: 30 g, Rfl: 11    levomilnacipran (FETZIMA) 40 MG CP24 extended release capsule, Take 1 capsule by mouth daily, Disp: 30 capsule, Rfl: 2    Multiple Vitamins-Minerals (MULTIVITAMIN PO), 1 tablet daily , Disp: , Rfl:     Probiotic Product (PROBIOTIC PO), 1 tablet daily , Disp: , Rfl:     Cyanocobalamin (B-12 PO), daily, Disp: , Rfl:     ondansetron (ZOFRAN) 4 MG tablet, TAKE 1 TABLET BY MOUTH EVERY 8 HOURS AS NEEDED FOR NAUSEA OR VOMITING, Disp: 15 tablet, Rfl: 0    meloxicam (MOBIC) 7.5 MG tablet, TK 1 T PO BID, Disp: , Rfl: 4    LINZESS 290 MCG CAPS capsule, 290 mcg every morning (before breakfast) , Disp: , Rfl:     lidocaine (XYLOCAINE) 5 % ointment, Apply topically as needed for Pain Apply topically as needed. , Disp: , Rfl:     lidocaine (LIDODERM) 5 %, Place 1 patch onto the skin daily 12 hours on, 12 hours off., Disp: , Rfl:     hydrocortisone (ANUSOL-HC) 25 MG suppository, Place 25 mg rectally as needed for Hemorrhoids , Disp: , Rfl:     HYDROcodone-acetaminophen (NORCO) 5-325 MG per tablet, 1 tablet nightly as needed for Pain. Take 1 tablet every 4-6 hours as needed for pain , Disp: , Rfl:     zinc 50 MG CAPS, Take 1 capsule by mouth daily , Disp: , Rfl:     Ascorbic Acid (VITAMIN C) 500 MG CAPS, Take 1 tablet by mouth daily , Disp: , Rfl:     orphenadrine (NORFLEX) 100 MG tablet, Take 100 mg by mouth 2 times daily as needed. , Disp: , Rfl:     tramadol (ULTRAM) 50 MG tablet, Take 50 mg by mouth every 6 hours as needed. Take 1-2 tabs every 6 hrs prn , Disp: , Rfl:       Physical Exam:      Vitals:    Vitals:    06/22/22 0757   BP: 122/78   Pulse: 75   Resp: 16   Temp: (!) 96.4 °F (35.8 °C)   SpO2: 96%       214 lb (97.1 kg)       Temp: (!) 96.4 °F (35.8 °C) I @FLOWSTAT(6)@ IHeart Rate: 75 I @FLOWSTAT(8)@ I BP: 122/78 I @TIQLPV(42)@; @VTEGOF(44)@ I Resp: 16 I @FLOWSTAT(9)@ I SpO2: 96 % I @FLOWSTAT(10)@ I   I Height: 5' 4\" (162.6 cm) I   I Facility age limit for growth percentiles is 20 years. I     Facility age limit for growth percentiles is 20 years. Facility age limit for growth percentiles is 20 years. Facility age limit for growth percentiles is 20 years. Facility age limit for growth percentiles is 20 years. Physical Exam:    Physical Exam  Vitals and nursing note reviewed. Constitutional:       Appearance: Normal appearance. She is normal weight. HENT:      Head: Normocephalic and atraumatic. Right Ear: Ear canal and external ear normal.      Left Ear: Ear canal and external ear normal.      Nose: Nose normal.      Mouth/Throat:      Mouth: Mucous membranes are moist.      Pharynx: Oropharynx is clear. Comments: Patient has little bit of redness and irritation in her throat from postnasal drainage.   White thick drainage noted in small amount [address] [facility], [address] [facility] [phone]       No results found for: LOIS   Rheumatoid Factor   Date/Time Value Ref Range Status   2020 09:35 AM 13 0 - 13 IU/mL Final     Comment:     Performed at [facility], [address]      I have reviewed the recent labs that was ordered on the patient which includes a sputum culture as well as mold labs. All labs were discussed with the patient. No labs were found to have any significant findings    No results found for this or any previous visit. No results found for this or any previous visit. All current and previous medial labs have been reviewed and discussed with patient     ACT score 24    Procedures: Allergy Testin2019    Assessment/Orders:    Diagnosis Orders   1. Severe persistent asthma without complication     2. Chronic allergic rhinitis     3. Exposure to mold     4. Allergy to mold     5. Allergy to trees     6. Eosinophilic asthma     7. Immunotherapy     8. Encounter for desensitization to allergens     9. Elevated IgE level         All diagnostic imaging  have been personally reviewed     Plan:  Follow Up:6 months    Patient to continue allergy medications and allergy shots  Encourage patient to gargle with warm salt water and continue nasal sinus rinses twice daily  Patient to continue other medications except for we will stop Astelin and switch her to Atrovent for rhinorrhea to see if this helps her  Patient states the allergy eyedrops I prescribed her last time has been beneficial she will continue those    Spent 45 minutes of face-to-face time with the patient with well more than half of the visit being dedicated to the discussion of the various symptom problems, provided education of medications and disease process, as well as discussion of a therapeutic plan for each. Face-to-face education time does not include any time that may have been spent for procedures.     (Please note that portions of this note may have been completed with a voice recognition program.  Efforts were made to edit the dictation but occasionally words are mis-transcribed.)         Signed:  COREEN Villagomez CNP  6/22/2022  9:17 AM

## 2022-06-22 NOTE — TELEPHONE ENCOUNTER
She needs to stop astelin nasal spray  I will add atrovent - ipratropium nasal spray  Otherwise nothing else changed. Have her look at printout from today which should list her medications and how often she is to take the med. The only thing not on there is the artovent. I just sent this in.

## 2022-07-06 ENCOUNTER — NURSE ONLY (OUTPATIENT)
Dept: ALLERGY | Age: 62
End: 2022-07-06
Payer: MEDICAID

## 2022-07-06 VITALS
HEART RATE: 84 BPM | OXYGEN SATURATION: 95 % | DIASTOLIC BLOOD PRESSURE: 80 MMHG | TEMPERATURE: 98.1 F | SYSTOLIC BLOOD PRESSURE: 126 MMHG

## 2022-07-06 DIAGNOSIS — Z91.048 ALLERGY TO MOLD: Primary | ICD-10-CM

## 2022-07-06 DIAGNOSIS — J30.1 ALLERGY TO TREES: ICD-10-CM

## 2022-07-06 DIAGNOSIS — Z51.6 ENCOUNTER FOR DESENSITIZATION TO ALLERGENS: ICD-10-CM

## 2022-07-06 DIAGNOSIS — J82.83 EOSINOPHILIC ASTHMA: ICD-10-CM

## 2022-07-06 PROCEDURE — 96372 THER/PROPH/DIAG INJ SC/IM: CPT | Performed by: NURSE PRACTITIONER

## 2022-07-06 PROCEDURE — 95117 IMMUNOTHERAPY INJECTIONS: CPT | Performed by: NURSE PRACTITIONER

## 2022-07-06 NOTE — PROGRESS NOTES
Patient here today to receive Xolair injection. Patient does not report any previous reaction from Xolair injections. Denies any nausea vomiting fever urticaria or angioedema. Denies any recent exacerbation of asthma or asthma-like symptoms. Patient was explained benefits and potential risks including anaphylaxis to patient. Patient has been explained the risk and benefits including delayed anaphylaxis. Patient has EpiPen and understands how to appropriately use. Xolair 150 mg given subcutaneously X 2 doses in    Bilateral, thighs  thigh(s), bilateral     for a total combined dose of 300 mg using a 25-gauge needle and 3 ML syringe. RIGHT  / LEFT UPPER ARM,  RIGHT  / LEFT FRONT OR MIDDLE OF THIGH, OR STOMACH        Xolair  [address] [id number]   Lot [id number]   Expires 07/31/2022    Patient observed for 30 minutes No  If NO Patient left against medical advice    Medication was supplied by patient:  YES/NO: No      Medication was supplied as a sample:  YES/NO: Yes                     Prior to patient receiving Xolair area was cleansed with alcohol swabs. Following the administration no evidence of bleeding or bruising. No adverse reactions reported. Patient tolerated well without adverse reactions or side effects. Patient to continue to receive Xolair injections monthly. Will report any problems to this office. No evidence of allergic reaction including anaphylaxis.

## 2022-07-06 NOTE — PROGRESS NOTES
After consent obtained/verified, allergy injection given in back of R/L arm(s). VIAL COLOR OF ALL VIALS TODAY IS RED MAINTENANCE    ALLERGY INJECTION FROM VIAL A GIVEN right  UPPER ARM IN THE AMOUNT OF 0.50 ML    ALLERGY INJECTION FROM VIAL B GIVEN left upper ARM IN THE AMOUNT OF 0.50 ML    Documentation of vial injection specific to arm(s) noted on Allergy Immunotherapy Administration Form. Patient waited 30 minutes for observation. No      Patient tolerated well without adverse reaction WHILE IN OFFICE    SHOT REACTION TREATMENT INSTRUCTIONS    During the 30 minute wait after an allergy injection the following symptoms should be reported:    Itching other than at the injection site  Hives or swelling other than at the injection site  Redness other than at the injection site  Difficulty breathing  Chest tightness  Difficulty swallowing  Throat tightness    If these symptoms occur, NOTIFY PROVIDER and the following treatment should be administered:    1. Epinephrine/Auvi Q 1:1000 IM - 0.3 ml if > 66 lbs or more, 0.15 ml if 33 - 63 lbs, or 0.1 ml if <33 lbs     2. Diphenhydramine - give all intramuscular:     2 to <6 years (off-label use): 6.25 mg,    6 to <12 years: 12.5 to 25 mg;    ?12 years: 25-50 mg.    3.  Famotidine:  Adults 40 mg oral    Adolescents age 12 years and >88 lbs: 40 mg    Children and Adolescents ? 12years of age: Initial: 0.25 mg/kg/dose  every 12 hours (maximum daily dose: 40 mg/day)    Epi/Auvi Q dose may me repeated in 5-15 minutes if adequate resolution of symptoms does not occur    Patient should be observed for at least one hour after final Epi/Auvi Q dose and must be seen by provider. Patients cannot drive themselves if they have received diphenhydramine.

## 2022-07-10 DIAGNOSIS — L66.9 CENTRAL CENTRIFUGAL SCARRING ALOPECIA: ICD-10-CM

## 2022-07-10 DIAGNOSIS — L85.3 XEROSIS OF SKIN: ICD-10-CM

## 2022-07-11 ENCOUNTER — NURSE ONLY (OUTPATIENT)
Dept: ALLERGY | Age: 62
End: 2022-07-11
Payer: MEDICAID

## 2022-07-11 VITALS
HEART RATE: 83 BPM | DIASTOLIC BLOOD PRESSURE: 78 MMHG | TEMPERATURE: 97.2 F | SYSTOLIC BLOOD PRESSURE: 122 MMHG | OXYGEN SATURATION: 95 %

## 2022-07-11 DIAGNOSIS — Z91.048 ALLERGY TO MOLD: Primary | ICD-10-CM

## 2022-07-11 DIAGNOSIS — J30.1 ALLERGY TO TREES: ICD-10-CM

## 2022-07-11 DIAGNOSIS — Z51.6 ENCOUNTER FOR DESENSITIZATION TO ALLERGENS: ICD-10-CM

## 2022-07-11 PROCEDURE — 95117 IMMUNOTHERAPY INJECTIONS: CPT | Performed by: NURSE PRACTITIONER

## 2022-07-11 RX ORDER — KETOCONAZOLE 20 MG/ML
SHAMPOO TOPICAL
Qty: 120 ML | Refills: 0 | Status: SHIPPED | OUTPATIENT
Start: 2022-07-11

## 2022-07-11 RX ORDER — ESOMEPRAZOLE MAGNESIUM 40 MG/1
CAPSULE, DELAYED RELEASE ORAL
Qty: 180 CAPSULE | Refills: 0 | Status: SHIPPED | OUTPATIENT
Start: 2022-07-11

## 2022-07-11 RX ORDER — HYDROCORTISONE 25 MG/ML
LOTION TOPICAL
Qty: 118 ML | Refills: 0 | Status: SHIPPED | OUTPATIENT
Start: 2022-07-11

## 2022-07-11 NOTE — PROGRESS NOTES
After consent obtained/verified, allergy injection given in back of R/L arm(s). VIAL COLOR OF ALL VIALS TODAY IS RED    ALLERGY INJECTION FROM VIAL A GIVEN left  UPPER ARM IN THE AMOUNT OF 0.50 ML    ALLERGY INJECTION FROM VIAL B GIVEN right upper ARM IN THE AMOUNT OF 0.50 ML        Documentation of vial injection specific to arm(s) noted on Allergy Immunotherapy Administration Form. Patient waited 30 minutes for observation. No      Patient tolerated well without adverse reaction WHILE IN OFFICE    SHOT REACTION TREATMENT INSTRUCTIONS    During the 30 minute wait after an allergy injection the following symptoms should be reported:    Itching other than at the injection site  Hives or swelling other than at the injection site  Redness other than at the injection site  Difficulty breathing  Chest tightness  Difficulty swallowing  Throat tightness    If these symptoms occur, NOTIFY PROVIDER and the following treatment should be administered:    1. Epinephrine/Auvi Q 1:1000 IM - 0.3 ml if > 66 lbs or more, 0.15 ml if 33 - 63 lbs, or 0.1 ml if <33 lbs     2. Diphenhydramine - give all intramuscular:     2 to <6 years (off-label use): 6.25 mg,    6 to <12 years: 12.5 to 25 mg;    ?12 years: 25-50 mg.    3.  Famotidine:  Adults 40 mg oral    Adolescents age 12 years and >88 lbs: 40 mg    Children and Adolescents ? 12years of age: Initial: 0.25 mg/kg/dose  every 12 hours (maximum daily dose: 40 mg/day)    Epi/Auvi Q dose may me repeated in 5-15 minutes if adequate resolution of symptoms does not occur    Patient should be observed for at least one hour after final Epi/Auvi Q dose and must be seen by provider. Patients cannot drive themselves if they have received diphenhydramine.

## 2022-07-19 ENCOUNTER — OFFICE VISIT (OUTPATIENT)
Dept: ENT CLINIC | Age: 62
End: 2022-07-19
Payer: MEDICAID

## 2022-07-19 VITALS
DIASTOLIC BLOOD PRESSURE: 78 MMHG | WEIGHT: 212.6 LBS | TEMPERATURE: 97.2 F | SYSTOLIC BLOOD PRESSURE: 122 MMHG | OXYGEN SATURATION: 95 % | RESPIRATION RATE: 16 BRPM | HEIGHT: 64 IN | BODY MASS INDEX: 36.29 KG/M2 | HEART RATE: 91 BPM

## 2022-07-19 DIAGNOSIS — Z98.2 VP (VENTRICULOPERITONEAL) SHUNT STATUS: ICD-10-CM

## 2022-07-19 DIAGNOSIS — R51.9 RIGHT SIDED FACIAL PAIN: ICD-10-CM

## 2022-07-19 DIAGNOSIS — F17.200 TOBACCO DEPENDENCE: ICD-10-CM

## 2022-07-19 DIAGNOSIS — J30.1 NON-SEASONAL ALLERGIC RHINITIS DUE TO POLLEN: ICD-10-CM

## 2022-07-19 DIAGNOSIS — H92.01 REFERRED OTALGIA OF RIGHT EAR: ICD-10-CM

## 2022-07-19 DIAGNOSIS — E03.9 ACQUIRED HYPOTHYROIDISM: ICD-10-CM

## 2022-07-19 DIAGNOSIS — R49.0 HOARSE: ICD-10-CM

## 2022-07-19 DIAGNOSIS — Z51.6 ALLERGY DESENSITIZATION THERAPY: ICD-10-CM

## 2022-07-19 DIAGNOSIS — J38.3 POLYPOID DEGENERATION OF VOCAL CORDS: Primary | ICD-10-CM

## 2022-07-19 PROCEDURE — 99214 OFFICE O/P EST MOD 30 MIN: CPT | Performed by: OTOLARYNGOLOGY

## 2022-07-19 PROCEDURE — 31575 DIAGNOSTIC LARYNGOSCOPY: CPT | Performed by: OTOLARYNGOLOGY

## 2022-07-19 RX ORDER — DOXYCYCLINE HYCLATE 100 MG
TABLET ORAL
Qty: 28 TABLET | Refills: 0 | Status: SHIPPED | OUTPATIENT
Start: 2022-07-19

## 2022-07-19 ASSESSMENT — ENCOUNTER SYMPTOMS
VOMITING: 0
ABDOMINAL PAIN: 0
RHINORRHEA: 0
NAUSEA: 0
CHOKING: 0
TROUBLE SWALLOWING: 0
WHEEZING: 0
SORE THROAT: 1
SINUS PRESSURE: 1
COLOR CHANGE: 0
APNEA: 0
STRIDOR: 0
DIARRHEA: 0
CHEST TIGHTNESS: 0
SHORTNESS OF BREATH: 0
FACIAL SWELLING: 0
VOICE CHANGE: 1
COUGH: 1

## 2022-07-19 NOTE — PROGRESS NOTES
[facility]YS[facility] [place] [facility]  [facility] EAR, NOSE AND THROAT  [address] [place] [address] [place] [address]  Dept: [phone]  Dept Fax: [phone]  Loc: Cindi Garcia is a 59 y.o. female who was referred byNo ref. provider found for:  Chief Complaint   Patient presents with    Follow-up     Patient is here for a f/u hoarseness     . HPI:     Didier Gray is a 59 y.o. female who presents today for follow-up on her hoarseness. She had upper respiratory infection last visit. She is worried about her voice. She is still smoking. She states she has severe allergies and she is moving from her current location because of mold in the house. She states she is going to move to [facility] and this would be her last visit. She complains of sinus and stuffy nose all the time. Lately she has had pain in the right side of her face not related to opening and closing her mouth. .    History: Allergies   Allergen Reactions    Bactrim [Sulfamethoxazole-Trimethoprim]     Flexeril [Cyclobenzaprine]     Morphine Other (See Comments)     \"I hallucinate\"    Peanut-Containing Drug Products     Tessalon [Benzonatate] Hives    Amoxicillin-Pot Clavulanate Rash    Cefdinir Rash    Ibuprofen [Ibuprofen] Rash    Lisinopril Rash    Macrobid [Nitrofurantoin Monohydrate Macrocrystals] Rash    Macrodantin [Nitrofurantoin] Rash    Ranitidine Rash     Current Outpatient Medications   Medication Sig Dispense Refill    doxycycline hyclate (VIBRA-TABS) 100 MG tablet Take 1 tablet twice a day.  28 tablet 0    ketoconazole (NIZORAL) 2 % shampoo APPLY 3-4 TIMES WEEKLY TO SCALP, LEAVE ON FOR 5 MINUTES PRIOR TO WASHING  mL 0    esomeprazole (NEXIUM) 40 MG delayed release capsule Take 1 capsule by mouth twice daily 180 capsule 0    hydrocortisone (HYTONE) 2.5 % lotion APPLY  LOTION EXTERNALLY ONCE DAILY TO AFFECTED AREA 118 mL 0    Hypertonic Nasal Wash (SINUS RINSE BOTTLE KIT) PACK Use with distilled water and salt packet once daily 1 each 11    ipratropium (ATROVENT) 0.03 % nasal spray 2 sprays by Each Nostril route every 12 hours 30 mL 5    fexofenadine (ALLEGRA) 180 MG tablet Take 1 tablet by mouth daily 30 tablet 5    ALLERGEN EXTRACT once a week      oxybutynin (DITROPAN XL) 5 MG extended release tablet Take 1 tablet by mouth daily 90 tablet 2    pentosan polysulfate (ELMIRON) 100 MG capsule Take 1 capsule by mouth 2 times daily 180 capsule 2    omalizumab (XOLAIR) 150 MG/ML SOSY injection Inject 300 mg into the skin every 28 days Administer 300 mg every 28 days PREFILLED SYRINGE. MEDICATION MUST BE MAILED TO PROVIDERS OFFICE 2 each 5    montelukast (SINGULAIR) 10 MG tablet Take 1 tablet by mouth nightly For allergies 30 tablet 5    doxycycline monohydrate (MONODOX) 100 MG capsule Take 1 capsule by mouth 2 times daily 14 capsule 0    albuterol sulfate  (90 Base) MCG/ACT inhaler RESCUE INHALER CARRY IN PURSE -  AS NEEDED - INHALE 2 PUFFS INTO LUNGS EVERY 4 HOURS AS NEEDED FOR WHEEZING 18 g 2    albuterol (PROVENTIL) (2.5 MG/3ML) 0.083% nebulizer solution USE 1 VIAL IN NEBULIZER EVERY 6 HOURS AS NEEDED FOR WHEEZING (FOR ASTHMA) 375 mL 5    budesonide-formoterol (SYMBICORT) 160-4.5 MCG/ACT AERO USE EVERY DAY - 2 puffs inhaled twice daily. Rinse mouth well after use. 1 each 11    ketotifen (ZADITOR) 0.025 % ophthalmic solution One drop in each eye twice daily as needed 1 each 11    fluticasone (FLONASE) 50 MCG/ACT nasal spray 2 sprays by Each Nostril route daily 1 each 11    hyoscyamine (ANASPAZ;LEVSIN) 125 MCG tablet Take 125 mcg by mouth every 4 hours as needed for Cramping      SYNTHROID 88 MCG tablet TAKE 1 TABLET BY MOUTH ONCE DAILY ON AN EMPTY STOMACH WITH WATER. DO NOT EAT OR TAKE ANY OTHER MEDICATIONS FOR 30 MINUTES.  90 tablet 3    metFORMIN (GLUCOPHAGE) 500 MG tablet TAKE 1 TABLET BY MOUTH TWICE DAILY WITH MEALS 180 tablet 3    atorvastatin (LIPITOR) 10 MG tablet Take 1 tablet by mouth once daily 90 tablet 3    Cholecalciferol (VITAMIN D3) 50 MCG (2000 UT) CAPS Take 1 capsule by mouth once daily 90 capsule 3    amLODIPine (NORVASC) 5 MG tablet Take 1 tablet by mouth once daily 90 tablet 3    triamcinolone acetonide (KENALOG) 10 MG/ML injection Inject 10 mg into the articular space once      estradiol (ESTRACE) 0.1 MG/GM vaginal cream INSERT 1 GRAM VAGINALLY 3 TIMES PER WEEK      EPIPEN 2-VENESSA 0.3 MG/0.3ML SOAJ injection INJECT 1 PEN IM AS A SINGLE DOSE PRN 1 each 1    fluconazole (DIFLUCAN) 150 MG tablet as needed (as need for yeast infection)       gabapentin (NEURONTIN) 100 MG capsule Take 100 mg by mouth 4 times daily. traZODone (DESYREL) 50 MG tablet Take 50 mg by mouth nightly as needed for Sleep       Biotin 1 MG CAPS Take 1 capsule by mouth daily       clobetasol (TEMOVATE) 0.05 % external solution Apply daily to scaly or itchy areas on scalp 60 mL 11    fluocinonide (LIDEX) 0.05 % cream Apply topically 2 times daily to rash on hands a needed 30 g 11    levomilnacipran (FETZIMA) 40 MG CP24 extended release capsule Take 1 capsule by mouth daily 30 capsule 2    Multiple Vitamins-Minerals (MULTIVITAMIN PO) 1 tablet daily       Probiotic Product (PROBIOTIC PO) 1 tablet daily       Cyanocobalamin (B-12 PO) daily      ondansetron (ZOFRAN) 4 MG tablet TAKE 1 TABLET BY MOUTH EVERY 8 HOURS AS NEEDED FOR NAUSEA OR VOMITING 15 tablet 0    meloxicam (MOBIC) 7.5 MG tablet TK 1 T PO BID  4    LINZESS 290 MCG CAPS capsule 290 mcg every morning (before breakfast)       lidocaine (XYLOCAINE) 5 % ointment Apply topically as needed for Pain Apply topically as needed. lidocaine (LIDODERM) 5 % Place 1 patch onto the skin daily 12 hours on, 12 hours off.      hydrocortisone (ANUSOL-HC) 25 MG suppository Place 25 mg rectally as needed for Hemorrhoids       HYDROcodone-acetaminophen (NORCO) 5-325 MG per tablet 1 tablet nightly as needed for Pain.  Take 1 tablet every 4-6 hours as needed for pain       zinc 50 MG CAPS Take 1 capsule by mouth daily       Ascorbic Acid (VITAMIN C) 500 MG CAPS Take 1 tablet by mouth daily       orphenadrine (NORFLEX) 100 MG tablet Take 100 mg by mouth 2 times daily as needed. tramadol (ULTRAM) 50 MG tablet Take 50 mg by mouth every 6 hours as needed.  Take 1-2 tabs every 6 hrs prn        Current Facility-Administered Medications   Medication Dose Route Frequency Provider Last Rate Last Admin    omalizumab Shaila Stephanie) injection 150 mg  150 mg SubCUTAneous Q28 Days Assunta Peasant, APRN - CNP        omalizumab Shaila Stephanie) injection 150 mg  150 mg SubCUTAneous Q28 Days Assunta Peasant, APRN - CNP        omalizumab Shaila Stephanie) injection 300 mg  300 mg SubCUTAneous Q28 Days Assunta Peasant, APRN - CNP   300 mg at 22 1053    omalizumab (XOLAIR) injection 150 mg  150 mg SubCUTAneous Q28 Days Assunta Peasant, APRN - CNP   150 mg at 22 0800    omalizumab Shaila Stephanie) injection 150 mg  150 mg SubCUTAneous Q28 Days Assunta Peasant, APRN - CNP   150 mg at 22 1518    omalizumab (XOLAIR) injection 150 mg  150 mg SubCUTAneous Q28 Days Assunta Peasant, APRN - CNP   150 mg at 22 0800     Past Medical History:   Diagnosis Date    Allergic rhinitis     Anxiety     Arnold-Chiari malformation (HCC)     Asthma     Chronic bronchitis (HCC)     Fibromyalgia     GERD (gastroesophageal reflux disease)     Headache(784.0)     Hyperlipidemia     Neuropathy     Osteoarthritis     Sinusitis     Type II or unspecified type diabetes mellitus without mention of complication, not stated as uncontrolled       Past Surgical History:   Procedure Laterality Date    APPENDECTOMY       SECTION      x2    COLONOSCOPY  2010    CSF SHUNT  2007    Cervical syrinx to subarachnoid shunt; thoracic syrinx to subarachnoid shunt (2 separate dural openings)    CYST REMOVAL      extradural cysts at thoracic 2-3    ECTOPIC PREGNANCY SURGERY      HEMICOLECTOMY      HERNIA REPAIR      inguinal HYSTERECTOMY (CERVIX STATUS UNKNOWN)      LARYNGOSCOPY N/A 02/24/2021    MICROLARYNGOSCOPY AND BIOPSY   performed by Aric Ying MD at [facility]  08/21/2020    Bilateral great toe symes amputation,Bilateral toes 1-5 matrixectomy by [facility] DECOMPRESSION  01/09/2007    C4-5-6-7; T1-2-3 laminectomies    TOOTH EXTRACTION  09/2017     Family History   Problem Relation Age of Onset    Cancer Other         colon    Mental Illness Other     High Blood Pressure Other     Diabetes Other     High Blood Pressure Sister      Social History     Tobacco Use    Smoking status: Every Day     Packs/day: 0.20     Years: 25.00     Pack years: 5.00     Types: Cigarettes     Start date: 6/1/1975    Smokeless tobacco: Never    Tobacco comments:     smokes 5 cigs a day   Substance Use Topics    Alcohol use: Not Currently     Alcohol/week: 2.0 standard drinks     Types: 2 Cans of beer per week     Comment: Patient drinks 1-2 beers a MONTH ; stomach tolerates very little alcohol       Subjective:      Review of Systems   Constitutional:  Negative for activity change, appetite change, chills, diaphoresis, fatigue, fever and unexpected weight change. HENT:  Positive for ear pain, postnasal drip, sinus pressure, sore throat and voice change. Negative for congestion, dental problem, ear discharge, facial swelling, hearing loss, mouth sores, nosebleeds, rhinorrhea, sneezing, tinnitus and trouble swallowing. Eyes:  Negative for visual disturbance. Respiratory:  Positive for cough. Negative for apnea, choking, chest tightness, shortness of breath, wheezing and stridor. Cardiovascular:  Negative for chest pain, palpitations and leg swelling. Gastrointestinal:  Negative for abdominal pain, diarrhea, nausea and vomiting. Endocrine: Negative for cold intolerance, heat intolerance, polydipsia and polyuria. Genitourinary:  Negative for dysuria, enuresis and hematuria.    Musculoskeletal:  Negative for arthralgias, gait problem, neck pain and neck stiffness. Skin:  Negative for color change and rash. Allergic/Immunologic: Positive for environmental allergies. Negative for food allergies and immunocompromised state. Neurological:  Negative for dizziness, syncope, facial asymmetry, speech difficulty, light-headedness and headaches. Hematological:  Negative for adenopathy. Does not bruise/bleed easily. Psychiatric/Behavioral:  Negative for confusion and sleep disturbance. The patient is not nervous/anxious. Objective:   /78 (Site: Left Upper Arm, Position: Sitting)   Pulse 91   Temp 97.2 °F (36.2 °C) (Infrared)   Resp 16   Ht 5' 4\" (1.626 m)   Wt 212 lb 9.6 oz (96.4 kg)   SpO2 95%   BMI 36.49 kg/m²     Physical Exam  Patient's voice is quite hoarse, the floppy polypoid degeneration type of voice without diplophonia. No stridor. Nose mild right septal deviation  Mouth and oropharynx no inflammation  No TMJ crepitus  No tenderness to her neck. HIGH RESOLUTION FLEXIBLE VIDEOLARYNGOSOCPY    A fiberoptic laryngoscopy was performed under topical anesthesia, after using Afrin and Lidocaine spray in the nasal fossa. The nasal fossa, nasopharynx, hypopharynx and larynx were carefully examined. Base of tongue was symmetrical. Epiglottis appeared normal and was not retrodisplaced. True vocal cords had normal mobility, and severe symmetrical polypoid degeneration. There was mild mucosal edema throughout the hypopharynx but no erythema. No mucosal lesions or masses were noted. No pooling in the pyriform sinuses. Data:  All of the past medical history, past surgical history, family history,social history, allergies and current medications were reviewed with the patient. Assessment & Plan   Diagnoses and all orders for this visit:     Diagnosis Orders   1. Polypoid degeneration of vocal cords  SD LARYNGOSCOPY FLEXIBLE DIAGNOSTIC      2.  Tobacco dependence  SD LARYNGOSCOPY FLEXIBLE DIAGNOSTIC      3. Hoarse  FL LARYNGOSCOPY FLEXIBLE DIAGNOSTIC      4. Referred otalgia of right ear  FL LARYNGOSCOPY FLEXIBLE DIAGNOSTIC      5. Right sided facial pain        6. Acquired hypothyroidism        7.  (ventriculoperitoneal) shunt status        8. Non-seasonal allergic rhinitis due to pollen        9. Allergy desensitization therapy            The findings were explained and her questions were answered. I reviewed a CT of her head from year ago and there was no disease in either her sinuses or middle ear's and mastoids. It is unclear what is causing the pain in the right side of her face and neck. Thorough ENT exam and high-resolution flexible endoscopy did not reveal any lesions to explain this. Patient is moving within 2 weeks. I prescribed 2 weeks of doxycycline which is one of the very few antibiotics he can tolerate to see if that helps the discomfort. Neck step would be a CT of the neck with contrast, but she will be gone. Information was provided to her regarding getting her records including the video of today's visit. Tootie Mendez. Lucy Paz MD    **This report has been created using voice recognition software. It may contain minor errors which are inherent in voicerecognition technology. **

## 2022-08-16 ENCOUNTER — TELEPHONE (OUTPATIENT)
Dept: ALLERGY | Age: 62
End: 2022-08-16

## 2022-08-16 NOTE — TELEPHONE ENCOUNTER
Received mail from [address] [place] [address] on pt's medical records from Dr. Rolland Bumpers office. Called medical records and informed pt wanted these records mailed to her address which is listed on the paper. Informed records that pt wants are from urology, ENT, Allergy & asthma, dermatology, pulmonary and dr Derick Vinson office. Spoke with DJ and informed her tried to call pt but her number is out of service now. She states she is going to get the request reprocessed.

## 2022-08-16 NOTE — TELEPHONE ENCOUNTER
Received called back from pt on number [phone] and informed her medical records sent medical records to our office and was asking her if she received any. Pt states she has not checked her mail since forwarded. Informed her to call our office if any issues and also gave medical records number of [phone]. Informed pt I had medical records resend everything to her address since they may have messed up. Pt verbalized understanding and thanked me.

## 2022-09-15 NOTE — ADDENDUM NOTE
Problem: Pain  Goal: #Acceptable pain level achieved/maintained at rest using NRS/Faces  Description: This goal is used for patients who can self-report.  Acceptable means the level is at or below the identified comfort/function goal.  Outcome: Outcome Met, Continue evaluating goal progress toward completion     Problem: Pain  Goal: # Verbalizes understanding of pain management  Description: Documented in Patient Education Activity  Outcome: Outcome Met, Continue evaluating goal progress toward completion     Problem: VTE, Risk for  Goal: # No s/s of VTE  Outcome: Outcome Met, Continue evaluating goal progress toward completion     Problem: VTE, Risk for  Goal: # Verbalizes understanding of VTE risk factors and prevention  Description: Document education using the patient education activity.   Outcome: Outcome Met, Continue evaluating goal progress toward completion     Problem: VTE, Risk for  Goal: Demonstrates ability to administer injectable anticoagulants if ordered for d/c  Description: Document education using the patient education activity.  Outcome: Outcome Met, Continue evaluating goal progress toward completion      Addended by: Delmar Escalera on: 6/6/2022 03:41 PM     Modules accepted: Orders

## 2023-05-18 ENCOUNTER — TELEPHONE (OUTPATIENT)
Dept: FAMILY MEDICINE CLINIC | Age: 63
End: 2023-05-18

## 2023-05-18 NOTE — TELEPHONE ENCOUNTER
Pt called office stating she has moved to [place]. Has been there since October 2022. Pt also wanted to let us know she misses us and to tell Dr. Estefania Carson.

## 2023-06-08 ENCOUNTER — HOSPITAL ENCOUNTER (OUTPATIENT)
Facility: HOSPITAL | Age: 63
Discharge: HOME | End: 2023-06-10
Payer: MEDICAID

## 2023-06-08 ENCOUNTER — HOSPITAL ENCOUNTER (OUTPATIENT)
Facility: HOSPITAL | Age: 63
Discharge: HOME | End: 2023-06-08
Payer: MEDICAID

## 2023-06-08 DIAGNOSIS — M79.89 MASS OF SOFT TISSUE: ICD-10-CM

## 2023-06-08 DIAGNOSIS — M79.672 LEFT FOOT PAIN: ICD-10-CM

## 2023-06-08 LAB
ANION GAP SERPL CALC-SCNC: 3 MMOL/L (ref 3–18)
BUN SERPL-MCNC: 13 MG/DL (ref 7–18)
BUN/CREAT SERPL: 20 (ref 12–20)
CALCIUM SERPL-MCNC: 9.2 MG/DL (ref 8.5–10.1)
CHLORIDE SERPL-SCNC: 108 MMOL/L (ref 100–111)
CO2 SERPL-SCNC: 30 MMOL/L (ref 21–32)
CREAT SERPL-MCNC: 0.64 MG/DL (ref 0.6–1.3)
EST. AVERAGE GLUCOSE BLD GHB EST-MCNC: 117 MG/DL
GLUCOSE SERPL-MCNC: 95 MG/DL (ref 74–99)
HBA1C MFR BLD: 5.7 % (ref 4.2–5.6)
HCT VFR BLD AUTO: 37.7 % (ref 35–45)
HGB BLD-MCNC: 12.1 G/DL (ref 12–16)
POTASSIUM SERPL-SCNC: 4.3 MMOL/L (ref 3.5–5.5)
SODIUM SERPL-SCNC: 141 MMOL/L (ref 136–145)

## 2023-06-08 PROCEDURE — 36415 COLL VENOUS BLD VENIPUNCTURE: CPT

## 2023-06-08 PROCEDURE — 85014 HEMATOCRIT: CPT

## 2023-06-08 PROCEDURE — 80048 BASIC METABOLIC PNL TOTAL CA: CPT

## 2023-06-08 PROCEDURE — 83036 HEMOGLOBIN GLYCOSYLATED A1C: CPT

## 2023-06-08 PROCEDURE — 85018 HEMOGLOBIN: CPT

## 2023-06-08 PROCEDURE — 93005 ELECTROCARDIOGRAM TRACING: CPT

## 2023-06-09 LAB
EKG ATRIAL RATE: 77 BPM
EKG DIAGNOSIS: NORMAL
EKG P AXIS: 78 DEGREES
EKG P-R INTERVAL: 172 MS
EKG Q-T INTERVAL: 384 MS
EKG QRS DURATION: 70 MS
EKG QTC CALCULATION (BAZETT): 434 MS
EKG R AXIS: 79 DEGREES
EKG T AXIS: 62 DEGREES
EKG VENTRICULAR RATE: 77 BPM

## 2023-09-25 ENCOUNTER — TELEPHONE (OUTPATIENT)
Dept: ALLERGY | Age: 63
End: 2023-09-25

## 2023-09-25 NOTE — TELEPHONE ENCOUNTER
Patient has not been into the office for over 1 year and pt moved. Xolair injection chart disassembled and scanned into media.

## 2023-11-13 ENCOUNTER — TELEPHONE (OUTPATIENT)
Dept: ALLERGY | Age: 63
End: 2023-11-13

## 2023-11-13 ENCOUNTER — TELEPHONE (OUTPATIENT)
Dept: FAMILY MEDICINE CLINIC | Age: 63
End: 2023-11-13

## 2023-11-13 NOTE — TELEPHONE ENCOUNTER
----- Message from Rica Lind sent at 11/13/2023  9:21 AM EST -----  Subject: Message to Provider    QUESTIONS  Information for Provider? Pt states [place] and is back   will like to start seeing Dr. Lars Kolb again. Please let pt know.   ---------------------------------------------------------------------------  --------------  Luis Armando DONOHUE  [phone]; OK to leave message on voicemail  ---------------------------------------------------------------------------  --------------  SCRIPT ANSWERS  Relationship to Patient?  Self

## 2023-11-13 NOTE — TELEPHONE ENCOUNTER
Patient called in asking if Fernando Moody would take her back as a patient since she moved back to [place]. Patients number is [phone]. Last office visit was 6/22/22 ans last allergy injections was 7/11/22.

## 2023-11-13 NOTE — TELEPHONE ENCOUNTER
Called and updated the patient, she states that she will call every couple months to see if Dr. Lex Costa is accepting new patients.

## 2023-11-14 ENCOUNTER — TELEPHONE (OUTPATIENT)
Dept: ALLERGY | Age: 63
End: 2023-11-14

## 2023-11-14 DIAGNOSIS — J30.9 CHRONIC ALLERGIC RHINITIS: Primary | ICD-10-CM

## 2023-11-14 NOTE — TELEPHONE ENCOUNTER
Patient may be seen at clinic to get re-established. Just moved from [place] back to [facility]. Will need a 1 hour appt to re-familiarize with patient. Informed next appt is in Feb.  Patient is non-urgent.  May be seen in Feb

## 2023-11-14 NOTE — TELEPHONE ENCOUNTER
Called and informed patient got her scheduled. She thank me and told me to tell Stefaniemary Velasco that she loves her and thanks her so much for seeing her again.

## 2024-01-09 ENCOUNTER — TELEPHONE (OUTPATIENT)
Dept: ALLERGY | Age: 64
End: 2024-01-09

## 2024-01-09 NOTE — TELEPHONE ENCOUNTER
Attempted to call pt. Unable to reach, left message to call office.   Left message to update her information she said she wanted to do on the voicemail she left us and also to see if she wanted to come in earlier

## 2024-01-16 ENCOUNTER — TELEPHONE (OUTPATIENT)
Dept: ALLERGY | Age: 64
End: 2024-01-16

## 2024-01-16 ENCOUNTER — NURSE ONLY (OUTPATIENT)
Dept: ALLERGY | Age: 64
End: 2024-01-16
Payer: MEDICAID

## 2024-01-16 ENCOUNTER — OFFICE VISIT (OUTPATIENT)
Dept: ALLERGY | Age: 64
End: 2024-01-16
Payer: MEDICAID

## 2024-01-16 VITALS
DIASTOLIC BLOOD PRESSURE: 74 MMHG | OXYGEN SATURATION: 96 % | RESPIRATION RATE: 18 BRPM | HEART RATE: 89 BPM | SYSTOLIC BLOOD PRESSURE: 124 MMHG

## 2024-01-16 VITALS
RESPIRATION RATE: 18 BRPM | HEIGHT: 64 IN | SYSTOLIC BLOOD PRESSURE: 124 MMHG | OXYGEN SATURATION: 96 % | BODY MASS INDEX: 32.44 KG/M2 | DIASTOLIC BLOOD PRESSURE: 74 MMHG | HEART RATE: 89 BPM | WEIGHT: 190 LBS

## 2024-01-16 DIAGNOSIS — H04.129 DRY EYES DUE TO DECREASED TEAR PRODUCTION: ICD-10-CM

## 2024-01-16 DIAGNOSIS — Z29.89 PROPHYLACTIC IMMUNOTHERAPY: ICD-10-CM

## 2024-01-16 DIAGNOSIS — R76.8 ELEVATED IGE LEVEL: Primary | ICD-10-CM

## 2024-01-16 DIAGNOSIS — J30.2 ALLERGIC RHINOCONJUNCTIVITIS, SEASONAL AND PERENNIAL: ICD-10-CM

## 2024-01-16 DIAGNOSIS — J82.83 EOSINOPHILIC ASTHMA: Primary | ICD-10-CM

## 2024-01-16 DIAGNOSIS — Z51.6 ENCOUNTER FOR DESENSITIZATION TO ALLERGENS: ICD-10-CM

## 2024-01-16 DIAGNOSIS — J34.89 RHINORRHEA: ICD-10-CM

## 2024-01-16 DIAGNOSIS — E11.9 TYPE 2 DIABETES MELLITUS WITHOUT COMPLICATION, WITHOUT LONG-TERM CURRENT USE OF INSULIN (HCC): ICD-10-CM

## 2024-01-16 DIAGNOSIS — J30.1 ALLERGY TO TREES: ICD-10-CM

## 2024-01-16 DIAGNOSIS — J45.50 SEVERE PERSISTENT ASTHMA WITHOUT COMPLICATION: ICD-10-CM

## 2024-01-16 DIAGNOSIS — J82.83 EOSINOPHILIC ASTHMA: ICD-10-CM

## 2024-01-16 DIAGNOSIS — J30.1 NON-SEASONAL ALLERGIC RHINITIS DUE TO POLLEN: ICD-10-CM

## 2024-01-16 DIAGNOSIS — Z77.120 EXPOSURE TO MOLD: ICD-10-CM

## 2024-01-16 DIAGNOSIS — H10.10 ALLERGIC RHINOCONJUNCTIVITIS, SEASONAL AND PERENNIAL: ICD-10-CM

## 2024-01-16 DIAGNOSIS — Z91.048 ALLERGY TO MOLD: ICD-10-CM

## 2024-01-16 DIAGNOSIS — J30.9 CHRONIC ALLERGIC RHINITIS: ICD-10-CM

## 2024-01-16 DIAGNOSIS — J30.89 ALLERGIC RHINOCONJUNCTIVITIS, SEASONAL AND PERENNIAL: ICD-10-CM

## 2024-01-16 PROCEDURE — 96372 THER/PROPH/DIAG INJ SC/IM: CPT | Performed by: NURSE PRACTITIONER

## 2024-01-16 PROCEDURE — 95012 NITRIC OXIDE EXP GAS DETER: CPT | Performed by: NURSE PRACTITIONER

## 2024-01-16 PROCEDURE — 1123F ACP DISCUSS/DSCN MKR DOCD: CPT | Performed by: NURSE PRACTITIONER

## 2024-01-16 PROCEDURE — A4617 MOUTH PIECE: HCPCS | Performed by: NURSE PRACTITIONER

## 2024-01-16 PROCEDURE — 99205 OFFICE O/P NEW HI 60 MIN: CPT | Performed by: NURSE PRACTITIONER

## 2024-01-16 RX ORDER — ALBUTEROL SULFATE 2.5 MG/3ML
SOLUTION RESPIRATORY (INHALATION)
Qty: 375 ML | Refills: 5 | Status: SHIPPED | OUTPATIENT
Start: 2024-01-16

## 2024-01-16 RX ORDER — TRIAMCINOLONE ACETONIDE 55 UG/1
2 SPRAY, METERED NASAL DAILY
Qty: 1 EACH | Refills: 11 | Status: SHIPPED | OUTPATIENT
Start: 2024-01-16

## 2024-01-16 RX ORDER — KETOTIFEN FUMARATE 0.25 MG/ML
SOLUTION/ DROPS OPHTHALMIC
Qty: 1 EACH | Refills: 11 | Status: SHIPPED | OUTPATIENT
Start: 2024-01-16

## 2024-01-16 RX ORDER — EPINEPHRINE 0.3 MG/.3ML
INJECTION INTRAMUSCULAR
Qty: 1 EACH | Refills: 1 | Status: SHIPPED | OUTPATIENT
Start: 2024-01-16

## 2024-01-16 RX ORDER — NALOXONE HYDROCHLORIDE 4 MG/.1ML
1 SPRAY NASAL PRN
COMMUNITY

## 2024-01-16 RX ORDER — CLOTRIMAZOLE AND BETAMETHASONE DIPROPIONATE 10; .64 MG/G; MG/G
CREAM TOPICAL 2 TIMES DAILY
COMMUNITY

## 2024-01-16 RX ORDER — IPRATROPIUM BROMIDE 21 UG/1
2 SPRAY, METERED NASAL EVERY 12 HOURS
Qty: 30 ML | Refills: 5 | Status: SHIPPED | OUTPATIENT
Start: 2024-01-16

## 2024-01-16 RX ORDER — ALBUTEROL SULFATE 90 UG/1
AEROSOL, METERED RESPIRATORY (INHALATION)
Qty: 18 G | Refills: 2 | Status: SHIPPED | OUTPATIENT
Start: 2024-01-16

## 2024-01-16 RX ORDER — FEXOFENADINE HCL 180 MG/1
180 TABLET ORAL DAILY
Qty: 30 TABLET | Refills: 11 | Status: SHIPPED | OUTPATIENT
Start: 2024-01-16

## 2024-01-16 RX ORDER — NORTRIPTYLINE HYDROCHLORIDE 10 MG/1
10 CAPSULE ORAL NIGHTLY
COMMUNITY

## 2024-01-16 ASSESSMENT — ENCOUNTER SYMPTOMS
SINUS PAIN: 1
EYE REDNESS: 1
EYE ITCHING: 1
EYE DISCHARGE: 1
EYE PAIN: 1
COUGH: 1
WHEEZING: 1
CONSTIPATION: 1
BACK PAIN: 1
SINUS PRESSURE: 1

## 2024-01-16 NOTE — PROGRESS NOTES
[place] [address] [phone]      IgG   Date/Time Value Ref Range Status   05/11/2021 08:16  700 - 1600 mg/dL Final     Comment:     [facility] [address] [place] [address] [phone]     IgA   Date/Time Value Ref Range Status   05/11/2021 08:25  70 - 400 mg/dL Final     Comment:     [facility] [place] [place] [address] [phone]      IgM   Date/Time Value Ref Range Status   05/11/2021 08:15  (H) 40 - 230 mg/dL Final     Comment:     [facility] [address] [place] [address] [phone]       No results found for: \"LOIS\"   Rheumatoid Factor   Date/Time Value Ref Range Status   04/07/2020 09:35 AM 13 0 - 13 IU/mL Final     Comment:     Performed at [facility] [address] [address]      01/16/24 performed  Fraction exhaled nitric oxide (FENO) test with new mouthpiece for the patient performed today during visit.  Patient has shortness of breath.      Feno Score =  11      Radiology:    No results found for this or any previous visit from the past 365 days.     No results found for this or any previous visit.     No results found for this or any previous visit.       All current and previous medial labs have been reviewed and discussed with patient       Procedures:  Allergy Testing: pending re-testing    Assessment/Orders:    Diagnosis Orders   1. Elevated IgE level  omalizumab (XOLAIR) 150 MG/ML SOSY injection      2. Dry eyes due to decreased tear production  Werner Potter MD, Ophthalmology, [place]      3. Type 2 diabetes mellitus without complication, without long-term current use of insulin ([facility])  Milton Sales, CNP, Family Medicine, [place]      4. Chronic allergic rhinitis  CBC with Auto Differential    IgA    IgE    IgG    IgM    Strongyloides Ab, IgG    ALLERGEN INHALANT [place] COMP 1      5. Allergy to mold        6. Allergy to trees        7. Encounter for desensitization to allergens        8.

## 2024-01-16 NOTE — PROGRESS NOTES
Patient here today to receive Xolair injection.    Patient does not report any previous reaction from Xolair injections.  Denies any nausea vomiting fever urticaria or angioedema.  Denies any recent exacerbation of asthma or asthma-like symptoms.  Patient was explained benefits and potential risks including anaphylaxis to patient.      Patient has been explained the risk and benefits including delayed anaphylaxis.  Patient has EpiPen and understands how to appropriately use.      Xolair 150 mg given subcutaneously X 2 doses in    bilateral both arms        for a total combined dose of 300 mg using a 25-gauge needle and 3 ML syringe.        RIGHT  / LEFT UPPER ARM,  RIGHT  / LEFT FRONT OR MIDDLE OF THIGH, OR STOMACH        Xolair  NDC 01913-245-65   Lot [id number]   Expires 05/31/24    Patient observed for 30 minutes Yes      Medication was supplied by patient:  YES/NO: No      Medication was supplied as a sample:  YES/NO: Yes                     Prior to patient receiving Xolair area was cleansed with alcohol swabs.  Following the administration no evidence of bleeding or bruising.    No adverse reactions reported.  Patient tolerated well without adverse reactions or side effects.  Patient to continue to receive Xolair injections monthly.  Will report any problems to this office.  No evidence of allergic reaction including anaphylaxis.    Comments:

## 2024-01-16 NOTE — PATIENT INSTRUCTIONS
(usually on your back or arm) with alcohol.  Use a small device to scratch or prick your skin with drops of possible allergens. This allows some of the allergen to go into your skin.  Check your skin after about 15 minutes for red, raised itchy areas called wheals. If a wheal forms, it means you are allergic to that allergen. This is called a positive reaction.  For an intradermal test  The nurse will:  Clean the test area (usually on your back or arm) with alcohol.  Inject the allergen solution into the skin.  Check your skin after about 15 minutes for red, raised itchy areas called wheals. If a wheal forms, it means you are allergic to that allergen. This is called a positive reaction.  What happens after these tests?  The results of a skin prick or intradermal test will be available right after the test is done.  If you have an allergic reaction from any of the skin tests, you may have some itching, tenderness, and swelling where the allergen solutions were placed on your skin. After the testing is done, cool cloths or a nonprescription steroid cream can be used to relieve the itching and swelling.  How long do the tests take?  Allergy skin tests usually take less than an hour.  Where can you learn more?  Go to https://www.[employer].net/patientEd and enter P567 to learn more about \"Allergy Skin Tests: About These Tests.\"  Current as of: September 25, 2023               Content Version: 13.9  © 4995-4174 [employer].   Care instructions adapted under license by [employer]. If you have questions about a medical condition or this instruction, always ask your healthcare professional. [employer] disclaims any warranty or liability for your use of this information.

## 2024-01-17 DIAGNOSIS — J45.50 SEVERE PERSISTENT ASTHMA WITHOUT COMPLICATION: Primary | ICD-10-CM

## 2024-01-17 NOTE — TELEPHONE ENCOUNTER
Called [facility] and spoke with Gloria. Asked if paige going through for claim. Gloria states yes and will have to order in for pt.

## 2024-01-17 NOTE — TELEPHONE ENCOUNTER
Called [facility] and spoke with Jessie. Informed on PA approval for xolair. Jessie states that medication going through for $0 copay. States will call pt to schedule delivery.

## 2024-01-17 NOTE — TELEPHONE ENCOUNTER
Received PA denial. Informed provider. Provider switched pt's inhaler per want insurance wants. Will call pt.

## 2024-01-17 NOTE — TELEPHONE ENCOUNTER
Called pt and informed her PA approval for fasenra. Informed pt to call [facility] at [phone] for delivery set up if [facility] does not call her with a day. Informed pt she has to call each month to the pharmacy to get this delivered.  Pt verbalized understanding and thanked me.

## 2024-01-17 NOTE — PROGRESS NOTES
Insurance would not pay for Dulera although this was on her formulary and a tier 1.  As a result we will now need to switch patient's inhaler medication from Dulera to Advair

## 2024-01-17 NOTE — TELEPHONE ENCOUNTER
Called pt and informed her on inhaler change due to insurance. Informed pt that medication needs to be ordered into the pharmacy. Pt verbalized understanding and thanked me.

## 2024-01-25 ENCOUNTER — TELEPHONE (OUTPATIENT)
Dept: ALLERGY | Age: 64
End: 2024-01-25

## 2024-01-25 NOTE — TELEPHONE ENCOUNTER
Received fax from [facility] agency on aging on asking provider Narcisa olivo for intermediate services. Informed provider. Provider states she will not sign and she will have her PCP do this. Called and left a message.

## 2024-02-07 SDOH — HEALTH STABILITY: PHYSICAL HEALTH: ON AVERAGE, HOW MANY DAYS PER WEEK DO YOU ENGAGE IN MODERATE TO STRENUOUS EXERCISE (LIKE A BRISK WALK)?: 2 DAYS

## 2024-02-07 SDOH — HEALTH STABILITY: PHYSICAL HEALTH: ON AVERAGE, HOW MANY MINUTES DO YOU ENGAGE IN EXERCISE AT THIS LEVEL?: 20 MIN

## 2024-02-08 ENCOUNTER — OFFICE VISIT (OUTPATIENT)
Dept: FAMILY MEDICINE CLINIC | Age: 64
End: 2024-02-08
Payer: MEDICAID

## 2024-02-08 VITALS
WEIGHT: 195 LBS | BODY MASS INDEX: 34.55 KG/M2 | HEART RATE: 99 BPM | SYSTOLIC BLOOD PRESSURE: 122 MMHG | DIASTOLIC BLOOD PRESSURE: 70 MMHG | OXYGEN SATURATION: 98 % | RESPIRATION RATE: 16 BRPM | HEIGHT: 63 IN | TEMPERATURE: 98 F

## 2024-02-08 DIAGNOSIS — E78.2 MIXED HYPERLIPIDEMIA: ICD-10-CM

## 2024-02-08 DIAGNOSIS — J82.83 EOSINOPHILIC ASTHMA: ICD-10-CM

## 2024-02-08 DIAGNOSIS — G95.0 SYRINGOMYELIA AND SYRINGOBULBIA (HCC): ICD-10-CM

## 2024-02-08 DIAGNOSIS — E03.9 HYPOTHYROIDISM, UNSPECIFIED TYPE: ICD-10-CM

## 2024-02-08 DIAGNOSIS — J30.1 ALLERGY TO TREES: ICD-10-CM

## 2024-02-08 DIAGNOSIS — N30.10 IC (INTERSTITIAL CYSTITIS): ICD-10-CM

## 2024-02-08 DIAGNOSIS — I10 PRIMARY HYPERTENSION: ICD-10-CM

## 2024-02-08 DIAGNOSIS — K21.9 GASTROESOPHAGEAL REFLUX DISEASE, UNSPECIFIED WHETHER ESOPHAGITIS PRESENT: ICD-10-CM

## 2024-02-08 DIAGNOSIS — L85.3 XEROSIS OF SKIN: ICD-10-CM

## 2024-02-08 DIAGNOSIS — F17.200 SMOKER: ICD-10-CM

## 2024-02-08 DIAGNOSIS — E66.9 CLASS 1 OBESITY WITH SERIOUS COMORBIDITY AND BODY MASS INDEX (BMI) OF 32.0 TO 32.9 IN ADULT, UNSPECIFIED OBESITY TYPE: ICD-10-CM

## 2024-02-08 DIAGNOSIS — Z13.820 OSTEOPOROSIS SCREENING: ICD-10-CM

## 2024-02-08 DIAGNOSIS — R73.02 IGT (IMPAIRED GLUCOSE TOLERANCE): ICD-10-CM

## 2024-02-08 DIAGNOSIS — E11.42 DIABETIC POLYNEUROPATHY ASSOCIATED WITH TYPE 2 DIABETES MELLITUS (HCC): ICD-10-CM

## 2024-02-08 DIAGNOSIS — J38.1 VOCAL CORD POLYP: ICD-10-CM

## 2024-02-08 DIAGNOSIS — Q07.00 ARNOLD-CHIARI MALFORMATION (HCC): ICD-10-CM

## 2024-02-08 DIAGNOSIS — E11.65 TYPE 2 DIABETES MELLITUS WITH HYPERGLYCEMIA, UNSPECIFIED WHETHER LONG TERM INSULIN USE (HCC): ICD-10-CM

## 2024-02-08 DIAGNOSIS — Z98.890 HISTORY OF SPINAL SURGERY: ICD-10-CM

## 2024-02-08 DIAGNOSIS — J44.9 CHRONIC OBSTRUCTIVE PULMONARY DISEASE, UNSPECIFIED COPD TYPE (HCC): ICD-10-CM

## 2024-02-08 DIAGNOSIS — E55.9 VITAMIN D DEFICIENCY: ICD-10-CM

## 2024-02-08 DIAGNOSIS — Z78.0 POSTMENOPAUSAL: ICD-10-CM

## 2024-02-08 DIAGNOSIS — Z12.31 ENCOUNTER FOR SCREENING MAMMOGRAM FOR MALIGNANT NEOPLASM OF BREAST: ICD-10-CM

## 2024-02-08 DIAGNOSIS — E04.1 THYROID NODULE: Primary | ICD-10-CM

## 2024-02-08 DIAGNOSIS — Z98.2 VP (VENTRICULOPERITONEAL) SHUNT STATUS: ICD-10-CM

## 2024-02-08 DIAGNOSIS — N32.81 OAB (OVERACTIVE BLADDER): ICD-10-CM

## 2024-02-08 PROCEDURE — 3078F DIAST BP <80 MM HG: CPT | Performed by: NURSE PRACTITIONER

## 2024-02-08 PROCEDURE — 3074F SYST BP LT 130 MM HG: CPT | Performed by: NURSE PRACTITIONER

## 2024-02-08 PROCEDURE — 1123F ACP DISCUSS/DSCN MKR DOCD: CPT | Performed by: NURSE PRACTITIONER

## 2024-02-08 PROCEDURE — 99205 OFFICE O/P NEW HI 60 MIN: CPT | Performed by: NURSE PRACTITIONER

## 2024-02-08 RX ORDER — LEVOTHYROXINE SODIUM 88 MCG
TABLET ORAL
Qty: 90 TABLET | Refills: 3 | Status: SHIPPED | OUTPATIENT
Start: 2024-02-08

## 2024-02-08 RX ORDER — ESOMEPRAZOLE MAGNESIUM 40 MG/1
40 CAPSULE, DELAYED RELEASE ORAL 2 TIMES DAILY
Qty: 180 CAPSULE | Refills: 0 | Status: SHIPPED | OUTPATIENT
Start: 2024-02-08

## 2024-02-08 RX ORDER — ERYTHROMYCIN 5 MG/G
OINTMENT OPHTHALMIC NIGHTLY
COMMUNITY
Start: 2024-02-05

## 2024-02-08 RX ORDER — HYDROCORTISONE 25 MG/ML
LOTION TOPICAL
Qty: 118 ML | Refills: 0 | Status: SHIPPED | OUTPATIENT
Start: 2024-02-08

## 2024-02-08 RX ORDER — ATORVASTATIN CALCIUM 20 MG/1
20 TABLET, FILM COATED ORAL DAILY
Qty: 90 TABLET | Refills: 3 | Status: SHIPPED | OUTPATIENT
Start: 2024-02-08

## 2024-02-08 RX ORDER — ACETAMINOPHEN 160 MG
1 TABLET,DISINTEGRATING ORAL DAILY
Qty: 90 CAPSULE | Refills: 3 | Status: SHIPPED | OUTPATIENT
Start: 2024-02-08

## 2024-02-08 RX ORDER — AMLODIPINE BESYLATE 5 MG/1
5 TABLET ORAL DAILY
Qty: 90 TABLET | Refills: 3 | Status: SHIPPED | OUTPATIENT
Start: 2024-02-08

## 2024-02-08 RX ORDER — PREDNISOLONE ACETATE 10 MG/ML
SUSPENSION/ DROPS OPHTHALMIC
COMMUNITY
Start: 2024-02-05

## 2024-02-08 SDOH — ECONOMIC STABILITY: HOUSING INSECURITY
IN THE LAST 12 MONTHS, WAS THERE A TIME WHEN YOU DID NOT HAVE A STEADY PLACE TO SLEEP OR SLEPT IN A SHELTER (INCLUDING NOW)?: NO

## 2024-02-08 SDOH — ECONOMIC STABILITY: FOOD INSECURITY: WITHIN THE PAST 12 MONTHS, YOU WORRIED THAT YOUR FOOD WOULD RUN OUT BEFORE YOU GOT MONEY TO BUY MORE.: SOMETIMES TRUE

## 2024-02-08 SDOH — ECONOMIC STABILITY: FOOD INSECURITY: WITHIN THE PAST 12 MONTHS, THE FOOD YOU BOUGHT JUST DIDN'T LAST AND YOU DIDN'T HAVE MONEY TO GET MORE.: NEVER TRUE

## 2024-02-08 SDOH — ECONOMIC STABILITY: INCOME INSECURITY: HOW HARD IS IT FOR YOU TO PAY FOR THE VERY BASICS LIKE FOOD, HOUSING, MEDICAL CARE, AND HEATING?: NOT HARD AT ALL

## 2024-02-08 ASSESSMENT — ENCOUNTER SYMPTOMS
COUGH: 0
BACK PAIN: 1
SHORTNESS OF BREATH: 0
CHEST TIGHTNESS: 0
WHEEZING: 0
ABDOMINAL PAIN: 0
ABDOMINAL DISTENTION: 0

## 2024-02-08 ASSESSMENT — PATIENT HEALTH QUESTIONNAIRE - PHQ9
SUM OF ALL RESPONSES TO PHQ9 QUESTIONS 1 & 2: 0
SUM OF ALL RESPONSES TO PHQ QUESTIONS 1-9: 0
SUM OF ALL RESPONSES TO PHQ QUESTIONS 1-9: 0
2. FEELING DOWN, DEPRESSED OR HOPELESS: 0
SUM OF ALL RESPONSES TO PHQ QUESTIONS 1-9: 0
SUM OF ALL RESPONSES TO PHQ QUESTIONS 1-9: 0
1. LITTLE INTEREST OR PLEASURE IN DOING THINGS: 0

## 2024-02-08 NOTE — PROGRESS NOTES
Social work referral for food insecurity, senior services, and transportation. Patient has [place] Medicaid.   
release capsule 180 capsule 0     Sig: Take 1 capsule by mouth 2 times daily    hydrocortisone (HYTONE) 2.5 % lotion 118 mL 0     Sig: APPLY  LOTION EXTERNALLY ONCE DAILY TO AFFECTED AREA    metFORMIN (GLUCOPHAGE) 500 MG tablet 180 tablet 3     Sig: Take 1 tablet by mouth 2 times daily (with meals)    SYNTHROID 88 MCG tablet 90 tablet 3     Sig: TAKE 1 TABLET BY MOUTH ONCE DAILY ON AN EMPTY STOMACH WITH WATER. DO NOT EAT OR TAKE ANY OTHER MEDICATIONS FOR 30 MINUTES.     Orders Placed This Encounter   Procedures    RICKY DIGITAL SCREEN W OR WO CAD BILATERAL     Standing Status:   Future     Standing Expiration Date:   4/8/2025    MAMMO DEXA BONE DENSITY SCAN     Standing Status:   Future     Standing Expiration Date:   4/8/2025    US THYROID     This procedure can be scheduled via [employer].  Access your [employer] account by visiting [email or web address].     Standing Status:   Future     Standing Expiration Date:   2/7/2025    CBC with Auto Differential     Standing Status:   Future     Standing Expiration Date:   2/7/2025    Comprehensive Metabolic Panel     Standing Status:   Future     Standing Expiration Date:   2/7/2025    Hemoglobin A1C     Standing Status:   Future     Standing Expiration Date:   2/7/2025    Lipid Panel     Standing Status:   Future     Standing Expiration Date:   2/7/2025     Order Specific Question:   Is Patient Fasting?/# of Hours     Answer:   yes/12    T4, Free     Standing Status:   Future     Standing Expiration Date:   2/7/2025    TSH     Standing Status:   Future     Standing Expiration Date:   2/7/2025    Thyroid Peroxidase Antibody     Standing Status:   Future     Standing Expiration Date:   2/7/2025    Sandeep Urias MD, Otolaryngology, [place]     Referral Priority:   Routine     Referral Type:   Eval and Treat     Referral Reason:   Specialty Services Required     Referred to Provider:   Sandeep Diaz MD     Requested Specialty:   Otolaryngology     Number of Visits Requested:

## 2024-02-12 ENCOUNTER — TELEPHONE (OUTPATIENT)
Dept: FAMILY MEDICINE CLINIC | Age: 64
End: 2024-02-12

## 2024-02-12 RX ORDER — LEVOTHYROXINE SODIUM 88 UG/1
TABLET ORAL
Qty: 90 TABLET | Refills: 3 | Status: SHIPPED | OUTPATIENT
Start: 2024-02-12

## 2024-02-12 NOTE — TELEPHONE ENCOUNTER
Michael states pt's insurance will not cover brand Synthroid and needs Rx for generic sent to [facility] .  Pt was informed of this.

## 2024-02-14 ENCOUNTER — HOSPITAL ENCOUNTER (OUTPATIENT)
Age: 64
Discharge: HOME | End: 2024-02-14
Payer: MEDICAID

## 2024-02-14 ENCOUNTER — CARE COORDINATION (OUTPATIENT)
Dept: CARE COORDINATION | Age: 64
End: 2024-02-14

## 2024-02-14 DIAGNOSIS — I10 PRIMARY HYPERTENSION: ICD-10-CM

## 2024-02-14 DIAGNOSIS — J30.9 CHRONIC ALLERGIC RHINITIS: ICD-10-CM

## 2024-02-14 LAB
BASOPHILS ABSOLUTE: 0.1 THOU/MM3 (ref 0–0.1)
BASOPHILS NFR BLD AUTO: 0.8 %
DEPRECATED RDW RBC AUTO: 44.1 FL (ref 35–45)
EOSINOPHIL NFR BLD AUTO: 2.7 %
EOSINOPHILS ABSOLUTE: 0.2 THOU/MM3 (ref 0–0.4)
ERYTHROCYTE [DISTWIDTH] IN BLOOD BY AUTOMATED COUNT: 13.3 % (ref 11.5–14.5)
HCT VFR BLD AUTO: 40.3 % (ref 37–47)
HGB BLD-MCNC: 12.9 GM/DL (ref 12–16)
IMM GRANULOCYTES # BLD AUTO: 0.02 THOU/MM3 (ref 0–0.07)
IMM GRANULOCYTES NFR BLD AUTO: 0.3 %
LYMPHOCYTES ABSOLUTE: 1.5 THOU/MM3 (ref 1–4.8)
LYMPHOCYTES NFR BLD AUTO: 23.8 %
MCH RBC QN AUTO: 29.1 PG (ref 26–33)
MCHC RBC AUTO-ENTMCNC: 32 GM/DL (ref 32.2–35.5)
MCV RBC AUTO: 90.8 FL (ref 81–99)
MONOCYTES ABSOLUTE: 0.3 THOU/MM3 (ref 0.4–1.3)
MONOCYTES NFR BLD AUTO: 5.2 %
NEUTROPHILS NFR BLD AUTO: 67.2 %
NRBC BLD AUTO-RTO: 0 /100 WBC
PLATELET # BLD AUTO: 302 THOU/MM3 (ref 130–400)
PMV BLD AUTO: 10.2 FL (ref 9.4–12.4)
RBC # BLD AUTO: 4.44 MILL/MM3 (ref 4.2–5.4)
WBC # BLD AUTO: 6.4 THOU/MM3 (ref 4.8–10.8)

## 2024-02-14 PROCEDURE — 82784 ASSAY IGA/IGD/IGG/IGM EACH: CPT

## 2024-02-14 PROCEDURE — 82785 ASSAY OF IGE: CPT

## 2024-02-14 PROCEDURE — 86003 ALLG SPEC IGE CRUDE XTRC EA: CPT

## 2024-02-14 PROCEDURE — 86682 HELMINTH ANTIBODY: CPT

## 2024-02-14 PROCEDURE — 85025 COMPLETE CBC W/AUTO DIFF WBC: CPT

## 2024-02-14 PROCEDURE — 36415 COLL VENOUS BLD VENIPUNCTURE: CPT

## 2024-02-14 NOTE — CARE COORDINATION
JOHN called [facility] on pt behalf. JOHN was informed that pt is currently on PASSPORT and meals will start on 2/15.

## 2024-02-15 LAB
IGA SERPL-MCNC: 174 MG/DL (ref 70–400)
IGG SERPL-MCNC: 992 MG/DL (ref 700–1600)
IGM SERPL-MCNC: 194 MG/DL (ref 40–230)

## 2024-02-16 ENCOUNTER — CARE COORDINATION (OUTPATIENT)
Dept: CARE COORDINATION | Age: 64
End: 2024-02-16

## 2024-02-16 LAB
ALLERGEN BERMUDA GRASS IGE: < 0.1 KU/L (ref 0–0.34)
ALLERGEN BIRCH IGE: < 0.1 KU/L (ref 0–0.34)
ALLERGEN DOG DANDER IGE: < 0.1 KU/L (ref 0–0.34)
ALLERGEN GERMAN COCKROACH IGE: < 0.1 KU/L (ref 0–0.34)
ALLERGEN HORMODENDRUM IGE: < 0.1 KUL/L (ref 0–0.34)
ALLERGEN MOUSE EPITHELIA IGE: < 0.1 KU/L (ref 0–0.34)
ALLERGEN OAK TREE IGE: < 0.1 KU/L (ref 0–0.34)
ALLERGEN PECAN TREE IGE: < 0.1 KU/L (ref 0–0.34)
ALLERGEN PIGWEED ROUGH IGE: < 0.1 KU/L (ref 0–0.34)
ALLERGEN SHEEP SORREL (W18) IGE: < 0.1 KU/L (ref 0–0.34)
ALLERGEN TREE SYCAMORE: < 0.1 KU/L (ref 0–0.34)
ALLERGEN WALNUT TREE IGE: < 0.1 KU/L (ref 0–0.34)
ALLERGEN WHITE MULBERRY TREE, IGE: < 0.1 KU/L (ref 0–0.34)
ALLERGEN, TREE, WHITE ASH IGE: < 0.1 KU/L (ref 0–0.34)
ALTERNARIA ALTERNATA: < 0.1 KU/L (ref 0–0.34)
ASPERGILLUS FUMIGATUS: < 0.1 KU/L (ref 0–0.34)
CAT DANDER ANTIBODY: < 0.1 KU/L (ref 0–0.34)
COTTONWOOD TREE: < 0.1 KU/L (ref 0–0.34)
D. FARINAE: < 0.1 KU/L (ref 0–0.34)
D. PTERONYSSINUS: < 0.1 KU/L (ref 0–0.34)
ELM TREE: < 0.1 KU/L (ref 0–0.34)
IGE SERPL-ACNC: 244 IU/ML (ref 0–100)
MAPLE/BOXELDER TREE: < 0.1 KU/L (ref 0–0.34)
MOUNTAIN CEDAR TREE: < 0.1 KU/L (ref 0–0.34)
MUCOR RACEMOSUS: < 0.1 KU/L (ref 0–0.34)
P. NOTATUM: < 0.1 KU/L (ref 0–0.34)
RUSSIAN THISTLE: < 0.1 KU/L (ref 0–0.34)
SHORT RAGWD(A ARTEMIS.) IGE: < 0.1 KU/L (ref 0–0.34)
TIMOTHY GRASS: < 0.1 KU/L (ref 0–0.34)

## 2024-02-16 NOTE — CARE COORDINATION
SW received a call from pt with concerns of her life alert system. She stated, \"its not going to work way to cumbersome with cords and stuff.\" Pt stated [employer] was there and they will come back on Monday. Pt stated that she tried to call Nuha at [facility] to inform her of this but had to leave a vm. Pt had many questions as to who does what for her and who to call. SW tried to explain different roles and encouraged pt to call SW with any  questions when she is not sure who to contact. JOHN also informed pt that I will call Nuha also to discuss pt life alert system and inquired who pt CM is. Active listening provided.

## 2024-02-16 NOTE — CARE COORDINATION
SW called [facility] and left a vm on Nuha  ph: [phone] call asking her to call pt and inform her of what she needs to do regarding life alert and to let her know who her PASSPORT cm is.

## 2024-02-18 LAB — STRONGYLOIDES IGG SER IA-ACNC: NORMAL

## 2024-02-22 ENCOUNTER — TELEPHONE (OUTPATIENT)
Dept: ALLERGY | Age: 64
End: 2024-02-22

## 2024-02-22 ENCOUNTER — NURSE ONLY (OUTPATIENT)
Dept: ALLERGY | Age: 64
End: 2024-02-22

## 2024-02-22 VITALS
HEART RATE: 89 BPM | RESPIRATION RATE: 18 BRPM | DIASTOLIC BLOOD PRESSURE: 71 MMHG | SYSTOLIC BLOOD PRESSURE: 106 MMHG | OXYGEN SATURATION: 98 %

## 2024-02-22 DIAGNOSIS — J82.83 EOSINOPHILIC ASTHMA: Primary | ICD-10-CM

## 2024-02-22 NOTE — PROGRESS NOTES
Patient here today to receive Xolair injection.    Patient does not report any previous reaction from Xolair injections.  Denies any nausea vomiting fever urticaria or angioedema.  Denies any recent exacerbation of asthma or asthma-like symptoms.  Patient was explained benefits and potential risks including anaphylaxis to patient.      Patient has been explained the risk and benefits including delayed anaphylaxis.  Patient has EpiPen and understands how to appropriately use.      Xolair 150 mg given subcutaneously X 2 doses in    bilateral both arms        for a total combined dose of 300 mg using a 25-gauge needle and 3 ML syringe.        RIGHT  / LEFT UPPER ARM,  RIGHT  / LEFT FRONT OR MIDDLE OF THIGH, OR STOMACH        Xolair  NDC 30378-922-87   Lot [id number]   Expires 01/31/25    Patient observed for 30 minutes Yes      Medication was supplied by patient:  YES/NO: Yes      Medication was supplied as a sample:  YES/NO: No                     Prior to patient receiving Xolair area was cleansed with alcohol swabs.  Following the administration no evidence of bleeding or bruising.    No adverse reactions reported.  Patient tolerated well without adverse reactions or side effects.  Patient to continue to receive Xolair injections monthly.  Will report any problems to this office.  No evidence of allergic reaction including anaphylaxis.    Comments:

## 2024-02-22 NOTE — TELEPHONE ENCOUNTER
Anticoagulation Progress Note    Indication: LVAD  Goal INR: 2-3  INR Result: 2.1    70 year old male returns to the [facility] for a follow-up visit after 8 weeks.      Assessment  (-) bleeding, bruising or thromboembolic complications  (-) missed/extra warfarin doses  (+) medication changes. Pt stopped taking aspirin 81mg.    (-) dietary changes  (-) alcohol  (-) smoking  (-) activity level changes  (-) hospital visits, ER visits, interruptions in therapy  (-) illness/infection, injuries, or falls    Plan   -Pt's INR=2.1 today, which is within the desired therapeutic range of 2-3.  -Pt's INR has decreased from 3 at the last visit.    -Pt has been taking warfarin 48.75 mg weekly.  -Plan to have the pt continue with the current warfarin regimen.   -Pt to return to clinic in 8 weeks (12/24/21 @ 10:00AM).      Angela Woods  Student Pharmacist   PharmD candidate 2021   Called pt here for xolair injection and informed on regarding allergy testing appointment. Informed on message to stop any antihistamines, nasal sprays and pepcid 7 days prior to allergy testing. Also informed to hold off on using inhalers day of testing if possible.  Pt verbalized understanding and thanked me.

## 2024-02-27 ENCOUNTER — PATIENT MESSAGE (OUTPATIENT)
Dept: FAMILY MEDICINE CLINIC | Age: 64
End: 2024-02-27

## 2024-02-27 NOTE — TELEPHONE ENCOUNTER
From: Gely Archer  To: Milton Bowden  Sent: 2/27/2024 11:14 AM EST  Subject: ESOMEPRAZOLE & BATH CHAIR & RAISED TOILET SEAT    Dr. Rose,  Gely Archer here.  I left a PREVIOUS MESSAGE UNDER A REPLY FOR A RX FOR A BENCH BATH CHAIR & RAISED TOILET SEAT in my last message & have not heard if Dr. Rose was going to write an RX to obtain these Medical items that are needed & no one never responded.   Also, I just spoke with [facility] in regards to my GERD medicine. I explained to Maren that I lived in [place], & returned & that I need ALL medicines that I was on with Dr. Foreman upon returning back to [place], & my GERD medicine is needed badly!  I am not sure if someone is checking my messages or not. I need my GERD medicine AND the ABOVE REQUESTED ITEMS & left my ' S INFO @ [facility] if Dr. Rose will micheal my RX request for them. Again, I can pick them up, or they can be sent via FAX to my .  Please read my LAST note for my requests.  Thank you!

## 2024-02-27 NOTE — TELEPHONE ENCOUNTER
From: Gely Archer  To: Milton Bowden  Sent: 2/27/2024 11:23 AM EST  Subject: I HAVE BEEN ON MY SAME GERD RX FOR YEARS    I told Maren that I have been on this RX for years & had tried everything else at my AGE OVER the years & nothing else worked! I have not heard ANYTHING from her since!  Thank you!

## 2024-02-29 ENCOUNTER — TELEPHONE (OUTPATIENT)
Dept: FAMILY MEDICINE CLINIC | Age: 64
End: 2024-02-29

## 2024-02-29 ENCOUNTER — CARE COORDINATION (OUTPATIENT)
Dept: CARE COORDINATION | Age: 64
End: 2024-02-29

## 2024-02-29 RX ORDER — PANTOPRAZOLE SODIUM 40 MG/1
40 TABLET, DELAYED RELEASE ORAL
Qty: 180 TABLET | Refills: 1 | Status: SHIPPED | OUTPATIENT
Start: 2024-02-29

## 2024-02-29 NOTE — CARE COORDINATION
JOHN called pt to follow up to make sure pt received the resources mailed and that she had her questions answered at [facility]. JOHN left vm informing pt to call back with any additional needs. Will resolve at this time.

## 2024-02-29 NOTE — TELEPHONE ENCOUNTER
[place] [employer] has denied Esomeprazole. She takes this BID. Preferred meds are:    Lansoprazole  Omeprazole  Pantoprazole    Please advise. Patient needs notified of change.

## 2024-03-01 ENCOUNTER — CARE COORDINATION (OUTPATIENT)
Dept: CARE COORDINATION | Age: 64
End: 2024-03-01

## 2024-03-01 NOTE — CARE COORDINATION
SW received a call back  from pt. She was venting about life. Pt has a lot of \"medical appts this month and I am trying not to stress over it.\" Allowed pt to process thoughts and feelings. Pt has received the resources I sent her. Pt following up with receiving a shower chair. Pt CM at [facility] is Gloria Mercedes. Pt has working Life Alert she is pleased with and receiving MOW which she is pleased with. Active listening provided. Advised pt to call with any additional needs or concerns. Pt voiced understanding.

## 2024-03-04 ENCOUNTER — PROCEDURE VISIT (OUTPATIENT)
Dept: ALLERGY | Age: 64
End: 2024-03-04
Payer: MEDICAID

## 2024-03-04 VITALS
RESPIRATION RATE: 16 BRPM | HEART RATE: 95 BPM | SYSTOLIC BLOOD PRESSURE: 125 MMHG | BODY MASS INDEX: 35.36 KG/M2 | OXYGEN SATURATION: 95 % | DIASTOLIC BLOOD PRESSURE: 73 MMHG | WEIGHT: 199.6 LBS

## 2024-03-04 DIAGNOSIS — Z77.120 EXPOSURE TO MOLD: ICD-10-CM

## 2024-03-04 DIAGNOSIS — Z91.048 ALLERGY TO MOLD: Primary | ICD-10-CM

## 2024-03-04 DIAGNOSIS — J30.2 ALLERGIC RHINOCONJUNCTIVITIS, SEASONAL AND PERENNIAL: ICD-10-CM

## 2024-03-04 DIAGNOSIS — J30.1 ACUTE SEASONAL ALLERGIC RHINITIS DUE TO POLLEN: ICD-10-CM

## 2024-03-04 DIAGNOSIS — J30.1 NON-SEASONAL ALLERGIC RHINITIS DUE TO POLLEN: ICD-10-CM

## 2024-03-04 DIAGNOSIS — J30.81 ALLERGY TO DOG DANDER: ICD-10-CM

## 2024-03-04 DIAGNOSIS — J30.81 CAT ALLERGIES: ICD-10-CM

## 2024-03-04 DIAGNOSIS — Z29.89 PROPHYLACTIC IMMUNOTHERAPY: ICD-10-CM

## 2024-03-04 DIAGNOSIS — Z91.038 ALLERGY TO COCKROACHES: ICD-10-CM

## 2024-03-04 DIAGNOSIS — J30.1 ALLERGY TO TREES: ICD-10-CM

## 2024-03-04 DIAGNOSIS — J30.89 ALLERGIC RHINOCONJUNCTIVITIS, SEASONAL AND PERENNIAL: ICD-10-CM

## 2024-03-04 DIAGNOSIS — Z91.09 MITE ALLERGY: ICD-10-CM

## 2024-03-04 DIAGNOSIS — R76.8 ELEVATED IGE LEVEL: ICD-10-CM

## 2024-03-04 DIAGNOSIS — H10.10 ALLERGIC RHINOCONJUNCTIVITIS, SEASONAL AND PERENNIAL: ICD-10-CM

## 2024-03-04 PROCEDURE — 99215 OFFICE O/P EST HI 40 MIN: CPT | Performed by: NURSE PRACTITIONER

## 2024-03-04 PROCEDURE — 1123F ACP DISCUSS/DSCN MKR DOCD: CPT | Performed by: NURSE PRACTITIONER

## 2024-03-04 PROCEDURE — 95004 PERQ TESTS W/ALRGNC XTRCS: CPT | Performed by: NURSE PRACTITIONER

## 2024-03-04 RX ORDER — EPINEPHRINE 0.3 MG/.3ML
INJECTION INTRAMUSCULAR
Qty: 1 EACH | Refills: 1 | Status: SHIPPED | OUTPATIENT
Start: 2024-03-04

## 2024-03-04 NOTE — PROGRESS NOTES
[email or web address]    Allergy & Asthma   [address] [place] [address]  Ph:   [phone]  Fax:[phone]    Provider:  Dr. Narcisa Hsieh    Follow Up     CHIEF COMPLAINT:   Chief Complaint   Patient presents with    Allergy Testing    Results    Asthma     Xolair f/u             HISTORY OF PRESENT ILLNESS: ESTABLISHED PATIENT HERE FOR EVALUATION   63-year-old female here today for evaluation of allergies.  Patient is established.  Severity of allergies are severe.  Patient constantly has itchy watery eyes and a runny nose.  She has formally taken allergy shots here but stopped after she moved away.  She moved back in this area now and wants retested.  She denies any nausea, vomiting, fever.  Onset of allergies has been for years and is chronic in nature.  Patient has been taking antihistamines which does seem to help.  She cannot tolerate Singulair due to depression.  She also feels like that when she took allergy shots formally that that helped as well.  She would like to be reconsidered for allergy injections because it did improve her quality life and made her feel better.          Review of Systems:  Review of Systems   Allergic/Immunologic: Positive for environmental allergies.   All other systems reviewed and are negative.        Past MedicalHistory:    Past Medical History:   Diagnosis Date    Allergic rhinitis     Anxiety     Arnold-Chiari malformation (HCC)     Asthma     Chronic bronchitis (HCC)     Fibromyalgia     GERD (gastroesophageal reflux disease)     Headache(784.0)     Hyperlipidemia     Neuropathy     Osteoarthritis     Sinusitis     Type II or unspecified type diabetes mellitus without mention of complication, not stated as uncontrolled        Past Surgical History:  Past Surgical History:   Procedure Laterality Date    APPENDECTOMY       SECTION      x2    COLONOSCOPY  2010    CSF SHUNT  2007    Cervical syrinx to subarachnoid shunt; thoracic syrinx to subarachnoid

## 2024-03-06 ENCOUNTER — HOSPITAL ENCOUNTER (OUTPATIENT)
Age: 64
Discharge: HOME | End: 2024-03-06
Payer: MEDICAID

## 2024-03-06 DIAGNOSIS — R73.02 IGT (IMPAIRED GLUCOSE TOLERANCE): ICD-10-CM

## 2024-03-06 DIAGNOSIS — E03.9 HYPOTHYROIDISM, UNSPECIFIED TYPE: ICD-10-CM

## 2024-03-06 DIAGNOSIS — E78.2 MIXED HYPERLIPIDEMIA: ICD-10-CM

## 2024-03-06 LAB
ALBUMIN SERPL BCG-MCNC: 4.5 G/DL (ref 3.5–5.1)
ALP SERPL-CCNC: 116 U/L (ref 38–126)
ALT SERPL W/O P-5'-P-CCNC: 10 U/L (ref 11–66)
ANION GAP SERPL CALC-SCNC: 14 MEQ/L (ref 8–16)
AST SERPL-CCNC: 12 U/L (ref 5–40)
BASOPHILS ABSOLUTE: 0 THOU/MM3 (ref 0–0.1)
BASOPHILS NFR BLD AUTO: 0.5 %
BILIRUB SERPL-MCNC: 0.3 MG/DL (ref 0.3–1.2)
BUN SERPL-MCNC: 12 MG/DL (ref 7–22)
CALCIUM SERPL-MCNC: 9.6 MG/DL (ref 8.5–10.5)
CHLORIDE SERPL-SCNC: 103 MEQ/L (ref 98–111)
CHOLEST SERPL-MCNC: 207 MG/DL (ref 100–199)
CO2 SERPL-SCNC: 24 MEQ/L (ref 23–33)
CREAT SERPL-MCNC: 0.7 MG/DL (ref 0.4–1.2)
DEPRECATED MEAN GLUCOSE BLD GHB EST-ACNC: 114 MG/DL (ref 70–126)
DEPRECATED RDW RBC AUTO: 45.9 FL (ref 35–45)
EOSINOPHIL NFR BLD AUTO: 2.5 %
EOSINOPHILS ABSOLUTE: 0.2 THOU/MM3 (ref 0–0.4)
ERYTHROCYTE [DISTWIDTH] IN BLOOD BY AUTOMATED COUNT: 13.6 % (ref 11.5–14.5)
GFR SERPL CREATININE-BSD FRML MDRD: > 60 ML/MIN/1.73M2
GLUCOSE SERPL-MCNC: 99 MG/DL (ref 70–108)
HBA1C MFR BLD HPLC: 5.8 % (ref 4.4–6.4)
HCT VFR BLD AUTO: 39.7 % (ref 37–47)
HDLC SERPL-MCNC: 54 MG/DL
HGB BLD-MCNC: 12.9 GM/DL (ref 12–16)
IMM GRANULOCYTES # BLD AUTO: 0.02 THOU/MM3 (ref 0–0.07)
IMM GRANULOCYTES NFR BLD AUTO: 0.3 %
LDLC SERPL CALC-MCNC: 126 MG/DL
LYMPHOCYTES ABSOLUTE: 2.1 THOU/MM3 (ref 1–4.8)
LYMPHOCYTES NFR BLD AUTO: 34.2 %
MCH RBC QN AUTO: 29.6 PG (ref 26–33)
MCHC RBC AUTO-ENTMCNC: 32.5 GM/DL (ref 32.2–35.5)
MCV RBC AUTO: 91.1 FL (ref 81–99)
MONOCYTES ABSOLUTE: 0.3 THOU/MM3 (ref 0.4–1.3)
MONOCYTES NFR BLD AUTO: 4.5 %
NEUTROPHILS NFR BLD AUTO: 58 %
NRBC BLD AUTO-RTO: 0 /100 WBC
PLATELET # BLD AUTO: 297 THOU/MM3 (ref 130–400)
PMV BLD AUTO: 10.4 FL (ref 9.4–12.4)
POTASSIUM SERPL-SCNC: 3.8 MEQ/L (ref 3.5–5.2)
PROT SERPL-MCNC: 7.7 G/DL (ref 6.1–8)
RBC # BLD AUTO: 4.36 MILL/MM3 (ref 4.2–5.4)
SEGMENTED NEUTROPHILS ABSOLUTE COUNT: 3.5 THOU/MM3 (ref 1.8–7.7)
SODIUM SERPL-SCNC: 141 MEQ/L (ref 135–145)
T4 FREE SERPL-MCNC: 0.89 NG/DL (ref 0.93–1.68)
TRIGL SERPL-MCNC: 137 MG/DL (ref 0–199)
TSH SERPL DL<=0.005 MIU/L-ACNC: 2.42 UIU/ML (ref 0.4–4.2)
WBC # BLD AUTO: 6 THOU/MM3 (ref 4.8–10.8)

## 2024-03-06 PROCEDURE — 85025 COMPLETE CBC W/AUTO DIFF WBC: CPT

## 2024-03-06 PROCEDURE — 84443 ASSAY THYROID STIM HORMONE: CPT

## 2024-03-06 PROCEDURE — 84439 ASSAY OF FREE THYROXINE: CPT

## 2024-03-06 PROCEDURE — 80053 COMPREHEN METABOLIC PANEL: CPT

## 2024-03-06 PROCEDURE — 80061 LIPID PANEL: CPT

## 2024-03-06 PROCEDURE — 86376 MICROSOMAL ANTIBODY EACH: CPT

## 2024-03-06 PROCEDURE — 83036 HEMOGLOBIN GLYCOSYLATED A1C: CPT

## 2024-03-06 PROCEDURE — 36415 COLL VENOUS BLD VENIPUNCTURE: CPT

## 2024-03-08 LAB — THYROPEROXIDASE AB SERPL IA-ACNC: < 4 IU/ML (ref 0–25)

## 2024-03-13 ENCOUNTER — HOSPITAL ENCOUNTER (OUTPATIENT)
Dept: ULTRASOUND IMAGING | Age: 64
Discharge: HOME | End: 2024-03-13

## 2024-03-13 DIAGNOSIS — E04.1 THYROID NODULE: ICD-10-CM

## 2024-03-13 DIAGNOSIS — Z29.89 IMMUNOTHERAPY: ICD-10-CM

## 2024-03-13 DIAGNOSIS — J30.9 CHRONIC ALLERGIC RHINITIS: Primary | ICD-10-CM

## 2024-03-13 PROCEDURE — 95165 ANTIGEN THERAPY SERVICES: CPT | Performed by: NURSE PRACTITIONER

## 2024-03-13 PROCEDURE — 76536 US EXAM OF HEAD AND NECK: CPT

## 2024-03-13 NOTE — PROGRESS NOTES
allergy injections and wishes to proceed.  SHOT REACTION TREATMENT INSTRUCTIONS    During the 30 minute wait after an allergy injection the following symptoms should be reported:    Itching other than at the injection site  Hives or swelling other than at the injection site  Redness other than at the injection site  Difficulty breathing  Chest tightness  Difficulty swallowing  Throat tightness    If these symptoms occur, NOTIFY PROVIDER and the following treatment should be administered:    1.  Epinephrine 1:1000 IM - 0.3 ml if > 66 lbs or more, 0.15 ml if 33 - 63 lbs, or 0.1 ml if <33 lbs   2.  Diphenhydramine - give all intramuscular:     2 to <6 years (off-label use): 6.25 mg,    6 to <12 years: 12.5 to 25 mg;    ?12 years: 25-50 mg.  3.  Famotidine:  Adults 40 mg oral    Adolescents age 16 years and >88 lbs: 40 mg    Children and Adolescents ?16 years of age: Initial: 0.25 mg/kg/dose every 12 hours (maximum daily dose: 40 mg/day)    Epi dose may me repeated in 5-15 minutes if adequate resolution of symptoms does not occur    Patient should be observed for at least one hour after final epi dose and must be seen by provider.  Patients cannot drive themselves if they have received diphenhydramine.      Patient needs allergy serum vials which were mixed.  It is medically necessary to mix patient's vials from a one-to-one concentration at other vials diluted to a 1:10, 1-100, 1:1000, and 1:10,000 concentration for appropriate dosage of the patient.  As a result the patient will be billed for all total serums that were mixed.  This ensures that the patient will not run out of serum and will receive appropriate care.  Narcisa Hsieh DNP, APRN-BC personally approved the serum mixing and was present in the office during the mixing process

## 2024-03-25 ENCOUNTER — NURSE ONLY (OUTPATIENT)
Dept: ALLERGY | Age: 64
End: 2024-03-25
Payer: MEDICAID

## 2024-03-25 VITALS — HEART RATE: 85 BPM | OXYGEN SATURATION: 98 % | SYSTOLIC BLOOD PRESSURE: 120 MMHG | DIASTOLIC BLOOD PRESSURE: 82 MMHG

## 2024-03-25 DIAGNOSIS — J30.9 CHRONIC ALLERGIC RHINITIS: ICD-10-CM

## 2024-03-25 DIAGNOSIS — J82.83 EOSINOPHILIC ASTHMA: Primary | ICD-10-CM

## 2024-03-25 DIAGNOSIS — J30.1 ACUTE SEASONAL ALLERGIC RHINITIS DUE TO POLLEN: ICD-10-CM

## 2024-03-25 PROCEDURE — 96372 THER/PROPH/DIAG INJ SC/IM: CPT | Performed by: NURSE PRACTITIONER

## 2024-03-25 PROCEDURE — 95117 IMMUNOTHERAPY INJECTIONS: CPT | Performed by: NURSE PRACTITIONER

## 2024-03-25 NOTE — PROGRESS NOTES
PATIENT HAS THE FOLLOWING DIAGNOSIS SUPPORTING ADMINISTRATION OF ALLERGY INJECTIONS: J30.1Allergic rhinitis due to pollen  AND 54048 MULTIPLE ALLERGY INJECTIONS FOR ALLERGY INJECTION ADMINISTRATION      Patient here for allergy injection today.  Patient was presented with the opportunity to speak with provider and ask questions regarding allergy injections.  Patient was also instructed they need to wait 30 minutes after receiving allergy injections. All risks associated with potential adverse effects have been explained to patient and patient handout was provided following allergy testing.    After consent obtained/verified, allergy injection subcutaneously given in back of arm(s).  Please see below for specific details of site injections, concentration of serum, and volume injected.    VIAL COLOR OF ALL VIALS TODAY IS silver 1:10,000. Vial allergy w/v concentration today is: 1:10,000     ALLERGY INJECTION FROM VIAL A GIVEN right  UPPER ARM IN THE AMOUNT OF 0.05 ML    ALLERGY INJECTION FROM VIAL B GIVEN left upper ARM IN THE AMOUNT OF 0.05  ML    ALLERGY INJECTION FROM VIAL C GIVEN leftlower ARM IN THE AMOUNT OF 0.05 ML      Documentation of vial injection specific to arm(s) noted on Allergy Immunotherapy Administration Form.       Patient waited 30 minutes for observation.Yes  Patient has received information and verbalizes understanding of the potential  health risks associated with allergy injections . Patient understands risks including anaphylaxis and chooses not to wait 30 minutes after administration of allergy injection(s).    Patient tolerated well without adverse reaction while the patient was in the office.    SHOT REACTION TREATMENT INSTRUCTIONS    During the 30 minute wait after an allergy injection the following symptoms should be reported:    Itching other than at the injection site  Hives or swelling other than at the injection site  Redness other than at the injection site  Difficulty

## 2024-03-25 NOTE — PROGRESS NOTES
Patient here today to receive Xolair injection.    Patient does not report any previous reaction from Xolair injections.  Denies any nausea vomiting fever urticaria or angioedema.  Denies any recent exacerbation of asthma or asthma-like symptoms.  Patient was explained benefits and potential risks including anaphylaxis to patient.      Patient has been explained the risk and benefits including delayed anaphylaxis.  Patient has EpiPen and understands how to appropriately use.      Xolair 150 mg given subcutaneously X 2 doses in    bilateral    thigh(s)     for a total combined dose of 300 mg using a 25-gauge needle and 3 ML syringe.        RIGHT  / LEFT UPPER ARM,  RIGHT  / LEFT FRONT OR MIDDLE OF THIGH, OR STOMACH        Xolair  NDC 85008-151-47   Lot [id number]   Expires 02/28/25    Patient observed for 30 minutes Yes      Medication was supplied by patient:  YES/NO: Yes      Medication was supplied as a sample:  YES/NO: No                     Prior to patient receiving Xolair area was cleansed with alcohol swabs.  Following the administration no evidence of bleeding or bruising.    No adverse reactions reported.  Patient tolerated well without adverse reactions or side effects.  Patient to continue to receive Xolair injections monthly.  Will report any problems to this office.  No evidence of allergic reaction including anaphylaxis.    Comments:

## 2024-03-28 ENCOUNTER — TRANSCRIBE ORDERS (OUTPATIENT)
Dept: ADMINISTRATIVE | Age: 64
End: 2024-03-28

## 2024-03-28 DIAGNOSIS — Z13.820 SCREENING FOR OSTEOPOROSIS: Primary | ICD-10-CM

## 2024-03-28 DIAGNOSIS — Z13.820 OSTEOPOROSIS SCREENING: ICD-10-CM

## 2024-03-28 DIAGNOSIS — Z78.0 POST-MENOPAUSAL: ICD-10-CM

## 2024-04-01 DIAGNOSIS — Z29.89 IMMUNOTHERAPY: ICD-10-CM

## 2024-04-01 DIAGNOSIS — J30.9 CHRONIC ALLERGIC RHINITIS: Primary | ICD-10-CM

## 2024-04-01 NOTE — PROGRESS NOTES
ALLERGY EXTRACT MIXING.  Monthly billing    DATE OF MIXING= 3/13/24  TOTAL NUMBER OF SET OF VIALS= 3  TOTAL VIALS PREPARED = 15  TOTAL INJECTABLE DOSES =   50 INJECTIONS PER SET  TOTAL ANTICIPATED DOSES = 150 INJECTIONS   TOTAL NUMBER OF INJECTIONS BILLED TODAY = 30     ALLERGY IMMUNOTHERAPY DOSES IS BILLED AT NO MORE THAN 30 UNITS PER MONTH.  TOTAL DOSES BILLED IS AT 30 DOSES PER MONTH UNTIL ALL ANTICIPATED DOSES ARE BILLED.     An allergy extract was prepared according to written prescription by provider.  Provider onsite during allergy extract preparation. Patient vial(s) include the following:     RED VIAL - 1:1 CONCENTRATION - EXPIRES 12 MONTHS  YELLOW VIAL-1:10 CONCENTRATION - EXPIRES 12 MONTHS  BLUE VIAL-1:100 CONCENTRATION - EXPIRES 12 MONTHS  GREEN VIAL-1:1,000 CONCENTRATION - EXPIRES 6 MONTHS  SILVER VIAL-1:10,000 CONCENTRATION - EXPIRES 6 MONTHS    Allergy extract was prepared by the following method:   RED TO YELLOW:  Once the  one-to-one concentration was prepared in the red vial, 0.5 ML's was then extracted in place into the yellow vial to achieve a 1:10 concentration.    YELLOW TO BLUE:  Then 0.5 ML's was extracted from the yellow vial and placed into the blue file with dilutant to achieve a 1:100 concentration.    BLUE TO GREEN:  Then 0.5 ML's was extracted from the blue vial and placed into Green vial with dilute to achieve a 1:1,000 concentration.    GREEN TO SILVER:  Then 0.5 ML's was taken from the green vial and placed in the Silver vial with dilutant to achieve a 1:10,000 concentration.      Patient vials were labeled with name, date-of-birth, vial (A,B,or C), concentration, and expiration.    When injecting from a new  vial the first injections is 0.05 ml and are increased by 0.05 increments until 0.5ml from the vial is achieved or the patient reaches maximum tolerated dose.   Injections are given once ot three times weekly and at least 24 hours apart, according to provider orders.   If

## 2024-04-03 ENCOUNTER — NURSE ONLY (OUTPATIENT)
Dept: ALLERGY | Age: 64
End: 2024-04-03
Payer: MEDICAID

## 2024-04-03 VITALS
HEART RATE: 90 BPM | DIASTOLIC BLOOD PRESSURE: 84 MMHG | SYSTOLIC BLOOD PRESSURE: 150 MMHG | OXYGEN SATURATION: 96 % | RESPIRATION RATE: 18 BRPM

## 2024-04-03 DIAGNOSIS — J30.9 CHRONIC ALLERGIC RHINITIS: Primary | ICD-10-CM

## 2024-04-03 DIAGNOSIS — J30.1 ACUTE SEASONAL ALLERGIC RHINITIS DUE TO POLLEN: ICD-10-CM

## 2024-04-03 PROCEDURE — 95117 IMMUNOTHERAPY INJECTIONS: CPT | Performed by: NURSE PRACTITIONER

## 2024-04-03 RX ORDER — MONTELUKAST SODIUM 10 MG/1
10 TABLET ORAL NIGHTLY
COMMUNITY
Start: 2024-03-27

## 2024-04-03 RX ORDER — AZELASTINE HYDROCHLORIDE 137 UG/1
SPRAY, METERED NASAL
COMMUNITY
Start: 2024-03-27

## 2024-04-03 NOTE — PROGRESS NOTES
PATIENT HAS THE FOLLOWING DIAGNOSIS SUPPORTING ADMINISTRATION OF ALLERGY INJECTIONS: J30.1Allergic rhinitis due to pollen  AND 22130 MULTIPLE ALLERGY INJECTIONS FOR ALLERGY INJECTION ADMINISTRATION      Patient here for allergy injection today.  Patient was presented with the opportunity to speak with provider and ask questions regarding allergy injections.  Patient was also instructed they need to wait 30 minutes after receiving allergy injections. All risks associated with potential adverse effects have been explained to patient and patient handout was provided following allergy testing.    After consent obtained/verified, allergy injection subcutaneously given in back of arm(s).  Please see below for specific details of site injections, concentration of serum, and volume injected.    VIAL COLOR OF ALL VIALS TODAY IS silver 1:10,000. Vial allergy w/v concentration today is: 1:10,000     ALLERGY INJECTION FROM VIAL A GIVEN left  UPPER ARM IN THE AMOUNT OF 0.10 ML    ALLERGY INJECTION FROM VIAL B GIVEN right upper ARM IN THE AMOUNT OF 0.10  ML    ALLERGY INJECTION FROM VIAL C GIVEN rightlower ARM IN THE AMOUNT OF 0.10 ML      Documentation of vial injection specific to arm(s) noted on Allergy Immunotherapy Administration Form.       Patient waited 30 minutes for observation.No  Patient has received information and verbalizes understanding of the potential  health risks associated with allergy injections . Patient understands risks including anaphylaxis and chooses not to wait 30 minutes after administration of allergy injection(s).    Patient tolerated well without adverse reaction while the patient was in the office.    SHOT REACTION TREATMENT INSTRUCTIONS    During the 30 minute wait after an allergy injection the following symptoms should be reported:    Itching other than at the injection site  Hives or swelling other than at the injection site  Redness other than at the injection site  Difficulty

## 2024-04-09 ENCOUNTER — NURSE ONLY (OUTPATIENT)
Dept: ALLERGY | Age: 64
End: 2024-04-09
Payer: MEDICAID

## 2024-04-09 VITALS
OXYGEN SATURATION: 97 % | HEART RATE: 84 BPM | RESPIRATION RATE: 20 BRPM | DIASTOLIC BLOOD PRESSURE: 78 MMHG | SYSTOLIC BLOOD PRESSURE: 130 MMHG

## 2024-04-09 DIAGNOSIS — J30.1 ALLERGY TO TREES: ICD-10-CM

## 2024-04-09 DIAGNOSIS — J30.9 CHRONIC ALLERGIC RHINITIS: Primary | ICD-10-CM

## 2024-04-09 DIAGNOSIS — J30.1 ACUTE SEASONAL ALLERGIC RHINITIS DUE TO POLLEN: ICD-10-CM

## 2024-04-09 PROCEDURE — 95117 IMMUNOTHERAPY INJECTIONS: CPT | Performed by: NURSE PRACTITIONER

## 2024-04-09 NOTE — PROGRESS NOTES
PATIENT HAS THE FOLLOWING DIAGNOSIS SUPPORTING ADMINISTRATION OF ALLERGY INJECTIONS: J30.1Allergic rhinitis due to pollen  AND 01891 MULTIPLE ALLERGY INJECTIONS FOR ALLERGY INJECTION ADMINISTRATION      Patient here for allergy injection today.  Patient was presented with the opportunity to speak with provider and ask questions regarding allergy injections.  Patient was also instructed they need to wait 30 minutes after receiving allergy injections. All risks associated with potential adverse effects have been explained to patient and patient handout was provided following allergy testing.    After consent obtained/verified, allergy injection subcutaneously given in back of arm(s).  Please see below for specific details of site injections, concentration of serum, and volume injected.    VIAL COLOR OF ALL VIALS TODAY IS silver 1:10,000. Vial allergy w/v concentration today is: 1:10,000     ALLERGY INJECTION FROM VIAL A GIVEN right  UPPER ARM IN THE AMOUNT OF 0.20 ML    ALLERGY INJECTION FROM VIAL B GIVEN left lower ARM IN THE AMOUNT OF 0.20  ML    ALLERGY INJECTION FROM VIAL C GIVEN leftupper ARM IN THE AMOUNT OF 0.20 ML      Documentation of vial injection specific to arm(s) noted on Allergy Immunotherapy Administration Form.       Patient waited 30 minutes for observation.No  Patient has received information and verbalizes understanding of the potential  health risks associated with allergy injections . Patient understands risks including anaphylaxis and chooses not to wait 30 minutes after administration of allergy injection(s).    Patient tolerated well without adverse reaction while the patient was in the office.    SHOT REACTION TREATMENT INSTRUCTIONS    During the 30 minute wait after an allergy injection the following symptoms should be reported:    Itching other than at the injection site  Hives or swelling other than at the injection site  Redness other than at the injection site  Difficulty

## 2024-04-16 ENCOUNTER — NURSE ONLY (OUTPATIENT)
Dept: ALLERGY | Age: 64
End: 2024-04-16
Payer: MEDICAID

## 2024-04-16 VITALS — HEART RATE: 74 BPM | DIASTOLIC BLOOD PRESSURE: 87 MMHG | SYSTOLIC BLOOD PRESSURE: 145 MMHG

## 2024-04-16 DIAGNOSIS — J30.1 ALLERGY TO TREES: ICD-10-CM

## 2024-04-16 DIAGNOSIS — J30.1 ACUTE SEASONAL ALLERGIC RHINITIS DUE TO POLLEN: ICD-10-CM

## 2024-04-16 DIAGNOSIS — J30.9 CHRONIC ALLERGIC RHINITIS: Primary | ICD-10-CM

## 2024-04-16 PROCEDURE — 95117 IMMUNOTHERAPY INJECTIONS: CPT | Performed by: NURSE PRACTITIONER

## 2024-04-16 NOTE — PROGRESS NOTES
PATIENT HAS THE FOLLOWING DIAGNOSIS SUPPORTING ADMINISTRATION OF ALLERGY INJECTIONS: J30.1Allergic rhinitis due to pollen  AND 26441 MULTIPLE ALLERGY INJECTIONS FOR ALLERGY INJECTION ADMINISTRATION      Patient here for allergy injection today.  Patient was presented with the opportunity to speak with provider and ask questions regarding allergy injections.  Patient was also instructed they need to wait 30 minutes after receiving allergy injections. All risks associated with potential adverse effects have been explained to patient and patient handout was provided following allergy testing.    After consent obtained/verified, allergy injection subcutaneously given in back of arm(s).  Please see below for specific details of site injections, concentration of serum, and volume injected.    VIAL COLOR OF ALL VIALS TODAY IS silver 1:10,000. Vial allergy w/v concentration today is: 1:10,000     ALLERGY INJECTION FROM VIAL A GIVEN left  UPPER ARM IN THE AMOUNT OF 0.30 ML    ALLERGY INJECTION FROM VIAL B GIVEN right lower ARM IN THE AMOUNT OF 0.30  ML    ALLERGY INJECTION FROM VIAL C GIVEN rightupper ARM IN THE AMOUNT OF 0.30 ML      Documentation of vial injection specific to arm(s) noted on Allergy Immunotherapy Administration Form.       Patient waited 30 minutes for observation.No  Patient has received information and verbalizes understanding of the potential  health risks associated with allergy injections . Patient understands risks including anaphylaxis and chooses not to wait 30 minutes after administration of allergy injection(s).    Patient tolerated well without adverse reaction while the patient was in the office.    SHOT REACTION TREATMENT INSTRUCTIONS    During the 30 minute wait after an allergy injection the following symptoms should be reported:    Itching other than at the injection site  Hives or swelling other than at the injection site  Redness other than at the injection site  Difficulty

## 2024-04-17 ENCOUNTER — HOSPITAL ENCOUNTER (OUTPATIENT)
Dept: WOMENS IMAGING | Age: 64
Discharge: HOME | End: 2024-04-17
Payer: MEDICAID

## 2024-04-17 VITALS — BODY MASS INDEX: 34.91 KG/M2 | HEIGHT: 63 IN | WEIGHT: 197 LBS

## 2024-04-17 DIAGNOSIS — Z13.820 OSTEOPOROSIS SCREENING: ICD-10-CM

## 2024-04-17 DIAGNOSIS — Z78.0 POSTMENOPAUSAL: ICD-10-CM

## 2024-04-17 DIAGNOSIS — Z12.31 ENCOUNTER FOR SCREENING MAMMOGRAM FOR MALIGNANT NEOPLASM OF BREAST: ICD-10-CM

## 2024-04-17 PROCEDURE — 77080 DXA BONE DENSITY AXIAL: CPT

## 2024-04-17 PROCEDURE — 77063 BREAST TOMOSYNTHESIS BI: CPT

## 2024-04-19 ENCOUNTER — PATIENT MESSAGE (OUTPATIENT)
Dept: FAMILY MEDICINE CLINIC | Age: 64
End: 2024-04-19

## 2024-04-19 NOTE — TELEPHONE ENCOUNTER
From: Gely Archer  To: Milton Bowden  Sent: 4/19/2024 8:50 AM EDT  Subject: CONTINUE TO TAKE VD3 RX?    Ok, I just received the recommendation from [facility], so do I continue to take the RX prescribed for VD 3, 2,000 units already prescribed in ADDITION to more Vit D over the counter?

## 2024-04-24 ENCOUNTER — HOSPITAL ENCOUNTER (OUTPATIENT)
Dept: WOMENS IMAGING | Age: 64
Discharge: HOME | End: 2024-04-24
Attending: RADIOLOGY
Payer: MEDICAID

## 2024-04-24 ENCOUNTER — NURSE ONLY (OUTPATIENT)
Dept: ALLERGY | Age: 64
End: 2024-04-24
Payer: MEDICAID

## 2024-04-24 VITALS
OXYGEN SATURATION: 98 % | SYSTOLIC BLOOD PRESSURE: 129 MMHG | DIASTOLIC BLOOD PRESSURE: 74 MMHG | HEART RATE: 92 BPM | RESPIRATION RATE: 18 BRPM

## 2024-04-24 DIAGNOSIS — J30.1 ACUTE SEASONAL ALLERGIC RHINITIS DUE TO POLLEN: ICD-10-CM

## 2024-04-24 DIAGNOSIS — J30.1 NON-SEASONAL ALLERGIC RHINITIS DUE TO POLLEN: ICD-10-CM

## 2024-04-24 DIAGNOSIS — J30.9 CHRONIC ALLERGIC RHINITIS: Primary | ICD-10-CM

## 2024-04-24 DIAGNOSIS — R92.8 ABNORMAL MAMMOGRAM: ICD-10-CM

## 2024-04-24 PROCEDURE — 76642 ULTRASOUND BREAST LIMITED: CPT

## 2024-04-24 PROCEDURE — 95117 IMMUNOTHERAPY INJECTIONS: CPT | Performed by: NURSE PRACTITIONER

## 2024-04-24 NOTE — PROGRESS NOTES
PATIENT HAS THE FOLLOWING DIAGNOSIS SUPPORTING ADMINISTRATION OF ALLERGY INJECTIONS: J30.1Allergic rhinitis due to pollen  AND 51287 MULTIPLE ALLERGY INJECTIONS FOR ALLERGY INJECTION ADMINISTRATION      Patient here for allergy injection today.  Patient was presented with the opportunity to speak with provider and ask questions regarding allergy injections.  Patient was also instructed they need to wait 30 minutes after receiving allergy injections. All risks associated with potential adverse effects have been explained to patient and patient handout was provided following allergy testing.    After consent obtained/verified, allergy injection subcutaneously given in back of arm(s).  Please see below for specific details of site injections, concentration of serum, and volume injected.    VIAL COLOR OF ALL VIALS TODAY IS silver 1:10,000. Vial allergy w/v concentration today is: 1:10,000     ALLERGY INJECTION FROM VIAL A GIVEN right  UPPER ARM IN THE AMOUNT OF 0.35 ML    ALLERGY INJECTION FROM VIAL B GIVEN left upper ARM IN THE AMOUNT OF 0.35  ML    ALLERGY INJECTION FROM VIAL C GIVEN leftlower ARM IN THE AMOUNT OF 0.35 ML      Documentation of vial injection specific to arm(s) noted on Allergy Immunotherapy Administration Form.       Patient waited 30 minutes for observation.No  Patient has received information and verbalizes understanding of the potential  health risks associated with allergy injections . Patient understands risks including anaphylaxis and chooses not to wait 30 minutes after administration of allergy injection(s).    Patient tolerated well without adverse reaction while the patient was in the office.    SHOT REACTION TREATMENT INSTRUCTIONS    During the 30 minute wait after an allergy injection the following symptoms should be reported:    Itching other than at the injection site  Hives or swelling other than at the injection site  Redness other than at the injection site  Difficulty

## 2024-04-25 ENCOUNTER — HOSPITAL ENCOUNTER (OUTPATIENT)
Dept: WOMENS IMAGING | Age: 64
Discharge: HOME | End: 2024-04-25
Payer: MEDICAID

## 2024-04-25 DIAGNOSIS — N63.25 MASS OVERLAPPING MULTIPLE QUADRANTS OF LEFT BREAST: ICD-10-CM

## 2024-04-25 PROCEDURE — G0279 TOMOSYNTHESIS, MAMMO: HCPCS

## 2024-04-25 PROCEDURE — 19083 BX BREAST 1ST LESION US IMAG: CPT

## 2024-04-25 PROCEDURE — 88305 TISSUE EXAM BY PATHOLOGIST: CPT

## 2024-04-25 NOTE — PROGRESS NOTES
Breast Biopsy Flowsheet/Post-Operative Care    Date of Procedure: 4/25/2024  Physician: Dr. Adams  Technologist: Clarissa Canas RT(R)(M)    Biopsy:ultrasound guided breast biopsy  Lesion type: Non-palpable  Breast: left    Clock face position: Site #1: 11:00         Primary Method of Detection: Mammogram      Microcalcification's: no           Biopsy Method:   Sertera:    Site # 1    Gauge: 14    # of Passes: 4     Clip: Nitza        Pre-Op Assessment: (BI-RADS)   4. Suspicious Abnormality    Patient Tolerated Procedure: good  Complications: n/a  Comments: n/a    Post Operative Care  Steri strips: Yes  Dressing: Gauze, Tape Sure Prep was applied prior to the gauze and tape  Ice Applied to Site:  No  Evidence of Bleeding:  No    Pain Verbalized: Yes      Written Discharge Instructions: Yes  Condition at Discharge: good  Time of Discharge: 1027    Electronically signed by Evy Mercedes on 4/25/2024 at 11:59 AM

## 2024-04-25 NOTE — PROGRESS NOTES
[facility]  Pre-Biopsy Assessment      Patient Education    Written information about procedure Yes  left   Procedural steps explained Yes Ultrasound Biopsy   Post-op potential: bruising, hematoma, pain Yes    Self-care: activity, care of dressing Yes    Patient verbalized understanding Yes    Consent signed and witnessed Yes      Hormone Therapy Status: n/a    Recent Medication: N/A Last Dose: n/a                                     Hormone Replacement Therapy: yes - estrace cream    Previous Breast Biopsy: no    Previous Diagnosis Cancer: no    Hysterectomy:yes - unsure of ovary status    Emotional Status: Calm    Language or Physical Barriers: n/a    Comments: n/a      Electronically signed by Evy Mercedes on 4/25/2024 at 9:22 AM

## 2024-04-26 ENCOUNTER — CLINICAL DOCUMENTATION (OUTPATIENT)
Dept: WOMENS IMAGING | Age: 64
End: 2024-04-26

## 2024-04-26 NOTE — PROGRESS NOTES
Contact Type :    Telephone  Notes :  After consulting with Dr. Adams,  Gely was contacted by telephone.  She was informed of the negative biopsy results and the need to return for a 6 month follow up.  She voiced understanding.    Biopsy site: doing well     Results faxed to: Milton Bowden, [facility]

## 2024-05-01 DIAGNOSIS — J45.50 SEVERE PERSISTENT ASTHMA WITHOUT COMPLICATION: ICD-10-CM

## 2024-05-01 NOTE — TELEPHONE ENCOUNTER
REMIND PATIENTS TO REQUESTS MEDICATIONS DURING THEIR REGULAR OFFICE VISIT EVEN IF THE MEDICATION IS NOT DUE.  THIS SAVES TIME FOR THE PATIENT AND PROVIDER.      IF MEDICATIONS ARE NOT DUE AT THAT TIME, THE PHARMACY MAY HOLD THE PRESCRIPTION TO FILL AT A LATER DATE.    MAKE SURE THE CORRECT PHARMACY IS SELECTED WITH THE MEDICATION REFILL REQUESTS.  MEDICATIONS THAT ARE SENT TO A DIFFERENT PHARMACY WILL NEED TO BE TRANSFERRED BY THE PATIENT TO THE CORRECT PHARMACY.  PLEASE VERIFY THE CORRECT PHARMACY AND LINK THE PHARMACY TO THE REFILL REQUEST.    ____________________________________________________________________________________________________________________________________    Patient and pharmacy requests the following medication to be refilled:      How often does patient take medication? DAILY    Patient is requesting 30 days of medication      Pharmacy (need the exact  pharmacy):     Patient last received medication on date: 1/17/24  Number of refills given at last fill: 2  (If refills are current the patient does not need another refill)    Last appointment: 3/4/24  IF THE PATIENT HAS NOT BEEN SEEN DURING THE LAST YEAR, THE MUST HAVE AN APPT TO BE SEEN AND I WILL ONLD SEND IN 90 DAYS ONE TIME.    Next appt : 3/4/24    DID THE PATIENT MISS THE LAST APPT AS A NO SHOW?  no.  IF THE PATIENT WAS A NO SHOW I WILL NOT FILL THE MEDICATION.

## 2024-05-02 DIAGNOSIS — Z29.89 IMMUNOTHERAPY: ICD-10-CM

## 2024-05-02 DIAGNOSIS — J30.9 CHRONIC ALLERGIC RHINITIS: Primary | ICD-10-CM

## 2024-05-02 DIAGNOSIS — L85.3 XEROSIS OF SKIN: ICD-10-CM

## 2024-05-02 RX ORDER — HYDROCORTISONE 25 MG/ML
LOTION TOPICAL
Qty: 118 ML | Refills: 5 | Status: SHIPPED | OUTPATIENT
Start: 2024-05-02

## 2024-05-06 ENCOUNTER — NURSE ONLY (OUTPATIENT)
Dept: ALLERGY | Age: 64
End: 2024-05-06
Payer: MEDICAID

## 2024-05-06 VITALS
BODY MASS INDEX: 34.9 KG/M2 | HEART RATE: 83 BPM | SYSTOLIC BLOOD PRESSURE: 139 MMHG | HEIGHT: 63 IN | OXYGEN SATURATION: 95 % | DIASTOLIC BLOOD PRESSURE: 76 MMHG

## 2024-05-06 DIAGNOSIS — J30.9 CHRONIC ALLERGIC RHINITIS: Primary | ICD-10-CM

## 2024-05-06 DIAGNOSIS — J30.1 SEASONAL ALLERGIC RHINITIS DUE TO POLLEN: ICD-10-CM

## 2024-05-06 PROCEDURE — 95117 IMMUNOTHERAPY INJECTIONS: CPT | Performed by: NURSE PRACTITIONER

## 2024-05-06 NOTE — PROGRESS NOTES
PATIENT HAS THE FOLLOWING DIAGNOSIS SUPPORTING ADMINISTRATION OF ALLERGY INJECTIONS: J30.1Allergic rhinitis due to pollen  AND 54031 MULTIPLE ALLERGY INJECTIONS FOR ALLERGY INJECTION ADMINISTRATION      Patient here for allergy injection today.  Patient was presented with the opportunity to speak with provider and ask questions regarding allergy injections.  Patient was also instructed they need to wait 30 minutes after receiving allergy injections. All risks associated with potential adverse effects have been explained to patient and patient handout was provided following allergy testing.    After consent obtained/verified, allergy injection subcutaneously given in back of arm(s).  Please see below for specific details of site injections, concentration of serum, and volume injected.    VIAL COLOR OF ALL VIALS TODAY IS silver 1:10,000. Vial allergy w/v concentration today is: 1:10,000     ALLERGY INJECTION FROM VIAL A GIVEN left  UPPER ARM IN THE AMOUNT OF 0.40 ML    ALLERGY INJECTION FROM VIAL B GIVEN right upper ARM IN THE AMOUNT OF 0.40  ML    ALLERGY INJECTION FROM VIAL C GIVEN rightlower ARM IN THE AMOUNT OF 0.40 ML      Documentation of vial injection specific to arm(s) noted on Allergy Immunotherapy Administration Form.       Patient waited 30 minutes for observation.No  Patient has received information and verbalizes understanding of the potential  health risks associated with allergy injections . Patient understands risks including anaphylaxis and chooses not to wait 30 minutes after administration of allergy injection(s).    Patient tolerated well without adverse reaction while the patient was in the office.    SHOT REACTION TREATMENT INSTRUCTIONS    During the 30 minute wait after an allergy injection the following symptoms should be reported:    Itching other than at the injection site  Hives or swelling other than at the injection site  Redness other than at the injection site  Difficulty

## 2024-05-08 DIAGNOSIS — Z09 FOLLOW-UP EXAM: ICD-10-CM

## 2024-05-08 DIAGNOSIS — N63.22 MASS OF UPPER INNER QUADRANT OF LEFT BREAST: Primary | ICD-10-CM

## 2024-05-15 ENCOUNTER — OFFICE VISIT (OUTPATIENT)
Dept: FAMILY MEDICINE CLINIC | Age: 64
End: 2024-05-15
Payer: MEDICAID

## 2024-05-15 ENCOUNTER — NURSE ONLY (OUTPATIENT)
Dept: ALLERGY | Age: 64
End: 2024-05-15
Payer: MEDICAID

## 2024-05-15 VITALS
OXYGEN SATURATION: 96 % | DIASTOLIC BLOOD PRESSURE: 68 MMHG | WEIGHT: 198 LBS | RESPIRATION RATE: 16 BRPM | SYSTOLIC BLOOD PRESSURE: 110 MMHG | TEMPERATURE: 98.1 F | BODY MASS INDEX: 35.07 KG/M2 | HEART RATE: 82 BPM

## 2024-05-15 VITALS
OXYGEN SATURATION: 98 % | DIASTOLIC BLOOD PRESSURE: 89 MMHG | HEART RATE: 82 BPM | SYSTOLIC BLOOD PRESSURE: 138 MMHG | RESPIRATION RATE: 18 BRPM

## 2024-05-15 DIAGNOSIS — E11.65 TYPE 2 DIABETES MELLITUS WITH HYPERGLYCEMIA, UNSPECIFIED WHETHER LONG TERM INSULIN USE (HCC): ICD-10-CM

## 2024-05-15 DIAGNOSIS — Z98.2 VP (VENTRICULOPERITONEAL) SHUNT STATUS: ICD-10-CM

## 2024-05-15 DIAGNOSIS — I10 PRIMARY HYPERTENSION: ICD-10-CM

## 2024-05-15 DIAGNOSIS — Q07.00 ARNOLD-CHIARI MALFORMATION (HCC): ICD-10-CM

## 2024-05-15 DIAGNOSIS — E11.42 DIABETIC POLYNEUROPATHY ASSOCIATED WITH TYPE 2 DIABETES MELLITUS (HCC): ICD-10-CM

## 2024-05-15 DIAGNOSIS — F17.200 SMOKER: Primary | ICD-10-CM

## 2024-05-15 DIAGNOSIS — G95.0 SYRINGOMYELIA AND SYRINGOBULBIA (HCC): ICD-10-CM

## 2024-05-15 DIAGNOSIS — J30.1 SEASONAL ALLERGIC RHINITIS DUE TO POLLEN: ICD-10-CM

## 2024-05-15 DIAGNOSIS — J30.9 CHRONIC ALLERGIC RHINITIS: Primary | ICD-10-CM

## 2024-05-15 DIAGNOSIS — K59.00 CONSTIPATION, UNSPECIFIED CONSTIPATION TYPE: ICD-10-CM

## 2024-05-15 DIAGNOSIS — K21.9 GASTROESOPHAGEAL REFLUX DISEASE, UNSPECIFIED WHETHER ESOPHAGITIS PRESENT: ICD-10-CM

## 2024-05-15 DIAGNOSIS — D24.9 FIBROADENOMA OF BREAST, UNSPECIFIED LATERALITY: ICD-10-CM

## 2024-05-15 DIAGNOSIS — E66.9 CLASS 1 OBESITY WITH SERIOUS COMORBIDITY AND BODY MASS INDEX (BMI) OF 32.0 TO 32.9 IN ADULT, UNSPECIFIED OBESITY TYPE: ICD-10-CM

## 2024-05-15 DIAGNOSIS — E03.9 HYPOTHYROIDISM, UNSPECIFIED TYPE: ICD-10-CM

## 2024-05-15 DIAGNOSIS — J82.83 EOSINOPHILIC ASTHMA: ICD-10-CM

## 2024-05-15 DIAGNOSIS — E78.2 MIXED HYPERLIPIDEMIA: ICD-10-CM

## 2024-05-15 PROCEDURE — 3074F SYST BP LT 130 MM HG: CPT | Performed by: NURSE PRACTITIONER

## 2024-05-15 PROCEDURE — 99214 OFFICE O/P EST MOD 30 MIN: CPT | Performed by: NURSE PRACTITIONER

## 2024-05-15 PROCEDURE — 3078F DIAST BP <80 MM HG: CPT | Performed by: NURSE PRACTITIONER

## 2024-05-15 PROCEDURE — 3044F HG A1C LEVEL LT 7.0%: CPT | Performed by: NURSE PRACTITIONER

## 2024-05-15 PROCEDURE — 1123F ACP DISCUSS/DSCN MKR DOCD: CPT | Performed by: NURSE PRACTITIONER

## 2024-05-15 PROCEDURE — 95117 IMMUNOTHERAPY INJECTIONS: CPT | Performed by: NURSE PRACTITIONER

## 2024-05-15 RX ORDER — ESOMEPRAZOLE MAGNESIUM 40 MG/1
40 CAPSULE, DELAYED RELEASE ORAL 2 TIMES DAILY
Qty: 180 CAPSULE | Refills: 0 | Status: SHIPPED | OUTPATIENT
Start: 2024-05-15 | End: 2024-05-15

## 2024-05-15 RX ORDER — OMEPRAZOLE 40 MG/1
40 CAPSULE, DELAYED RELEASE ORAL DAILY
Qty: 30 CAPSULE | Refills: 3 | Status: SHIPPED | OUTPATIENT
Start: 2024-05-15

## 2024-05-15 RX ORDER — LINACLOTIDE 290 UG/1
290 CAPSULE, GELATIN COATED ORAL
Qty: 16 CAPSULE | Refills: 0 | Status: SHIPPED | COMMUNITY
Start: 2024-05-15

## 2024-05-15 ASSESSMENT — ENCOUNTER SYMPTOMS
CHEST TIGHTNESS: 0
BACK PAIN: 0
WHEEZING: 0
ABDOMINAL DISTENTION: 0
ABDOMINAL PAIN: 0
COUGH: 0
SHORTNESS OF BREATH: 0

## 2024-05-15 NOTE — PROGRESS NOTES
PATIENT HAS THE FOLLOWING DIAGNOSIS SUPPORTING ADMINISTRATION OF ALLERGY INJECTIONS: J30.1Allergic rhinitis due to pollen  AND 56186 MULTIPLE ALLERGY INJECTIONS FOR ALLERGY INJECTION ADMINISTRATION      Patient here for allergy injection today.  Patient was presented with the opportunity to speak with provider and ask questions regarding allergy injections.  Patient was also instructed they need to wait 30 minutes after receiving allergy injections. All risks associated with potential adverse effects have been explained to patient and patient handout was provided following allergy testing.    After consent obtained/verified, allergy injection subcutaneously given in back of arm(s).  Please see below for specific details of site injections, concentration of serum, and volume injected.    VIAL COLOR OF ALL VIALS TODAY IS silver 1:10,000. Vial allergy w/v concentration today is: 1:10,000     ALLERGY INJECTION FROM VIAL A GIVEN right  UPPER ARM IN THE AMOUNT OF 0.50 ML    ALLERGY INJECTION FROM VIAL B GIVEN left lower ARM IN THE AMOUNT OF 0.50  ML    ALLERGY INJECTION FROM VIAL C GIVEN leftupper ARM IN THE AMOUNT OF 0.50 ML      Documentation of vial injection specific to arm(s) noted on Allergy Immunotherapy Administration Form.       Patient waited 30 minutes for observation.No  Patient has received information and verbalizes understanding of the potential  health risks associated with allergy injections . Patient understands risks including anaphylaxis and chooses not to wait 30 minutes after administration of allergy injection(s).    Patient tolerated well without adverse reaction while the patient was in the office.    SHOT REACTION TREATMENT INSTRUCTIONS    During the 30 minute wait after an allergy injection the following symptoms should be reported:    Itching other than at the injection site  Hives or swelling other than at the injection site  Redness other than at the injection site  Difficulty

## 2024-05-15 NOTE — PROGRESS NOTES
packs/day: 0.4 packs/day for 64.3 years (25.1 ttl pk-yrs)     Types: Cigarettes     Start date: 6[birth year]1975     Passive exposure: Current    Smokeless tobacco: Never    Tobacco comments:     Started back but smoking less   Substance Use Topics    Alcohol use: Yes     Alcohol/week: 2.0 standard drinks of alcohol     Types: 2 Cans of beer per week     Comment: Once a month; stomach tolerates very little alcohol      Current Outpatient Medications   Medication Sig Dispense Refill    esomeprazole (NEXIUM) 40 MG delayed release capsule Take 1 capsule by mouth 2 times daily 180 capsule 0    linaclotide (LINZESS) 290 MCG CAPS capsule Take 1 capsule by mouth every morning (before breakfast) 16 capsule 0    budesonide (PULMICORT) 180 MCG/ACT AEPB inhaler Inhale 2 puffs into the lungs in the morning and at bedtime 1 each 5    hydrocortisone (HYTONE) 2.5 % lotion APPLY  LOTION EXTERNALLY ONCE DAILY TO AFFECTED AREA 118 mL 5    Azelastine HCl 137 MCG/SPRAY SOLN USE 1 SPRAY(S) IN EACH NOSTRIL IN THE MORNING AND AT BEDTIME      levothyroxine (SYNTHROID) 88 MCG tablet TAKE 1 TABLET BY MOUTH ONCE DAILY ON AN EMPTY STOMACH WITH WATER. DO NOT EAT OR TAKE ANY OTHER MEDICATIONS FOR 30 MINUTES. 90 tablet 3    erythromycin (ROMYCIN) 5 MG/GM ophthalmic ointment Place into both eyes nightly      prednisoLONE acetate (PRED FORTE) 1 % ophthalmic suspension Place into both eyes      amLODIPine (NORVASC) 5 MG tablet Take 1 tablet by mouth daily 90 tablet 3    atorvastatin (LIPITOR) 20 MG tablet Take 1 tablet by mouth daily TAKE 1 TABLET BY MOUTH ONCE DAILY 90 tablet 3    Cholecalciferol (VITAMIN D3) 50 MCG (2000 UT) CAPS Take 1 capsule by mouth daily 90 capsule 3    metFORMIN (GLUCOPHAGE) 500 MG tablet Take 1 tablet by mouth 2 times daily (with meals) 180 tablet 3    clotrimazole-betamethasone (LOTRISONE) 1-0.05 % cream Apply topically 2 times daily Apply topically 2 times daily.      pentosan polysulfate (ELMIRON) 100 MG capsule Take 1 capsule

## 2024-05-16 ENCOUNTER — OFFICE VISIT (OUTPATIENT)
Dept: PULMONOLOGY | Age: 64
End: 2024-05-16

## 2024-05-16 VITALS
SYSTOLIC BLOOD PRESSURE: 118 MMHG | OXYGEN SATURATION: 97 % | TEMPERATURE: 97.9 F | DIASTOLIC BLOOD PRESSURE: 60 MMHG | BODY MASS INDEX: 34.69 KG/M2 | WEIGHT: 195.8 LBS | HEIGHT: 63 IN | HEART RATE: 78 BPM

## 2024-05-16 DIAGNOSIS — R91.1 LEFT UPPER LOBE PULMONARY NODULE: ICD-10-CM

## 2024-05-16 DIAGNOSIS — E66.9 OBESITY (BMI 30-39.9): ICD-10-CM

## 2024-05-16 DIAGNOSIS — Z87.891 PERSONAL HISTORY OF TOBACCO USE: ICD-10-CM

## 2024-05-16 DIAGNOSIS — R76.8 ELEVATED IGE LEVEL: ICD-10-CM

## 2024-05-16 DIAGNOSIS — J45.40 MODERATE PERSISTENT ASTHMA WITHOUT COMPLICATION: Primary | ICD-10-CM

## 2024-05-16 ASSESSMENT — ENCOUNTER SYMPTOMS
EYES NEGATIVE: 1
COUGH: 1
ALLERGIC/IMMUNOLOGIC NEGATIVE: 1
SHORTNESS OF BREATH: 1
GASTROINTESTINAL NEGATIVE: 1
WHEEZING: 1

## 2024-05-16 NOTE — PROGRESS NOTES
Patient is scheduled for a CT Chest Abdomen Pelvis and, due to his age, he needs orders for creatinine. Please place in Epic. No need to fax. 266 High  R, CM      Latest Reference Range & Units 09/05/21 15:55 04/02/22 10:33 02/14/24 07:26 03/06/24 07:22   Eosinophils Absolute 0.0 - 0.4 thou/mm3 0.2 0.1 0.2 0.2     Assessment      Diagnosis Orders   1. Moderate persistent asthma without complication  Spirometry With Bronchodilator      2. Elevated IgE level        3. Obesity (BMI 30-39.9)        4. Left upper lobe pulmonary nodule        5. Personal history of tobacco use  [place] VISIT TO DISCUSS LUNG CA SCREEN W LDCT    CT Lung Screen (Initial/Annual/Baseline)    Spirometry With Bronchodilator            Plan   -Gely Archer educated about environmental safety precautions she need to practice to prevent exacerbation ofher Asthma. Gely Archer verbalizes understanding.   -LDCT to be done   I advised patient to quit, and offered support.  Educational material distributed.  Discussed current use pattern., advised to quit, barriers to quit were reviewed and discussed,  smoking cessation discussed for minutes: 5-10 minutes  -Spirometry with BD  -Continue Pulmicort BID- rinsing mouth after use  -Continue Albuterol PRN   -Continue Xolair as previously prescribed   -Activity as tolerated  -Watch weight gain   -Advised to maintain pneumonia vaccine with PCP and to take flu vaccine this coming season.  -Advised patient to call office with any changes, questions, or concerns regarding respiratory status    Will see Gely Archer back in: 1 months with Jose Enrique and LDCT to be done prior     Theresa Krishnan CNP  5/16/2024    Discussed with the patient the current USPSTF guidelines released March 9, 2021 for screening for lung cancer.    For adults aged 50 to 80 years who have a 20 pack-year smoking history and currently smoke or have quit within the past 15 years the grade B recommendation is to:  Screen for lung cancer with low-dose computed tomography (LDCT) every year.  Stop screening once a person has not smoked for 15 years or has a health problem that limits

## 2024-05-21 ENCOUNTER — NURSE ONLY (OUTPATIENT)
Dept: ALLERGY | Age: 64
End: 2024-05-21
Payer: MEDICAID

## 2024-05-21 VITALS
OXYGEN SATURATION: 97 % | DIASTOLIC BLOOD PRESSURE: 80 MMHG | SYSTOLIC BLOOD PRESSURE: 136 MMHG | HEART RATE: 85 BPM | RESPIRATION RATE: 18 BRPM

## 2024-05-21 DIAGNOSIS — J30.1 SEASONAL ALLERGIC RHINITIS DUE TO POLLEN: ICD-10-CM

## 2024-05-21 DIAGNOSIS — J30.9 CHRONIC ALLERGIC RHINITIS: Primary | ICD-10-CM

## 2024-05-21 PROCEDURE — 95117 IMMUNOTHERAPY INJECTIONS: CPT | Performed by: NURSE PRACTITIONER

## 2024-05-28 ENCOUNTER — NURSE ONLY (OUTPATIENT)
Dept: ALLERGY | Age: 64
End: 2024-05-28
Payer: MEDICAID

## 2024-05-28 VITALS
DIASTOLIC BLOOD PRESSURE: 86 MMHG | SYSTOLIC BLOOD PRESSURE: 120 MMHG | RESPIRATION RATE: 18 BRPM | OXYGEN SATURATION: 96 % | HEART RATE: 86 BPM

## 2024-05-28 DIAGNOSIS — J30.1 SEASONAL ALLERGIC RHINITIS DUE TO POLLEN: ICD-10-CM

## 2024-05-28 DIAGNOSIS — J30.1 ACUTE SEASONAL ALLERGIC RHINITIS DUE TO POLLEN: ICD-10-CM

## 2024-05-28 DIAGNOSIS — J45.50 SEVERE PERSISTENT ASTHMA WITHOUT COMPLICATION: ICD-10-CM

## 2024-05-28 DIAGNOSIS — J30.9 CHRONIC ALLERGIC RHINITIS: Primary | ICD-10-CM

## 2024-05-28 PROCEDURE — 96372 THER/PROPH/DIAG INJ SC/IM: CPT | Performed by: NURSE PRACTITIONER

## 2024-05-28 PROCEDURE — 95117 IMMUNOTHERAPY INJECTIONS: CPT | Performed by: NURSE PRACTITIONER

## 2024-05-28 NOTE — PROGRESS NOTES
Patient here today to receive Xolair injection.    Patient does not report any previous reaction from Xolair injections.  Denies any nausea vomiting fever urticaria or angioedema.  Denies any recent exacerbation of asthma or asthma-like symptoms.  Patient was explained benefits and potential risks including anaphylaxis to patient.      Patient has been explained the risk and benefits including delayed anaphylaxis.  Patient has EpiPen and understands how to appropriately use.      Xolair 150 mg given subcutaneously X 2 doses in    bilateral both arms        for a total combined dose of 300 mg using a 25-gauge needle and 3 ML syringe.        RIGHT  / LEFT UPPER ARM,  RIGHT  / LEFT FRONT OR MIDDLE OF THIGH, OR STOMACH        Xolair  NDC 09473-638-23   Lot [id number]   Expires 06/30/2025    Patient observed for 30 minutes No      Medication was supplied by patient:  YES/NO: Yes      Medication was supplied as a sample:  YES/NO: No                     Prior to patient receiving Xolair area was cleansed with alcohol swabs.  Following the administration no evidence of bleeding or bruising.    No adverse reactions reported.  Patient tolerated well without adverse reactions or side effects.  Patient to continue to receive Xolair injections monthly.  Will report any problems to this office.  No evidence of allergic reaction including anaphylaxis.    Comments:

## 2024-05-28 NOTE — PROGRESS NOTES
PATIENT HAS THE FOLLOWING DIAGNOSIS SUPPORTING ADMINISTRATION OF ALLERGY INJECTIONS: J30.1Allergic rhinitis due to pollen  AND 89596 MULTIPLE ALLERGY INJECTIONS FOR ALLERGY INJECTION ADMINISTRATION      Patient here for allergy injection today.  Patient was presented with the opportunity to speak with provider and ask questions regarding allergy injections.  Patient was also instructed they need to wait 30 minutes after receiving allergy injections. All risks associated with potential adverse effects have been explained to patient and patient handout was provided following allergy testing.    After consent obtained/verified, allergy injection subcutaneously given in back of arm(s).  Please see below for specific details of site injections, concentration of serum, and volume injected.    VIAL COLOR OF ALL VIALS TODAY IS green 1:1,000. Vial allergy w/v concentration today is: 1:1,000     ALLERGY INJECTION FROM VIAL A GIVEN right  UPPER ARM IN THE AMOUNT OF 0.20 ML    ALLERGY INJECTION FROM VIAL B GIVEN left upper ARM IN THE AMOUNT OF 0.20  ML    ALLERGY INJECTION FROM VIAL C GIVEN leftlower ARM IN THE AMOUNT OF 0.20 ML      Documentation of vial injection specific to arm(s) noted on Allergy Immunotherapy Administration Form.       Patient waited 30 minutes for observation.No  Patient has received information and verbalizes understanding of the potential  health risks associated with allergy injections . Patient understands risks including anaphylaxis and chooses not to wait 30 minutes after administration of allergy injection(s).    Patient tolerated well without adverse reaction while the patient was in the office.    SHOT REACTION TREATMENT INSTRUCTIONS    During the 30 minute wait after an allergy injection the following symptoms should be reported:    Itching other than at the injection site  Hives or swelling other than at the injection site  Redness other than at the injection site  Difficulty breathing  Chest

## 2024-06-04 DIAGNOSIS — J45.50 SEVERE PERSISTENT ASTHMA WITHOUT COMPLICATION: ICD-10-CM

## 2024-06-04 RX ORDER — ALBUTEROL SULFATE 90 UG/1
AEROSOL, METERED RESPIRATORY (INHALATION)
Qty: 18 G | Refills: 2 | Status: SHIPPED | OUTPATIENT
Start: 2024-06-04

## 2024-06-04 NOTE — TELEPHONE ENCOUNTER
REMIND PATIENTS TO REQUESTS MEDICATIONS DURING THEIR REGULAR OFFICE VISIT EVEN IF THE MEDICATION IS NOT DUE.  THIS SAVES TIME FOR THE PATIENT AND PROVIDER.      IF MEDICATIONS ARE NOT DUE AT THAT TIME, THE PHARMACY MAY HOLD THE PRESCRIPTION TO FILL AT A LATER DATE.    MAKE SURE THE CORRECT PHARMACY IS SELECTED WITH THE MEDICATION REFILL REQUESTS.  MEDICATIONS THAT ARE SENT TO A DIFFERENT PHARMACY WILL NEED TO BE TRANSFERRED BY THE PATIENT TO THE CORRECT PHARMACY.  PLEASE VERIFY THE CORRECT PHARMACY AND LINK THE PHARMACY TO THE REFILL REQUEST.    ____________________________________________________________________________________________________________________________________    Patient requests the following medication to be refilled:    How often does patient take medication? USE 1 VIAL IN NEBULIZER EVERY 6 HOURS AS NEEDED FOR WHEEZING (FOR ASTHMA)     Patient is requesting 30 days of medication      Pharmacy (need the exact  pharmacy):     Patient last received medication on date:1/16/2024  Number of refills given at last fill: 5  (If refills are current the patient does not need another refill)    Last appointment: 01/16/2024  IF THE PATIENT HAS NOT BEEN SEEN DURING THE LAST YEAR, THE MUST HAVE AN APPT TO BE SEEN AND I WILL ONLD SEND IN 90 DAYS ONE TIME.    Next appt :09/04/2024    DID THE PATIENT MISS THE LAST APPT AS A NO SHOW?  no.  IF THE PATIENT WAS A NO SHOW I WILL NOT FILL THE MEDICATION.

## 2024-06-10 DIAGNOSIS — J30.9 CHRONIC ALLERGIC RHINITIS: Primary | ICD-10-CM

## 2024-06-10 DIAGNOSIS — Z29.89 IMMUNOTHERAPY: ICD-10-CM

## 2024-06-13 ENCOUNTER — HOSPITAL ENCOUNTER (OUTPATIENT)
Age: 64
Discharge: HOME | End: 2024-06-13
Payer: MEDICAID

## 2024-06-13 DIAGNOSIS — E78.2 MIXED HYPERLIPIDEMIA: ICD-10-CM

## 2024-06-13 DIAGNOSIS — E03.9 HYPOTHYROIDISM, UNSPECIFIED TYPE: ICD-10-CM

## 2024-06-13 DIAGNOSIS — I10 PRIMARY HYPERTENSION: ICD-10-CM

## 2024-06-13 DIAGNOSIS — E11.65 TYPE 2 DIABETES MELLITUS WITH HYPERGLYCEMIA, UNSPECIFIED WHETHER LONG TERM INSULIN USE (HCC): ICD-10-CM

## 2024-06-13 LAB
ALBUMIN SERPL BCG-MCNC: 4.5 G/DL (ref 3.5–5.1)
ALP SERPL-CCNC: 112 U/L (ref 38–126)
ALT SERPL W/O P-5'-P-CCNC: 11 U/L (ref 11–66)
ANION GAP SERPL CALC-SCNC: 12 MEQ/L (ref 8–16)
AST SERPL-CCNC: 12 U/L (ref 5–40)
BASOPHILS ABSOLUTE: 0.1 THOU/MM3 (ref 0–0.1)
BASOPHILS NFR BLD AUTO: 1 %
BILIRUB SERPL-MCNC: 0.3 MG/DL (ref 0.3–1.2)
BUN SERPL-MCNC: 10 MG/DL (ref 7–22)
CALCIUM SERPL-MCNC: 9.5 MG/DL (ref 8.5–10.5)
CHLORIDE SERPL-SCNC: 104 MEQ/L (ref 98–111)
CHOLEST SERPL-MCNC: 205 MG/DL (ref 100–199)
CO2 SERPL-SCNC: 25 MEQ/L (ref 23–33)
CREAT SERPL-MCNC: 0.6 MG/DL (ref 0.4–1.2)
DEPRECATED MEAN GLUCOSE BLD GHB EST-ACNC: 120 MG/DL (ref 70–126)
DEPRECATED RDW RBC AUTO: 45.8 FL (ref 35–45)
EOSINOPHIL NFR BLD AUTO: 3 %
EOSINOPHILS ABSOLUTE: 0.2 THOU/MM3 (ref 0–0.4)
ERYTHROCYTE [DISTWIDTH] IN BLOOD BY AUTOMATED COUNT: 13.7 % (ref 11.5–14.5)
GFR SERPL CREATININE-BSD FRML MDRD: > 90 ML/MIN/1.73M2
GLUCOSE SERPL-MCNC: 103 MG/DL (ref 70–108)
HBA1C MFR BLD HPLC: 6 % (ref 4.4–6.4)
HCT VFR BLD AUTO: 40.6 % (ref 37–47)
HDLC SERPL-MCNC: 53 MG/DL
HGB BLD-MCNC: 12.7 GM/DL (ref 12–16)
IMM GRANULOCYTES # BLD AUTO: 0.02 THOU/MM3 (ref 0–0.07)
IMM GRANULOCYTES NFR BLD AUTO: 0.3 %
LDLC SERPL CALC-MCNC: 117 MG/DL
LYMPHOCYTES ABSOLUTE: 1.8 THOU/MM3 (ref 1–4.8)
LYMPHOCYTES NFR BLD AUTO: 29.8 %
MCH RBC QN AUTO: 28.5 PG (ref 26–33)
MCHC RBC AUTO-ENTMCNC: 31.3 GM/DL (ref 32.2–35.5)
MCV RBC AUTO: 91 FL (ref 81–99)
MONOCYTES ABSOLUTE: 0.3 THOU/MM3 (ref 0.4–1.3)
MONOCYTES NFR BLD AUTO: 4.6 %
NEUTROPHILS ABSOLUTE: 3.7 THOU/MM3 (ref 1.8–7.7)
NEUTROPHILS NFR BLD AUTO: 61.3 %
NRBC BLD AUTO-RTO: 0 /100 WBC
PLATELET # BLD AUTO: 314 THOU/MM3 (ref 130–400)
PMV BLD AUTO: 10.2 FL (ref 9.4–12.4)
POTASSIUM SERPL-SCNC: 3.9 MEQ/L (ref 3.5–5.2)
PROT SERPL-MCNC: 7.8 G/DL (ref 6.1–8)
RBC # BLD AUTO: 4.46 MILL/MM3 (ref 4.2–5.4)
SODIUM SERPL-SCNC: 141 MEQ/L (ref 135–145)
T4 FREE SERPL-MCNC: < 0.1 NG/DL (ref 0.93–1.68)
TRIGL SERPL-MCNC: 173 MG/DL (ref 0–199)
TSH SERPL DL<=0.005 MIU/L-ACNC: 1.95 UIU/ML (ref 0.4–4.2)
WBC # BLD AUTO: 6.1 THOU/MM3 (ref 4.8–10.8)

## 2024-06-13 PROCEDURE — 36415 COLL VENOUS BLD VENIPUNCTURE: CPT

## 2024-06-13 PROCEDURE — 85025 COMPLETE CBC W/AUTO DIFF WBC: CPT

## 2024-06-13 PROCEDURE — 83036 HEMOGLOBIN GLYCOSYLATED A1C: CPT

## 2024-06-13 PROCEDURE — 84443 ASSAY THYROID STIM HORMONE: CPT

## 2024-06-13 PROCEDURE — 84439 ASSAY OF FREE THYROXINE: CPT

## 2024-06-13 PROCEDURE — 80061 LIPID PANEL: CPT

## 2024-06-13 PROCEDURE — 80053 COMPREHEN METABOLIC PANEL: CPT

## 2024-06-18 ENCOUNTER — OFFICE VISIT (OUTPATIENT)
Dept: FAMILY MEDICINE CLINIC | Age: 64
End: 2024-06-18
Payer: MEDICAID

## 2024-06-18 ENCOUNTER — TELEPHONE (OUTPATIENT)
Dept: FAMILY MEDICINE CLINIC | Age: 64
End: 2024-06-18

## 2024-06-18 ENCOUNTER — NURSE ONLY (OUTPATIENT)
Dept: ALLERGY | Age: 64
End: 2024-06-18
Payer: MEDICAID

## 2024-06-18 VITALS — BODY MASS INDEX: 35.43 KG/M2 | DIASTOLIC BLOOD PRESSURE: 64 MMHG | SYSTOLIC BLOOD PRESSURE: 102 MMHG | WEIGHT: 200 LBS

## 2024-06-18 VITALS
DIASTOLIC BLOOD PRESSURE: 83 MMHG | SYSTOLIC BLOOD PRESSURE: 134 MMHG | OXYGEN SATURATION: 98 % | HEART RATE: 79 BPM | RESPIRATION RATE: 18 BRPM

## 2024-06-18 DIAGNOSIS — E66.9 CLASS 1 OBESITY WITH SERIOUS COMORBIDITY AND BODY MASS INDEX (BMI) OF 32.0 TO 32.9 IN ADULT, UNSPECIFIED OBESITY TYPE: ICD-10-CM

## 2024-06-18 DIAGNOSIS — J44.9 CHRONIC OBSTRUCTIVE PULMONARY DISEASE, UNSPECIFIED COPD TYPE (HCC): ICD-10-CM

## 2024-06-18 DIAGNOSIS — J30.9 CHRONIC ALLERGIC RHINITIS: Primary | ICD-10-CM

## 2024-06-18 DIAGNOSIS — J30.1 SEASONAL ALLERGIC RHINITIS DUE TO POLLEN: ICD-10-CM

## 2024-06-18 DIAGNOSIS — E11.65 TYPE 2 DIABETES MELLITUS WITH HYPERGLYCEMIA, UNSPECIFIED WHETHER LONG TERM INSULIN USE (HCC): ICD-10-CM

## 2024-06-18 DIAGNOSIS — F17.200 SMOKER: ICD-10-CM

## 2024-06-18 DIAGNOSIS — I10 PRIMARY HYPERTENSION: ICD-10-CM

## 2024-06-18 DIAGNOSIS — J82.83 EOSINOPHILIC ASTHMA: ICD-10-CM

## 2024-06-18 DIAGNOSIS — Q07.00 ARNOLD-CHIARI MALFORMATION (HCC): ICD-10-CM

## 2024-06-18 DIAGNOSIS — J30.1 ACUTE SEASONAL ALLERGIC RHINITIS DUE TO POLLEN: ICD-10-CM

## 2024-06-18 DIAGNOSIS — E03.9 HYPOTHYROIDISM, UNSPECIFIED TYPE: ICD-10-CM

## 2024-06-18 DIAGNOSIS — K21.9 GASTROESOPHAGEAL REFLUX DISEASE, UNSPECIFIED WHETHER ESOPHAGITIS PRESENT: Primary | ICD-10-CM

## 2024-06-18 PROCEDURE — 99214 OFFICE O/P EST MOD 30 MIN: CPT | Performed by: NURSE PRACTITIONER

## 2024-06-18 PROCEDURE — 3078F DIAST BP <80 MM HG: CPT | Performed by: NURSE PRACTITIONER

## 2024-06-18 PROCEDURE — 1123F ACP DISCUSS/DSCN MKR DOCD: CPT | Performed by: NURSE PRACTITIONER

## 2024-06-18 PROCEDURE — 3074F SYST BP LT 130 MM HG: CPT | Performed by: NURSE PRACTITIONER

## 2024-06-18 PROCEDURE — 95117 IMMUNOTHERAPY INJECTIONS: CPT | Performed by: NURSE PRACTITIONER

## 2024-06-18 PROCEDURE — 3044F HG A1C LEVEL LT 7.0%: CPT | Performed by: NURSE PRACTITIONER

## 2024-06-18 RX ORDER — ESOMEPRAZOLE MAGNESIUM 40 MG/1
40 CAPSULE, DELAYED RELEASE ORAL 2 TIMES DAILY
Qty: 180 CAPSULE | Refills: 1 | Status: SHIPPED | OUTPATIENT
Start: 2024-06-18

## 2024-06-18 ASSESSMENT — ENCOUNTER SYMPTOMS
BACK PAIN: 0
ABDOMINAL PAIN: 0
SHORTNESS OF BREATH: 0
WHEEZING: 0
COUGH: 0
ABDOMINAL DISTENTION: 0
CHEST TIGHTNESS: 0

## 2024-06-18 NOTE — PROGRESS NOTES
PATIENT HAS THE FOLLOWING DIAGNOSIS SUPPORTING ADMINISTRATION OF ALLERGY INJECTIONS: J30.1Allergic rhinitis due to pollen  AND 96140 MULTIPLE ALLERGY INJECTIONS FOR ALLERGY INJECTION ADMINISTRATION      Patient here for allergy injection today.  Patient was presented with the opportunity to speak with provider and ask questions regarding allergy injections.  Patient was also instructed they need to wait 30 minutes after receiving allergy injections. All risks associated with potential adverse effects have been explained to patient and patient handout was provided following allergy testing.    After consent obtained/verified, allergy injection subcutaneously given in back of arm(s).  Please see below for specific details of site injections, concentration of serum, and volume injected.    VIAL COLOR OF ALL VIALS TODAY IS green 1:1,000. Vial allergy w/v concentration today is: 1:1,000     ALLERGY INJECTION FROM VIAL A GIVEN left  UPPER ARM IN THE AMOUNT OF 0.05 ML    ALLERGY INJECTION FROM VIAL B GIVEN right upper ARM IN THE AMOUNT OF 0.05  ML    ALLERGY INJECTION FROM VIAL C GIVEN rightlower ARM IN THE AMOUNT OF 0.05 ML      Documentation of vial injection specific to arm(s) noted on Allergy Immunotherapy Administration Form.       Patient waited 30 minutes for observation.No  Patient has received information and verbalizes understanding of the potential  health risks associated with allergy injections . Patient understands risks including anaphylaxis and chooses not to wait 30 minutes after administration of allergy injection(s).    Patient tolerated well without adverse reaction while the patient was in the office.    SHOT REACTION TREATMENT INSTRUCTIONS    During the 30 minute wait after an allergy injection the following symptoms should be reported:    Itching other than at the injection site  Hives or swelling other than at the injection site  Redness other than at the injection site  Difficulty breathing  Chest

## 2024-06-19 NOTE — TELEPHONE ENCOUNTER
First she has to fail it once a day with this insurance. She failed it once a day in 2019 but they will not accept that because they have no records of billing to prove it at pharmacy. Can we prescribe it once daily and see if I can start with an approval there?  
Left voice mail letting her know that I will be sending her a My Chart message regarding this.   
[place] [employer] has denied Esomeprazole for the following reasons:    Note from Payer: Coverage is provided when the member meets ALL the following requirements: 1. Member has failed once daily dosing of the requested medication; AND 2. Documentation of one of the following conditions: H. Pylori (bacterial infection in the stomach), Chronic Obstructive Pulmonary Disease (long-term lung disease that causes decreased air flow to the lungs), Dyspepsia (pain in the stomach that occurs after eating or drinking), Gastritis (swelling of the lining in the stomach), Gastroparesis (slow stomach emptying), Barretts Esophagus (condition in which the lining of the throat changes), cancer of the digestive system, Crest Syndrome (condition that causes tightening of skin and connective tissues), Esophageal Varices (abnormal, large veins in the throat), Scleroderma (condition that causes tightening of skin and connective tissues), Systemic Mastocytosis (type of blood disorder), or Zollinger Laboy Syndrome (condition where tumors form in your pancreas or upper part of the small intestine). The [facility] Policy for Medical Necessity as posted on the [facility] website and [facility] Preferred Drug List criteria were reviewed and per [place] Administrative Code Rule 5160-1-01 (C) and 5160-26-03 (B), a medically necessary service must include: generally accepted standards of medical practice, be clinically appropriate in administration, treatment and outcome and be the lowest cost alternative to effectively treat the condition. Please contact your provider to assist you with other treatment options that might be covered under your benefit package, or other services that might be available through the community.     If you change her diagnosis to one of these, please change it in the note and on her diagnosis list so that I can submit it.   
Patient has tried multiple PPIs.  Patient does have history eosinophilic asthma .  That may meet criteria.  Thank you  
Please notify patient that I do not for see any chance this will be covered twice daily.  I recommend that she stay on her current PPI therapy or consult GI and maybe they will possibly have a better chance of getting some coverage.  
Universal Safety Interventions

## 2024-06-25 ENCOUNTER — NURSE ONLY (OUTPATIENT)
Dept: ALLERGY | Age: 64
End: 2024-06-25

## 2024-06-25 VITALS
SYSTOLIC BLOOD PRESSURE: 145 MMHG | RESPIRATION RATE: 18 BRPM | OXYGEN SATURATION: 98 % | DIASTOLIC BLOOD PRESSURE: 103 MMHG | HEART RATE: 91 BPM

## 2024-06-25 DIAGNOSIS — J30.1 SEASONAL ALLERGIC RHINITIS DUE TO POLLEN: ICD-10-CM

## 2024-06-25 DIAGNOSIS — J30.1 ACUTE SEASONAL ALLERGIC RHINITIS DUE TO POLLEN: ICD-10-CM

## 2024-06-25 DIAGNOSIS — J82.83 EOSINOPHILIC ASTHMA: Primary | ICD-10-CM

## 2024-06-25 DIAGNOSIS — J30.9 CHRONIC ALLERGIC RHINITIS: ICD-10-CM

## 2024-06-25 NOTE — PROGRESS NOTES
PATIENT HAS THE FOLLOWING DIAGNOSIS SUPPORTING ADMINISTRATION OF ALLERGY INJECTIONS: J30.1Allergic rhinitis due to pollen  AND 85567 MULTIPLE ALLERGY INJECTIONS FOR ALLERGY INJECTION ADMINISTRATION      Patient here for allergy injection today.  Patient was presented with the opportunity to speak with provider and ask questions regarding allergy injections.  Patient was also instructed they need to wait 30 minutes after receiving allergy injections. All risks associated with potential adverse effects have been explained to patient and patient handout was provided following allergy testing.    After consent obtained/verified, allergy injection subcutaneously given in back of arm(s).  Please see below for specific details of site injections, concentration of serum, and volume injected.    VIAL COLOR OF ALL VIALS TODAY IS green 1:1,000. Vial allergy w/v concentration today is: 1:1,000     ALLERGY INJECTION FROM VIAL A GIVEN right  UPPER ARM IN THE AMOUNT OF 0.15 ML    ALLERGY INJECTION FROM VIAL B GIVEN left lower ARM IN THE AMOUNT OF 0.15  ML    ALLERGY INJECTION FROM VIAL C GIVEN leftupper ARM IN THE AMOUNT OF 0.15 ML      Documentation of vial injection specific to arm(s) noted on Allergy Immunotherapy Administration Form.       Patient waited 30 minutes for observation.No  Patient has received information and verbalizes understanding of the potential  health risks associated with allergy injections . Patient understands risks including anaphylaxis and chooses not to wait 30 minutes after administration of allergy injection(s).    Patient tolerated well without adverse reaction while the patient was in the office.    SHOT REACTION TREATMENT INSTRUCTIONS    During the 30 minute wait after an allergy injection the following symptoms should be reported:    Itching other than at the injection site  Hives or swelling other than at the injection site  Redness other than at the injection site  Difficulty breathing  Chest

## 2024-06-25 NOTE — PROGRESS NOTES
Patient here today to receive Xolair injection.    Patient does not report any previous reaction from Xolair injections.  Denies any nausea vomiting fever urticaria or angioedema.  Denies any recent exacerbation of asthma or asthma-like symptoms.  Patient was explained benefits and potential risks including anaphylaxis to patient.      Patient has been explained the risk and benefits including delayed anaphylaxis.  Patient has EpiPen and understands how to appropriately use.      Xolair 150 mg given subcutaneously X 2 doses in    bilateral    thigh(s)     for a total combined dose of 300 mg using a 25-gauge needle and 3 ML syringe.        RIGHT  / LEFT UPPER ARM,  RIGHT  / LEFT FRONT OR MIDDLE OF THIGH, OR STOMACH        Xolair  NDC 43312-496-29   Lot [id number]   Expires 07/31/25    Patient observed for 30 minutes No      Medication was supplied by patient:  YES/NO: Yes      Medication was supplied as a sample:  YES/NO: No                     Prior to patient receiving Xolair area was cleansed with alcohol swabs.  Following the administration no evidence of bleeding or bruising.    No adverse reactions reported.  Patient tolerated well without adverse reactions or side effects.  Patient to continue to receive Xolair injections monthly.  Will report any problems to this office.  No evidence of allergic reaction including anaphylaxis.    Comments:

## 2024-06-26 ENCOUNTER — HOSPITAL ENCOUNTER (OUTPATIENT)
Dept: CT IMAGING | Age: 64
Discharge: HOME | End: 2024-06-26
Payer: MEDICAID

## 2024-06-26 ENCOUNTER — HOSPITAL ENCOUNTER (OUTPATIENT)
Dept: PULMONOLOGY | Age: 64
Discharge: HOME | End: 2024-06-26
Payer: MEDICAID

## 2024-06-26 DIAGNOSIS — J45.40 MODERATE PERSISTENT ASTHMA WITHOUT COMPLICATION: ICD-10-CM

## 2024-06-26 DIAGNOSIS — Z87.891 PERSONAL HISTORY OF TOBACCO USE: ICD-10-CM

## 2024-06-26 PROCEDURE — 94060 EVALUATION OF WHEEZING: CPT

## 2024-06-26 PROCEDURE — 71271 CT THORAX LUNG CANCER SCR C-: CPT

## 2024-07-02 ENCOUNTER — NURSE ONLY (OUTPATIENT)
Dept: ALLERGY | Age: 64
End: 2024-07-02
Payer: MEDICAID

## 2024-07-02 VITALS
HEART RATE: 90 BPM | DIASTOLIC BLOOD PRESSURE: 63 MMHG | OXYGEN SATURATION: 96 % | RESPIRATION RATE: 18 BRPM | SYSTOLIC BLOOD PRESSURE: 137 MMHG

## 2024-07-02 DIAGNOSIS — J30.1 ACUTE SEASONAL ALLERGIC RHINITIS DUE TO POLLEN: ICD-10-CM

## 2024-07-02 DIAGNOSIS — J30.9 CHRONIC ALLERGIC RHINITIS: Primary | ICD-10-CM

## 2024-07-02 DIAGNOSIS — J30.1 SEASONAL ALLERGIC RHINITIS DUE TO POLLEN: ICD-10-CM

## 2024-07-02 PROCEDURE — 95117 IMMUNOTHERAPY INJECTIONS: CPT | Performed by: NURSE PRACTITIONER

## 2024-07-02 NOTE — PROGRESS NOTES
PATIENT HAS THE FOLLOWING DIAGNOSIS SUPPORTING ADMINISTRATION OF ALLERGY INJECTIONS: J30.1Allergic rhinitis due to pollen  AND 01849 MULTIPLE ALLERGY INJECTIONS FOR ALLERGY INJECTION ADMINISTRATION      Patient here for allergy injection today.  Patient was presented with the opportunity to speak with provider and ask questions regarding allergy injections.  Patient was also instructed they need to wait 30 minutes after receiving allergy injections. All risks associated with potential adverse effects have been explained to patient and patient handout was provided following allergy testing.    After consent obtained/verified, allergy injection subcutaneously given in back of arm(s).  Please see below for specific details of site injections, concentration of serum, and volume injected.    VIAL COLOR OF ALL VIALS TODAY IS green 1:1,000. Vial allergy w/v concentration today is: 1:1,000     ALLERGY INJECTION FROM VIAL A GIVEN left  UPPER ARM IN THE AMOUNT OF 0.20 ML    ALLERGY INJECTION FROM VIAL B GIVEN right lower ARM IN THE AMOUNT OF 0.20  ML    ALLERGY INJECTION FROM VIAL C GIVEN rightupper ARM IN THE AMOUNT OF 0.20 ML      Documentation of vial injection specific to arm(s) noted on Allergy Immunotherapy Administration Form.       Patient waited 30 minutes for observation.No  Patient has received information and verbalizes understanding of the potential  health risks associated with allergy injections . Patient understands risks including anaphylaxis and chooses not to wait 30 minutes after administration of allergy injection(s).    Patient tolerated well without adverse reaction while the patient was in the office.    SHOT REACTION TREATMENT INSTRUCTIONS    During the 30 minute wait after an allergy injection the following symptoms should be reported:    Itching other than at the injection site  Hives or swelling other than at the injection site  Redness other than at the injection site  Difficulty breathing  Chest

## 2024-07-08 ENCOUNTER — OFFICE VISIT (OUTPATIENT)
Dept: PULMONOLOGY | Age: 64
End: 2024-07-08
Payer: MEDICAID

## 2024-07-08 VITALS
SYSTOLIC BLOOD PRESSURE: 128 MMHG | BODY MASS INDEX: 35.67 KG/M2 | DIASTOLIC BLOOD PRESSURE: 60 MMHG | HEART RATE: 89 BPM | TEMPERATURE: 97.7 F | HEIGHT: 63 IN | OXYGEN SATURATION: 98 % | WEIGHT: 201.3 LBS

## 2024-07-08 DIAGNOSIS — J43.2 CENTRILOBULAR EMPHYSEMA (HCC): Primary | ICD-10-CM

## 2024-07-08 DIAGNOSIS — Z87.891 PERSONAL HISTORY OF TOBACCO USE: ICD-10-CM

## 2024-07-08 DIAGNOSIS — J45.40 MODERATE PERSISTENT ASTHMA WITHOUT COMPLICATION: ICD-10-CM

## 2024-07-08 DIAGNOSIS — R91.8 PULMONARY NODULES: ICD-10-CM

## 2024-07-08 DIAGNOSIS — E66.9 OBESITY (BMI 30-39.9): ICD-10-CM

## 2024-07-08 PROCEDURE — 1123F ACP DISCUSS/DSCN MKR DOCD: CPT | Performed by: NURSE PRACTITIONER

## 2024-07-08 PROCEDURE — 99406 BEHAV CHNG SMOKING 3-10 MIN: CPT | Performed by: NURSE PRACTITIONER

## 2024-07-08 PROCEDURE — G0296 VISIT TO DETERM LDCT ELIG: HCPCS | Performed by: NURSE PRACTITIONER

## 2024-07-08 PROCEDURE — 99214 OFFICE O/P EST MOD 30 MIN: CPT | Performed by: NURSE PRACTITIONER

## 2024-07-08 ASSESSMENT — ENCOUNTER SYMPTOMS
SHORTNESS OF BREATH: 1
EYES NEGATIVE: 1
GASTROINTESTINAL NEGATIVE: 1
WHEEZING: 0
COUGH: 0

## 2024-07-08 NOTE — PROGRESS NOTES
[facility]    Patient: MACHELLE JONAS, 63 y.o.   : 1960  2024      Subjective     Chief Complaint   Patient presents with    Follow-up     Asthma 1 month f/u with [facility] 24 and CT lung screen 24.      VANESSA Hong is here for follow up for her Asthma with elevated IgE with spirometry and LDCT to review due to current smoking history.    Patient on Xolair injection per COREEN Morris  LDCT done with 2 stable pulmonary nodules seen   Current every day smoker - states she is smoking around 1PPD- has tried many options to quit smoking and none have worked for her. Interested in trying hypnosis   Spirometry done with no obstruction or restriction seen and no significant BD response   BMI 35  Current  inhaler use of Pulmicort BID and Albuterol PRN- rare use of KATELYN  No home oxygen use     Asthma control (Severity) questionnaire:  Asthma symptoms:  > 2 times per week -> Yes   Night time awakenings: > 2 times per month -> No  Use of short acting beta agonist for symptoms control (Other than pre exercise use) :> 2times per week  -> Yes  Interference with normal activity  -> moderate  Lung function: Fev1 >60% Predicted  -> Yes  Number of exacerbations per year: > 2 -> No    Progress History:   Since last visit any new medical issues? No  New ER or hospital visits? No  Any new or changes in medicines? No  Using inhalers? Yes   Are they helpful? Yes   Immunization History   Administered Date(s) Administered    COVID-19, PFIZER Bivalent, DO NOT Dilute, (age 12y+), IM, 30 mcg/0.3 mL 2023    COVID-19, PFIZER PURPLE top, DILUTE for use, (age 12 y+), 30mcg/0.3mL 2021, 2021, 2021, 2022    COVID-19, PFIZER, ( formula), (age 12y+), IM, 30mcg/0.3mL 2024    DTaP vaccine 10/12/2016    INFLUENZA, INTRADERMAL, QUADRIVALENT, PRESERVATIVE FREE 10/04/2019    Influenza 2013    Influenza Virus Vaccine 2015, 10/11/2017,

## 2024-07-23 ENCOUNTER — LAB (OUTPATIENT)
Dept: ALLERGY | Age: 64
End: 2024-07-23
Payer: MEDICAID

## 2024-07-23 VITALS
HEIGHT: 63 IN | BODY MASS INDEX: 35.66 KG/M2 | OXYGEN SATURATION: 94 % | DIASTOLIC BLOOD PRESSURE: 63 MMHG | HEART RATE: 78 BPM | SYSTOLIC BLOOD PRESSURE: 132 MMHG

## 2024-07-23 DIAGNOSIS — J30.9 CHRONIC ALLERGIC RHINITIS: Primary | ICD-10-CM

## 2024-07-23 DIAGNOSIS — J30.1 SEASONAL ALLERGIC RHINITIS DUE TO POLLEN: ICD-10-CM

## 2024-07-23 PROCEDURE — 95117 IMMUNOTHERAPY INJECTIONS: CPT | Performed by: NURSE PRACTITIONER

## 2024-07-23 NOTE — PROGRESS NOTES
PATIENT HAS THE FOLLOWING DIAGNOSIS SUPPORTING ADMINISTRATION OF ALLERGY INJECTIONS: J30.1Allergic rhinitis due to pollen  AND 67275 MULTIPLE ALLERGY INJECTIONS FOR ALLERGY INJECTION ADMINISTRATION      Patient here for allergy injection today.  Patient was presented with the opportunity to speak with provider and ask questions regarding allergy injections.  Patient was also instructed they need to wait 30 minutes after receiving allergy injections. All risks associated with potential adverse effects have been explained to patient and patient handout was provided following allergy testing.    After consent obtained/verified, allergy injection subcutaneously given in back of arm(s).  Please see below for specific details of site injections, concentration of serum, and volume injected.    VIAL COLOR OF ALL VIALS TODAY IS green 1:1,000. Vial allergy w/v concentration today is: 1:1,000     ALLERGY INJECTION FROM VIAL A GIVEN right  UPPER ARM IN THE AMOUNT OF 0.25 ML    ALLERGY INJECTION FROM VIAL B GIVEN left upper ARM IN THE AMOUNT OF 0.25  ML    ALLERGY INJECTION FROM VIAL C GIVEN leftlower ARM IN THE AMOUNT OF 0.25 ML      Documentation of vial injection specific to arm(s) noted on Allergy Immunotherapy Administration Form.       Patient waited 30 minutes for observation.No  Patient has received information and verbalizes understanding of the potential  health risks associated with allergy injections . Patient understands risks including anaphylaxis and chooses not to wait 30 minutes after administration of allergy injection(s).    Patient tolerated well without adverse reaction while the patient was in the office.    SHOT REACTION TREATMENT INSTRUCTIONS    During the 30 minute wait after an allergy injection the following symptoms should be reported:    Itching other than at the injection site  Hives or swelling other than at the injection site  Redness other than at the injection site  Difficulty breathing  Chest

## 2024-07-30 ENCOUNTER — NURSE ONLY (OUTPATIENT)
Dept: ALLERGY | Age: 64
End: 2024-07-30
Payer: MEDICAID

## 2024-07-30 VITALS — SYSTOLIC BLOOD PRESSURE: 148 MMHG | DIASTOLIC BLOOD PRESSURE: 79 MMHG | OXYGEN SATURATION: 98 % | HEART RATE: 84 BPM

## 2024-07-30 DIAGNOSIS — J30.9 CHRONIC ALLERGIC RHINITIS: ICD-10-CM

## 2024-07-30 DIAGNOSIS — J82.83 EOSINOPHILIC ASTHMA: Primary | ICD-10-CM

## 2024-07-30 DIAGNOSIS — J30.1 ACUTE SEASONAL ALLERGIC RHINITIS DUE TO POLLEN: ICD-10-CM

## 2024-07-30 DIAGNOSIS — J30.1 SEASONAL ALLERGIC RHINITIS DUE TO POLLEN: ICD-10-CM

## 2024-07-30 PROCEDURE — 95117 IMMUNOTHERAPY INJECTIONS: CPT | Performed by: NURSE PRACTITIONER

## 2024-07-30 PROCEDURE — 96372 THER/PROPH/DIAG INJ SC/IM: CPT | Performed by: NURSE PRACTITIONER

## 2024-07-30 NOTE — PROGRESS NOTES
Patient here today to receive Xolair injection.    Patient does not report any previous reaction from Xolair injections.  Denies any nausea vomiting fever urticaria or angioedema.  Denies any recent exacerbation of asthma or asthma-like symptoms.  Patient was explained benefits and potential risks including anaphylaxis to patient.      Patient has been explained the risk and benefits including delayed anaphylaxis.  Patient has EpiPen and understands how to appropriately use.      Xolair 150 mg given subcutaneously X 2 doses in    bilateral both arms        for a total combined dose of 300 mg using a 25-gauge needle and 3 ML syringe.        RIGHT  / LEFT UPPER ARM,  RIGHT  / LEFT FRONT OR MIDDLE OF THIGH, OR STOMACH        Xolair  NDC 65326-370-37   Lot [id number]   Expires 07/31/2025    Patient observed for 30 minutes No      Medication was supplied by patient:  YES/NO: Yes      Medication was supplied as a sample:  YES/NO: No                     Prior to patient receiving Xolair area was cleansed with alcohol swabs.  Following the administration no evidence of bleeding or bruising.    No adverse reactions reported.  Patient tolerated well without adverse reactions or side effects.  Patient to continue to receive Xolair injections monthly.  Will report any problems to this office.  No evidence of allergic reaction including anaphylaxis.    Comments:

## 2024-07-30 NOTE — PROGRESS NOTES
PATIENT HAS THE FOLLOWING DIAGNOSIS SUPPORTING ADMINISTRATION OF ALLERGY INJECTIONS: J30.1Allergic rhinitis due to pollen  AND 41589 MULTIPLE ALLERGY INJECTIONS FOR ALLERGY INJECTION ADMINISTRATION      Patient here for allergy injection today.  Patient was presented with the opportunity to speak with provider and ask questions regarding allergy injections.  Patient was also instructed they need to wait 30 minutes after receiving allergy injections. All risks associated with potential adverse effects have been explained to patient and patient handout was provided following allergy testing.    After consent obtained/verified, allergy injection subcutaneously given in back of arm(s).  Please see below for specific details of site injections, concentration of serum, and volume injected.    VIAL COLOR OF ALL VIALS TODAY IS green 1:1,000. Vial allergy w/v concentration today is: 1:1,000     ALLERGY INJECTION FROM VIAL A GIVEN left  UPPER ARM IN THE AMOUNT OF 0.30 ML    ALLERGY INJECTION FROM VIAL B GIVEN right upper ARM IN THE AMOUNT OF 0.30  ML    ALLERGY INJECTION FROM VIAL C GIVEN rightlower ARM IN THE AMOUNT OF 0.30 ML      Documentation of vial injection specific to arm(s) noted on Allergy Immunotherapy Administration Form.       Patient waited 30 minutes for observation.No  Patient has received information and verbalizes understanding of the potential  health risks associated with allergy injections . Patient understands risks including anaphylaxis and chooses not to wait 30 minutes after administration of allergy injection(s).    Patient tolerated well without adverse reaction while the patient was in the office.    SHOT REACTION TREATMENT INSTRUCTIONS    During the 30 minute wait after an allergy injection the following symptoms should be reported:    Itching other than at the injection site  Hives or swelling other than at the injection site  Redness other than at the injection site  Difficulty breathing  Chest

## 2024-08-06 ENCOUNTER — NURSE ONLY (OUTPATIENT)
Dept: ALLERGY | Age: 64
End: 2024-08-06
Payer: MEDICAID

## 2024-08-06 VITALS
DIASTOLIC BLOOD PRESSURE: 77 MMHG | RESPIRATION RATE: 18 BRPM | OXYGEN SATURATION: 100 % | SYSTOLIC BLOOD PRESSURE: 128 MMHG | HEART RATE: 84 BPM

## 2024-08-06 DIAGNOSIS — J30.1 SEASONAL ALLERGIC RHINITIS DUE TO POLLEN: ICD-10-CM

## 2024-08-06 DIAGNOSIS — J30.9 CHRONIC ALLERGIC RHINITIS: Primary | ICD-10-CM

## 2024-08-06 PROCEDURE — 95117 IMMUNOTHERAPY INJECTIONS: CPT | Performed by: NURSE PRACTITIONER

## 2024-08-06 NOTE — PROGRESS NOTES
PATIENT HAS THE FOLLOWING DIAGNOSIS SUPPORTING ADMINISTRATION OF ALLERGY INJECTIONS: J30.1Allergic rhinitis due to pollen  AND 33810 MULTIPLE ALLERGY INJECTIONS FOR ALLERGY INJECTION ADMINISTRATION      Patient here for allergy injection today.  Patient was presented with the opportunity to speak with provider and ask questions regarding allergy injections.  Patient was also instructed they need to wait 30 minutes after receiving allergy injections. All risks associated with potential adverse effects have been explained to patient and patient handout was provided following allergy testing.    After consent obtained/verified, allergy injection subcutaneously given in back of arm(s).  Please see below for specific details of site injections, concentration of serum, and volume injected.    VIAL COLOR OF ALL VIALS TODAY IS green 1:1,000. Vial allergy w/v concentration today is: 1:1,000     ALLERGY INJECTION FROM VIAL A GIVEN right  UPPER ARM IN THE AMOUNT OF 0.40 ML    ALLERGY INJECTION FROM VIAL B GIVEN left lower ARM IN THE AMOUNT OF 0.40  ML    ALLERGY INJECTION FROM VIAL C GIVEN leftupper ARM IN THE AMOUNT OF 0.40 ML      Documentation of vial injection specific to arm(s) noted on Allergy Immunotherapy Administration Form.       Patient waited 30 minutes for observation.No  Patient has received information and verbalizes understanding of the potential  health risks associated with allergy injections . Patient understands risks including anaphylaxis and chooses not to wait 30 minutes after administration of allergy injection(s).    Patient tolerated well without adverse reaction while the patient was in the office.    SHOT REACTION TREATMENT INSTRUCTIONS    During the 30 minute wait after an allergy injection the following symptoms should be reported:    Itching other than at the injection site  Hives or swelling other than at the injection site  Redness other than at the injection site  Difficulty breathing  Chest

## 2024-08-07 ENCOUNTER — OFFICE VISIT (OUTPATIENT)
Dept: NEUROSURGERY | Age: 64
End: 2024-08-07
Payer: MEDICAID

## 2024-08-07 VITALS
DIASTOLIC BLOOD PRESSURE: 62 MMHG | SYSTOLIC BLOOD PRESSURE: 110 MMHG | HEART RATE: 76 BPM | BODY MASS INDEX: 34.55 KG/M2 | WEIGHT: 195 LBS | HEIGHT: 63 IN

## 2024-08-07 DIAGNOSIS — M54.12 CERVICAL RADICULOPATHY: ICD-10-CM

## 2024-08-07 DIAGNOSIS — Z92.89 HISTORY OF VENTRICULOPERITONEAL SHUNTING: ICD-10-CM

## 2024-08-07 DIAGNOSIS — G93.5 CHIARI MALFORMATION TYPE I (HCC): Primary | ICD-10-CM

## 2024-08-07 PROCEDURE — 1123F ACP DISCUSS/DSCN MKR DOCD: CPT | Performed by: PHYSICIAN ASSISTANT

## 2024-08-07 PROCEDURE — 99204 OFFICE O/P NEW MOD 45 MIN: CPT | Performed by: PHYSICIAN ASSISTANT

## 2024-08-07 ASSESSMENT — ENCOUNTER SYMPTOMS
BACK PAIN: 0
SHORTNESS OF BREATH: 0
CHEST TIGHTNESS: 0
APNEA: 0

## 2024-08-07 NOTE — PROGRESS NOTES
uncontrolled.    Past Surgical History  The patient  has a past surgical history that includes Hysterectomy;  section; Ectopic pregnancy surgery; Appendectomy; hernia repair; hemicolectomy; Colonoscopy (2010); csf shunt (2007); spinal cord decompression (2007); cyst removal; Tooth Extraction (2017); laryngoscopy (N/A, 2021); other surgical history (2020); Toe Surgery (); RICKY US GUIDED BREAST BIOPSY W LOC DEVICE 1ST LESION LEFT (Left, 2024); and Toe Surgery ().    Family History  This patient's family history includes Arthritis in her sister; Asthma in her maternal cousin; Colon Cancer in her maternal aunt; High Blood Pressure in her sister; Mental Illness in her maternal aunt; Stroke in her sister.    Social History  Gely  reports that she has been smoking cigarettes. She started smoking about 49 years ago. She has a 39.9 pack-year smoking history. She has been exposed to tobacco smoke. She has never used smokeless tobacco. She reports current alcohol use of about 2.0 standard drinks of alcohol per week. She reports current drug use. Drug: Marijuana (Weed).    Subjective:      Review of Systems   Constitutional:  Negative for activity change.   Respiratory:  Negative for apnea, chest tightness and shortness of breath.    Cardiovascular:  Negative for chest pain.   Musculoskeletal:  Negative for back pain.   Neurological:  Positive for headaches.   Psychiatric/Behavioral:  Negative for agitation, behavioral problems, confusion and decreased concentration.        Objective:     /62 (Site: Left Upper Arm, Position: Sitting, Cuff Size: Large Adult)   Pulse 76   Ht 1.6 m (5' 3\")   Wt 88.5 kg (195 lb)   BMI 34.54 kg/m²   Assessment & Plan   Examination of carotid arteries (puls, amplitude, bruits) or Examination of peripheral vascular system  (swelling, varicosities and pulses, temperature, edema,tenderness     Physical Exam  Constitutional:

## 2024-08-08 ENCOUNTER — OFFICE VISIT (OUTPATIENT)
Dept: ENT CLINIC | Age: 64
End: 2024-08-08
Payer: MEDICAID

## 2024-08-08 VITALS
OXYGEN SATURATION: 97 % | RESPIRATION RATE: 18 BRPM | HEART RATE: 84 BPM | HEIGHT: 63 IN | TEMPERATURE: 96.9 F | BODY MASS INDEX: 35.75 KG/M2 | SYSTOLIC BLOOD PRESSURE: 120 MMHG | DIASTOLIC BLOOD PRESSURE: 70 MMHG | WEIGHT: 201.8 LBS

## 2024-08-08 DIAGNOSIS — J38.3 POLYPOID DEGENERATION OF VOCAL CORDS: Primary | ICD-10-CM

## 2024-08-08 PROCEDURE — 99212 OFFICE O/P EST SF 10 MIN: CPT | Performed by: OTOLARYNGOLOGY

## 2024-08-08 PROCEDURE — 1123F ACP DISCUSS/DSCN MKR DOCD: CPT | Performed by: OTOLARYNGOLOGY

## 2024-08-08 NOTE — PROGRESS NOTES
[facility] [place] [facility]  [facility]  [address]  [address]  [place] [address]  Dept: [phone]  Dept Fax: [phone]  Loc: [phone]    Gely Archer is a 63 y.o. female who was referred by Milton Bowden APRN - * for:  Chief Complaint   Patient presents with    Other     Patient is here to re-establish care after moving back to [facility].  Patient reports having no symptoms.  Hx of degeneration of vocal cords.   She is still having sinus issues.   .    HPI:     Gely Archer is a 63 y.o. female who presents today for ***.    History:     Allergies   Allergen Reactions    Bactrim [Sulfamethoxazole-Trimethoprim]     Flexeril [Cyclobenzaprine]     Montelukast      Makes her feel bad    Morphine Other (See Comments)     \"I hallucinate\"    Peanut-Containing Drug Products     Tessalon [Benzonatate] Hives    Amoxicillin-Pot Clavulanate Rash    Cefdinir Rash    Ibuprofen [Ibuprofen] Rash    Lisinopril Rash    Macrobid [Nitrofurantoin Monohydrate Macrocrystals] Rash    Macrodantin [Nitrofurantoin] Rash    Ranitidine Rash     Current Outpatient Medications   Medication Sig Dispense Refill    esomeprazole (NEXIUM) 40 MG delayed release capsule Take 1 capsule by mouth in the morning and 1 capsule in the evening. 180 capsule 1    albuterol sulfate HFA (PROVENTIL;VENTOLIN;PROAIR) 108 (90 Base) MCG/ACT inhaler INHALE 2 PUFFS BY MOUTH EVERY 4 HOURS AS NEEDED FOR  WHEEZING (RESCUE INHALER-CARRY IN PURSE) 18 g 2    linaclotide (LINZESS) 290 MCG CAPS capsule Take 1 capsule by mouth every morning (before breakfast) 16 capsule 0    budesonide (PULMICORT) 180 MCG/ACT AEPB inhaler Inhale 2 puffs into the lungs in the morning and at bedtime 1 each 5    hydrocortisone (HYTONE) 2.5 % lotion APPLY  LOTION EXTERNALLY ONCE DAILY TO AFFECTED AREA 118 mL 5    Azelastine HCl 137 MCG/SPRAY SOLN USE 1 SPRAY(S) IN EACH NOSTRIL IN THE MORNING AND AT BEDTIME      levothyroxine (SYNTHROID)

## 2024-08-13 ENCOUNTER — TELEPHONE (OUTPATIENT)
Dept: ALLERGY | Age: 64
End: 2024-08-13

## 2024-08-13 ENCOUNTER — NURSE ONLY (OUTPATIENT)
Dept: ALLERGY | Age: 64
End: 2024-08-13
Payer: MEDICAID

## 2024-08-13 VITALS
RESPIRATION RATE: 20 BRPM | OXYGEN SATURATION: 98 % | DIASTOLIC BLOOD PRESSURE: 79 MMHG | HEART RATE: 83 BPM | SYSTOLIC BLOOD PRESSURE: 110 MMHG

## 2024-08-13 DIAGNOSIS — H04.129 DRY EYES DUE TO DECREASED TEAR PRODUCTION: Primary | ICD-10-CM

## 2024-08-13 DIAGNOSIS — J30.1 SEASONAL ALLERGIC RHINITIS DUE TO POLLEN: ICD-10-CM

## 2024-08-13 DIAGNOSIS — J30.9 CHRONIC ALLERGIC RHINITIS: Primary | ICD-10-CM

## 2024-08-13 DIAGNOSIS — J30.1 ACUTE SEASONAL ALLERGIC RHINITIS DUE TO POLLEN: ICD-10-CM

## 2024-08-13 DIAGNOSIS — H10.13 ALLERGIC RHINOCONJUNCTIVITIS OF BOTH EYES: Primary | ICD-10-CM

## 2024-08-13 DIAGNOSIS — J30.9 ALLERGIC RHINOCONJUNCTIVITIS OF BOTH EYES: Primary | ICD-10-CM

## 2024-08-13 PROCEDURE — 95117 IMMUNOTHERAPY INJECTIONS: CPT | Performed by: NURSE PRACTITIONER

## 2024-08-13 RX ORDER — KETOTIFEN FUMARATE 0.35 MG/ML
1 SOLUTION/ DROPS OPHTHALMIC 2 TIMES DAILY
Qty: 1 EACH | Refills: 5 | Status: SHIPPED | OUTPATIENT
Start: 2024-08-13 | End: 2024-08-23

## 2024-08-13 NOTE — PROGRESS NOTES
Patient wants allergy eyedrops filled because she did not go get the medication previously which  after 6 months.  She is now reporting that her eye doctor wants her on this and prednisone eyedrops

## 2024-08-13 NOTE — TELEPHONE ENCOUNTER
Attempted to reach patient. Unable to reach patient. Left voicemail informing patient that provider sent  in eye drop prescriptions, had if patient had any questions to please call office.

## 2024-08-13 NOTE — TELEPHONE ENCOUNTER
REMIND PATIENTS TO REQUESTS MEDICATIONS DURING THEIR REGULAR OFFICE VISIT EVEN IF THE MEDICATION IS NOT DUE.  THIS SAVES TIME FOR THE PATIENT AND PROVIDER.      IF MEDICATIONS ARE NOT DUE AT THAT TIME, THE PHARMACY MAY HOLD THE PRESCRIPTION TO FILL AT A LATER DATE.    MAKE SURE THE CORRECT PHARMACY IS SELECTED WITH THE MEDICATION REFILL REQUESTS.  MEDICATIONS THAT ARE SENT TO A DIFFERENT PHARMACY WILL NEED TO BE TRANSFERRED BY THE PATIENT TO THE CORRECT PHARMACY.  PLEASE VERIFY THE CORRECT PHARMACY AND LINK THE PHARMACY TO THE REFILL REQUEST.    ____________________________________________________________________________________________________________________________________    Patient requests the following medication to be refilled:  ketotifen (ZADITOR) 0.025 % ophthalmic solution   Patient did not refill before refill . Patient was unsure which eye drops to take based upon her eye doctor.     How often does patient take medication? One drop in each eye twice daily as needed     Patient is requesting 30 days of medication      Pharmacy (need the exact  pharmacy):     Patient last received medication on date:2024  Number of refills given at last fill: 11  (If refills are current the patient does not need another refill)    Last appointment: 2024  IF THE PATIENT HAS NOT BEEN SEEN DURING THE LAST YEAR, THE MUST HAVE AN APPT TO BE SEEN AND I WILL ONLD SEND IN 90 DAYS ONE TIME.    Next appt : 24    DID THE PATIENT MISS THE LAST APPT AS A NO SHOW?  no.  IF THE PATIENT WAS A NO SHOW I WILL NOT FILL THE MEDICATION.

## 2024-08-13 NOTE — PROGRESS NOTES
PATIENT HAS THE FOLLOWING DIAGNOSIS SUPPORTING ADMINISTRATION OF ALLERGY INJECTIONS: J30.1Allergic rhinitis due to pollen  AND 45226 MULTIPLE ALLERGY INJECTIONS FOR ALLERGY INJECTION ADMINISTRATION      Patient here for allergy injection today.  Patient was presented with the opportunity to speak with provider and ask questions regarding allergy injections.  Patient was also instructed they need to wait 30 minutes after receiving allergy injections. All risks associated with potential adverse effects have been explained to patient and patient handout was provided following allergy testing.    After consent obtained/verified, allergy injection subcutaneously given in back of arm(s).  Please see below for specific details of site injections, concentration of serum, and volume injected.    VIAL COLOR OF ALL VIALS TODAY IS green 1:1,000. Vial allergy w/v concentration today is: 1:1,000     ALLERGY INJECTION FROM VIAL A GIVEN left  UPPER ARM IN THE AMOUNT OF 0.45 ML    ALLERGY INJECTION FROM VIAL B GIVEN right lower ARM IN THE AMOUNT OF 0.45  ML    ALLERGY INJECTION FROM VIAL C GIVEN rightupper ARM IN THE AMOUNT OF 0.45 ML      Documentation of vial injection specific to arm(s) noted on Allergy Immunotherapy Administration Form.       Patient waited 30 minutes for observation.No  Patient has received information and verbalizes understanding of the potential  health risks associated with allergy injections . Patient understands risks including anaphylaxis and chooses not to wait 30 minutes after administration of allergy injection(s).    Patient tolerated well without adverse reaction while the patient was in the office.    SHOT REACTION TREATMENT INSTRUCTIONS    During the 30 minute wait after an allergy injection the following symptoms should be reported:    Itching other than at the injection site  Hives or swelling other than at the injection site  Redness other than at the injection site  Difficulty breathing  Chest

## 2024-08-16 NOTE — PROGRESS NOTES
Patient is here to re-establish care after moving back to [facility].  Patient reports having no symptoms.  Hx of degeneration of vocal cords.   She is still having sinus issues.     Feels fine today.    Care is established

## 2024-08-20 ENCOUNTER — NURSE ONLY (OUTPATIENT)
Dept: ALLERGY | Age: 64
End: 2024-08-20
Payer: MEDICAID

## 2024-08-20 VITALS
RESPIRATION RATE: 18 BRPM | SYSTOLIC BLOOD PRESSURE: 135 MMHG | DIASTOLIC BLOOD PRESSURE: 81 MMHG | HEART RATE: 88 BPM | OXYGEN SATURATION: 94 %

## 2024-08-20 DIAGNOSIS — J30.9 CHRONIC ALLERGIC RHINITIS: Primary | ICD-10-CM

## 2024-08-20 DIAGNOSIS — Z13.9 SCREENING DUE: ICD-10-CM

## 2024-08-20 DIAGNOSIS — J30.1 ACUTE SEASONAL ALLERGIC RHINITIS DUE TO POLLEN: ICD-10-CM

## 2024-08-20 DIAGNOSIS — J30.1 SEASONAL ALLERGIC RHINITIS DUE TO POLLEN: ICD-10-CM

## 2024-08-20 DIAGNOSIS — J82.83 EOSINOPHILIC ASTHMA: Primary | ICD-10-CM

## 2024-08-20 PROCEDURE — 95117 IMMUNOTHERAPY INJECTIONS: CPT | Performed by: NURSE PRACTITIONER

## 2024-08-20 NOTE — PROGRESS NOTES
PATIENT HAS THE FOLLOWING DIAGNOSIS SUPPORTING ADMINISTRATION OF ALLERGY INJECTIONS: J30.1Allergic rhinitis due to pollen  AND 79944 MULTIPLE ALLERGY INJECTIONS FOR ALLERGY INJECTION ADMINISTRATION      Patient here for allergy injection today.  Patient was presented with the opportunity to speak with provider and ask questions regarding allergy injections.  Patient was also instructed they need to wait 30 minutes after receiving allergy injections. All risks associated with potential adverse effects have been explained to patient and patient handout was provided following allergy testing.    After consent obtained/verified, allergy injection subcutaneously given in back of arm(s).  Please see below for specific details of site injections, concentration of serum, and volume injected.    VIAL COLOR OF ALL VIALS TODAY IS green 1:1,000. Vial allergy w/v concentration today is: 1:1,000     ALLERGY INJECTION FROM VIAL A GIVEN right  UPPER ARM IN THE AMOUNT OF 0.50 ML    ALLERGY INJECTION FROM VIAL B GIVEN left upper ARM IN THE AMOUNT OF 0.50  ML    ALLERGY INJECTION FROM VIAL C GIVEN leftlower ARM IN THE AMOUNT OF 0.50 ML      Documentation of vial injection specific to arm(s) noted on Allergy Immunotherapy Administration Form.       Patient waited 30 minutes for observation.No  Patient has received information and verbalizes understanding of the potential  health risks associated with allergy injections . Patient understands risks including anaphylaxis and chooses not to wait 30 minutes after administration of allergy injection(s).    Patient tolerated well without adverse reaction while the patient was in the office.    SHOT REACTION TREATMENT INSTRUCTIONS    During the 30 minute wait after an allergy injection the following symptoms should be reported:    Itching other than at the injection site  Hives or swelling other than at the injection site  Redness other than at the injection site  Difficulty breathing  Chest

## 2024-08-21 DIAGNOSIS — J30.9 ALLERGIC CONJUNCTIVITIS AND RHINITIS, BILATERAL: Primary | ICD-10-CM

## 2024-08-21 DIAGNOSIS — H10.13 ALLERGIC CONJUNCTIVITIS AND RHINITIS, BILATERAL: Primary | ICD-10-CM

## 2024-08-21 RX ORDER — OLOPATADINE HYDROCHLORIDE 2 MG/ML
SOLUTION/ DROPS OPHTHALMIC
Qty: 1 EACH | Refills: 5 | Status: SHIPPED | OUTPATIENT
Start: 2024-08-21

## 2024-08-21 NOTE — PROGRESS NOTES
Responded back to patient's questions.  I have discontinued Salvadore.  We are happy to sure how to use her inhaler when she brings it in or she can go to the pharmacy we will show as well.  Additionally have instructed her to stop all nasal sprays going up her nose including the rinses and any medications.

## 2024-08-23 DIAGNOSIS — J82.83 EOSINOPHILIC ASTHMA: ICD-10-CM

## 2024-08-23 DIAGNOSIS — R76.8 ELEVATED IGE LEVEL: ICD-10-CM

## 2024-08-26 ENCOUNTER — HOSPITAL ENCOUNTER (OUTPATIENT)
Dept: CT IMAGING | Age: 64
Discharge: HOME | End: 2024-08-26
Payer: MEDICAID

## 2024-08-26 DIAGNOSIS — Z92.89 HISTORY OF VENTRICULOPERITONEAL SHUNTING: ICD-10-CM

## 2024-08-26 DIAGNOSIS — M54.12 CERVICAL RADICULOPATHY: ICD-10-CM

## 2024-08-26 DIAGNOSIS — G93.5 CHIARI MALFORMATION TYPE I (HCC): ICD-10-CM

## 2024-08-26 PROCEDURE — 70450 CT HEAD/BRAIN W/O DYE: CPT

## 2024-08-26 PROCEDURE — 72125 CT NECK SPINE W/O DYE: CPT

## 2024-08-26 RX ORDER — OMALIZUMAB 150 MG/ML
INJECTION, SOLUTION SUBCUTANEOUS
Refills: 5 | OUTPATIENT
Start: 2024-08-26

## 2024-08-27 ENCOUNTER — NURSE ONLY (OUTPATIENT)
Dept: ALLERGY | Age: 64
End: 2024-08-27
Payer: MEDICAID

## 2024-08-27 ENCOUNTER — HOSPITAL ENCOUNTER (OUTPATIENT)
Age: 64
Discharge: HOME | End: 2024-08-27
Payer: MEDICAID

## 2024-08-27 VITALS
HEART RATE: 79 BPM | RESPIRATION RATE: 18 BRPM | SYSTOLIC BLOOD PRESSURE: 119 MMHG | OXYGEN SATURATION: 98 % | DIASTOLIC BLOOD PRESSURE: 68 MMHG

## 2024-08-27 DIAGNOSIS — J30.1 SEASONAL ALLERGIC RHINITIS DUE TO POLLEN: ICD-10-CM

## 2024-08-27 DIAGNOSIS — J82.83 EOSINOPHILIC ASTHMA: ICD-10-CM

## 2024-08-27 DIAGNOSIS — J30.1 ACUTE SEASONAL ALLERGIC RHINITIS DUE TO POLLEN: ICD-10-CM

## 2024-08-27 DIAGNOSIS — J30.9 CHRONIC ALLERGIC RHINITIS: Primary | ICD-10-CM

## 2024-08-27 DIAGNOSIS — Z13.9 SCREENING DUE: ICD-10-CM

## 2024-08-27 LAB
ANTIBODY SCREEN: NORMAL
BASOPHILS ABSOLUTE: 0.1 THOU/MM3 (ref 0–0.1)
BASOPHILS NFR BLD AUTO: 0.7 %
DEPRECATED RDW RBC AUTO: 46.3 FL (ref 35–45)
EOSINOPHIL NFR BLD AUTO: 2 %
EOSINOPHILS ABSOLUTE: 0.2 THOU/MM3 (ref 0–0.4)
ERYTHROCYTE [DISTWIDTH] IN BLOOD BY AUTOMATED COUNT: 14 % (ref 11.5–14.5)
HAV AB SER-ACNC: NONREACTIVE
HBV SURFACE AB SER QL IA: NEGATIVE
HBV SURFACE AG SERPL QL IA: NEGATIVE
HCT VFR BLD AUTO: 38.8 % (ref 37–47)
HGB BLD-MCNC: 12.3 GM/DL (ref 12–16)
IGE SERPL-ACNC: 305 IU/ML (ref 0–100)
IMM GRANULOCYTES # BLD AUTO: 0.08 THOU/MM3 (ref 0–0.07)
IMM GRANULOCYTES NFR BLD AUTO: 1.1 %
LYMPHOCYTES ABSOLUTE: 2 THOU/MM3 (ref 1–4.8)
LYMPHOCYTES NFR BLD AUTO: 25.7 %
MCH RBC QN AUTO: 28.7 PG (ref 26–33)
MCHC RBC AUTO-ENTMCNC: 31.7 GM/DL (ref 32.2–35.5)
MCV RBC AUTO: 90.7 FL (ref 81–99)
MONOCYTES ABSOLUTE: 0.3 THOU/MM3 (ref 0.4–1.3)
MONOCYTES NFR BLD AUTO: 3.6 %
NEUTROPHILS ABSOLUTE: 5.1 THOU/MM3 (ref 1.8–7.7)
NEUTROPHILS NFR BLD AUTO: 66.9 %
NRBC BLD AUTO-RTO: 0 /100 WBC
PLATELET # BLD AUTO: 369 THOU/MM3 (ref 130–400)
PMV BLD AUTO: 10 FL (ref 9.4–12.4)
RBC # BLD AUTO: 4.28 MILL/MM3 (ref 4.2–5.4)
WBC # BLD AUTO: 7.6 THOU/MM3 (ref 4.8–10.8)

## 2024-08-27 PROCEDURE — 82785 ASSAY OF IGE: CPT

## 2024-08-27 PROCEDURE — 95117 IMMUNOTHERAPY INJECTIONS: CPT | Performed by: NURSE PRACTITIONER

## 2024-08-27 PROCEDURE — 85025 COMPLETE CBC W/AUTO DIFF WBC: CPT

## 2024-08-27 PROCEDURE — 87340 HEPATITIS B SURFACE AG IA: CPT

## 2024-08-27 PROCEDURE — 86008 ALLG SPEC IGE RECOMB EA: CPT

## 2024-08-27 PROCEDURE — 86003 ALLG SPEC IGE CRUDE XTRC EA: CPT

## 2024-08-27 PROCEDURE — 86850 RBC ANTIBODY SCREEN: CPT

## 2024-08-27 PROCEDURE — 86708 HEPATITIS A ANTIBODY: CPT

## 2024-08-27 PROCEDURE — 86706 HEP B SURFACE ANTIBODY: CPT

## 2024-08-27 PROCEDURE — 36415 COLL VENOUS BLD VENIPUNCTURE: CPT

## 2024-08-27 NOTE — PROGRESS NOTES
PATIENT HAS THE FOLLOWING DIAGNOSIS SUPPORTING ADMINISTRATION OF ALLERGY INJECTIONS: J30.1Allergic rhinitis due to pollen  AND 44964 MULTIPLE ALLERGY INJECTIONS FOR ALLERGY INJECTION ADMINISTRATION      Patient here for allergy injection today.  Patient was presented with the opportunity to speak with provider and ask questions regarding allergy injections.  Patient was also instructed they need to wait 30 minutes after receiving allergy injections. All risks associated with potential adverse effects have been explained to patient and patient handout was provided following allergy testing.    After consent obtained/verified, allergy injection subcutaneously given in back of arm(s).  Please see below for specific details of site injections, concentration of serum, and volume injected.    VIAL COLOR OF ALL VIALS TODAY IS blue 1:100. Vial allergy w/v concentration today is: 1:100     ALLERGY INJECTION FROM VIAL A GIVEN left  UPPER ARM IN THE AMOUNT OF 0.10 ML    ALLERGY INJECTION FROM VIAL B GIVEN right upper ARM IN THE AMOUNT OF 0.10  ML    ALLERGY INJECTION FROM VIAL C GIVEN rightlower ARM IN THE AMOUNT OF 0.10 ML      Documentation of vial injection specific to arm(s) noted on Allergy Immunotherapy Administration Form.       Patient waited 30 minutes for observation.No  Patient has received information and verbalizes understanding of the potential  health risks associated with allergy injections . Patient understands risks including anaphylaxis and chooses not to wait 30 minutes after administration of allergy injection(s).    Patient tolerated well without adverse reaction while the patient was in the office.    SHOT REACTION TREATMENT INSTRUCTIONS    During the 30 minute wait after an allergy injection the following symptoms should be reported:    Itching other than at the injection site  Hives or swelling other than at the injection site  Redness other than at the injection site  Difficulty breathing  Chest

## 2024-08-28 LAB
ALLERGEN CORN IGE: < 0.1 KU/L (ref 0–0.34)
ALLERGEN EGG WHITE IGE: < 0.1 KU/L (ref 0–0.34)
EGG YOLK IGE: < 0.1 KU/L (ref 0–0.34)

## 2024-08-29 ENCOUNTER — PATIENT MESSAGE (OUTPATIENT)
Dept: FAMILY MEDICINE CLINIC | Age: 64
End: 2024-08-29

## 2024-08-29 LAB
DEPRECATED MISC ALLERGEN IGE RAST QL: NORMAL
MISC. #1 REFERENCE GROUP TEST: NORMAL

## 2024-08-29 RX ORDER — HYOSCYAMINE SULFATE 0.38 MG/1
375 TABLET, EXTENDED RELEASE ORAL EVERY 12 HOURS PRN
Qty: 60 TABLET | Refills: 3 | Status: SHIPPED | OUTPATIENT
Start: 2024-08-29

## 2024-09-04 ENCOUNTER — OFFICE VISIT (OUTPATIENT)
Dept: ALLERGY | Age: 64
End: 2024-09-04
Payer: MEDICAID

## 2024-09-04 ENCOUNTER — TELEPHONE (OUTPATIENT)
Dept: ALLERGY | Age: 64
End: 2024-09-04

## 2024-09-04 ENCOUNTER — NURSE ONLY (OUTPATIENT)
Dept: ALLERGY | Age: 64
End: 2024-09-04

## 2024-09-04 VITALS
SYSTOLIC BLOOD PRESSURE: 140 MMHG | DIASTOLIC BLOOD PRESSURE: 76 MMHG | HEART RATE: 86 BPM | RESPIRATION RATE: 18 BRPM | OXYGEN SATURATION: 100 %

## 2024-09-04 VITALS
BODY MASS INDEX: 36.43 KG/M2 | HEIGHT: 63 IN | DIASTOLIC BLOOD PRESSURE: 76 MMHG | SYSTOLIC BLOOD PRESSURE: 140 MMHG | RESPIRATION RATE: 18 BRPM | HEART RATE: 86 BPM | OXYGEN SATURATION: 100 % | WEIGHT: 205.6 LBS

## 2024-09-04 DIAGNOSIS — J45.50 SEVERE PERSISTENT ASTHMA WITHOUT COMPLICATION: Primary | ICD-10-CM

## 2024-09-04 DIAGNOSIS — J34.89 RHINORRHEA: ICD-10-CM

## 2024-09-04 DIAGNOSIS — J30.9 CHRONIC ALLERGIC RHINITIS: ICD-10-CM

## 2024-09-04 DIAGNOSIS — J30.89 ALLERGIC RHINOCONJUNCTIVITIS, SEASONAL AND PERENNIAL: ICD-10-CM

## 2024-09-04 DIAGNOSIS — J30.1 SEASONAL ALLERGIC RHINITIS DUE TO POLLEN: ICD-10-CM

## 2024-09-04 DIAGNOSIS — J30.1 ACUTE SEASONAL ALLERGIC RHINITIS DUE TO POLLEN: ICD-10-CM

## 2024-09-04 DIAGNOSIS — J30.1 NON-SEASONAL ALLERGIC RHINITIS DUE TO POLLEN: ICD-10-CM

## 2024-09-04 DIAGNOSIS — J30.9 ALLERGIC CONJUNCTIVITIS AND RHINITIS, BILATERAL: ICD-10-CM

## 2024-09-04 DIAGNOSIS — H10.10 ALLERGIC RHINOCONJUNCTIVITIS, SEASONAL AND PERENNIAL: ICD-10-CM

## 2024-09-04 DIAGNOSIS — H10.13 ALLERGIC CONJUNCTIVITIS AND RHINITIS, BILATERAL: ICD-10-CM

## 2024-09-04 DIAGNOSIS — J32.4 CHRONIC PANSINUSITIS: ICD-10-CM

## 2024-09-04 DIAGNOSIS — J30.2 ALLERGIC RHINOCONJUNCTIVITIS, SEASONAL AND PERENNIAL: ICD-10-CM

## 2024-09-04 PROCEDURE — 1123F ACP DISCUSS/DSCN MKR DOCD: CPT | Performed by: NURSE PRACTITIONER

## 2024-09-04 PROCEDURE — A4617 MOUTH PIECE: HCPCS | Performed by: NURSE PRACTITIONER

## 2024-09-04 PROCEDURE — 99214 OFFICE O/P EST MOD 30 MIN: CPT | Performed by: NURSE PRACTITIONER

## 2024-09-04 RX ORDER — IPRATROPIUM BROMIDE 21 UG/1
2 SPRAY, METERED NASAL EVERY 12 HOURS
Qty: 30 ML | Refills: 11 | Status: SHIPPED | OUTPATIENT
Start: 2024-09-04

## 2024-09-04 RX ORDER — OLOPATADINE HYDROCHLORIDE 2 MG/ML
SOLUTION/ DROPS OPHTHALMIC
Qty: 1 EACH | Refills: 11 | Status: SHIPPED | OUTPATIENT
Start: 2024-09-04

## 2024-09-04 RX ORDER — OMALIZUMAB 300 MG/2ML
300 INJECTION, SOLUTION SUBCUTANEOUS
Qty: 1 EACH | Refills: 5 | Status: SHIPPED | OUTPATIENT
Start: 2024-09-04 | End: 2024-09-04

## 2024-09-04 RX ORDER — FEXOFENADINE HCL 180 MG/1
180 TABLET ORAL DAILY
Qty: 30 TABLET | Refills: 11 | Status: SHIPPED | OUTPATIENT
Start: 2024-09-04

## 2024-09-04 RX ORDER — ALBUTEROL SULFATE 0.83 MG/ML
SOLUTION RESPIRATORY (INHALATION)
Qty: 375 ML | Refills: 5 | Status: SHIPPED | OUTPATIENT
Start: 2024-09-04

## 2024-09-04 RX ORDER — ALBUTEROL SULFATE 90 UG/1
AEROSOL, METERED RESPIRATORY (INHALATION)
Qty: 18 G | Refills: 2 | Status: SHIPPED | OUTPATIENT
Start: 2024-09-04

## 2024-09-04 ASSESSMENT — ENCOUNTER SYMPTOMS
RHINORRHEA: 1
SHORTNESS OF BREATH: 1

## 2024-09-04 NOTE — PROGRESS NOTES
Patient here today to receive Xolair injection.    Patient does not report any previous reaction from Xolair injections.  Denies any nausea vomiting fever urticaria or angioedema.  Denies any recent exacerbation of asthma or asthma-like symptoms.  Patient was explained benefits and potential risks including anaphylaxis to patient.      Patient has been explained the risk and benefits including delayed anaphylaxis.      Patient has EpiPen and understands how to appropriately use? Yes    2ND DOSE OF XOLAIR 150 MG GIVEN subcutaneously in  left ABDOMEN  1ST DOSE OF XOLAIR 150 MG GIVEN subcutaneously in  right ABDOMEN     for a total combined dose of 300 mg using a 25-gauge needle and 3 ML syringe.        APPROVED INJECTION SITES RIGHT  / LEFT UPPER ARM,  RIGHT  / LEFT FRONT OR MIDDLE OF THIGH, OR STOMACH        Xolair  NDC 58700-222-76   Lot [id number]   Expires 08/31/2025    Patient observed for 30 minutes No      Medication was supplied by patient:  YES/NO: Yes      Medication was supplied as a sample:  YES/NO: No                   Prior to patient receiving Xolair area was cleansed with alcohol swabs.  Following the administration no evidence of bleeding or bruising.    No adverse reactions reported.  Patient tolerated well without adverse reactions or side effects.  Patient to continue to receive Xolair injections monthly.  Will report any problems to this office.  No evidence of allergic reaction including anaphylaxis.    Comments:

## 2024-09-04 NOTE — TELEPHONE ENCOUNTER
Attempted to reach patient regarding xolair shots. Unable to reach patient. Left voicemail for patient to call office back at [phone]

## 2024-09-04 NOTE — PROGRESS NOTES
PATIENT HAS THE FOLLOWING DIAGNOSIS SUPPORTING ADMINISTRATION OF ALLERGY INJECTIONS: J30.1Allergic rhinitis due to pollen  AND 78832 MULTIPLE ALLERGY INJECTIONS FOR ALLERGY INJECTION ADMINISTRATION      Patient here for allergy injection today.  Patient was presented with the opportunity to speak with provider and ask questions regarding allergy injections.  Patient was also instructed they need to wait 30 minutes after receiving allergy injections. All risks associated with potential adverse effects have been explained to patient and patient handout was provided following allergy testing.    After consent obtained/verified, allergy injection subcutaneously given in back of arm(s).  Please see below for specific details of site injections, concentration of serum, and volume injected.    VIAL COLOR OF ALL VIALS TODAY IS blue 1:100. Vial allergy w/v concentration today is: 1:100     ALLERGY INJECTION FROM VIAL A GIVEN right  UPPER ARM IN THE AMOUNT OF 0.20 ML    ALLERGY INJECTION FROM VIAL B GIVEN left lower ARM IN THE AMOUNT OF 0.20  ML    ALLERGY INJECTION FROM VIAL C GIVEN leftupper ARM IN THE AMOUNT OF 0.20 ML      Documentation of vial injection specific to arm(s) noted on Allergy Immunotherapy Administration Form.       Patient waited 30 minutes for observation.No  Patient has received information and verbalizes understanding of the potential  health risks associated with allergy injections . Patient understands risks including anaphylaxis and chooses not to wait 30 minutes after administration of allergy injection(s).    Patient tolerated well without adverse reaction while the patient was in the office.    SHOT REACTION TREATMENT INSTRUCTIONS    During the 30 minute wait after an allergy injection the following symptoms should be reported:    Itching other than at the injection site  Hives or swelling other than at the injection site  Redness other than at the injection site  Difficulty breathing  Chest

## 2024-09-04 NOTE — PROGRESS NOTES
discussion of the various symptom problems, provided education of medications and disease process, as well as discussion of a therapeutic plan for each.  Face-to-face education time does not include any time that may have been spent for procedures.    Patient's case was discussed with  Dr. Braulio Hsieh who reviewed patient's case.  We will continue with current recommendations and treatment plan.    (Please note that portions of this note may have been completed with a voice recognition program.  Efforts were made to edit the dictation but occasionally words are mis-transcribed.)         Signed:  COREEN Morris - CNP  9/4/2024  1:00 PM

## 2024-09-05 NOTE — TELEPHONE ENCOUNTER
Called patient and informed patient that to switch from 2 shots of xolair to just 1 shot of xolair that insurance would charge her a higher copay. Informed patient that provider just wants to keep pt on the 2 shots of xolair. Pt verbalized understanding and thanked me.

## 2024-09-09 ENCOUNTER — APPOINTMENT (OUTPATIENT)
Dept: GENERAL RADIOLOGY | Age: 64
End: 2024-09-09
Payer: MEDICAID

## 2024-09-09 ENCOUNTER — HOSPITAL ENCOUNTER (EMERGENCY)
Age: 64
Discharge: HOME | End: 2024-09-09
Attending: STUDENT IN AN ORGANIZED HEALTH CARE EDUCATION/TRAINING PROGRAM
Payer: MEDICAID

## 2024-09-09 VITALS
OXYGEN SATURATION: 97 % | SYSTOLIC BLOOD PRESSURE: 151 MMHG | HEART RATE: 84 BPM | DIASTOLIC BLOOD PRESSURE: 82 MMHG | HEIGHT: 63 IN | TEMPERATURE: 98.4 F | BODY MASS INDEX: 35.44 KG/M2 | WEIGHT: 200 LBS | RESPIRATION RATE: 18 BRPM

## 2024-09-09 DIAGNOSIS — R51.9 CHRONIC NONINTRACTABLE HEADACHE, UNSPECIFIED HEADACHE TYPE: Primary | ICD-10-CM

## 2024-09-09 DIAGNOSIS — G89.29 CHRONIC NONINTRACTABLE HEADACHE, UNSPECIFIED HEADACHE TYPE: Primary | ICD-10-CM

## 2024-09-09 LAB
ANION GAP SERPL CALC-SCNC: 14 MEQ/L (ref 8–16)
BASOPHILS ABSOLUTE: 0.1 THOU/MM3 (ref 0–0.1)
BASOPHILS NFR BLD AUTO: 0.8 %
BUN SERPL-MCNC: 11 MG/DL (ref 7–22)
CALCIUM SERPL-MCNC: 9.7 MG/DL (ref 8.5–10.5)
CHLORIDE SERPL-SCNC: 103 MEQ/L (ref 98–111)
CO2 SERPL-SCNC: 25 MEQ/L (ref 23–33)
CREAT SERPL-MCNC: 0.7 MG/DL (ref 0.4–1.2)
DEPRECATED RDW RBC AUTO: 46.2 FL (ref 35–45)
EKG ATRIAL RATE: 88 BPM
EKG P AXIS: 65 DEGREES
EKG P-R INTERVAL: 162 MS
EKG Q-T INTERVAL: 356 MS
EKG QRS DURATION: 68 MS
EKG QTC CALCULATION (BAZETT): 430 MS
EKG R AXIS: 58 DEGREES
EKG T AXIS: 55 DEGREES
EKG VENTRICULAR RATE: 88 BPM
EOSINOPHIL NFR BLD AUTO: 2.7 %
EOSINOPHILS ABSOLUTE: 0.2 THOU/MM3 (ref 0–0.4)
ERYTHROCYTE [DISTWIDTH] IN BLOOD BY AUTOMATED COUNT: 14 % (ref 11.5–14.5)
FLUAV RNA RESP QL NAA+PROBE: NOT DETECTED
FLUBV RNA RESP QL NAA+PROBE: NOT DETECTED
GFR SERPL CREATININE-BSD FRML MDRD: > 90 ML/MIN/1.73M2
GLUCOSE SERPL-MCNC: 102 MG/DL (ref 70–108)
HCT VFR BLD AUTO: 40.1 % (ref 37–47)
HGB BLD-MCNC: 12.9 GM/DL (ref 12–16)
IMM GRANULOCYTES # BLD AUTO: 0.01 THOU/MM3 (ref 0–0.07)
IMM GRANULOCYTES NFR BLD AUTO: 0.1 %
LYMPHOCYTES ABSOLUTE: 2.4 THOU/MM3 (ref 1–4.8)
LYMPHOCYTES NFR BLD AUTO: 32.8 %
MAGNESIUM SERPL-MCNC: 1.9 MG/DL (ref 1.6–2.4)
MCH RBC QN AUTO: 29 PG (ref 26–33)
MCHC RBC AUTO-ENTMCNC: 32.2 GM/DL (ref 32.2–35.5)
MCV RBC AUTO: 90.1 FL (ref 81–99)
MONOCYTES ABSOLUTE: 0.4 THOU/MM3 (ref 0.4–1.3)
MONOCYTES NFR BLD AUTO: 4.8 %
NEUTROPHILS ABSOLUTE: 4.3 THOU/MM3 (ref 1.8–7.7)
NEUTROPHILS NFR BLD AUTO: 58.8 %
NRBC BLD AUTO-RTO: 0 /100 WBC
NT-PROBNP SERPL IA-MCNC: < 36 PG/ML (ref 0–124)
OSMOLALITY SERPL CALC.SUM OF ELEC: 282.7 MOSMOL/KG (ref 275–300)
PLATELET # BLD AUTO: 341 THOU/MM3 (ref 130–400)
PMV BLD AUTO: 10.8 FL (ref 9.4–12.4)
POTASSIUM SERPL-SCNC: 3.5 MEQ/L (ref 3.5–5.2)
RBC # BLD AUTO: 4.45 MILL/MM3 (ref 4.2–5.4)
SARS-COV-2 RNA RESP QL NAA+PROBE: NOT DETECTED
SODIUM SERPL-SCNC: 142 MEQ/L (ref 135–145)
TROPONIN, HIGH SENSITIVITY: < 6 NG/L (ref 0–12)
TSH SERPL DL<=0.005 MIU/L-ACNC: 1.85 UIU/ML (ref 0.4–4.2)
WBC # BLD AUTO: 7.3 THOU/MM3 (ref 4.8–10.8)

## 2024-09-09 PROCEDURE — 80048 BASIC METABOLIC PNL TOTAL CA: CPT

## 2024-09-09 PROCEDURE — 93005 ELECTROCARDIOGRAM TRACING: CPT | Performed by: STUDENT IN AN ORGANIZED HEALTH CARE EDUCATION/TRAINING PROGRAM

## 2024-09-09 PROCEDURE — 6360000002 HC RX W HCPCS: Performed by: STUDENT IN AN ORGANIZED HEALTH CARE EDUCATION/TRAINING PROGRAM

## 2024-09-09 PROCEDURE — 83880 ASSAY OF NATRIURETIC PEPTIDE: CPT

## 2024-09-09 PROCEDURE — 93010 ELECTROCARDIOGRAM REPORT: CPT | Performed by: NUCLEAR MEDICINE

## 2024-09-09 PROCEDURE — 99285 EMERGENCY DEPT VISIT HI MDM: CPT

## 2024-09-09 PROCEDURE — 84443 ASSAY THYROID STIM HORMONE: CPT

## 2024-09-09 PROCEDURE — 2580000003 HC RX 258: Performed by: EMERGENCY MEDICINE

## 2024-09-09 PROCEDURE — 36415 COLL VENOUS BLD VENIPUNCTURE: CPT

## 2024-09-09 PROCEDURE — 96375 TX/PRO/DX INJ NEW DRUG ADDON: CPT

## 2024-09-09 PROCEDURE — 96361 HYDRATE IV INFUSION ADD-ON: CPT

## 2024-09-09 PROCEDURE — 84484 ASSAY OF TROPONIN QUANT: CPT

## 2024-09-09 PROCEDURE — 6360000002 HC RX W HCPCS: Performed by: EMERGENCY MEDICINE

## 2024-09-09 PROCEDURE — 96374 THER/PROPH/DIAG INJ IV PUSH: CPT

## 2024-09-09 PROCEDURE — 83735 ASSAY OF MAGNESIUM: CPT

## 2024-09-09 PROCEDURE — 85025 COMPLETE CBC W/AUTO DIFF WBC: CPT

## 2024-09-09 PROCEDURE — 71045 X-RAY EXAM CHEST 1 VIEW: CPT

## 2024-09-09 PROCEDURE — 87636 SARSCOV2 & INF A&B AMP PRB: CPT

## 2024-09-09 RX ORDER — 0.9 % SODIUM CHLORIDE 0.9 %
1000 INTRAVENOUS SOLUTION INTRAVENOUS ONCE
Status: COMPLETED | OUTPATIENT
Start: 2024-09-09 | End: 2024-09-09

## 2024-09-09 RX ORDER — METOCLOPRAMIDE HYDROCHLORIDE 5 MG/ML
10 INJECTION INTRAMUSCULAR; INTRAVENOUS ONCE
Status: COMPLETED | OUTPATIENT
Start: 2024-09-09 | End: 2024-09-09

## 2024-09-09 RX ORDER — DEXAMETHASONE SODIUM PHOSPHATE 4 MG/ML
4 INJECTION, SOLUTION INTRA-ARTICULAR; INTRALESIONAL; INTRAMUSCULAR; INTRAVENOUS; SOFT TISSUE ONCE
Status: COMPLETED | OUTPATIENT
Start: 2024-09-09 | End: 2024-09-09

## 2024-09-09 RX ORDER — KETOROLAC TROMETHAMINE 30 MG/ML
15 INJECTION, SOLUTION INTRAMUSCULAR; INTRAVENOUS ONCE
Status: COMPLETED | OUTPATIENT
Start: 2024-09-09 | End: 2024-09-09

## 2024-09-09 RX ADMIN — KETOROLAC TROMETHAMINE 15 MG: 30 INJECTION, SOLUTION INTRAMUSCULAR at 21:13

## 2024-09-09 RX ADMIN — METOCLOPRAMIDE 10 MG: 5 INJECTION, SOLUTION INTRAMUSCULAR; INTRAVENOUS at 19:22

## 2024-09-09 RX ADMIN — DEXAMETHASONE SODIUM PHOSPHATE 4 MG: 4 INJECTION, SOLUTION INTRA-ARTICULAR; INTRALESIONAL; INTRAMUSCULAR; INTRAVENOUS; SOFT TISSUE at 19:22

## 2024-09-09 RX ADMIN — SODIUM CHLORIDE 1000 ML: 9 INJECTION, SOLUTION INTRAVENOUS at 19:51

## 2024-09-09 ASSESSMENT — PAIN - FUNCTIONAL ASSESSMENT
PAIN_FUNCTIONAL_ASSESSMENT: NONE - DENIES PAIN
PAIN_FUNCTIONAL_ASSESSMENT: 0-10
PAIN_FUNCTIONAL_ASSESSMENT: 0-10

## 2024-09-09 ASSESSMENT — PAIN DESCRIPTION - LOCATION: LOCATION: HEAD

## 2024-09-09 ASSESSMENT — PAIN SCALES - GENERAL
PAINLEVEL_OUTOF10: 10
PAINLEVEL_OUTOF10: 8

## 2024-09-10 ENCOUNTER — TELEPHONE (OUTPATIENT)
Dept: NEUROSURGERY | Age: 64
End: 2024-09-10

## 2024-09-10 ENCOUNTER — NURSE ONLY (OUTPATIENT)
Dept: ALLERGY | Age: 64
End: 2024-09-10
Payer: MEDICAID

## 2024-09-10 VITALS
OXYGEN SATURATION: 100 % | HEART RATE: 95 BPM | DIASTOLIC BLOOD PRESSURE: 87 MMHG | RESPIRATION RATE: 18 BRPM | SYSTOLIC BLOOD PRESSURE: 157 MMHG

## 2024-09-10 DIAGNOSIS — J30.1 ACUTE SEASONAL ALLERGIC RHINITIS DUE TO POLLEN: ICD-10-CM

## 2024-09-10 DIAGNOSIS — J30.9 CHRONIC ALLERGIC RHINITIS: Primary | ICD-10-CM

## 2024-09-10 DIAGNOSIS — Z29.89 PROPHYLACTIC IMMUNOTHERAPY: ICD-10-CM

## 2024-09-10 PROCEDURE — 95117 IMMUNOTHERAPY INJECTIONS: CPT | Performed by: NURSE PRACTITIONER

## 2024-09-10 RX ORDER — EPINEPHRINE 0.3 MG/.3ML
INJECTION INTRAMUSCULAR
Qty: 1 EACH | Refills: 1 | Status: SHIPPED | OUTPATIENT
Start: 2024-09-10

## 2024-09-12 DIAGNOSIS — G93.5 CHIARI MALFORMATION TYPE I (HCC): Primary | ICD-10-CM

## 2024-09-12 DIAGNOSIS — Z92.89 HISTORY OF VENTRICULOPERITONEAL SHUNTING: ICD-10-CM

## 2024-09-12 DIAGNOSIS — M54.12 CERVICAL RADICULOPATHY: ICD-10-CM

## 2024-09-13 ENCOUNTER — TELEPHONE (OUTPATIENT)
Dept: NEUROSURGERY | Age: 64
End: 2024-09-13

## 2024-09-17 ENCOUNTER — NURSE ONLY (OUTPATIENT)
Dept: ALLERGY | Age: 64
End: 2024-09-17
Payer: MEDICAID

## 2024-09-17 VITALS
OXYGEN SATURATION: 98 % | HEART RATE: 83 BPM | SYSTOLIC BLOOD PRESSURE: 157 MMHG | DIASTOLIC BLOOD PRESSURE: 81 MMHG | RESPIRATION RATE: 18 BRPM

## 2024-09-17 DIAGNOSIS — J30.1 ACUTE SEASONAL ALLERGIC RHINITIS DUE TO POLLEN: ICD-10-CM

## 2024-09-17 DIAGNOSIS — J30.1 SEASONAL ALLERGIC RHINITIS DUE TO POLLEN: ICD-10-CM

## 2024-09-17 DIAGNOSIS — J30.9 CHRONIC ALLERGIC RHINITIS: Primary | ICD-10-CM

## 2024-09-17 PROCEDURE — 95117 IMMUNOTHERAPY INJECTIONS: CPT | Performed by: NURSE PRACTITIONER

## 2024-09-19 ENCOUNTER — PATIENT MESSAGE (OUTPATIENT)
Dept: FAMILY MEDICINE CLINIC | Age: 64
End: 2024-09-19

## 2024-09-19 ENCOUNTER — OFFICE VISIT (OUTPATIENT)
Dept: FAMILY MEDICINE CLINIC | Age: 64
End: 2024-09-19

## 2024-09-19 VITALS
WEIGHT: 207 LBS | DIASTOLIC BLOOD PRESSURE: 68 MMHG | SYSTOLIC BLOOD PRESSURE: 122 MMHG | HEART RATE: 92 BPM | BODY MASS INDEX: 36.67 KG/M2 | OXYGEN SATURATION: 96 % | TEMPERATURE: 97.8 F

## 2024-09-19 DIAGNOSIS — J44.9 CHRONIC OBSTRUCTIVE PULMONARY DISEASE, UNSPECIFIED COPD TYPE (HCC): ICD-10-CM

## 2024-09-19 DIAGNOSIS — R51.9 CHRONIC DAILY HEADACHE: Primary | ICD-10-CM

## 2024-09-19 DIAGNOSIS — F17.200 SMOKER: ICD-10-CM

## 2024-09-19 DIAGNOSIS — R93.0 ABNORMAL CT OF THE HEAD: ICD-10-CM

## 2024-09-19 DIAGNOSIS — E66.9 CLASS 1 OBESITY WITH SERIOUS COMORBIDITY AND BODY MASS INDEX (BMI) OF 32.0 TO 32.9 IN ADULT, UNSPECIFIED OBESITY TYPE: ICD-10-CM

## 2024-09-19 DIAGNOSIS — N30.10 INTERSTITIAL CYSTITIS: Primary | ICD-10-CM

## 2024-09-19 DIAGNOSIS — Q07.00 ARNOLD-CHIARI MALFORMATION (HCC): ICD-10-CM

## 2024-09-19 DIAGNOSIS — E23.6 PITUITARY GLAND ENLARGED (HCC): ICD-10-CM

## 2024-09-19 DIAGNOSIS — Z98.2 VP (VENTRICULOPERITONEAL) SHUNT STATUS: ICD-10-CM

## 2024-09-19 DIAGNOSIS — I10 PRIMARY HYPERTENSION: ICD-10-CM

## 2024-09-19 RX ORDER — CYCLOSPORINE 0.5 MG/ML
1 EMULSION OPHTHALMIC EVERY 12 HOURS
COMMUNITY
Start: 2024-09-13

## 2024-09-19 RX ORDER — BUTALBITAL, ACETAMINOPHEN AND CAFFEINE 50; 325; 40 MG/1; MG/1; MG/1
1 TABLET ORAL EVERY 6 HOURS PRN
Qty: 90 TABLET | Refills: 0 | Status: SHIPPED | OUTPATIENT
Start: 2024-09-19

## 2024-09-19 RX ORDER — AMLODIPINE BESYLATE 10 MG/1
10 TABLET ORAL DAILY
Qty: 90 TABLET | Refills: 3 | Status: SHIPPED | OUTPATIENT
Start: 2024-09-19

## 2024-09-19 ASSESSMENT — ENCOUNTER SYMPTOMS
ABDOMINAL PAIN: 0
CHEST TIGHTNESS: 0
SHORTNESS OF BREATH: 0
WHEEZING: 0
ABDOMINAL DISTENTION: 0
COUGH: 0
BACK PAIN: 0

## 2024-09-24 ENCOUNTER — NURSE ONLY (OUTPATIENT)
Dept: ALLERGY | Age: 64
End: 2024-09-24
Payer: MEDICAID

## 2024-09-24 VITALS
DIASTOLIC BLOOD PRESSURE: 96 MMHG | HEART RATE: 85 BPM | RESPIRATION RATE: 18 BRPM | SYSTOLIC BLOOD PRESSURE: 129 MMHG | OXYGEN SATURATION: 98 %

## 2024-09-24 DIAGNOSIS — J30.1 SEASONAL ALLERGIC RHINITIS DUE TO POLLEN: ICD-10-CM

## 2024-09-24 DIAGNOSIS — J30.1 ACUTE SEASONAL ALLERGIC RHINITIS DUE TO POLLEN: ICD-10-CM

## 2024-09-24 DIAGNOSIS — J30.9 CHRONIC ALLERGIC RHINITIS: Primary | ICD-10-CM

## 2024-09-24 PROCEDURE — 95117 IMMUNOTHERAPY INJECTIONS: CPT | Performed by: NURSE PRACTITIONER

## 2024-09-24 RX ORDER — POLYETHYLENE GLYCOL 3350 17 G/17G
17 POWDER, FOR SOLUTION ORAL DAILY
COMMUNITY
Start: 2024-09-19

## 2024-09-25 ENCOUNTER — TELEPHONE (OUTPATIENT)
Dept: ALLERGY | Age: 64
End: 2024-09-25

## 2024-09-25 ENCOUNTER — HOSPITAL ENCOUNTER (OUTPATIENT)
Dept: CT IMAGING | Age: 64
Discharge: HOME | End: 2024-09-25
Payer: MEDICAID

## 2024-09-25 DIAGNOSIS — J45.50 SEVERE PERSISTENT ASTHMA WITHOUT COMPLICATION: ICD-10-CM

## 2024-09-25 DIAGNOSIS — J32.4 CHRONIC PANSINUSITIS: ICD-10-CM

## 2024-09-25 PROCEDURE — 70486 CT MAXILLOFACIAL W/O DYE: CPT

## 2024-10-01 ENCOUNTER — NURSE ONLY (OUTPATIENT)
Dept: ALLERGY | Age: 64
End: 2024-10-01
Payer: MEDICAID

## 2024-10-01 VITALS
DIASTOLIC BLOOD PRESSURE: 81 MMHG | HEART RATE: 96 BPM | SYSTOLIC BLOOD PRESSURE: 140 MMHG | RESPIRATION RATE: 18 BRPM | OXYGEN SATURATION: 98 %

## 2024-10-01 DIAGNOSIS — J30.9 CHRONIC ALLERGIC RHINITIS: Primary | ICD-10-CM

## 2024-10-01 DIAGNOSIS — J30.1 ACUTE SEASONAL ALLERGIC RHINITIS DUE TO POLLEN: ICD-10-CM

## 2024-10-01 DIAGNOSIS — J30.1 SEASONAL ALLERGIC RHINITIS DUE TO POLLEN: ICD-10-CM

## 2024-10-01 PROCEDURE — 95117 IMMUNOTHERAPY INJECTIONS: CPT | Performed by: NURSE PRACTITIONER

## 2024-10-01 NOTE — PROGRESS NOTES
PATIENT HAS THE FOLLOWING DIAGNOSIS SUPPORTING ADMINISTRATION OF ALLERGY INJECTIONS: J30.1Allergic rhinitis due to pollen  AND 97274 MULTIPLE ALLERGY INJECTIONS FOR ALLERGY INJECTION ADMINISTRATION      Patient here for allergy injection today.  Patient was presented with the opportunity to speak with provider and ask questions regarding allergy injections.  Patient was also instructed they need to wait 30 minutes after receiving allergy injections. All risks associated with potential adverse effects have been explained to patient and patient handout was provided following allergy testing.    After consent obtained/verified, allergy injection subcutaneously given in back of arm(s).  Please see below for specific details of site injections, concentration of serum, and volume injected.    VIAL COLOR OF ALL VIALS TODAY IS yellow 1:10. Vial allergy w/v concentration today is: 1:10     ALLERGY INJECTION FROM VIAL A GIVEN right  UPPER ARM IN THE AMOUNT OF 0.10 ML    ALLERGY INJECTION FROM VIAL B GIVEN left lower ARM IN THE AMOUNT OF 0.10  ML    ALLERGY INJECTION FROM VIAL C GIVEN leftupper ARM IN THE AMOUNT OF 0.10 ML      Documentation of vial injection specific to arm(s) noted on Allergy Immunotherapy Administration Form.       Patient waited 30 minutes for observation.No  Patient has received information and verbalizes understanding of the potential  health risks associated with allergy injections . Patient understands risks including anaphylaxis and chooses not to wait 30 minutes after administration of allergy injection(s).    Patient tolerated well without adverse reaction while the patient was in the office.    SHOT REACTION TREATMENT INSTRUCTIONS    During the 30 minute wait after an allergy injection the following symptoms should be reported:    Itching other than at the injection site  Hives or swelling other than at the injection site  Redness other than at the injection site  Difficulty breathing  Chest

## 2024-10-07 ENCOUNTER — HOSPITAL ENCOUNTER (OUTPATIENT)
Dept: MRI IMAGING | Age: 64
Discharge: HOME | End: 2024-10-07
Payer: MEDICAID

## 2024-10-07 DIAGNOSIS — G93.5 CHIARI MALFORMATION TYPE I (HCC): ICD-10-CM

## 2024-10-07 DIAGNOSIS — Z92.89 HISTORY OF VENTRICULOPERITONEAL SHUNTING: ICD-10-CM

## 2024-10-07 DIAGNOSIS — M54.12 CERVICAL RADICULOPATHY: ICD-10-CM

## 2024-10-07 PROCEDURE — A9579 GAD-BASE MR CONTRAST NOS,1ML: HCPCS

## 2024-10-07 PROCEDURE — 6360000004 HC RX CONTRAST MEDICATION

## 2024-10-07 PROCEDURE — 70553 MRI BRAIN STEM W/O & W/DYE: CPT

## 2024-10-07 PROCEDURE — 72156 MRI NECK SPINE W/O & W/DYE: CPT

## 2024-10-07 PROCEDURE — 72157 MRI CHEST SPINE W/O & W/DYE: CPT

## 2024-10-07 RX ADMIN — GADOTERIDOL 20 ML: 279.3 INJECTION, SOLUTION INTRAVENOUS at 07:50

## 2024-10-08 ENCOUNTER — NURSE ONLY (OUTPATIENT)
Dept: ALLERGY | Age: 64
End: 2024-10-08

## 2024-10-08 VITALS
HEART RATE: 91 BPM | SYSTOLIC BLOOD PRESSURE: 124 MMHG | DIASTOLIC BLOOD PRESSURE: 76 MMHG | OXYGEN SATURATION: 98 % | RESPIRATION RATE: 18 BRPM

## 2024-10-08 DIAGNOSIS — J30.9 CHRONIC ALLERGIC RHINITIS: Primary | ICD-10-CM

## 2024-10-08 DIAGNOSIS — J45.50 SEVERE PERSISTENT ASTHMA WITHOUT COMPLICATION: ICD-10-CM

## 2024-10-08 DIAGNOSIS — J30.1 ACUTE SEASONAL ALLERGIC RHINITIS DUE TO POLLEN: ICD-10-CM

## 2024-10-08 DIAGNOSIS — J30.1 SEASONAL ALLERGIC RHINITIS DUE TO POLLEN: ICD-10-CM

## 2024-10-08 NOTE — PROGRESS NOTES
Patient here today to receive Xolair injection.    Patient does not report any previous reaction from Xolair injections.  Denies any nausea vomiting fever urticaria or angioedema.  Denies any recent exacerbation of asthma or asthma-like symptoms.  Patient was explained benefits and potential risks including anaphylaxis to patient.      Patient has been explained the risk and benefits including delayed anaphylaxis.      Patient has EpiPen and understands how to appropriately use? Yes    2ND DOSE OF XOLAIR 150 MG GIVEN subcutaneously in  right ABDOMEN  1ST DOSE OF XOLAIR 150 MG GIVEN subcutaneously in  left ABDOMEN     for a total combined dose of 300 mg using a 25-gauge needle and 3 ML syringe.        APPROVED INJECTION SITES RIGHT  / LEFT UPPER ARM,  RIGHT  / LEFT FRONT OR MIDDLE OF THIGH, OR STOMACH        Xolair  NDC 62710-343-03    Lot [id number]    Expires 11/30/2025     Patient observed for 30 minutes No      Medication was supplied by patient:  YES/NO: Yes      Medication was supplied as a sample:  YES/NO: No                   Prior to patient receiving Xolair area was cleansed with alcohol swabs.  Following the administration no evidence of bleeding or bruising.    No adverse reactions reported.  Patient tolerated well without adverse reactions or side effects.  Patient to continue to receive Xolair injections monthly.  Will report any problems to this office.  No evidence of allergic reaction including anaphylaxis.    Comments:

## 2024-10-08 NOTE — PROGRESS NOTES
PATIENT HAS THE FOLLOWING DIAGNOSIS SUPPORTING ADMINISTRATION OF ALLERGY INJECTIONS: J30.1Allergic rhinitis due to pollen  AND 81872 MULTIPLE ALLERGY INJECTIONS FOR ALLERGY INJECTION ADMINISTRATION      Patient here for allergy injection today.  Patient was presented with the opportunity to speak with provider and ask questions regarding allergy injections.  Patient was also instructed they need to wait 30 minutes after receiving allergy injections. All risks associated with potential adverse effects have been explained to patient and patient handout was provided following allergy testing.    After consent obtained/verified, allergy injection subcutaneously given in back of arm(s).  Please see below for specific details of site injections, concentration of serum, and volume injected.    VIAL COLOR OF ALL VIALS TODAY IS yellow 1:10. Vial allergy w/v concentration today is: 1:10     ALLERGY INJECTION FROM VIAL A GIVEN left  UPPER ARM IN THE AMOUNT OF 0.20 ML    ALLERGY INJECTION FROM VIAL B GIVEN right lower ARM IN THE AMOUNT OF 0.20  ML    ALLERGY INJECTION FROM VIAL C GIVEN rightupper ARM IN THE AMOUNT OF 0.20 ML      Documentation of vial injection specific to arm(s) noted on Allergy Immunotherapy Administration Form.       Patient waited 30 minutes for observation.No  Patient has received information and verbalizes understanding of the potential  health risks associated with allergy injections . Patient understands risks including anaphylaxis and chooses not to wait 30 minutes after administration of allergy injection(s).    Patient tolerated well without adverse reaction while the patient was in the office.    SHOT REACTION TREATMENT INSTRUCTIONS    During the 30 minute wait after an allergy injection the following symptoms should be reported:    Itching other than at the injection site  Hives or swelling other than at the injection site  Redness other than at the injection site  Difficulty breathing  Chest

## 2024-10-09 ENCOUNTER — TELEPHONE (OUTPATIENT)
Dept: NEUROSURGERY | Age: 64
End: 2024-10-09

## 2024-10-09 NOTE — TELEPHONE ENCOUNTER
Patient was called to R.S appt to 10/10/2024. Dr. Zhang will be in sx and the appointment needs R.S to a day he will be in clinic. VM was left for the patient to call back and get the appointment R.S

## 2024-10-10 ENCOUNTER — TELEPHONE (OUTPATIENT)
Dept: NEUROSURGERY | Age: 64
End: 2024-10-10

## 2024-10-10 ENCOUNTER — OFFICE VISIT (OUTPATIENT)
Dept: NEUROSURGERY | Age: 64
End: 2024-10-10
Payer: MEDICAID

## 2024-10-10 VITALS
WEIGHT: 200 LBS | SYSTOLIC BLOOD PRESSURE: 130 MMHG | BODY MASS INDEX: 35.44 KG/M2 | HEART RATE: 94 BPM | HEIGHT: 63 IN | DIASTOLIC BLOOD PRESSURE: 83 MMHG

## 2024-10-10 DIAGNOSIS — G93.5 CHIARI MALFORMATION TYPE I (HCC): Primary | ICD-10-CM

## 2024-10-10 DIAGNOSIS — D35.2 PITUITARY ADENOMA (HCC): ICD-10-CM

## 2024-10-10 DIAGNOSIS — G93.2 PSEUDOTUMOR CEREBRI SYNDROME: ICD-10-CM

## 2024-10-10 DIAGNOSIS — Z92.89 HISTORY OF VENTRICULOPERITONEAL SHUNTING: ICD-10-CM

## 2024-10-10 PROCEDURE — 99215 OFFICE O/P EST HI 40 MIN: CPT | Performed by: PHYSICIAN ASSISTANT

## 2024-10-10 PROCEDURE — 1123F ACP DISCUSS/DSCN MKR DOCD: CPT | Performed by: PHYSICIAN ASSISTANT

## 2024-10-10 NOTE — PROGRESS NOTES
[facility] [place] [facility]  [facility]  [address]  [facility] [address]  Dept: [phone]  Dept Fax: [phone]  Loc: [phone]    Follow-up visit  Visit Date: 10/10/2024      Gely  is a 63 y.o. female who is returning to the office today for a follow-up visit continuation of transition of care for ongoing evaluation of indwelling shunt previously placed by Dr. Fierro.  She was most recently seen and evaluated in our office setting on 8/7/2024 as a referral to our service.  Transient episodes of dizziness and headache were noted with the patient otherwise stable and intact to her baseline.  In the absence of any recent imaging a new CT scan of the head was ordered for review at today's appointment.    A CT scan of the head image without contrast on 8/26/2024 reveals this contiguous catheter absent spinal stenosis at any level.        Of note shunt was initially placed in 2007 to address history of cervical syrinx.         Small syrinx within the cord behind thoracic to is reflected with low cerebellar tonsils consistent with Chiari malformation and an 11 x 9 mm nodule on the left side of the pituitary gland suggestive of adenoma.    An accompanying MRI of the thoracic spine was reviewed again highlighting the small syrinx at T12 with postoperative changes within the laminectomy defects of the lower cervical and upper thoracic spine.  9 images otherwise negative.    An MRI of the brain image with and without contrast on 10/7/2024 was also reviewed again highlighting the cerebellar tonsil ectopia measuring 4.4 mm below the foramen magnum, consistent with Chiari I malformation along with the abnormal signal intensity of the pituitary gland.        At today's evaluation she arrives unaccompanied and ambulating without assistance with complaints of moderately persistent headache.  She also relates instances of dizziness and imbalance.  We reviewed all of

## 2024-10-10 NOTE — TELEPHONE ENCOUNTER
Pt called regarding her upcoming appt and referral to [facility]. She is unable to get in to their office until 11/22. Pt was concerned about her appt with our office. Writer spoke with MARY ALICE Mendoza, he is ok with the images being completed and keeping the current appt. KL

## 2024-10-17 ENCOUNTER — NURSE ONLY (OUTPATIENT)
Dept: ALLERGY | Age: 64
End: 2024-10-17
Payer: MEDICAID

## 2024-10-17 ENCOUNTER — OFFICE VISIT (OUTPATIENT)
Dept: ALLERGY | Age: 64
End: 2024-10-17
Payer: MEDICAID

## 2024-10-17 VITALS — SYSTOLIC BLOOD PRESSURE: 127 MMHG | OXYGEN SATURATION: 98 % | HEART RATE: 93 BPM | DIASTOLIC BLOOD PRESSURE: 59 MMHG

## 2024-10-17 VITALS
DIASTOLIC BLOOD PRESSURE: 59 MMHG | OXYGEN SATURATION: 98 % | SYSTOLIC BLOOD PRESSURE: 127 MMHG | BODY MASS INDEX: 36.25 KG/M2 | WEIGHT: 204.6 LBS | HEIGHT: 63 IN | HEART RATE: 93 BPM

## 2024-10-17 DIAGNOSIS — J82.83 EOSINOPHILIC ASTHMA: Primary | ICD-10-CM

## 2024-10-17 DIAGNOSIS — J32.2 CHRONIC ETHMOIDAL SINUSITIS: ICD-10-CM

## 2024-10-17 DIAGNOSIS — J30.1 SEASONAL ALLERGIC RHINITIS DUE TO POLLEN: ICD-10-CM

## 2024-10-17 DIAGNOSIS — J30.9 CHRONIC ALLERGIC RHINITIS: Primary | ICD-10-CM

## 2024-10-17 DIAGNOSIS — J30.1 ACUTE SEASONAL ALLERGIC RHINITIS DUE TO POLLEN: ICD-10-CM

## 2024-10-17 PROCEDURE — A4617 MOUTH PIECE: HCPCS | Performed by: NURSE PRACTITIONER

## 2024-10-17 PROCEDURE — 99214 OFFICE O/P EST MOD 30 MIN: CPT | Performed by: NURSE PRACTITIONER

## 2024-10-17 PROCEDURE — 95117 IMMUNOTHERAPY INJECTIONS: CPT | Performed by: NURSE PRACTITIONER

## 2024-10-17 PROCEDURE — 1123F ACP DISCUSS/DSCN MKR DOCD: CPT | Performed by: NURSE PRACTITIONER

## 2024-10-17 ASSESSMENT — ENCOUNTER SYMPTOMS: EYE DISCHARGE: 1

## 2024-10-17 NOTE — PROGRESS NOTES
The nasal septum is midline. The nasal turbinates are within  appropriate limits. No polyps or masses are noted.    The mastoid air cells and middle ear cavities are normally aerated.    There are no suspicious findings in the imaged aspects of the brain parenchyma  and facial soft tissues.    Impression  1. Mild mucosal thickening in the ethmoid air cells bilaterally.  2. The remaining paranasal sinuses are clear.    **This report has been created using voice recognition software. It may contain  minor errors which are inherent in voice recognition technology.**        Electronically signed by Dr. Zelda Pedersen     No results found for this or any previous visit.     No results found for this or any previous visit.         All current and previous medial labs have been reviewed and discussed with patient         Assessment/Orders:   CURRENT MEDS W/ ASSOC DIAG           Start Date End Date     albuterol (PROVENTIL) (2.5 MG/3ML) 0.083% nebulizer solution  09/04/24  --     USE 1 VIAL IN NEBULIZER EVERY 6 HOURS AS NEEDED FOR WHEEZING (FOR ASTHMA)     Associated Diagnoses:  Severe persistent asthma without complication     albuterol sulfate HFA (PROVENTIL;VENTOLIN;PROAIR) 108 (90 Base) MCG/ACT inhaler  09/04/24  --     INHALE 2 PUFFS BY MOUTH EVERY 4 HOURS AS NEEDED FOR  WHEEZING (RESCUE INHALER-CARRY IN PURSE)     Associated Diagnoses:  Severe persistent asthma without complication     ALLERGEN EXTRACT  --  --     Associated Diagnoses:  --     amLODIPine (NORVASC) 10 MG tablet  09/19/24  --     Take 1 tablet by mouth daily     Associated Diagnoses:  --     Ascorbic Acid (VITAMIN C) 500 MG CAPS  --  --     Associated Diagnoses:  --     atorvastatin (LIPITOR) 20 MG tablet  02/08/24  --     Take 1 tablet by mouth daily TAKE 1 TABLET BY MOUTH ONCE DAILY     Associated Diagnoses:  --     Azelastine HCl 137 MCG/SPRAY SOLN  03/27/24  --     Associated Diagnoses:  --     Biotin 1 MG CAPS  --  --     Associated Diagnoses:  --

## 2024-10-17 NOTE — PROGRESS NOTES
PATIENT HAS THE FOLLOWING DIAGNOSIS SUPPORTING ADMINISTRATION OF ALLERGY INJECTIONS: J30.1Allergic rhinitis due to pollen  AND 35602 MULTIPLE ALLERGY INJECTIONS FOR ALLERGY INJECTION ADMINISTRATION      Patient here for allergy injection today.  Patient was presented with the opportunity to speak with provider and ask questions regarding allergy injections.  Patient was also instructed they need to wait 30 minutes after receiving allergy injections. All risks associated with potential adverse effects have been explained to patient and patient handout was provided following allergy testing.    After consent obtained/verified, allergy injection subcutaneously given in back of arm(s).  Please see below for specific details of site injections, concentration of serum, and volume injected.    VIAL COLOR OF ALL VIALS TODAY IS yellow 1:10. Vial allergy w/v concentration today is: 1:10     ALLERGY INJECTION FROM VIAL A GIVEN right  UPPER ARM IN THE AMOUNT OF 0.30 ML    ALLERGY INJECTION FROM VIAL B GIVEN left upper ARM IN THE AMOUNT OF 0.30  ML    ALLERGY INJECTION FROM VIAL C GIVEN leftlower ARM IN THE AMOUNT OF 0.30 ML      Documentation of vial injection specific to arm(s) noted on Allergy Immunotherapy Administration Form.       Patient waited 30 minutes for observation.No  Patient has received information and verbalizes understanding of the potential  health risks associated with allergy injections . Patient understands risks including anaphylaxis and chooses not to wait 30 minutes after administration of allergy injection(s).    Patient tolerated well without adverse reaction while the patient was in the office.    SHOT REACTION TREATMENT INSTRUCTIONS    During the 30 minute wait after an allergy injection the following symptoms should be reported:    Itching other than at the injection site  Hives or swelling other than at the injection site  Redness other than at the injection site  Difficulty breathing  Chest

## 2024-10-21 NOTE — PROGRESS NOTES
risks including atrophy and dyspigmentation, patient gives verbal consent to proceed. After cleansing with alcohol the alopecic patches on the scalp were injected with 0.3 ml of Kenalog 10 mg/mL  - discussed that her current pituitary issues/neurology issues are likely unrelated to her CCCA; counseled her to continue to follow with these providers     RTC 4 months CCCA    Future Appointments   Date Time Provider Department Center   10/29/2024  8:00 AM SCHEDULE, [facility] ALL[facility] N [facility] [place]   11/1/2024  6:30 AM STR MRI RM1 [facility] MRI STR Rad/Card   11/1/2024  7:00 AM STR MRI RM1 [facility] MRI STR Rad/Card   11/1/2024  7:30 AM STR MRI RM1 [facility] MRI STR Rad/Card   11/5/2024  8:00 AM SCHEDULE, [facility] ALL[facility]GY [facility] N [facility] [place]   11/6/2024  8:45 AM Lyle Zhang MD N [facility] [place]   11/7/2024  8:30 AM Mayur Jaramillo MD N [place] [facility] [place]   11/12/2024  8:00 AM SCHEDULE, [facility] ALLERGY [facility] N [facility] [place]   11/19/2024  8:00 AM SCHEDULE, [facility] ALLERGY LAB N [facility] [place]   11/26/2024  8:00 AM SCHEDULE, [facility] ALLERGY LAB N [facility] [place]   1/16/2025  8:00 AM Abad Falk MD ENDO [facility] [place]   3/14/2025  8:00 AM Sandeep Diaz MD N ENT [facility] [place]   4/29/2025  9:00 AM Narcisa Hsieh APRN - CNP N [facility] [place]   7/7/2025  7:30 AM STR CT IMAGING RM1  [facility] OUT EXP STR Rad/Card   7/17/2025  8:00 AM Theresa Krishnan, APRN - CNP N Pulm [facility] [place]         There are no Patient Instructions on file for this visit.      IToyin, personally scribed the services dictated to me by Dr. Pritchard in this documentation.     I, Dr. Heuring, personally performed the services described in this documentation, as scribed by Toyin Snider in my presence, and it is both accurate and complete.

## 2024-10-22 ENCOUNTER — NURSE ONLY (OUTPATIENT)
Dept: ALLERGY | Age: 64
End: 2024-10-22
Payer: MEDICAID

## 2024-10-22 VITALS
SYSTOLIC BLOOD PRESSURE: 127 MMHG | HEART RATE: 84 BPM | BODY MASS INDEX: 36.24 KG/M2 | HEIGHT: 63 IN | DIASTOLIC BLOOD PRESSURE: 60 MMHG | OXYGEN SATURATION: 94 %

## 2024-10-22 DIAGNOSIS — J30.1 ACUTE SEASONAL ALLERGIC RHINITIS DUE TO POLLEN: Primary | ICD-10-CM

## 2024-10-22 DIAGNOSIS — J30.9 CHRONIC ALLERGIC RHINITIS: ICD-10-CM

## 2024-10-22 DIAGNOSIS — J30.1 SEASONAL ALLERGIC RHINITIS DUE TO POLLEN: ICD-10-CM

## 2024-10-22 PROCEDURE — 95117 IMMUNOTHERAPY INJECTIONS: CPT | Performed by: NURSE PRACTITIONER

## 2024-10-22 NOTE — PROGRESS NOTES
PATIENT HAS THE FOLLOWING DIAGNOSIS SUPPORTING ADMINISTRATION OF ALLERGY INJECTIONS: J30.1Allergic rhinitis due to pollen  AND 96086 MULTIPLE ALLERGY INJECTIONS FOR ALLERGY INJECTION ADMINISTRATION      Patient here for allergy injection today.  Patient was presented with the opportunity to speak with provider and ask questions regarding allergy injections.  Patient was also instructed they need to wait 30 minutes after receiving allergy injections. All risks associated with potential adverse effects have been explained to patient and patient handout was provided following allergy testing.    After consent obtained/verified, allergy injection subcutaneously given in back of arm(s).  Please see below for specific details of site injections, concentration of serum, and volume injected.    VIAL COLOR OF ALL VIALS TODAY IS yellow 1:10. Vial allergy w/v concentration today is: 1:10     ALLERGY INJECTION FROM VIAL A GIVEN left  UPPER ARM IN THE AMOUNT OF 0.40 ML    ALLERGY INJECTION FROM VIAL B GIVEN right upper ARM IN THE AMOUNT OF 0.40  ML    ALLERGY INJECTION FROM VIAL C GIVEN rightlower ARM IN THE AMOUNT OF 0.40 ML      Documentation of vial injection specific to arm(s) noted on Allergy Immunotherapy Administration Form.       Patient waited 30 minutes for observation.No  Patient has received information and verbalizes understanding of the potential  health risks associated with allergy injections . Patient understands risks including anaphylaxis and chooses not to wait 30 minutes after administration of allergy injection(s).    Patient tolerated well without adverse reaction while the patient was in the office.    SHOT REACTION TREATMENT INSTRUCTIONS    During the 30 minute wait after an allergy injection the following symptoms should be reported:    Itching other than at the injection site  Hives or swelling other than at the injection site  Redness other than at the injection site  Difficulty breathing  Chest

## 2024-10-28 ENCOUNTER — OFFICE VISIT (OUTPATIENT)
Dept: DERMATOLOGY | Age: 64
End: 2024-10-28
Payer: MEDICAID

## 2024-10-28 VITALS
SYSTOLIC BLOOD PRESSURE: 108 MMHG | HEART RATE: 84 BPM | DIASTOLIC BLOOD PRESSURE: 64 MMHG | HEIGHT: 63 IN | WEIGHT: 209 LBS | BODY MASS INDEX: 37.03 KG/M2

## 2024-10-28 DIAGNOSIS — L66.81 CENTRAL CENTRIFUGAL SCARRING ALOPECIA: Primary | ICD-10-CM

## 2024-10-28 PROCEDURE — 99214 OFFICE O/P EST MOD 30 MIN: CPT | Performed by: DERMATOLOGY

## 2024-10-28 PROCEDURE — 1123F ACP DISCUSS/DSCN MKR DOCD: CPT | Performed by: DERMATOLOGY

## 2024-10-28 PROCEDURE — 11900 INJECT SKIN LESIONS </W 7: CPT | Performed by: DERMATOLOGY

## 2024-10-28 RX ORDER — CLOBETASOL PROPIONATE 0.5 MG/ML
SOLUTION TOPICAL
Qty: 60 ML | Refills: 11 | Status: SHIPPED | OUTPATIENT
Start: 2024-10-28

## 2024-10-28 RX ORDER — KETOCONAZOLE 20 MG/ML
SHAMPOO TOPICAL
Qty: 120 ML | Refills: 0 | Status: SHIPPED | OUTPATIENT
Start: 2024-10-28

## 2024-10-28 NOTE — PATIENT INSTRUCTIONS
For scalp:  - ILK injections performed today  - continue ketoconazole shampoo, refills provided   - restart clobetasol ointment on areas with the burning/tingling sensation 3x weekly, or up to daily for increased flares, refills provided   - restart compounded topical solution, refills provided

## 2024-10-29 ENCOUNTER — TELEPHONE (OUTPATIENT)
Dept: ALLERGY | Age: 64
End: 2024-10-29

## 2024-10-29 NOTE — TELEPHONE ENCOUNTER
Received mychart message from pt from this morning 10/29/2024 at 6:35am stating she is unable to make it for her allergy injections today 10/29/2024 due to not feeling well. Attempted to reach patient. Unable to reach patient. Left voicemail stating  cancelled her appointment for today and if pt had any further questions to please call office

## 2024-11-01 ENCOUNTER — TELEPHONE (OUTPATIENT)
Dept: NEUROSURGERY | Age: 64
End: 2024-11-01

## 2024-11-01 ENCOUNTER — HOSPITAL ENCOUNTER (OUTPATIENT)
Dept: MRI IMAGING | Age: 64
Discharge: HOME | End: 2024-11-01
Payer: MEDICAID

## 2024-11-01 DIAGNOSIS — G93.5 CHIARI MALFORMATION TYPE I (HCC): ICD-10-CM

## 2024-11-01 DIAGNOSIS — G93.2 PSEUDOTUMOR CEREBRI SYNDROME: ICD-10-CM

## 2024-11-01 DIAGNOSIS — Z92.89 HISTORY OF VENTRICULOPERITONEAL SHUNTING: ICD-10-CM

## 2024-11-01 DIAGNOSIS — D35.2 PITUITARY ADENOMA (HCC): ICD-10-CM

## 2024-11-01 PROCEDURE — A9579 GAD-BASE MR CONTRAST NOS,1ML: HCPCS | Performed by: PHYSICIAN ASSISTANT

## 2024-11-01 PROCEDURE — 70553 MRI BRAIN STEM W/O & W/DYE: CPT

## 2024-11-01 PROCEDURE — 70546 MR ANGIOGRAPH HEAD W/O&W/DYE: CPT

## 2024-11-01 PROCEDURE — 70544 MR ANGIOGRAPHY HEAD W/O DYE: CPT

## 2024-11-01 PROCEDURE — 6360000004 HC RX CONTRAST MEDICATION: Performed by: PHYSICIAN ASSISTANT

## 2024-11-01 RX ADMIN — GADOTERIDOL 20 ML: 279.3 INJECTION, SOLUTION INTRAVENOUS at 09:59

## 2024-11-01 NOTE — TELEPHONE ENCOUNTER
Patient was called to reschedule her appointment with Dr. Zhang. The patient is scheduled to be seen by  on 11/6/2024 @ 8:45 am. Dr. Zhang is out of the [place] until 11/11/2024. The patient was called, no answer. A voicemail was left for the patient to call the office to get this appointment rescheduled due to the reasoning above. The office tried reaching the patient by her cell phone, which did not ring.

## 2024-11-04 ENCOUNTER — TELEPHONE (OUTPATIENT)
Dept: DERMATOLOGY | Age: 64
End: 2024-11-04

## 2024-11-04 NOTE — TELEPHONE ENCOUNTER
[facility] called for a clarification of a medication that was sent ketamine 10%/amitriptyline 5/menthol 5.  They are stating the menthol has to be 1%. Is this okay to change?

## 2024-11-05 ENCOUNTER — TELEPHONE (OUTPATIENT)
Dept: ALLERGY | Age: 64
End: 2024-11-05

## 2024-11-05 ENCOUNTER — NURSE ONLY (OUTPATIENT)
Dept: ALLERGY | Age: 64
End: 2024-11-05
Payer: MEDICAID

## 2024-11-05 VITALS
DIASTOLIC BLOOD PRESSURE: 79 MMHG | RESPIRATION RATE: 18 BRPM | SYSTOLIC BLOOD PRESSURE: 99 MMHG | OXYGEN SATURATION: 98 % | HEART RATE: 84 BPM

## 2024-11-05 DIAGNOSIS — J30.1 ACUTE SEASONAL ALLERGIC RHINITIS DUE TO POLLEN: Primary | ICD-10-CM

## 2024-11-05 DIAGNOSIS — J30.9 CHRONIC ALLERGIC RHINITIS: ICD-10-CM

## 2024-11-05 DIAGNOSIS — J30.1 SEASONAL ALLERGIC RHINITIS DUE TO POLLEN: ICD-10-CM

## 2024-11-05 PROCEDURE — 95117 IMMUNOTHERAPY INJECTIONS: CPT | Performed by: NURSE PRACTITIONER

## 2024-11-05 NOTE — PROGRESS NOTES
PATIENT HAS THE FOLLOWING DIAGNOSIS SUPPORTING ADMINISTRATION OF ALLERGY INJECTIONS: J30.1Allergic rhinitis due to pollen  AND 56717 MULTIPLE ALLERGY INJECTIONS FOR ALLERGY INJECTION ADMINISTRATION      Patient here for allergy injection today.  Patient was presented with the opportunity to speak with provider and ask questions regarding allergy injections.  Patient was also instructed they need to wait 30 minutes after receiving allergy injections. All risks associated with potential adverse effects have been explained to patient and patient handout was provided following allergy testing.    After consent obtained/verified, allergy injection subcutaneously given in back of arm(s).  Please see below for specific details of site injections, concentration of serum, and volume injected.    VIAL COLOR OF ALL VIALS TODAY IS yellow 1:10. Vial allergy w/v concentration today is: 1:10     ALLERGY INJECTION FROM VIAL A GIVEN right  UPPER ARM IN THE AMOUNT OF 0.40 ML    ALLERGY INJECTION FROM VIAL B GIVEN left lower ARM IN THE AMOUNT OF 0.40  ML    ALLERGY INJECTION FROM VIAL C GIVEN leftupper ARM IN THE AMOUNT OF 0.40 ML      Documentation of vial injection specific to arm(s) noted on Allergy Immunotherapy Administration Form.       Patient waited 30 minutes for observation.No  Patient has received information and verbalizes understanding of the potential  health risks associated with allergy injections . Patient understands risks including anaphylaxis and chooses not to wait 30 minutes after administration of allergy injection(s).    Patient tolerated well without adverse reaction while the patient was in the office.    SHOT REACTION TREATMENT INSTRUCTIONS    During the 30 minute wait after an allergy injection the following symptoms should be reported:    Itching other than at the injection site  Hives or swelling other than at the injection site  Redness other than at the injection site  Difficulty breathing  Chest

## 2024-11-05 NOTE — TELEPHONE ENCOUNTER
Pt in for allergy injections. Pt states testing is complete. Pt states she had appointment with neurology was originally scheduled for 11/6/2024 but was rescheduled by provider and won't know any notes or results until 11/20/2024.

## 2024-11-07 ENCOUNTER — OFFICE VISIT (OUTPATIENT)
Dept: UROLOGY | Age: 64
End: 2024-11-07

## 2024-11-07 ENCOUNTER — TELEPHONE (OUTPATIENT)
Dept: UROLOGY | Age: 64
End: 2024-11-07

## 2024-11-07 VITALS — BODY MASS INDEX: 37.03 KG/M2 | RESPIRATION RATE: 18 BRPM | WEIGHT: 209 LBS | HEIGHT: 63 IN

## 2024-11-07 DIAGNOSIS — R39.15 URINARY URGENCY: ICD-10-CM

## 2024-11-07 DIAGNOSIS — R35.0 URINARY FREQUENCY: ICD-10-CM

## 2024-11-07 DIAGNOSIS — R31.21 ASYMPTOMATIC MICROSCOPIC HEMATURIA: Primary | ICD-10-CM

## 2024-11-07 DIAGNOSIS — N30.10 INTERSTITIAL CYSTITIS: ICD-10-CM

## 2024-11-07 DIAGNOSIS — N28.1 RENAL CYST: ICD-10-CM

## 2024-11-07 RX ORDER — PENTOSAN POLYSULFATE SODIUM 100 MG/1
100 CAPSULE, GELATIN COATED ORAL 2 TIMES DAILY
Qty: 180 CAPSULE | Refills: 0 | Status: SHIPPED | OUTPATIENT
Start: 2024-11-07 | End: 2025-02-05

## 2024-11-07 RX ORDER — OXYBUTYNIN CHLORIDE 5 MG/1
5 TABLET, EXTENDED RELEASE ORAL DAILY
Qty: 90 TABLET | Refills: 3 | Status: SHIPPED | OUTPATIENT
Start: 2024-11-07

## 2024-11-07 NOTE — TELEPHONE ENCOUNTER
Pt called in and let us know that both medications that were sent in are the Right script of how she takes it

## 2024-11-07 NOTE — PROGRESS NOTES
Med list reviewed by patient on mychart  
cyst             Plan:   Assessment & Plan    Chronic flank pain, likely to MSK/arthritis.  previously Azithro and Doxy, improved urethritis  Start Ditropan 5 mg daily  Start  Elmiron BID  Increase fluids daily  Reviewed Cr 0.7 9/2024  CBC reviewed    4/2023 cystoscopy in [place]- reported to be normal    Has not been in to see us 2022 as she moved and not back in town    Hx of renal cyst; Check renal      Follow up with [facility]  If needs doc, refer to one of other partners         Prescriptions Ordered:  Orders Placed This Encounter   Medications    oxyBUTYnin (DITROPAN-XL) 5 MG extended release tablet     Sig: Take 1 tablet by mouth daily     Dispense:  90 tablet     Refill:  3    ELMIRON 100 MG capsule     Sig: Take 1 capsule by mouth in the morning and at bedtime     Dispense:  180 capsule     Refill:  0      Orders Placed:  Orders Placed This Encounter   Procedures    US RENAL COMPLETE     Standing Status:   Future     Standing Expiration Date:   11/8/2025            MD Mayur TOLEDO MD  [facility]

## 2024-11-12 ENCOUNTER — NURSE ONLY (OUTPATIENT)
Dept: ALLERGY | Age: 64
End: 2024-11-12
Payer: MEDICAID

## 2024-11-12 VITALS
OXYGEN SATURATION: 98 % | HEART RATE: 82 BPM | SYSTOLIC BLOOD PRESSURE: 119 MMHG | DIASTOLIC BLOOD PRESSURE: 75 MMHG | RESPIRATION RATE: 18 BRPM

## 2024-11-12 DIAGNOSIS — J30.1 SEASONAL ALLERGIC RHINITIS DUE TO POLLEN: ICD-10-CM

## 2024-11-12 DIAGNOSIS — J82.83 EOSINOPHILIC ASTHMA: ICD-10-CM

## 2024-11-12 DIAGNOSIS — J30.9 CHRONIC ALLERGIC RHINITIS: ICD-10-CM

## 2024-11-12 DIAGNOSIS — J30.1 ACUTE SEASONAL ALLERGIC RHINITIS DUE TO POLLEN: Primary | ICD-10-CM

## 2024-11-12 PROCEDURE — 96372 THER/PROPH/DIAG INJ SC/IM: CPT | Performed by: NURSE PRACTITIONER

## 2024-11-12 PROCEDURE — 95117 IMMUNOTHERAPY INJECTIONS: CPT | Performed by: NURSE PRACTITIONER

## 2024-11-12 NOTE — PROGRESS NOTES
Patient here today to receive Xolair injection.    Patient does not report any previous reaction from Xolair injections.  Denies any nausea vomiting fever urticaria or angioedema.  Denies any recent exacerbation of asthma or asthma-like symptoms.  Patient was explained benefits and potential risks including anaphylaxis to patient.      Patient has been explained the risk and benefits including delayed anaphylaxis.      Patient has EpiPen and understands how to appropriately use? Yes      1ST DOSE OF XOLAIR 150 MG GIVEN subcutaneously in  right lowerABDOMEN  2ND DOSE OF XOLAIR 150 MG GIVEN subcutaneously in  left lower  Total combined dose of 300 mg        APPROVED INJECTION SITES RIGHT  / LEFT UPPER ARM,  RIGHT  / LEFT FRONT OR MIDDLE OF THIGH, OR STOMACH         NDC 42162-865-31   Lot [id number]   Expires 11/30/2025    Patient observed for 30 minutes No      Medication was supplied by patient:  YES/NO: Yes      Medication was supplied as a sample:  YES/NO: No                   Prior to patient receiving medication the area was cleansed with alcohol swabs.  Following the administration no evidence of bleeding or bruising.    No adverse reactions reported.  Patient tolerated well without adverse reactions or side effects.  Patient to continue to receive Xolair injections as prescribed.  Will report any problems to this office.  No evidence of allergic reaction including anaphylaxis.      
tightness  Difficulty swallowing  Throat tightness    If these symptoms occur, NOTIFY PROVIDER and the following treatment should be administered:    1.  Epinephrine/Auvi Q 1:1000 IM - 0.3 ml if > 66 lbs or more, 0.15 ml if 33 - 63 lbs, or 0.1 ml if <33 lbs     2.  Diphenhydramine - give all intramuscular:     2 to <6 years (off-label use): 6.25 mg,    6 to <12 years: 12.5 to 25 mg;    >=12 years: 25-50 mg.    3.  Famotidine:  Adults 40 mg oral    Adolescents age 16 years and >88 lbs: 40 mg    Children and Adolescents <=16 years of age: Initial: 0.25 mg/kg/dose  every 12 hours (maximum daily dose: 40 mg/day)    Epi/Auvi Q dose may me repeated in 5-15 minutes if adequate resolution of symptoms does not occur    Patient should be observed for at least one hour after final Epi/Auvi Q dose and must be seen by provider.  Patients cannot drive themselves if they have received diphenhydramine.

## 2024-11-19 ENCOUNTER — NURSE ONLY (OUTPATIENT)
Dept: ALLERGY | Age: 64
End: 2024-11-19
Payer: MEDICAID

## 2024-11-19 ENCOUNTER — TELEPHONE (OUTPATIENT)
Dept: ALLERGY | Age: 64
End: 2024-11-19

## 2024-11-19 VITALS
SYSTOLIC BLOOD PRESSURE: 127 MMHG | RESPIRATION RATE: 16 BRPM | OXYGEN SATURATION: 99 % | HEART RATE: 83 BPM | DIASTOLIC BLOOD PRESSURE: 79 MMHG

## 2024-11-19 DIAGNOSIS — J30.1 SEASONAL ALLERGIC RHINITIS DUE TO POLLEN: ICD-10-CM

## 2024-11-19 DIAGNOSIS — J30.9 CHRONIC ALLERGIC RHINITIS: Primary | ICD-10-CM

## 2024-11-19 DIAGNOSIS — J30.1 NON-SEASONAL ALLERGIC RHINITIS DUE TO POLLEN: Primary | ICD-10-CM

## 2024-11-19 PROCEDURE — 95117 IMMUNOTHERAPY INJECTIONS: CPT | Performed by: NURSE PRACTITIONER

## 2024-11-19 RX ORDER — FLUTICASONE PROPIONATE 50 MCG
2 SPRAY, SUSPENSION (ML) NASAL DAILY PRN
Qty: 1 EACH | Refills: 5 | Status: SHIPPED | OUTPATIENT
Start: 2024-11-19

## 2024-11-19 NOTE — PROGRESS NOTES
Patient is requesting medication for nasal congestion.  Medication for nasal congestion.  Will send in flonase 2 sprays daily as needed

## 2024-11-19 NOTE — TELEPHONE ENCOUNTER
Her meds are a hot mess. Please tell her to bring in all meds (do not bring in controlled meds if she has them) the next time she comes for an allergy shot and rectify her medications.  I am going to send in flonase to use for nasal congestion as needed. Make sure she understands that she does not have to use this medication daily

## 2024-11-19 NOTE — PROGRESS NOTES
PATIENT HAS THE FOLLOWING DIAGNOSIS SUPPORTING ADMINISTRATION OF ALLERGY INJECTIONS: J30.1Allergic rhinitis due to pollen  AND 16597 MULTIPLE ALLERGY INJECTIONS FOR ALLERGY INJECTION ADMINISTRATION      Patient here for allergy injection today.  Patient was presented with the opportunity to speak with provider and ask questions regarding allergy injections.  Patient was also instructed they need to wait 30 minutes after receiving allergy injections. All risks associated with potential adverse effects have been explained to patient and patient handout was provided following allergy testing.    After consent obtained/verified, allergy injection subcutaneously given in back of arm(s).  Please see below for specific details of site injections, concentration of serum, and volume injected.    VIAL COLOR OF ALL VIALS TODAY IS red 1:1. Vial allergy w/v concentration today is: 1:1     ALLERGY INJECTION FROM VIAL A GIVEN right  UPPER ARM IN THE AMOUNT OF 0.05 ML    ALLERGY INJECTION FROM VIAL B GIVEN left upper ARM IN THE AMOUNT OF 0.05  ML    ALLERGY INJECTION FROM VIAL C GIVEN leftlower ARM IN THE AMOUNT OF 0.05 ML      Documentation of vial injection specific to arm(s) noted on Allergy Immunotherapy Administration Form.       Patient waited 30 minutes for observation.No  Patient has received information and verbalizes understanding of the potential  health risks associated with allergy injections . Patient understands risks including anaphylaxis and chooses not to wait 30 minutes after administration of allergy injection(s).    Patient tolerated well without adverse reaction while the patient was in the office.    SHOT REACTION TREATMENT INSTRUCTIONS    During the 30 minute wait after an allergy injection the following symptoms should be reported:    Itching other than at the injection site  Hives or swelling other than at the injection site  Redness other than at the injection site  Difficulty breathing  Chest

## 2024-11-19 NOTE — TELEPHONE ENCOUNTER
Pt in for allergy injections. Pt is wanting to know if she should restart nasal sprays. Pt states she is congested but she was getting sores in her nose from the nasal sprays. Pt was not getting nose bleeds, just sores. See [employer] message from 8/21/24.    Please advise.

## 2024-11-20 ENCOUNTER — TELEPHONE (OUTPATIENT)
Dept: UROLOGY | Age: 64
End: 2024-11-20

## 2024-11-20 ENCOUNTER — OFFICE VISIT (OUTPATIENT)
Dept: NEUROSURGERY | Age: 64
End: 2024-11-20
Payer: MEDICAID

## 2024-11-20 VITALS
HEIGHT: 62 IN | DIASTOLIC BLOOD PRESSURE: 86 MMHG | HEART RATE: 97 BPM | BODY MASS INDEX: 38.46 KG/M2 | WEIGHT: 209 LBS | SYSTOLIC BLOOD PRESSURE: 144 MMHG

## 2024-11-20 DIAGNOSIS — G93.5 CHIARI MALFORMATION TYPE I (HCC): ICD-10-CM

## 2024-11-20 DIAGNOSIS — G93.2 PSEUDOTUMOR CEREBRI SYNDROME: ICD-10-CM

## 2024-11-20 DIAGNOSIS — D35.2 PITUITARY ADENOMA (HCC): Primary | ICD-10-CM

## 2024-11-20 PROCEDURE — 1123F ACP DISCUSS/DSCN MKR DOCD: CPT | Performed by: NEUROLOGICAL SURGERY

## 2024-11-20 PROCEDURE — 99214 OFFICE O/P EST MOD 30 MIN: CPT | Performed by: NEUROLOGICAL SURGERY

## 2024-11-20 NOTE — PROGRESS NOTES
Gely Archer   [id number]   1960       Gely Archer  presented today for a follow up visit.        Notes from patient's previous visit:      This is a 64-year-old female who who presented initially to neurosurgery outpatient clinic because of worsening headache.  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 Onofre.          In today visit:      Patient stated that there is no significant changes in patient's symptoms since her last visit.  There is no significant changes also and patient physical exam comparing with her last visit.  Patient did not see her pulmonologist yet as it was recommended during her last visit (plan to see her ophthalmology next week).  Brain MRI and MRV showed:    There is no abnormal enhancement in the brain. The major intracranial vascular  flow voids are present. The cerebellar tonsils are 3 mm below the foramen  magnum. There is no crowding of the craniocervical junction. They have a round  configuration. This is stable. The brainstem is normal.     In the sella turcica, there is a nonenhancing lesion in the suprasellar and  sellar location. This is arising from the superior margin of the pituitary gland  versus abutting the superior margin on the left. This measures 1.3 x 1.1 x 0.9  cm. This is isointense to the pituitary tissue on the noncontrast images. The  pituitary tissue enhances normally after contrast. This lesion does not enhance.  The pituitary stalk deviates to the right. There is no associated mass effect  upon the undersurface of the optic chiasm. This lesion is stable. The pituitary  tissue below it appears normal.           On the CSF flow images, there is no disparity in CSF flow anterior and posterior  to the spinal cord during the diastolic and systolic phases. The images  performed at a VENC of 5 are of best quality.

## 2024-11-20 NOTE — TELEPHONE ENCOUNTER
Patient scheduled for US RENAL COMPLETE  at [facility] on 2/3/25.  Arrival of 6:45 am for a 7:00 am  scan time.  Order mailed with instructions or given to the patient in the office     Orders and instructions mailed to patient

## 2024-11-26 ENCOUNTER — NURSE ONLY (OUTPATIENT)
Dept: ALLERGY | Age: 64
End: 2024-11-26
Payer: MEDICAID

## 2024-11-26 VITALS
DIASTOLIC BLOOD PRESSURE: 82 MMHG | HEART RATE: 61 BPM | SYSTOLIC BLOOD PRESSURE: 129 MMHG | OXYGEN SATURATION: 100 % | RESPIRATION RATE: 18 BRPM

## 2024-11-26 DIAGNOSIS — J30.9 CHRONIC ALLERGIC RHINITIS: Primary | ICD-10-CM

## 2024-11-26 DIAGNOSIS — J30.1 SEASONAL ALLERGIC RHINITIS DUE TO POLLEN: ICD-10-CM

## 2024-11-26 PROCEDURE — 95117 IMMUNOTHERAPY INJECTIONS: CPT | Performed by: NURSE PRACTITIONER

## 2024-11-26 NOTE — PROGRESS NOTES
tightness  Difficulty swallowing  Throat tightness    If these symptoms occur, NOTIFY PROVIDER and the following treatment should be administered:    1.  Epinephrine/Auvi Q 1:1000 IM - 0.3 ml if > 66 lbs or more, 0.15 ml if 33 - 63 lbs, or 0.1 ml if <33 lbs     2.  Diphenhydramine - give all intramuscular:     2 to <6 years (off-label use): 6.25 mg,    6 to <12 years: 12.5 to 25 mg;    >=12 years: 25-50 mg.    3.  Famotidine:  Adults 40 mg oral    Adolescents age 16 years and >88 lbs: 40 mg    Children and Adolescents <=16 years of age: Initial: 0.25 mg/kg/dose  every 12 hours (maximum daily dose: 40 mg/day)    Epi/Auvi Q dose may me repeated in 5-15 minutes if adequate resolution of symptoms does not occur    Patient should be observed for at least one hour after final Epi/Auvi Q dose and must be seen by provider.  Patients cannot drive themselves if they have received diphenhydramine.

## 2024-12-02 ENCOUNTER — HOSPITAL ENCOUNTER (OUTPATIENT)
Dept: INTERVENTIONAL RADIOLOGY/VASCULAR | Age: 64
Discharge: HOME | End: 2024-12-02
Payer: MEDICAID

## 2024-12-02 VITALS
HEIGHT: 62 IN | DIASTOLIC BLOOD PRESSURE: 59 MMHG | WEIGHT: 209 LBS | OXYGEN SATURATION: 94 % | SYSTOLIC BLOOD PRESSURE: 118 MMHG | BODY MASS INDEX: 38.46 KG/M2 | TEMPERATURE: 96.8 F | RESPIRATION RATE: 18 BRPM | HEART RATE: 86 BPM

## 2024-12-02 DIAGNOSIS — G93.5 CHIARI MALFORMATION TYPE I (HCC): ICD-10-CM

## 2024-12-02 PROCEDURE — 2709999900 IR LUMBAR PUNCTURE FOR DIAGNOSIS

## 2024-12-02 PROCEDURE — 6360000002 HC RX W HCPCS: Performed by: RADIOLOGY

## 2024-12-02 PROCEDURE — 62328 DX LMBR SPI PNXR W/FLUOR/CT: CPT

## 2024-12-02 PROCEDURE — 2580000003 HC RX 258: Performed by: RADIOLOGY

## 2024-12-02 RX ORDER — SODIUM CHLORIDE 450 MG/100ML
INJECTION, SOLUTION INTRAVENOUS CONTINUOUS
Status: DISCONTINUED | OUTPATIENT
Start: 2024-12-02 | End: 2024-12-03 | Stop reason: HOSPADM

## 2024-12-02 RX ORDER — LIDOCAINE HYDROCHLORIDE 20 MG/ML
INJECTION, SOLUTION EPIDURAL; INFILTRATION; INTRACAUDAL; PERINEURAL PRN
Status: COMPLETED | OUTPATIENT
Start: 2024-12-02 | End: 2024-12-02

## 2024-12-02 RX ADMIN — SODIUM CHLORIDE: 4.5 INJECTION, SOLUTION INTRAVENOUS at 08:38

## 2024-12-02 RX ADMIN — LIDOCAINE HYDROCHLORIDE 15 ML: 20 INJECTION, SOLUTION EPIDURAL; INFILTRATION; INTRACAUDAL; PERINEURAL at 10:15

## 2024-12-02 ASSESSMENT — PAIN DESCRIPTION - DESCRIPTORS: DESCRIPTORS: ACHING

## 2024-12-02 ASSESSMENT — PAIN - FUNCTIONAL ASSESSMENT
PAIN_FUNCTIONAL_ASSESSMENT: ACTIVITIES ARE NOT PREVENTED
PAIN_FUNCTIONAL_ASSESSMENT: 0-10

## 2024-12-02 NOTE — PROGRESS NOTES
TRANSFER - OUT REPORT:    Verbal report given to Therese RN(name) on Gely Archer being transferred to [facility](unit) for routine progression of patient care       Report consisted of patient's Situation, Background, Assessment and   Recommendations(SBAR).     Information from the following report(s) Nurse Handoff Report, Surgery Report, and MAR was reviewed with the receiving nurse.    Opportunity for questions and clarification was provided.      Patient transported with:   Monitor

## 2024-12-02 NOTE — DISCHARGE INSTRUCTIONS
LUMBAR PUNCTURE DISCHARGE CARE    1.  Drink extra liquids today.  2.  Minimal activity for the remainder of the day.  Go home and rest.  No driving today.  3.  You may get a headache after the Lumbar Puncture.  Lying down usually lessens the headache.  You may take Tylenol for the headache.  4.  Keep puncture site lower back clean and dry for 24 hours.  Cover area with a band- aid for 1 - 2 days.    Call your doctor if:  You have a headache lasting longer than 24 hours.  Bleeding other than a small spot on the band-aid from puncture site.    Seek care immediately by going to the nearest Emergency Room if:  You have shaking, chills or temperature over 101 deg F.  You have severe headache.  You have numbness, weakness,or tingling in your legs or change in mental alertness.    Any questions call your physician or [facility] [phone] or [phone]

## 2024-12-02 NOTE — PROGRESS NOTES
0950 Patient received in IR for lumbar puncture with opening pressure.  0955 This procedure has been fully reviewed with the patient and written informed consent has been obtained.  1012 Procedure started with .  1020 Opening pressure 8.5 cmH20  1021 Procedure completed; patient tolerated well. Dressing to lumbar region, bandaid; no bleeding noted.  1023 Patient on cart; comfort ensured.  1026 Patient taken to OPN via cart/transporter. Pt alert and oriented x3; follows commands. Skin pink, warm, and dry. Respirations easy, regular, and nonlabored.

## 2024-12-02 NOTE — PROGRESS NOTES
0803 Patient arrived to [facility] ambulatory for lumbar puncture.  Oriented to room and call light  PT RIGHTS AND RESPONSIBILITIES OFFERED TO PT.  She states she has transport bringing her, she has been NPO, and she does not take any blood thinners.  Her  is at the bedside.     1040 Patient returned to [facility].  Dressing is clean, dry, intact.  Patient denies complaints    1055 dressing is clean, dry, intact.  She denies complaints    1125 dressing is clean, dry, intact.  She denies complaints    1155 dressing is clean, dry, intact.  She denies complaints    1220 dressing is clean, dry, intact.  She denies complaints.  Iv removed.  Discharge instructions given and explained and she denies questions.      1231 Discharged in wheel chair      _M___ Safety:       (Environmental)  [place] to environment  Ensure ID band is correct and in place/ allergy band as needed  Assess for fall risk  Initiate fall precautions as applicable (fall band, side rails, etc.)  Call light within reach  Bed in low position/ wheels locked    _M___ Pain:       Assess pain level and characteristics  Administer analgesics as ordered  Assess effectiveness of pain management and report to MD as needed    _M___ Knowledge Deficit:  Assess baseline knowledge  Provide teaching at level of understanding  Provide teaching via preferred learning method  Evaluate teaching effectiveness    _M___ Hemodynamic/Respiratory Status:       (Pre and Post Procedure Monitoring)  Assess/Monitor vital signs and LOC  Assess Baseline SpO2 prior to any sedation  Obtain weight/height  Assess vital signs/ LOC until patient meets discharge criteria  Monitor procedure site and notify MD of any issues

## 2024-12-02 NOTE — H&P
Formulation and discussion of sedation / procedure plans, risks, benefits, side effects and alternatives with patient and/or responsible adult completed.    History and Physical reviewed and unchanged.    Electronically signed by Moses Davila MD on 12/2/24 at 10:21 AM EST

## 2024-12-02 NOTE — OP NOTE
Department of Radiology  Post Procedure Progress Note      Pre-Procedure Diagnosis:   headaches     Procedure Performed:  lumbar puncture    Anesthesia: local    Findings: successful, opeing pressure 8.5 cm H2O    Immediate Complications:  None    Estimated Blood Loss: minimal    SEE DICTATED PROCEDURE NOTE FOR COMPLETE DETAILS.    Moses Davila MD   12/2/2024 10:21 AM

## 2024-12-04 ENCOUNTER — TELEPHONE (OUTPATIENT)
Dept: NEUROSURGERY | Age: 64
End: 2024-12-04

## 2024-12-04 DIAGNOSIS — L66.81 CENTRAL CENTRIFUGAL SCARRING ALOPECIA: ICD-10-CM

## 2024-12-04 NOTE — TELEPHONE ENCOUNTER
Patient was called and informed her discs are ready for  in the office. Voicemail left for the patient with office hours.

## 2024-12-05 RX ORDER — KETOCONAZOLE 20 MG/ML
SHAMPOO, SUSPENSION TOPICAL
Qty: 120 ML | Refills: 0 | Status: SHIPPED | OUTPATIENT
Start: 2024-12-05

## 2024-12-10 ENCOUNTER — NURSE ONLY (OUTPATIENT)
Dept: ALLERGY | Age: 64
End: 2024-12-10
Payer: MEDICAID

## 2024-12-10 VITALS
RESPIRATION RATE: 18 BRPM | DIASTOLIC BLOOD PRESSURE: 71 MMHG | OXYGEN SATURATION: 98 % | HEART RATE: 96 BPM | SYSTOLIC BLOOD PRESSURE: 122 MMHG

## 2024-12-10 DIAGNOSIS — J30.1 SEASONAL ALLERGIC RHINITIS DUE TO POLLEN: ICD-10-CM

## 2024-12-10 DIAGNOSIS — J30.9 CHRONIC ALLERGIC RHINITIS: Primary | ICD-10-CM

## 2024-12-10 PROCEDURE — 95117 IMMUNOTHERAPY INJECTIONS: CPT | Performed by: NURSE PRACTITIONER

## 2024-12-12 DIAGNOSIS — J45.40 MODERATE PERSISTENT ASTHMA WITHOUT COMPLICATION: Primary | ICD-10-CM

## 2024-12-17 ENCOUNTER — NURSE ONLY (OUTPATIENT)
Dept: ALLERGY | Age: 64
End: 2024-12-17
Payer: MEDICAID

## 2024-12-17 VITALS
DIASTOLIC BLOOD PRESSURE: 70 MMHG | HEART RATE: 86 BPM | OXYGEN SATURATION: 98 % | RESPIRATION RATE: 18 BRPM | SYSTOLIC BLOOD PRESSURE: 129 MMHG

## 2024-12-17 DIAGNOSIS — J30.1 SEASONAL ALLERGIC RHINITIS DUE TO POLLEN: ICD-10-CM

## 2024-12-17 DIAGNOSIS — J82.83 EOSINOPHILIC ASTHMA: ICD-10-CM

## 2024-12-17 DIAGNOSIS — J30.9 CHRONIC ALLERGIC RHINITIS: Primary | ICD-10-CM

## 2024-12-17 PROCEDURE — 95117 IMMUNOTHERAPY INJECTIONS: CPT | Performed by: NURSE PRACTITIONER

## 2024-12-17 PROCEDURE — 96372 THER/PROPH/DIAG INJ SC/IM: CPT | Performed by: NURSE PRACTITIONER

## 2024-12-17 NOTE — PROGRESS NOTES
PATIENT HAS THE FOLLOWING DIAGNOSIS SUPPORTING ADMINISTRATION OF ALLERGY INJECTIONS: J30.1Allergic rhinitis due to pollen  AND 97440 MULTIPLE ALLERGY INJECTIONS FOR ALLERGY INJECTION ADMINISTRATION      Patient here for allergy injection today.  Patient was presented with the opportunity to speak with provider and ask questions regarding allergy injections.  Patient was also instructed they need to wait 30 minutes after receiving allergy injections. All risks associated with potential adverse effects have been explained to patient and patient handout was provided following allergy testing.    After consent obtained/verified, allergy injection subcutaneously given in back of arm(s).  Please see below for specific details of site injections, concentration of serum, and volume injected.    VIAL COLOR OF ALL VIALS TODAY IS red 1:1. Vial allergy w/v concentration today is: 1:1     ALLERGY INJECTION FROM VIAL A GIVEN left  UPPER ARM IN THE AMOUNT OF 0.15 ML    ALLERGY INJECTION FROM VIAL B GIVEN right lower ARM IN THE AMOUNT OF 0.15  ML    ALLERGY INJECTION FROM VIAL C GIVEN rightupper ARM IN THE AMOUNT OF 0.15 ML      Documentation of vial injection specific to arm(s) noted on Allergy Immunotherapy Administration Form.       Patient waited 30 minutes for observation.No  Patient has received information and verbalizes understanding of the potential  health risks associated with allergy injections . Patient understands risks including anaphylaxis and chooses not to wait 30 minutes after administration of allergy injection(s).    Patient tolerated well without adverse reaction while the patient was in the office.    SHOT REACTION TREATMENT INSTRUCTIONS    During the 30 minute wait after an allergy injection the following symptoms should be reported:    Itching other than at the injection site  Hives or swelling other than at the injection site  Redness other than at the injection site  Difficulty breathing  Chest

## 2024-12-17 NOTE — PROGRESS NOTES
Patient here today to receive Xolair injection.    Patient does not report any previous reaction from Xolair injections.  Denies any nausea vomiting fever urticaria or angioedema.  Denies any recent exacerbation of asthma or asthma-like symptoms.  Patient was explained benefits and potential risks including anaphylaxis to patient.      Patient has been explained the risk and benefits including delayed anaphylaxis.      Patient has EpiPen and understands how to appropriately use? Yes      1ST DOSE OF XOLAIR 150 MG GIVEN subcutaneously in  left ABDOMEN  2ND DOSE OF XOLAIR 150 MG GIVEN subcutaneously in  right ABDOMEN  Total combined dose of 300 mg        APPROVED INJECTION SITES RIGHT  / LEFT UPPER ARM,  RIGHT  / LEFT FRONT OR MIDDLE OF THIGH, OR STOMACH         NDC LLQ- 24999-782-10 [id number]   Lot LLQ- [id number]   Expires Q- 11/30/2025 [facility]-12/31/2025    Patient observed for 30 minutes No      Medication was supplied by patient:  YES/NO: Yes      Medication was supplied as a sample:  YES/NO: No                   Prior to patient receiving medication the area was cleansed with alcohol swabs.  Following the administration no evidence of bleeding or bruising.    No adverse reactions reported.  Patient tolerated well without adverse reactions or side effects.  Patient to continue to receive Xolair injections as prescribed.  Will report any problems to this office.  No evidence of allergic reaction including anaphylaxis.

## 2024-12-24 ENCOUNTER — NURSE ONLY (OUTPATIENT)
Dept: ALLERGY | Age: 64
End: 2024-12-24
Payer: MEDICAID

## 2024-12-24 VITALS
OXYGEN SATURATION: 98 % | HEART RATE: 91 BPM | DIASTOLIC BLOOD PRESSURE: 76 MMHG | RESPIRATION RATE: 18 BRPM | SYSTOLIC BLOOD PRESSURE: 130 MMHG

## 2024-12-24 DIAGNOSIS — J30.9 CHRONIC ALLERGIC RHINITIS: Primary | ICD-10-CM

## 2024-12-24 DIAGNOSIS — J30.1 SEASONAL ALLERGIC RHINITIS DUE TO POLLEN: ICD-10-CM

## 2024-12-24 PROCEDURE — 95117 IMMUNOTHERAPY INJECTIONS: CPT | Performed by: NURSE PRACTITIONER

## 2024-12-24 NOTE — PROGRESS NOTES
PATIENT HAS THE FOLLOWING DIAGNOSIS SUPPORTING ADMINISTRATION OF ALLERGY INJECTIONS: J30.1Allergic rhinitis due to pollen  AND 34673 MULTIPLE ALLERGY INJECTIONS FOR ALLERGY INJECTION ADMINISTRATION      Patient here for allergy injection today.  Patient was presented with the opportunity to speak with provider and ask questions regarding allergy injections.  Patient was also instructed they need to wait 30 minutes after receiving allergy injections. All risks associated with potential adverse effects have been explained to patient and patient handout was provided following allergy testing.    After consent obtained/verified, allergy injection subcutaneously given in back of arm(s).  Please see below for specific details of site injections, concentration of serum, and volume injected.    VIAL COLOR OF ALL VIALS TODAY IS red 1:1. Vial allergy w/v concentration today is: 1:1     ALLERGY INJECTION FROM VIAL A GIVEN right  UPPER ARM IN THE AMOUNT OF 0.20 ML    ALLERGY INJECTION FROM VIAL B GIVEN left upper ARM IN THE AMOUNT OF 0.20  ML    ALLERGY INJECTION FROM VIAL C GIVEN leftlower ARM IN THE AMOUNT OF 0.20 ML      Documentation of vial injection specific to arm(s) noted on Allergy Immunotherapy Administration Form.       Patient waited 30 minutes for observation.No  Patient has received information and verbalizes understanding of the potential  health risks associated with allergy injections . Patient understands risks including anaphylaxis and chooses not to wait 30 minutes after administration of allergy injection(s).    Patient tolerated well without adverse reaction while the patient was in the office.    SHOT REACTION TREATMENT INSTRUCTIONS    During the 30 minute wait after an allergy injection the following symptoms should be reported:    Itching other than at the injection site  Hives or swelling other than at the injection site  Redness other than at the injection site  Difficulty breathing  Chest

## 2025-01-03 DIAGNOSIS — L85.3 XEROSIS OF SKIN: ICD-10-CM

## 2025-01-03 DIAGNOSIS — L66.81 CENTRAL CENTRIFUGAL SCARRING ALOPECIA: ICD-10-CM

## 2025-01-03 DIAGNOSIS — Z29.89 PROPHYLACTIC IMMUNOTHERAPY: ICD-10-CM

## 2025-01-03 DIAGNOSIS — J45.50 SEVERE PERSISTENT ASTHMA WITHOUT COMPLICATION: ICD-10-CM

## 2025-01-03 RX ORDER — HYDROCORTISONE 25 MG/ML
LOTION TOPICAL
Qty: 118 ML | Refills: 5 | Status: SHIPPED | OUTPATIENT
Start: 2025-01-03

## 2025-01-03 RX ORDER — ATORVASTATIN CALCIUM 20 MG/1
20 TABLET, FILM COATED ORAL DAILY
Qty: 90 TABLET | Refills: 3 | Status: SHIPPED | OUTPATIENT
Start: 2025-01-03

## 2025-01-03 RX ORDER — LEVOTHYROXINE SODIUM 88 UG/1
TABLET ORAL
Qty: 90 TABLET | Refills: 3 | Status: SHIPPED | OUTPATIENT
Start: 2025-01-03

## 2025-01-06 ENCOUNTER — HOSPITAL ENCOUNTER (OUTPATIENT)
Dept: WOMENS IMAGING | Age: 65
Discharge: HOME | End: 2025-01-06
Attending: RADIOLOGY
Payer: MEDICAID

## 2025-01-06 DIAGNOSIS — N63.22 MASS OF UPPER INNER QUADRANT OF LEFT BREAST: ICD-10-CM

## 2025-01-06 DIAGNOSIS — Z09 FOLLOW-UP EXAM: ICD-10-CM

## 2025-01-06 PROCEDURE — 76642 ULTRASOUND BREAST LIMITED: CPT

## 2025-01-06 RX ORDER — EPINEPHRINE 0.3 MG/.3ML
INJECTION SUBCUTANEOUS
Qty: 2 EACH | Refills: 0 | Status: SHIPPED | OUTPATIENT
Start: 2025-01-06

## 2025-01-06 RX ORDER — ALBUTEROL SULFATE 90 UG/1
INHALANT RESPIRATORY (INHALATION)
Qty: 9 G | Refills: 0 | Status: SHIPPED | OUTPATIENT
Start: 2025-01-06

## 2025-01-06 NOTE — TELEPHONE ENCOUNTER
IF THE PATIENT HAS NOT BEEN SEEN DURING THE LAST YEAR, THE MUST HAVE AN APPT TO BE SEEN AND I WILL ONLD SEND IN 90 DAYS ONE TIME.    DID THE PATIENT MISS THE LAST APPT AS A NO SHOW?  no.  IF THE PATIENT WAS A NO SHOW I WILL NOT FILL THE MEDICATION. PATIENTS WILL NEED AN APPT.    __________________________________________________________________________________________________________________________________    Pharmacy requests the following medication to be refilled:    NAME OF MEDICATION:  albuterol and epi pen     FREQUENCY OF MEDICATION:  EVERY 6 HOURS  NUMBER OF MEDICATION FILL DAYS:  30     NAME AND LOCATION OF PHARMACY:  [facility]    MEDICATION LAST FILLED: 9/4/24    NUMBER OF REFILLS LAST PROVIDED: 2  (If refills are current the patient does not need another refill)    Last appointment: 10/17/24      Next appt : 4/29/25

## 2025-01-07 ENCOUNTER — NURSE ONLY (OUTPATIENT)
Dept: ALLERGY | Age: 65
End: 2025-01-07
Payer: MEDICAID

## 2025-01-07 VITALS
DIASTOLIC BLOOD PRESSURE: 95 MMHG | SYSTOLIC BLOOD PRESSURE: 121 MMHG | RESPIRATION RATE: 18 BRPM | OXYGEN SATURATION: 98 % | HEART RATE: 95 BPM

## 2025-01-07 DIAGNOSIS — J30.9 CHRONIC ALLERGIC RHINITIS: Primary | ICD-10-CM

## 2025-01-07 DIAGNOSIS — J30.1 SEASONAL ALLERGIC RHINITIS DUE TO POLLEN: ICD-10-CM

## 2025-01-07 PROCEDURE — 95117 IMMUNOTHERAPY INJECTIONS: CPT | Performed by: NURSE PRACTITIONER

## 2025-01-07 NOTE — PROGRESS NOTES

## 2025-01-09 ENCOUNTER — TELEPHONE (OUTPATIENT)
Dept: ALLERGY | Age: 65
End: 2025-01-09

## 2025-01-09 RX ORDER — KETOCONAZOLE 20 MG/ML
SHAMPOO, SUSPENSION TOPICAL
Qty: 120 ML | Refills: 0 | Status: SHIPPED | OUTPATIENT
Start: 2025-01-09

## 2025-01-09 NOTE — TELEPHONE ENCOUNTER
Received PA Case ID #: [id number]    Outcome  Approved today by [place] [employer] 2017  Your PA request for [id number] was approved for 365 days. The PA# assigned is [id number].  Authorization Expiration Date: 1/7/2026

## 2025-01-14 ENCOUNTER — NURSE ONLY (OUTPATIENT)
Dept: ALLERGY | Age: 65
End: 2025-01-14
Payer: MEDICAID

## 2025-01-14 VITALS
DIASTOLIC BLOOD PRESSURE: 79 MMHG | SYSTOLIC BLOOD PRESSURE: 130 MMHG | HEART RATE: 88 BPM | RESPIRATION RATE: 18 BRPM | OXYGEN SATURATION: 98 %

## 2025-01-14 DIAGNOSIS — J30.1 SEASONAL ALLERGIC RHINITIS DUE TO POLLEN: ICD-10-CM

## 2025-01-14 DIAGNOSIS — J30.9 CHRONIC ALLERGIC RHINITIS: Primary | ICD-10-CM

## 2025-01-14 PROCEDURE — 95117 IMMUNOTHERAPY INJECTIONS: CPT | Performed by: NURSE PRACTITIONER

## 2025-01-16 ENCOUNTER — OFFICE VISIT (OUTPATIENT)
Age: 65
End: 2025-01-16
Payer: MEDICAID

## 2025-01-16 VITALS
DIASTOLIC BLOOD PRESSURE: 78 MMHG | SYSTOLIC BLOOD PRESSURE: 134 MMHG | WEIGHT: 208.6 LBS | HEIGHT: 62 IN | HEART RATE: 88 BPM | BODY MASS INDEX: 38.39 KG/M2

## 2025-01-16 DIAGNOSIS — E23.6 PITUITARY MASS (HCC): Primary | ICD-10-CM

## 2025-01-16 PROCEDURE — 99204 OFFICE O/P NEW MOD 45 MIN: CPT | Performed by: INTERNAL MEDICINE

## 2025-01-16 PROCEDURE — 1123F ACP DISCUSS/DSCN MKR DOCD: CPT | Performed by: INTERNAL MEDICINE

## 2025-01-16 NOTE — PROGRESS NOTES
Maternal Aunt     Asthma Maternal Cousin      Social History     Tobacco Use    Smoking status: Light Smoker     Current packs/day: 0.50     Average packs/day: 0.6 packs/day for 64.9 years (40.1 ttl pk-yrs)     Types: Cigarettes     Start date: 6/1/1975     Passive exposure: Current    Smokeless tobacco: Never    Tobacco comments:     Started back but smoking less   Substance Use Topics    Alcohol use: Yes     Alcohol/week: 2.0 standard drinks of alcohol     Types: 2 Cans of beer per week     Comment: Once a month; stomach tolerates very little alcohol      Current Outpatient Medications   Medication Sig Dispense Refill    ketoconazole (NIZORAL) 2 % shampoo APPLY 3-4 TIMES WEEKLY TO SCALP, LEAVE ON FOR 5 MINUTES PRIOR TO WASHING  mL 0    albuterol sulfate HFA (PROVENTIL;VENTOLIN;PROAIR) 108 (90 Base) MCG/ACT inhaler INHALE 2 PUFFS BY MOUTH EVERY 4 HOURS AS NEEDED FOR WHEEZING **RESCUE  INHALER.  CARRY  IN  PURSE** 9 g 0    EPINEPHrine (EPIPEN) 0.3 MG/0.3ML SOAJ injection INJECT CONTENTS OF 1 PEN INTRAMUSCULARLY AS NEEDED FOR ALLERGIC REACTION 2 each 0    hydrocortisone (HYTONE) 2.5 % lotion APPLY  LOTION EXTERNALLY ONCE DAILY TO AFFECTED AREA 118 mL 5    levothyroxine (SYNTHROID) 88 MCG tablet TAKE 1 TABLET BY MOUTH ONCE DAILY ON AN EMPTY STOMACH WITH  WATER.  DO  NOT  EAT  OR  TAKE  ANY  OTHER  MEDICATIONS  FOR  30  MINUTES 90 tablet 3    atorvastatin (LIPITOR) 20 MG tablet Take 1 tablet by mouth once daily 90 tablet 3    mometasone-formoterol (DULERA) 100-5 MCG/ACT inhaler 2 puffs inhaled twice daily.  Rinse mouth well after use. 1 each 11    fluticasone (FLONASE) 50 MCG/ACT nasal spray 2 sprays by Each Nostril route daily as needed for Rhinitis (for nasal congestion) 1 each 5    oxyBUTYnin (DITROPAN-XL) 5 MG extended release tablet Take 1 tablet by mouth daily 90 tablet 3    ELMIRON 100 MG capsule Take 1 capsule by mouth in the morning and at bedtime 180 capsule 0    NONFORMULARY Compounded cream for joint

## 2025-01-23 ENCOUNTER — PATIENT MESSAGE (OUTPATIENT)
Dept: FAMILY MEDICINE CLINIC | Age: 65
End: 2025-01-23

## 2025-01-23 DIAGNOSIS — E11.65 TYPE 2 DIABETES MELLITUS WITH HYPERGLYCEMIA, UNSPECIFIED WHETHER LONG TERM INSULIN USE (HCC): Primary | ICD-10-CM

## 2025-01-25 SDOH — ECONOMIC STABILITY: FOOD INSECURITY: WITHIN THE PAST 12 MONTHS, YOU WORRIED THAT YOUR FOOD WOULD RUN OUT BEFORE YOU GOT MONEY TO BUY MORE.: NEVER TRUE

## 2025-01-25 SDOH — ECONOMIC STABILITY: INCOME INSECURITY: IN THE LAST 12 MONTHS, WAS THERE A TIME WHEN YOU WERE NOT ABLE TO PAY THE MORTGAGE OR RENT ON TIME?: NO

## 2025-01-25 SDOH — ECONOMIC STABILITY: TRANSPORTATION INSECURITY
IN THE PAST 12 MONTHS, HAS THE LACK OF TRANSPORTATION KEPT YOU FROM MEDICAL APPOINTMENTS OR FROM GETTING MEDICATIONS?: NO

## 2025-01-25 SDOH — ECONOMIC STABILITY: FOOD INSECURITY: WITHIN THE PAST 12 MONTHS, THE FOOD YOU BOUGHT JUST DIDN'T LAST AND YOU DIDN'T HAVE MONEY TO GET MORE.: SOMETIMES TRUE

## 2025-01-25 SDOH — ECONOMIC STABILITY: TRANSPORTATION INSECURITY
IN THE PAST 12 MONTHS, HAS LACK OF TRANSPORTATION KEPT YOU FROM MEETINGS, WORK, OR FROM GETTING THINGS NEEDED FOR DAILY LIVING?: NO

## 2025-01-25 ASSESSMENT — PATIENT HEALTH QUESTIONNAIRE - PHQ9
SUM OF ALL RESPONSES TO PHQ9 QUESTIONS 1 & 2: 1
SUM OF ALL RESPONSES TO PHQ QUESTIONS 1-9: 1
SUM OF ALL RESPONSES TO PHQ9 QUESTIONS 1 & 2: 1
2. FEELING DOWN, DEPRESSED OR HOPELESS: NOT AT ALL
SUM OF ALL RESPONSES TO PHQ QUESTIONS 1-9: 1
1. LITTLE INTEREST OR PLEASURE IN DOING THINGS: SEVERAL DAYS
1. LITTLE INTEREST OR PLEASURE IN DOING THINGS: SEVERAL DAYS
2. FEELING DOWN, DEPRESSED OR HOPELESS: NOT AT ALL

## 2025-01-28 ENCOUNTER — CARE COORDINATION (OUTPATIENT)
Dept: CARE COORDINATION | Age: 65
End: 2025-01-28

## 2025-01-28 ENCOUNTER — OFFICE VISIT (OUTPATIENT)
Dept: FAMILY MEDICINE CLINIC | Age: 65
End: 2025-01-28

## 2025-01-28 ENCOUNTER — NURSE ONLY (OUTPATIENT)
Dept: ALLERGY | Age: 65
End: 2025-01-28
Payer: MEDICAID

## 2025-01-28 VITALS
DIASTOLIC BLOOD PRESSURE: 76 MMHG | HEART RATE: 83 BPM | SYSTOLIC BLOOD PRESSURE: 106 MMHG | RESPIRATION RATE: 18 BRPM | OXYGEN SATURATION: 98 %

## 2025-01-28 VITALS
RESPIRATION RATE: 16 BRPM | BODY MASS INDEX: 37.68 KG/M2 | HEART RATE: 86 BPM | SYSTOLIC BLOOD PRESSURE: 122 MMHG | WEIGHT: 206 LBS | TEMPERATURE: 97.9 F | DIASTOLIC BLOOD PRESSURE: 66 MMHG

## 2025-01-28 DIAGNOSIS — J30.9 CHRONIC ALLERGIC RHINITIS: Primary | ICD-10-CM

## 2025-01-28 DIAGNOSIS — E03.9 HYPOTHYROIDISM, UNSPECIFIED TYPE: ICD-10-CM

## 2025-01-28 DIAGNOSIS — R51.9 CHRONIC DAILY HEADACHE: Primary | ICD-10-CM

## 2025-01-28 DIAGNOSIS — E11.65 TYPE 2 DIABETES MELLITUS WITH HYPERGLYCEMIA, UNSPECIFIED WHETHER LONG TERM INSULIN USE (HCC): ICD-10-CM

## 2025-01-28 DIAGNOSIS — Q07.00 ARNOLD-CHIARI MALFORMATION (HCC): ICD-10-CM

## 2025-01-28 DIAGNOSIS — L66.81 CENTRAL CENTRIFUGAL SCARRING ALOPECIA: ICD-10-CM

## 2025-01-28 DIAGNOSIS — F17.200 SMOKER: ICD-10-CM

## 2025-01-28 DIAGNOSIS — J30.1 SEASONAL ALLERGIC RHINITIS DUE TO POLLEN: ICD-10-CM

## 2025-01-28 DIAGNOSIS — Z98.2 HISTORY OF BRAIN SHUNT: ICD-10-CM

## 2025-01-28 DIAGNOSIS — E11.42 DIABETIC POLYNEUROPATHY ASSOCIATED WITH TYPE 2 DIABETES MELLITUS (HCC): ICD-10-CM

## 2025-01-28 DIAGNOSIS — Z98.2 VP (VENTRICULOPERITONEAL) SHUNT STATUS: ICD-10-CM

## 2025-01-28 DIAGNOSIS — E23.6 PITUITARY GLAND ENLARGED (HCC): ICD-10-CM

## 2025-01-28 DIAGNOSIS — L85.3 XEROSIS OF SKIN: ICD-10-CM

## 2025-01-28 DIAGNOSIS — J82.83 EOSINOPHILIC ASTHMA: ICD-10-CM

## 2025-01-28 DIAGNOSIS — I10 PRIMARY HYPERTENSION: ICD-10-CM

## 2025-01-28 DIAGNOSIS — K59.00 CONSTIPATION, UNSPECIFIED CONSTIPATION TYPE: ICD-10-CM

## 2025-01-28 DIAGNOSIS — E11.40 TYPE 2 DIABETES MELLITUS WITH DIABETIC NEUROPATHY, WITHOUT LONG-TERM CURRENT USE OF INSULIN (HCC): ICD-10-CM

## 2025-01-28 DIAGNOSIS — K21.9 GASTROESOPHAGEAL REFLUX DISEASE, UNSPECIFIED WHETHER ESOPHAGITIS PRESENT: ICD-10-CM

## 2025-01-28 DIAGNOSIS — L20.9 ATOPIC DERMATITIS, UNSPECIFIED TYPE: ICD-10-CM

## 2025-01-28 DIAGNOSIS — J44.9 CHRONIC OBSTRUCTIVE PULMONARY DISEASE, UNSPECIFIED COPD TYPE (HCC): ICD-10-CM

## 2025-01-28 DIAGNOSIS — E55.9 VITAMIN D DEFICIENCY: ICD-10-CM

## 2025-01-28 DIAGNOSIS — L98.9 SKIN LESION OF RIGHT LEG: ICD-10-CM

## 2025-01-28 DIAGNOSIS — E78.2 MIXED HYPERLIPIDEMIA: ICD-10-CM

## 2025-01-28 DIAGNOSIS — J45.50 SEVERE PERSISTENT ASTHMA WITHOUT COMPLICATION (HCC): ICD-10-CM

## 2025-01-28 PROCEDURE — 96372 THER/PROPH/DIAG INJ SC/IM: CPT | Performed by: NURSE PRACTITIONER

## 2025-01-28 PROCEDURE — 95117 IMMUNOTHERAPY INJECTIONS: CPT | Performed by: NURSE PRACTITIONER

## 2025-01-28 RX ORDER — ESOMEPRAZOLE MAGNESIUM 40 MG/1
CAPSULE, DELAYED RELEASE ORAL
Qty: 180 CAPSULE | Refills: 0 | Status: SHIPPED | OUTPATIENT
Start: 2025-01-28

## 2025-01-28 RX ORDER — ALBUTEROL SULFATE 90 UG/1
INHALANT RESPIRATORY (INHALATION)
Qty: 9 G | Refills: 0 | OUTPATIENT
Start: 2025-01-28

## 2025-01-28 RX ORDER — PENTOSAN POLYSULFATE SODIUM 100 MG/1
100 CAPSULE, GELATIN COATED ORAL 2 TIMES DAILY
Qty: 180 CAPSULE | Refills: 0 | Status: SHIPPED | OUTPATIENT
Start: 2025-01-28 | End: 2025-04-28

## 2025-01-28 RX ORDER — BLOOD PRESSURE TEST KIT
1 KIT MISCELLANEOUS WEEKLY
Qty: 1 KIT | Refills: 0 | Status: SHIPPED | OUTPATIENT
Start: 2025-01-28

## 2025-01-28 RX ORDER — KETOCONAZOLE 20 MG/G
CREAM TOPICAL
Qty: 30 G | Refills: 1 | Status: SHIPPED | OUTPATIENT
Start: 2025-01-28

## 2025-01-28 ASSESSMENT — ENCOUNTER SYMPTOMS
COUGH: 0
CHEST TIGHTNESS: 0
ABDOMINAL PAIN: 0
ABDOMINAL DISTENTION: 0
SHORTNESS OF BREATH: 0
WHEEZING: 1
BACK PAIN: 0

## 2025-01-28 NOTE — PATIENT INSTRUCTIONS
[place] [employer] - Financial Resources*  (Please call [address]/211 if need more resources)    Utility  [facility] Community Action Partnership:  What they do: Help pay rent, utilities & internet bills.  Phone Number: [phone]  Website: https://[employer])/emergency-services/rent-assistance    [facility] [place]:  What they do: They offer Utility assistance  Phone Number: [phone]   Website: https://[facility]/[employer]/equip-families    Medical  Alzheimer's Association: [phone]  American Cancer Society: 638.610.9305  American Heart Association: [phone]  American Lung Association: 662.247.2024  Arthritis Foundation: [phone]  [place] [employer] Services for Visually Impaired: [phone]  Kidney Foundation: [phone]     [facility]:  What they offer: Assist in finding resources to help pay hospital bills.  Phone Number: 1-604.327.7827  Website: https://www[employer]/patient-resources/financial-assistance    [facility] [employer] Family Services:  What they offer: Medical coverage assistance for children, pregnant women, elderly, individuals with disabilities and those looking for long term care and/or in home waiver care.   Phone Number:  496.488.5229  Website: https://[employer]/services/medical-assistance    [facility] Agency on Aging:  What they offer: Aging and disability resource center, care management/coordination, transportation, wellness and prevention.  Phone Number: [phone]          [employer]:  What they offer: Temporary cash assistance to families to pay immediate needs while the adults of the families prepare and search for jobs.   Phone Number: 838.105.5040  Website: https://[employer]/services/cash-assistance                                       [place] [employer]*  (Call [employer]/211 if need more resources.)    Home Delivered Meals:  [employer]: [phone] for people and pets  [employer]:  Phone:

## 2025-01-28 NOTE — TELEPHONE ENCOUNTER
Gely Archer called requesting a refill on the following medications:  Requested Prescriptions     Pending Prescriptions Disp Refills    ELMIRON 100 MG capsule [Pharmacy Med Name: Elmiron 100 MG Oral Capsule] 180 capsule 0     Sig: TAKE 1 CAPSULE BY MOUTH IN THE MORNING AND AT BEDTIME     Pharmacy verified:  .[email or web address]      Date of last visit:   Date of next visit (if applicable): 3/13/2025

## 2025-01-28 NOTE — PROGRESS NOTES
Patient here today to receive Xolair injection.    Patient does not report any previous reaction from Xolair injections.  Denies any nausea vomiting fever urticaria or angioedema.  Denies any recent exacerbation of asthma or asthma-like symptoms.  Patient was explained benefits and potential risks including anaphylaxis to patient.      Patient has been explained the risk and benefits including delayed anaphylaxis.      Patient has EpiPen and understands how to appropriately use? Yes      1ST DOSE OF XOLAIR 150 MG GIVEN subcutaneously in  right ABDOMEN  2ND DOSE OF XOLAIR 150 MG GIVEN subcutaneously in  left ABDOMEN  Total combined dose of 300 mg        APPROVED INJECTION SITES RIGHT  / LEFT UPPER ARM,  RIGHT  / LEFT FRONT OR MIDDLE OF THIGH, OR STOMACH         NDC 49091-021-95   Lot [id number]   Expires 01/31/2026    Patient observed for 30 minutes No      Medication was supplied by patient:  YES/NO: Yes      Medication was supplied as a sample:  YES/NO: No                   Prior to patient receiving medication the area was cleansed with alcohol swabs.  Following the administration no evidence of bleeding or bruising.    No adverse reactions reported.  Patient tolerated well without adverse reactions or side effects.  Patient to continue to receive Xolair injections as prescribed.  Will report any problems to this office.  No evidence of allergic reaction including anaphylaxis.

## 2025-01-28 NOTE — PROGRESS NOTES
[facility]  [address] [address]  Dept: [phone]  Dept Fax: [phone]  Loc: [phone]  PROGRESS NOTE      VisitDate: 1/28/2025    Gely Archer is a 64 y.o. female who presents today for:     Chief Complaint   Patient presents with    Forms     DM shoes and inserts eval from [facility]    Headache     Has tried Fioricet for ha, takes the edge off but doesn't completely help, pain mgmt will prescribe something         Subjective:  Patient currently consulting with pain management and trying Nurtec and Qulipta regarding her migraines.  Patient here for foot exam and forms to be completed regarding diabetic shoes and inserts.  Patient does complain of some numbness of her great toes bilaterally distal aspect and plantar aspect.  Taking medications as prescribed.  History of anxiety depression asthma COPD smoker Chiari malformation.  Headaches hyperlipidemia neuropathy osteoarthritis type 2 diabetes hypothyroid pituitary gland enlargement  shunt constipation hyperlipidemia vitamin D deficiency.  Patient also complains of a rash to right lower extremity which has not improved with hydrocortisone cream.          Review of Systems   Constitutional:  Negative for activity change, appetite change, chills, fatigue and fever.   Eyes:  Negative for visual disturbance.   Respiratory:  Positive for wheezing. Negative for cough, chest tightness and shortness of breath.    Cardiovascular:  Negative for chest pain, palpitations and leg swelling.   Gastrointestinal:  Negative for abdominal distention and abdominal pain.   Genitourinary:  Negative for dysuria.   Musculoskeletal:  Positive for arthralgias. Negative for back pain and neck pain.   Skin: Negative.  Negative for rash.   Neurological:  Positive for numbness and headaches. Negative for dizziness and light-headedness.   Hematological:  Negative for adenopathy.   Psychiatric/Behavioral:

## 2025-01-28 NOTE — CARE COORDINATION
JOHN received a referral from [facility] requesting assist for food insecurity. SW mailing out resources and included JOHN contact information requesting a call back.

## 2025-01-28 NOTE — PROGRESS NOTES
PATIENT HAS THE FOLLOWING DIAGNOSIS SUPPORTING ADMINISTRATION OF ALLERGY INJECTIONS: J30.1Allergic rhinitis due to pollen  AND 39131 MULTIPLE ALLERGY INJECTIONS FOR ALLERGY INJECTION ADMINISTRATION      Patient here for allergy injection today.  Patient was presented with the opportunity to speak with provider and ask questions regarding allergy injections.  Patient was also instructed they need to wait 30 minutes after receiving allergy injections. All risks associated with potential adverse effects have been explained to patient and patient handout was provided following allergy testing.    After consent obtained/verified, allergy injection subcutaneously given in back of arm(s).  Please see below for specific details of site injections, concentration of serum, and volume injected.    VIAL COLOR OF ALL VIALS TODAY IS red 1:1. Vial allergy w/v concentration today is: 1:1     ALLERGY INJECTION FROM VIAL A GIVEN left  UPPER ARM IN THE AMOUNT OF 0.30 ML    ALLERGY INJECTION FROM VIAL B GIVEN right lower ARM IN THE AMOUNT OF 0.30  ML    ALLERGY INJECTION FROM VIAL C GIVEN rightupper ARM IN THE AMOUNT OF 0.30 ML      Documentation of vial injection specific to arm(s) noted on Allergy Immunotherapy Administration Form.       Patient waited 30 minutes for observation.No  Patient has received information and verbalizes understanding of the potential  health risks associated with allergy injections . Patient understands risks including anaphylaxis and chooses not to wait 30 minutes after administration of allergy injection(s).    Patient tolerated well without adverse reaction while the patient was in the office.    SHOT REACTION TREATMENT INSTRUCTIONS    During the 30 minute wait after an allergy injection the following symptoms should be reported:    Itching other than at the injection site  Hives or swelling other than at the injection site  Redness other than at the injection site  Difficulty breathing  Chest

## 2025-01-30 ENCOUNTER — HOSPITAL ENCOUNTER (OUTPATIENT)
Age: 65
Discharge: HOME | End: 2025-01-30
Payer: MEDICAID

## 2025-01-30 ENCOUNTER — TELEPHONE (OUTPATIENT)
Dept: FAMILY MEDICINE CLINIC | Age: 65
End: 2025-01-30

## 2025-01-30 DIAGNOSIS — E11.65 TYPE 2 DIABETES MELLITUS WITH HYPERGLYCEMIA, UNSPECIFIED WHETHER LONG TERM INSULIN USE (HCC): ICD-10-CM

## 2025-01-30 DIAGNOSIS — E78.2 MIXED HYPERLIPIDEMIA: ICD-10-CM

## 2025-01-30 DIAGNOSIS — E23.6 PITUITARY MASS (HCC): ICD-10-CM

## 2025-01-30 DIAGNOSIS — E03.9 HYPOTHYROIDISM, UNSPECIFIED TYPE: ICD-10-CM

## 2025-01-30 DIAGNOSIS — E55.9 VITAMIN D DEFICIENCY: ICD-10-CM

## 2025-01-30 DIAGNOSIS — J45.50 SEVERE PERSISTENT ASTHMA WITHOUT COMPLICATION (HCC): ICD-10-CM

## 2025-01-30 LAB
25(OH)D3 SERPL-MCNC: 31 NG/ML (ref 30–100)
ALBUMIN SERPL BCG-MCNC: 4.6 G/DL (ref 3.5–5.1)
ALP SERPL-CCNC: 120 U/L (ref 38–126)
ALT SERPL W/O P-5'-P-CCNC: 13 U/L (ref 11–66)
ANION GAP SERPL CALC-SCNC: 12 MEQ/L (ref 8–16)
AST SERPL-CCNC: 11 U/L (ref 5–40)
BASOPHILS ABSOLUTE: 0.1 THOU/MM3 (ref 0–0.1)
BASOPHILS NFR BLD AUTO: 0.7 %
BILIRUB SERPL-MCNC: 0.4 MG/DL (ref 0.3–1.2)
BUN SERPL-MCNC: 9 MG/DL (ref 7–22)
CALCIUM SERPL-MCNC: 9.6 MG/DL (ref 8.5–10.5)
CHLORIDE SERPL-SCNC: 101 MEQ/L (ref 98–111)
CHOLEST SERPL-MCNC: 210 MG/DL (ref 100–199)
CO2 SERPL-SCNC: 27 MEQ/L (ref 23–33)
CORTIS SERPL-MCNC: 4.55 UG/DL
CORTISOL COLLECTION INFO: NORMAL
CREAT SERPL-MCNC: 0.6 MG/DL (ref 0.4–1.2)
DEPRECATED MEAN GLUCOSE BLD GHB EST-ACNC: 117 MG/DL (ref 70–126)
DEPRECATED RDW RBC AUTO: 46.2 FL (ref 35–45)
EOSINOPHIL NFR BLD AUTO: 3.1 %
EOSINOPHILS ABSOLUTE: 0.2 THOU/MM3 (ref 0–0.4)
ERYTHROCYTE [DISTWIDTH] IN BLOOD BY AUTOMATED COUNT: 14.4 % (ref 11.5–14.5)
GFR SERPL CREATININE-BSD FRML MDRD: > 90 ML/MIN/1.73M2
GLUCOSE SERPL-MCNC: 102 MG/DL (ref 70–108)
HBA1C MFR BLD HPLC: 5.9 % (ref 4.4–6.4)
HCT VFR BLD AUTO: 40.9 % (ref 37–47)
HDLC SERPL-MCNC: 50 MG/DL
HGB BLD-MCNC: 13.1 GM/DL (ref 12–16)
IMM GRANULOCYTES # BLD AUTO: 0.03 THOU/MM3 (ref 0–0.07)
IMM GRANULOCYTES NFR BLD AUTO: 0.4 %
LDLC SERPL CALC-MCNC: 125 MG/DL
LYMPHOCYTES ABSOLUTE: 1.9 THOU/MM3 (ref 1–4.8)
LYMPHOCYTES NFR BLD AUTO: 26.4 %
MCH RBC QN AUTO: 28.4 PG (ref 26–33)
MCHC RBC AUTO-ENTMCNC: 32 GM/DL (ref 32.2–35.5)
MCV RBC AUTO: 88.7 FL (ref 81–99)
MONOCYTES ABSOLUTE: 0.2 THOU/MM3 (ref 0.4–1.3)
MONOCYTES NFR BLD AUTO: 3.4 %
NEUTROPHILS ABSOLUTE: 4.8 THOU/MM3 (ref 1.8–7.7)
NEUTROPHILS NFR BLD AUTO: 66 %
NRBC BLD AUTO-RTO: 0 /100 WBC
ORIGINAL SAMPLE NUMBER: NORMAL
OSMOLALITY SERPL: NORMAL MOSMOL/KG (ref 275–295)
PLATELET # BLD AUTO: 337 THOU/MM3 (ref 130–400)
PMV BLD AUTO: 10.4 FL (ref 9.4–12.4)
POTASSIUM SERPL-SCNC: 4 MEQ/L (ref 3.5–5.2)
PROLACTIN SERPL-MCNC: 8 NG/ML
PROT SERPL-MCNC: 7.8 G/DL (ref 6.1–8)
RBC # BLD AUTO: 4.61 MILL/MM3 (ref 4.2–5.4)
REFERENCE LOCATION: NORMAL
REFERENCE RANGE: NORMAL
SODIUM SERPL-SCNC: 140 MEQ/L (ref 135–145)
T4 FREE SERPL-MCNC: 1.09 NG/DL (ref 0.93–1.68)
TEST RESULTS WITH UNITS: NORMAL
TEST(S) BEING PERFORMED: NORMAL
TRIGL SERPL-MCNC: 177 MG/DL (ref 0–199)
TSH SERPL DL<=0.005 MIU/L-ACNC: 1.77 UIU/ML (ref 0.4–4.2)
WBC # BLD AUTO: 7.3 THOU/MM3 (ref 4.8–10.8)

## 2025-01-30 PROCEDURE — 82306 VITAMIN D 25 HYDROXY: CPT

## 2025-01-30 PROCEDURE — 84305 ASSAY OF SOMATOMEDIN: CPT

## 2025-01-30 PROCEDURE — 83930 ASSAY OF BLOOD OSMOLALITY: CPT

## 2025-01-30 PROCEDURE — 82024 ASSAY OF ACTH: CPT

## 2025-01-30 PROCEDURE — 83002 ASSAY OF GONADOTROPIN (LH): CPT

## 2025-01-30 PROCEDURE — 83036 HEMOGLOBIN GLYCOSYLATED A1C: CPT

## 2025-01-30 PROCEDURE — 84443 ASSAY THYROID STIM HORMONE: CPT

## 2025-01-30 PROCEDURE — 84439 ASSAY OF FREE THYROXINE: CPT

## 2025-01-30 PROCEDURE — 36415 COLL VENOUS BLD VENIPUNCTURE: CPT

## 2025-01-30 PROCEDURE — 82670 ASSAY OF TOTAL ESTRADIOL: CPT

## 2025-01-30 PROCEDURE — 83001 ASSAY OF GONADOTROPIN (FSH): CPT

## 2025-01-30 PROCEDURE — 80061 LIPID PANEL: CPT

## 2025-01-30 PROCEDURE — 85025 COMPLETE CBC W/AUTO DIFF WBC: CPT

## 2025-01-30 PROCEDURE — 82533 TOTAL CORTISOL: CPT

## 2025-01-30 PROCEDURE — 84146 ASSAY OF PROLACTIN: CPT

## 2025-01-30 PROCEDURE — 80053 COMPREHEN METABOLIC PANEL: CPT

## 2025-01-30 RX ORDER — PENTOSAN POLYSULFATE SODIUM 100 MG/1
100 CAPSULE, GELATIN COATED ORAL 2 TIMES DAILY
Qty: 180 CAPSULE | Refills: 0 | OUTPATIENT
Start: 2025-01-30 | End: 2025-04-30

## 2025-01-30 RX ORDER — ALBUTEROL SULFATE 90 UG/1
INHALANT RESPIRATORY (INHALATION)
Qty: 9 G | Refills: 0 | OUTPATIENT
Start: 2025-01-30

## 2025-01-30 RX ORDER — KETOCONAZOLE 20 MG/ML
SHAMPOO, SUSPENSION TOPICAL
Qty: 120 ML | Refills: 0 | OUTPATIENT
Start: 2025-01-30

## 2025-01-30 NOTE — TELEPHONE ENCOUNTER
----- Message from COREEN Deluca - CNP sent at 1/30/2025 10:32 AM EST -----  Thyroid and vitamin D within normal limits   How Severe Are Your Spot(S)?: mild What Is The Reason For Today's Visit?: Upper Body Skin Exam

## 2025-01-30 NOTE — TELEPHONE ENCOUNTER
----- Message from COREEN Deluca - CNP sent at 1/30/2025 10:11 AM EST -----  CMP FLP CBC all within normal limits

## 2025-01-30 NOTE — TELEPHONE ENCOUNTER
Gely Archer called requesting a refill on the following medications:  Requested Prescriptions     Pending Prescriptions Disp Refills    ELMIRON 100 MG capsule [Pharmacy Med Name: Elmiron 100 MG Oral Capsule] 180 capsule 0     Sig: TAKE 1 CAPSULE BY MOUTH IN THE MORNING AND AT BEDTIME     Pharmacy verified:  [email or web address]      Date of last visit: 11/07/2024  Date of next visit (if applicable): 3/13/2025

## 2025-01-30 NOTE — TELEPHONE ENCOUNTER
----- Message from COREEN Deluca CNP sent at 1/30/2025 10:39 AM EST -----  A1c 5.9 good glycemic control

## 2025-01-31 LAB
ACTH PLAS-MCNC: 14.2 PG/ML (ref 7.2–63.3)
ESTRADIOL LEVEL: < 5 PG/ML
FOLLICLE STIMULATING HORMONE: 36.6 MIU/ML
LUTEINIZING HORMONE: 16.2 MIU/ML (ref 1.7–8.6)

## 2025-02-01 ENCOUNTER — CLINICAL DOCUMENTATION (OUTPATIENT)
Age: 65
End: 2025-02-01

## 2025-02-01 LAB
IGF-I SERPL-MCNC: 93 NG/ML (ref 32–238)
IGF-I Z-SCORE SERPL: -0.3

## 2025-02-01 NOTE — PROGRESS NOTES
Estradiol is low consistent with menopausal status.  We need to obtain an ACTH stimulation test.  Please get with me to arrange.

## 2025-02-03 ENCOUNTER — HOSPITAL ENCOUNTER (OUTPATIENT)
Dept: ULTRASOUND IMAGING | Age: 65
Discharge: HOME | End: 2025-02-03
Attending: UROLOGY
Payer: MEDICAID

## 2025-02-03 DIAGNOSIS — R31.21 ASYMPTOMATIC MICROSCOPIC HEMATURIA: ICD-10-CM

## 2025-02-03 DIAGNOSIS — N30.10 INTERSTITIAL CYSTITIS: ICD-10-CM

## 2025-02-03 DIAGNOSIS — R35.0 URINARY FREQUENCY: ICD-10-CM

## 2025-02-03 DIAGNOSIS — N28.1 RENAL CYST: ICD-10-CM

## 2025-02-03 PROCEDURE — 76770 US EXAM ABDO BACK WALL COMP: CPT

## 2025-02-04 ENCOUNTER — NURSE ONLY (OUTPATIENT)
Dept: ALLERGY | Age: 65
End: 2025-02-04

## 2025-02-04 VITALS
RESPIRATION RATE: 18 BRPM | OXYGEN SATURATION: 98 % | HEART RATE: 87 BPM | DIASTOLIC BLOOD PRESSURE: 71 MMHG | SYSTOLIC BLOOD PRESSURE: 93 MMHG

## 2025-02-04 DIAGNOSIS — J30.1 SEASONAL ALLERGIC RHINITIS DUE TO POLLEN: ICD-10-CM

## 2025-02-04 DIAGNOSIS — J30.9 CHRONIC ALLERGIC RHINITIS: Primary | ICD-10-CM

## 2025-02-04 NOTE — PROGRESS NOTES

## 2025-02-05 ENCOUNTER — TELEPHONE (OUTPATIENT)
Age: 65
End: 2025-02-05

## 2025-02-05 NOTE — TELEPHONE ENCOUNTER
Left message on answering machine requesting pt to call back at earliest convenience. ACTH Stimulation form placed on Dr. Falk's desk

## 2025-02-05 NOTE — TELEPHONE ENCOUNTER
----- Message from Dr. Abad Falk MD sent at 2/1/2025  4:04 PM EST -----  Estradiol is low consistent with menopausal status.  We need to obtain an ACTH stimulation test.  Please get with me to arrange.

## 2025-02-12 DIAGNOSIS — J30.9 CHRONIC ALLERGIC RHINITIS: Primary | ICD-10-CM

## 2025-02-12 DIAGNOSIS — Z29.89 IMMUNOTHERAPY: ICD-10-CM

## 2025-02-12 NOTE — PROGRESS NOTES
TYPE OF INSURANCE: MEDICAID  PT'S CURRENT VIALS EMPTY. NEW VIALS MIXED  ALLERGY EXTRACT MIXING.    DATE OF MIXING= Today   TOTAL NUMBER OF SET OF VIALS= 3  TOTAL VIALS PREPARED = 3  TOTAL INJECTABLE DOSES =   10 INJECTIONS PER VIAL  TOTAL ANTICIPATED DOSES = 30 INJECTIONS   TOTAL NUMBER OF INJECTIONS BILLED TODAY = 30       An allergy extract was prepared according to written prescription by provider.  Provider onsite during allergy extract preparation. Patient vial(s) include the following:     RED VIAL - 1:1 CONCENTRATION - EXPIRES 12 MONTHS  YELLOW VIAL-1:10 CONCENTRATION - EXPIRES 12 MONTHS  BLUE VIAL-1:100 CONCENTRATION - EXPIRES 12 MONTHS  GREEN VIAL-1:1,000 CONCENTRATION - EXPIRES 6 MONTHS  SILVER VIAL-1:10,000 CONCENTRATION - EXPIRES 6 MONTHS    Allergy extract was prepared by the following method:   RED TO YELLOW:  Once the  one-to-one concentration was prepared in the red vial, 0.5 ML's was then extracted in place into the yellow vial to achieve a 1:10 concentration.    YELLOW TO BLUE:  Then 0.5 ML's was extracted from the yellow vial and placed into the blue file with dilutant to achieve a 1:100 concentration.    BLUE TO GREEN:  Then 0.5 ML's was extracted from the blue vial and placed into Green vial with dilute to achieve a 1:1,000 concentration.    GREEN TO SILVER:  Then 0.5 ML's was taken from the green vial and placed in the Silver vial with dilutant to achieve a 1:10,000 concentration.      Patient vials were labeled with name, date-of-birth, vial (A,B,or C), concentration, and expiration.    When injecting from a new  vial the first injections is 0.05 ml and are increased by 0.05 increments until 0.5ml from the vial is achieved or the patient reaches maximum tolerated dose.   Injections are given once ot three times weekly and at least 24 hours apart, according to provider orders.   If patient has complications or \"knots\" or maximum tolerance the dose building is reduced, stopped, or maintained

## 2025-02-18 ENCOUNTER — PATIENT MESSAGE (OUTPATIENT)
Dept: FAMILY MEDICINE CLINIC | Age: 65
End: 2025-02-18

## 2025-02-18 DIAGNOSIS — R73.02 IGT (IMPAIRED GLUCOSE TOLERANCE): ICD-10-CM

## 2025-02-18 DIAGNOSIS — E23.6 PITUITARY MASS: Primary | ICD-10-CM

## 2025-02-18 RX ORDER — SODIUM CHLORIDE 9 MG/ML
5-250 INJECTION, SOLUTION INTRAVENOUS PRN
OUTPATIENT
Start: 2025-03-03

## 2025-02-18 RX ORDER — SODIUM CHLORIDE 0.9 % (FLUSH) 0.9 %
5-40 SYRINGE (ML) INJECTION PRN
OUTPATIENT
Start: 2025-03-03

## 2025-02-18 RX ORDER — ACETAMINOPHEN 160 MG
2000 TABLET,DISINTEGRATING ORAL DAILY
Qty: 90 CAPSULE | Refills: 3 | Status: SHIPPED | OUTPATIENT
Start: 2025-02-18

## 2025-02-18 RX ORDER — COSYNTROPIN 0.25 MG/ML
250 INJECTION, POWDER, FOR SOLUTION INTRAMUSCULAR; INTRAVENOUS ONCE
OUTPATIENT
Start: 2025-03-03 | End: 2025-03-03

## 2025-02-18 NOTE — PROGRESS NOTES
Patient here today to receive Xolair injection. Patient does not report any previous reaction from Xolair injections. Denies any nausea vomiting fever urticaria or angioedema. Denies any recent exacerbation of asthma or asthma-like symptoms. Patient was explained benefits and potential risks including anaphylaxis to patient. Patient has been explained the risk and benefits including delayed anaphylaxis. Patient has EpiPen and understands how to appropriately use. Xolair 150 mg given subcutaneously X 2 doses in    bilateral thighs  thigh(s)     for a total combined dose of 300 mg using a 25-gauge needle and 3 ML syringe. RIGHT  / LEFT UPPER ARM,  RIGHT  / LEFT FRONT OR MIDDLE OF THIGH, OR STOMACH        Xolair  [address] 26814-530-19   Lot [id number]   Expires 11/2022    Patient observed for 30 minutes No  If NO Patient left against medical advice    Medication was supplied by patient:  YES/NO: Yes      Medication was supplied as a sample:  YES/NO: No                     Prior to patient receiving Xolair area was cleansed with alcohol swabs. Following the administration no evidence of bleeding or bruising. No adverse reactions reported. Patient tolerated well without adverse reactions or side effects. Patient to continue to receive Xolair injections monthly. Will report any problems to this office. No evidence of allergic reaction including anaphylaxis.
Yes

## 2025-02-25 ENCOUNTER — NURSE ONLY (OUTPATIENT)
Dept: ALLERGY | Age: 65
End: 2025-02-25
Payer: MEDICAID

## 2025-02-25 VITALS
SYSTOLIC BLOOD PRESSURE: 115 MMHG | HEART RATE: 88 BPM | RESPIRATION RATE: 18 BRPM | DIASTOLIC BLOOD PRESSURE: 71 MMHG | OXYGEN SATURATION: 98 %

## 2025-02-25 DIAGNOSIS — J30.9 CHRONIC ALLERGIC RHINITIS: Primary | ICD-10-CM

## 2025-02-25 DIAGNOSIS — J30.1 SEASONAL ALLERGIC RHINITIS DUE TO POLLEN: ICD-10-CM

## 2025-02-25 PROCEDURE — 95117 IMMUNOTHERAPY INJECTIONS: CPT | Performed by: NURSE PRACTITIONER

## 2025-02-25 NOTE — PROGRESS NOTES
PATIENT HAS THE FOLLOWING DIAGNOSIS SUPPORTING ADMINISTRATION OF ALLERGY INJECTIONS: J30.1Allergic rhinitis due to pollen  AND 94879 MULTIPLE ALLERGY INJECTIONS FOR ALLERGY INJECTION ADMINISTRATION      Patient here for allergy injection today.  Patient was presented with the opportunity to speak with provider and ask questions regarding allergy injections.  Patient was also instructed they need to wait 30 minutes after receiving allergy injections. All risks associated with potential adverse effects have been explained to patient and patient handout was provided following allergy testing.    After consent obtained/verified, allergy injection subcutaneously given in back of arm(s).  Please see below for specific details of site injections, concentration of serum, and volume injected.    VIAL COLOR OF ALL VIALS TODAY IS red 1:1. Vial allergy w/v concentration today is: 1:1     ALLERGY INJECTION FROM VIAL A GIVEN left  UPPER ARM IN THE AMOUNT OF 0.15 ML    ALLERGY INJECTION FROM VIAL B GIVEN right lower ARM IN THE AMOUNT OF 0.15  ML    ALLERGY INJECTION FROM VIAL C GIVEN rightupper ARM IN THE AMOUNT OF 0.15 ML      Documentation of vial injection specific to arm(s) noted on Allergy Immunotherapy Administration Form.       Patient waited 30 minutes for observation.No  Patient has received information and verbalizes understanding of the potential  health risks associated with allergy injections . Patient understands risks including anaphylaxis and chooses not to wait 30 minutes after administration of allergy injection(s).    Patient tolerated well without adverse reaction while the patient was in the office.    SHOT REACTION TREATMENT INSTRUCTIONS    During the 30 minute wait after an allergy injection the following symptoms should be reported:    Itching other than at the injection site  Hives or swelling other than at the injection site  Redness other than at the injection site  Difficulty breathing  Chest

## 2025-03-04 ENCOUNTER — NURSE ONLY (OUTPATIENT)
Dept: ALLERGY | Age: 65
End: 2025-03-04
Payer: MEDICAID

## 2025-03-04 VITALS
OXYGEN SATURATION: 93 % | DIASTOLIC BLOOD PRESSURE: 77 MMHG | HEART RATE: 85 BPM | RESPIRATION RATE: 16 BRPM | SYSTOLIC BLOOD PRESSURE: 126 MMHG

## 2025-03-04 DIAGNOSIS — J30.9 CHRONIC ALLERGIC RHINITIS: Primary | ICD-10-CM

## 2025-03-04 DIAGNOSIS — J30.1 SEASONAL ALLERGIC RHINITIS DUE TO POLLEN: ICD-10-CM

## 2025-03-04 PROCEDURE — 95117 IMMUNOTHERAPY INJECTIONS: CPT | Performed by: NURSE PRACTITIONER

## 2025-03-04 NOTE — PROGRESS NOTES
PATIENT HAS THE FOLLOWING DIAGNOSIS SUPPORTING ADMINISTRATION OF ALLERGY INJECTIONS: J30.1Allergic rhinitis due to pollen  AND 08524 MULTIPLE ALLERGY INJECTIONS FOR ALLERGY INJECTION ADMINISTRATION      Patient here for allergy injection today.  Patient was presented with the opportunity to speak with provider and ask questions regarding allergy injections.  Patient was also instructed they need to wait 30 minutes after receiving allergy injections. All risks associated with potential adverse effects have been explained to patient and patient handout was provided following allergy testing.    After consent obtained/verified, allergy injection subcutaneously given in back of arm(s).  Please see below for specific details of site injections, concentration of serum, and volume injected.    VIAL COLOR OF ALL VIALS TODAY IS red 1:1. Vial allergy w/v concentration today is: 1:1     ALLERGY INJECTION FROM VIAL A GIVEN right  UPPER ARM IN THE AMOUNT OF 0.20 ML    ALLERGY INJECTION FROM VIAL B GIVEN left lower ARM IN THE AMOUNT OF 0.20  ML    ALLERGY INJECTION FROM VIAL C GIVEN leftupper ARM IN THE AMOUNT OF 0.20 ML      Documentation of vial injection specific to arm(s) noted on Allergy Immunotherapy Administration Form.       Patient waited 30 minutes for observation.No  Patient has received information and verbalizes understanding of the potential  health risks associated with allergy injections . Patient understands risks including anaphylaxis and chooses not to wait 30 minutes after administration of allergy injection(s).    Patient tolerated well without adverse reaction while the patient was in the office.    SHOT REACTION TREATMENT INSTRUCTIONS    During the 30 minute wait after an allergy injection the following symptoms should be reported:    Itching other than at the injection site  Hives or swelling other than at the injection site  Redness other than at the injection site  Difficulty breathing  Chest

## 2025-03-06 ENCOUNTER — HOSPITAL ENCOUNTER (OUTPATIENT)
Dept: MRI IMAGING | Age: 65
Discharge: HOME | End: 2025-03-06
Attending: NEUROLOGICAL SURGERY
Payer: MEDICAID

## 2025-03-06 DIAGNOSIS — D35.2 PITUITARY ADENOMA (HCC): ICD-10-CM

## 2025-03-06 PROCEDURE — A9579 GAD-BASE MR CONTRAST NOS,1ML: HCPCS | Performed by: NEUROLOGICAL SURGERY

## 2025-03-06 PROCEDURE — 70553 MRI BRAIN STEM W/O & W/DYE: CPT

## 2025-03-06 PROCEDURE — 6360000004 HC RX CONTRAST MEDICATION: Performed by: NEUROLOGICAL SURGERY

## 2025-03-06 RX ADMIN — GADOTERIDOL 20 ML: 279.3 INJECTION, SOLUTION INTRAVENOUS at 07:37

## 2025-03-10 ENCOUNTER — HOSPITAL ENCOUNTER (OUTPATIENT)
Dept: NURSING | Age: 65
Discharge: HOME | End: 2025-03-10
Payer: MEDICAID

## 2025-03-10 ENCOUNTER — TELEPHONE (OUTPATIENT)
Dept: ADMINISTRATIVE | Age: 65
End: 2025-03-10

## 2025-03-10 VITALS
TEMPERATURE: 97.6 F | SYSTOLIC BLOOD PRESSURE: 162 MMHG | DIASTOLIC BLOOD PRESSURE: 85 MMHG | RESPIRATION RATE: 18 BRPM | HEART RATE: 89 BPM | OXYGEN SATURATION: 96 %

## 2025-03-10 DIAGNOSIS — E23.6 PITUITARY MASS: Primary | ICD-10-CM

## 2025-03-10 LAB
CORTIS SERPL-MCNC: 12.2 UG/DL
CORTIS SERPL-MCNC: 25.5 UG/DL
CORTIS SERPL-MCNC: 26.6 UG/DL
CORTISOL COLLECTION INFO: NORMAL

## 2025-03-10 PROCEDURE — 82024 ASSAY OF ACTH: CPT

## 2025-03-10 PROCEDURE — 6360000002 HC RX W HCPCS: Performed by: INTERNAL MEDICINE

## 2025-03-10 PROCEDURE — 99213 OFFICE O/P EST LOW 20 MIN: CPT

## 2025-03-10 PROCEDURE — 96374 THER/PROPH/DIAG INJ IV PUSH: CPT

## 2025-03-10 PROCEDURE — 82533 TOTAL CORTISOL: CPT

## 2025-03-10 RX ORDER — SODIUM CHLORIDE 9 MG/ML
5-250 INJECTION, SOLUTION INTRAVENOUS PRN
Status: CANCELLED | OUTPATIENT
Start: 2025-03-10

## 2025-03-10 RX ORDER — COSYNTROPIN 0.25 MG/ML
250 INJECTION, POWDER, FOR SOLUTION INTRAMUSCULAR; INTRAVENOUS ONCE
Status: CANCELLED | OUTPATIENT
Start: 2025-03-10 | End: 2025-03-10

## 2025-03-10 RX ORDER — SODIUM CHLORIDE 0.9 % (FLUSH) 0.9 %
5-40 SYRINGE (ML) INJECTION PRN
Status: CANCELLED | OUTPATIENT
Start: 2025-03-10

## 2025-03-10 RX ORDER — COSYNTROPIN 0.25 MG/ML
250 INJECTION, POWDER, FOR SOLUTION INTRAMUSCULAR; INTRAVENOUS ONCE
Status: COMPLETED | OUTPATIENT
Start: 2025-03-10 | End: 2025-03-10

## 2025-03-10 RX ADMIN — COSYNTROPIN 250 MCG: 0.25 INJECTION, POWDER, LYOPHILIZED, FOR SOLUTION INTRAMUSCULAR; INTRAVENOUS at 07:46

## 2025-03-10 NOTE — PROGRESS NOTES
0730 Pt arrives ambulatory for cortrosyn stim test. Procedure explained and questions answered. PT RIGHTS AND RESPONSIBILITIES OFFERED TO PT. P provided with muffin and apple juice.   0744 labs drawn and sent down as ordered.   0746 med given as per MAR.  0817 labs drawn and sent down as ordered.   0847 labs drawn and sent down as ordered.   0902 pt discharged ambulatory with instructions with no complaints.                   __m__ Safety:       (Environmental)  [place] to environment  Ensure ID band is correct and in place/ allergy band as needed  Assess for fall risk  Initiate fall precautions as applicable (fall band, side rails, etc.)  Call light within reach  Bed in low position/ wheels locked    __m__ Pain:       Assess pain level and characteristics  Administer analgesics as ordered  Assess effectiveness of pain management and report to MD as needed    _m___ Knowledge Deficit:  Assess baseline knowledge  Provide teaching at level of understanding  Provide teaching via preferred learning method  Evaluate teaching effectiveness    __m__ Hemodynamic/Respiratory Status:       (Pre and Post Procedure Monitoring)  Assess/Monitor vital signs and LOC  Assess Baseline SpO2 prior to any sedation  Obtain weight/height  Assess vital signs/ LOC until patient meets discharge criteria  Monitor procedure site and notify MD of any issues

## 2025-03-11 ENCOUNTER — NURSE ONLY (OUTPATIENT)
Dept: ALLERGY | Age: 65
End: 2025-03-11

## 2025-03-11 VITALS
SYSTOLIC BLOOD PRESSURE: 131 MMHG | RESPIRATION RATE: 18 BRPM | HEART RATE: 91 BPM | OXYGEN SATURATION: 98 % | DIASTOLIC BLOOD PRESSURE: 81 MMHG

## 2025-03-11 DIAGNOSIS — J30.9 CHRONIC ALLERGIC RHINITIS: Primary | ICD-10-CM

## 2025-03-11 DIAGNOSIS — J30.1 SEASONAL ALLERGIC RHINITIS DUE TO POLLEN: ICD-10-CM

## 2025-03-11 DIAGNOSIS — J82.83 EOSINOPHILIC ASTHMA: ICD-10-CM

## 2025-03-11 LAB — ACTH PLAS-MCNC: 19.6 PG/ML (ref 7.2–63.3)

## 2025-03-11 NOTE — PROGRESS NOTES
Patient here today to receive Xolair injection.    Patient does not report any previous reaction from Xolair injections.  Denies any nausea vomiting fever urticaria or angioedema.  Denies any recent exacerbation of asthma or asthma-like symptoms.  Patient was explained benefits and potential risks including anaphylaxis to patient.      Patient has been explained the risk and benefits including delayed anaphylaxis.      Patient has EpiPen and understands how to appropriately use? Yes      1ST DOSE OF XOLAIR 150 MG GIVEN subcutaneously in  left ABDOMEN  2ND DOSE OF XOLAIR 150 MG GIVEN subcutaneously in  right ABDOMEN  Total combined dose of 300 mg        APPROVED INJECTION SITES RIGHT  / LEFT UPPER ARM,  RIGHT  / LEFT FRONT OR MIDDLE OF THIGH, OR STOMACH         NDC 53246-909-94 RLQ and LLQ   Lot [id number] RLQ and LLQ   Expires 11/30/2025 RLQ and LLQ    Patient observed for 30 minutes No      Medication was supplied by patient:  YES/NO: Yes      Medication was supplied as a sample:  YES/NO: No                   Prior to patient receiving medication the area was cleansed with alcohol swabs.  Following the administration no evidence of bleeding or bruising.    No adverse reactions reported.  Patient tolerated well without adverse reactions or side effects.  Patient to continue to receive Xolair injections as prescribed.  Will report any problems to this office.  No evidence of allergic reaction including anaphylaxis.

## 2025-03-11 NOTE — PROGRESS NOTES

## 2025-03-12 ENCOUNTER — OFFICE VISIT (OUTPATIENT)
Dept: NEUROSURGERY | Age: 65
End: 2025-03-12
Payer: MEDICAID

## 2025-03-12 VITALS
BODY MASS INDEX: 35.44 KG/M2 | WEIGHT: 200 LBS | DIASTOLIC BLOOD PRESSURE: 82 MMHG | HEIGHT: 63 IN | SYSTOLIC BLOOD PRESSURE: 124 MMHG | HEART RATE: 96 BPM

## 2025-03-12 DIAGNOSIS — D35.2 PITUITARY ADENOMA (HCC): Primary | ICD-10-CM

## 2025-03-12 PROCEDURE — 1123F ACP DISCUSS/DSCN MKR DOCD: CPT | Performed by: NEUROLOGICAL SURGERY

## 2025-03-12 PROCEDURE — 99214 OFFICE O/P EST MOD 30 MIN: CPT | Performed by: NEUROLOGICAL SURGERY

## 2025-03-13 ENCOUNTER — OFFICE VISIT (OUTPATIENT)
Dept: UROLOGY | Age: 65
End: 2025-03-13
Payer: MEDICAID

## 2025-03-13 VITALS — BODY MASS INDEX: 35.44 KG/M2 | RESPIRATION RATE: 20 BRPM | WEIGHT: 200 LBS | HEIGHT: 63 IN

## 2025-03-13 DIAGNOSIS — N30.10 INTERSTITIAL CYSTITIS: ICD-10-CM

## 2025-03-13 DIAGNOSIS — N28.1 RENAL CYST, RIGHT: ICD-10-CM

## 2025-03-13 DIAGNOSIS — R31.21 ASYMPTOMATIC MICROSCOPIC HEMATURIA: Primary | ICD-10-CM

## 2025-03-13 DIAGNOSIS — R10.2 SUPRAPUBIC PAIN: ICD-10-CM

## 2025-03-13 LAB
BILIRUBIN, URINE: NEGATIVE
BLOOD URINE, POC: ABNORMAL
CHARACTER, URINE: CLEAR
COLOR, UA: YELLOW
GLUCOSE URINE: NEGATIVE MG/DL
KETONES, URINE: NEGATIVE
LEUKOCYTE CLUMPS, URINE: NEGATIVE
NITRITE, URINE: NEGATIVE
PH, URINE: 5.5 (ref 5–9)
PROTEIN, URINE: ABNORMAL MG/DL
SPECIFIC GRAVITY UA: 1.02 (ref 1–1.03)
UROBILINOGEN, URINE: 1 EU/DL (ref 0–1)

## 2025-03-13 PROCEDURE — 1123F ACP DISCUSS/DSCN MKR DOCD: CPT

## 2025-03-13 PROCEDURE — 81003 URINALYSIS AUTO W/O SCOPE: CPT

## 2025-03-13 PROCEDURE — 99214 OFFICE O/P EST MOD 30 MIN: CPT

## 2025-03-13 RX ORDER — PENTOSAN POLYSULFATE SODIUM 100 MG/1
100 CAPSULE, GELATIN COATED ORAL 2 TIMES DAILY
Qty: 180 CAPSULE | Refills: 1 | Status: SHIPPED | OUTPATIENT
Start: 2025-03-13 | End: 2025-09-09

## 2025-03-13 NOTE — PROGRESS NOTES
[facility] [place] [facility]  [facility]  [address]  [address]  [facility] [place] [address]  Dept: [phone]  Loc: [phone]  Visit Date: 3/13/2025      HPI:   Gely Archer is a 64 y.o. female with past medical history as listed below who presents today for hematuria follow-up.    Right Renal Cysts  + Small right renal cysts x 2. The largest of which is 1.0 cm.    Also with chronic flank pain. Likely MSK.    Interstitial Cystitis  + Suprapubic pain and discomfort. Started on Elmiron 100 mg BID at her last visit and instructed to increase fluids. Also started on Ditropan XL 5 mg QD. Well controlled!    Associated microhematuria, trace-intact.      Labs   Results for orders placed or performed in visit on 03/13/25   POCT Urinalysis No Micro (Auto)   Result Value Ref Range    Glucose, Ur Negative NEGATIVE mg/dl    Bilirubin, Urine Negative     Ketones, Urine Negative NEGATIVE    Specific Gravity, UA 1.020 1.002 - 1.030    Blood, UA POC Trace-intact NEGATIVE    pH, Urine 5.50 5.0 - 9.0    Protein, Urine Trace (A) NEGATIVE mg/dl    Urobilinogen, Urine 1.00 0.0 - 1.0 eu/dl    Nitrite, Urine Negative NEGATIVE    Leukocyte Clumps, Urine Negative NEGATIVE    Color, UA Yellow YELLOW-STRAW    Character, Urine Clear [id number]         Recent BUN/Creatinine:  Lab Results   Component Value Date/Time    BUN 9 01/30/2025 09:01 AM    CREATININE 0.6 01/30/2025 09:01 AM       Radiology    I personally reviewed images and their accompanying reports from the following studies:    US RENAL COMPLETE  Result Date:     FINDINGS: The right kidney measures 11.4 x 4.9 x 4.1 cm and the left kidney  measures 10.5 x 5.9 x 6.9 cm. Renal cortical thickness is normal. Given  differences in technique, a 1.0 cm anechoic structure at the lower right kidney  is stable in size. There is no hydronephrosis or renal calculi.     Color Doppler demonstrates expected waveforms in the bilateral renal arteries.  The left

## 2025-03-15 ENCOUNTER — RESULTS FOLLOW-UP (OUTPATIENT)
Dept: NURSING | Age: 65
End: 2025-03-15

## 2025-03-18 ENCOUNTER — NURSE ONLY (OUTPATIENT)
Dept: ALLERGY | Age: 65
End: 2025-03-18

## 2025-03-18 VITALS
RESPIRATION RATE: 18 BRPM | OXYGEN SATURATION: 98 % | DIASTOLIC BLOOD PRESSURE: 74 MMHG | HEART RATE: 89 BPM | SYSTOLIC BLOOD PRESSURE: 119 MMHG

## 2025-03-18 DIAGNOSIS — J30.1 SEASONAL ALLERGIC RHINITIS DUE TO POLLEN: ICD-10-CM

## 2025-03-18 DIAGNOSIS — J30.9 CHRONIC ALLERGIC RHINITIS: Primary | ICD-10-CM

## 2025-03-18 NOTE — PROGRESS NOTES
PATIENT HAS THE FOLLOWING DIAGNOSIS SUPPORTING ADMINISTRATION OF ALLERGY INJECTIONS: J30.1Allergic rhinitis due to pollen  AND 11403 MULTIPLE ALLERGY INJECTIONS FOR ALLERGY INJECTION ADMINISTRATION      Patient here for allergy injection today.  Patient was presented with the opportunity to speak with provider and ask questions regarding allergy injections.  Patient was also instructed they need to wait 30 minutes after receiving allergy injections. All risks associated with potential adverse effects have been explained to patient and patient handout was provided following allergy testing.    After consent obtained/verified, allergy injection subcutaneously given in back of arm(s).  Please see below for specific details of site injections, concentration of serum, and volume injected.    VIAL COLOR OF ALL VIALS TODAY IS red 1:1. Vial allergy w/v concentration today is: 1:1     ALLERGY INJECTION FROM VIAL A GIVEN right  UPPER ARM IN THE AMOUNT OF 0.10 ML    ALLERGY INJECTION FROM VIAL B GIVEN left upper ARM IN THE AMOUNT OF 0.10  ML    ALLERGY INJECTION FROM VIAL C GIVEN leftlower ARM IN THE AMOUNT OF 0.10 ML      Documentation of vial injection specific to arm(s) noted on Allergy Immunotherapy Administration Form.       Patient waited 30 minutes for observation.No  Patient has received information and verbalizes understanding of the potential  health risks associated with allergy injections . Patient understands risks including anaphylaxis and chooses not to wait 30 minutes after administration of allergy injection(s).    Patient tolerated well without adverse reaction while the patient was in the office.    SHOT REACTION TREATMENT INSTRUCTIONS    During the 30 minute wait after an allergy injection the following symptoms should be reported:    Itching other than at the injection site  Hives or swelling other than at the injection site  Redness other than at the injection site  Difficulty breathing  Chest

## 2025-03-23 DIAGNOSIS — J45.50 SEVERE PERSISTENT ASTHMA WITHOUT COMPLICATION (HCC): ICD-10-CM

## 2025-03-24 RX ORDER — OMALIZUMAB 150 MG/ML
INJECTION, SOLUTION SUBCUTANEOUS
Refills: 6 | OUTPATIENT
Start: 2025-03-24

## 2025-03-25 ENCOUNTER — CLINICAL SUPPORT (OUTPATIENT)
Dept: ALLERGY | Age: 65
End: 2025-03-25
Payer: MEDICAID

## 2025-03-25 VITALS
OXYGEN SATURATION: 98 % | SYSTOLIC BLOOD PRESSURE: 127 MMHG | DIASTOLIC BLOOD PRESSURE: 91 MMHG | HEART RATE: 93 BPM | RESPIRATION RATE: 18 BRPM

## 2025-03-25 DIAGNOSIS — J30.1 SEASONAL ALLERGIC RHINITIS DUE TO POLLEN: ICD-10-CM

## 2025-03-25 DIAGNOSIS — J30.9 CHRONIC ALLERGIC RHINITIS: Primary | ICD-10-CM

## 2025-03-25 PROCEDURE — 95117 IMMUNOTHERAPY INJECTIONS: CPT | Performed by: NURSE PRACTITIONER

## 2025-03-25 NOTE — PROGRESS NOTES

## 2025-03-28 DIAGNOSIS — L66.81 CENTRAL CENTRIFUGAL SCARRING ALOPECIA: ICD-10-CM

## 2025-03-28 RX ORDER — KETOCONAZOLE 20 MG/G
CREAM TOPICAL
Qty: 30 G | Refills: 1 | Status: SHIPPED | OUTPATIENT
Start: 2025-03-28

## 2025-04-01 ENCOUNTER — CLINICAL SUPPORT (OUTPATIENT)
Dept: ALLERGY | Age: 65
End: 2025-04-01
Payer: MEDICAID

## 2025-04-01 VITALS
DIASTOLIC BLOOD PRESSURE: 70 MMHG | RESPIRATION RATE: 18 BRPM | OXYGEN SATURATION: 98 % | HEART RATE: 91 BPM | SYSTOLIC BLOOD PRESSURE: 120 MMHG

## 2025-04-01 DIAGNOSIS — J30.9 CHRONIC ALLERGIC RHINITIS: Primary | ICD-10-CM

## 2025-04-01 DIAGNOSIS — J30.1 SEASONAL ALLERGIC RHINITIS DUE TO POLLEN: ICD-10-CM

## 2025-04-01 PROCEDURE — 95117 IMMUNOTHERAPY INJECTIONS: CPT | Performed by: NURSE PRACTITIONER

## 2025-04-01 RX ORDER — KETOCONAZOLE 20 MG/ML
SHAMPOO, SUSPENSION TOPICAL
Qty: 120 ML | Refills: 0 | Status: SHIPPED | OUTPATIENT
Start: 2025-04-01

## 2025-04-01 NOTE — PROGRESS NOTES
PATIENT HAS THE FOLLOWING DIAGNOSIS SUPPORTING ADMINISTRATION OF ALLERGY INJECTIONS: J30.1Allergic rhinitis due to pollen  AND 18809 MULTIPLE ALLERGY INJECTIONS FOR ALLERGY INJECTION ADMINISTRATION      Patient here for allergy injection today.  Patient was presented with the opportunity to speak with provider and ask questions regarding allergy injections.  Patient was also instructed they need to wait 30 minutes after receiving allergy injections. All risks associated with potential adverse effects have been explained to patient and patient handout was provided following allergy testing.    After consent obtained/verified, allergy injection subcutaneously given in back of arm(s).  Please see below for specific details of site injections, concentration of serum, and volume injected.    VIAL COLOR OF ALL VIALS TODAY IS red 1:1. Vial allergy w/v concentration today is: 1:1     ALLERGY INJECTION FROM VIAL A GIVEN right  UPPER ARM IN THE AMOUNT OF 0.20 ML    ALLERGY INJECTION FROM VIAL B GIVEN left lower ARM IN THE AMOUNT OF 0.20  ML    ALLERGY INJECTION FROM VIAL C GIVEN leftupper ARM IN THE AMOUNT OF 0.20 ML      Documentation of vial injection specific to arm(s) noted on Allergy Immunotherapy Administration Form.       Patient waited 30 minutes for observation.No  Patient has received information and verbalizes understanding of the potential  health risks associated with allergy injections . Patient understands risks including anaphylaxis and chooses not to wait 30 minutes after administration of allergy injection(s).    Patient tolerated well without adverse reaction while the patient was in the office.    SHOT REACTION TREATMENT INSTRUCTIONS    During the 30 minute wait after an allergy injection the following symptoms should be reported:    Itching other than at the injection site  Hives or swelling other than at the injection site  Redness other than at the injection site  Difficulty breathing  Chest

## 2025-04-03 ENCOUNTER — TRANSCRIBE ORDERS (OUTPATIENT)
Dept: ADMINISTRATIVE | Age: 65
End: 2025-04-03

## 2025-04-03 DIAGNOSIS — Z78.0 ASYMPTOMATIC MENOPAUSAL STATE: Primary | ICD-10-CM

## 2025-04-08 ENCOUNTER — CLINICAL SUPPORT (OUTPATIENT)
Dept: ALLERGY | Age: 65
End: 2025-04-08
Payer: MEDICAID

## 2025-04-08 VITALS
OXYGEN SATURATION: 98 % | SYSTOLIC BLOOD PRESSURE: 128 MMHG | RESPIRATION RATE: 18 BRPM | HEART RATE: 98 BPM | DIASTOLIC BLOOD PRESSURE: 83 MMHG

## 2025-04-08 DIAGNOSIS — J30.1 SEASONAL ALLERGIC RHINITIS DUE TO POLLEN: ICD-10-CM

## 2025-04-08 DIAGNOSIS — J30.9 CHRONIC ALLERGIC RHINITIS: Primary | ICD-10-CM

## 2025-04-08 PROCEDURE — 95117 IMMUNOTHERAPY INJECTIONS: CPT | Performed by: NURSE PRACTITIONER

## 2025-04-15 ENCOUNTER — CLINICAL SUPPORT (OUTPATIENT)
Dept: ALLERGY | Age: 65
End: 2025-04-15

## 2025-04-15 VITALS
RESPIRATION RATE: 18 BRPM | HEART RATE: 91 BPM | DIASTOLIC BLOOD PRESSURE: 77 MMHG | OXYGEN SATURATION: 98 % | SYSTOLIC BLOOD PRESSURE: 127 MMHG

## 2025-04-15 DIAGNOSIS — J30.9 CHRONIC ALLERGIC RHINITIS: ICD-10-CM

## 2025-04-15 DIAGNOSIS — J30.1 SEASONAL ALLERGIC RHINITIS DUE TO POLLEN: ICD-10-CM

## 2025-04-15 DIAGNOSIS — J45.50 SEVERE PERSISTENT ASTHMA WITHOUT COMPLICATION (HCC): Primary | ICD-10-CM

## 2025-04-15 NOTE — PROGRESS NOTES
PATIENT HAS THE FOLLOWING DIAGNOSIS SUPPORTING ADMINISTRATION OF ALLERGY INJECTIONS: J30.1Allergic rhinitis due to pollen  AND 78115 MULTIPLE ALLERGY INJECTIONS FOR ALLERGY INJECTION ADMINISTRATION      Patient here for allergy injection today.  Patient was presented with the opportunity to speak with provider and ask questions regarding allergy injections.  Patient was also instructed they need to wait 30 minutes after receiving allergy injections. All risks associated with potential adverse effects have been explained to patient and patient handout was provided following allergy testing.    After consent obtained/verified, allergy injection subcutaneously given in back of arm(s).  Please see below for specific details of site injections, concentration of serum, and volume injected.    VIAL COLOR OF ALL VIALS TODAY IS red 1:1. Vial allergy w/v concentration today is: 1:1     ALLERGY INJECTION FROM VIAL A GIVEN right  UPPER ARM IN THE AMOUNT OF 0.30 ML    ALLERGY INJECTION FROM VIAL B GIVEN left upper ARM IN THE AMOUNT OF 0.30  ML    ALLERGY INJECTION FROM VIAL C GIVEN leftlower ARM IN THE AMOUNT OF 0.30 ML      Documentation of vial injection specific to arm(s) noted on Allergy Immunotherapy Administration Form.       Patient waited 30 minutes for observation.No  Patient has received information and verbalizes understanding of the potential  health risks associated with allergy injections . Patient understands risks including anaphylaxis and chooses not to wait 30 minutes after administration of allergy injection(s).    Patient tolerated well without adverse reaction while the patient was in the office.    SHOT REACTION TREATMENT INSTRUCTIONS    During the 30 minute wait after an allergy injection the following symptoms should be reported:    Itching other than at the injection site  Hives or swelling other than at the injection site  Redness other than at the injection site  Difficulty breathing  Chest

## 2025-04-15 NOTE — PROGRESS NOTES
Patient here today to receive Xolair injection.    Patient does not report any previous reaction from Xolair injections.  Denies any nausea vomiting fever urticaria or angioedema.  Denies any recent exacerbation of asthma or asthma-like symptoms.  Patient was explained benefits and potential risks including anaphylaxis to patient.      Patient has been explained the risk and benefits including delayed anaphylaxis.      Patient has EpiPen and understands how to appropriately use? Yes      1ST DOSE OF XOLAIR 150 MG GIVEN subcutaneously in  right ABDOMEN  2ND DOSE OF XOLAIR 150 MG GIVEN subcutaneously in  left ABDOMEN  Total combined dose of 300 mg        APPROVED INJECTION SITES RIGHT  / LEFT UPPER ARM,  RIGHT  / LEFT FRONT OR MIDDLE OF THIGH, OR STOMACH         NDC 01977-772-03 (RLQ AND LLQ)   Lot [id number](RLQ AND LLQ)   Expires 01/31/2026(RLQ AND LLQ)    Patient observed for 30 minutes No      Medication was supplied by patient:  YES/NO: Yes      Medication was supplied as a sample:  YES/NO: No                   Prior to patient receiving medication the area was cleansed with alcohol swabs.  Following the administration no evidence of bleeding or bruising.    No adverse reactions reported.  Patient tolerated well without adverse reactions or side effects.  Patient to continue to receive Xolair injections as prescribed.  Will report any problems to this office.  No evidence of allergic reaction including anaphylaxis.

## 2025-04-22 ENCOUNTER — CLINICAL SUPPORT (OUTPATIENT)
Dept: ALLERGY | Age: 65
End: 2025-04-22

## 2025-04-22 VITALS
RESPIRATION RATE: 18 BRPM | HEART RATE: 98 BPM | OXYGEN SATURATION: 98 % | DIASTOLIC BLOOD PRESSURE: 85 MMHG | SYSTOLIC BLOOD PRESSURE: 126 MMHG

## 2025-04-22 DIAGNOSIS — J30.9 CHRONIC ALLERGIC RHINITIS: Primary | ICD-10-CM

## 2025-04-22 DIAGNOSIS — J30.1 SEASONAL ALLERGIC RHINITIS DUE TO POLLEN: ICD-10-CM

## 2025-04-22 NOTE — PROGRESS NOTES
PATIENT HAS THE FOLLOWING DIAGNOSIS SUPPORTING ADMINISTRATION OF ALLERGY INJECTIONS: J30.1Allergic rhinitis due to pollen  AND 09111 MULTIPLE ALLERGY INJECTIONS FOR ALLERGY INJECTION ADMINISTRATION      Patient here for allergy injection today.  Patient was presented with the opportunity to speak with provider and ask questions regarding allergy injections.  Patient was also instructed they need to wait 30 minutes after receiving allergy injections. All risks associated with potential adverse effects have been explained to patient and patient handout was provided following allergy testing.    After consent obtained/verified, allergy injection subcutaneously given in back of arm(s).  Please see below for specific details of site injections, concentration of serum, and volume injected.    VIAL COLOR OF ALL VIALS TODAY IS red 1:1. Vial allergy w/v concentration today is: 1:1     ALLERGY INJECTION FROM VIAL A GIVEN left  UPPER ARM IN THE AMOUNT OF 0.35 ML    ALLERGY INJECTION FROM VIAL B GIVEN right upper ARM IN THE AMOUNT OF 0.35  ML    ALLERGY INJECTION FROM VIAL C GIVEN rightlower ARM IN THE AMOUNT OF 0.35 ML      Documentation of vial injection specific to arm(s) noted on Allergy Immunotherapy Administration Form.       Patient waited 30 minutes for observation.No  Patient has received information and verbalizes understanding of the potential  health risks associated with allergy injections . Patient understands risks including anaphylaxis and chooses not to wait 30 minutes after administration of allergy injection(s).    Patient tolerated well without adverse reaction while the patient was in the office.    SHOT REACTION TREATMENT INSTRUCTIONS    During the 30 minute wait after an allergy injection the following symptoms should be reported:    Itching other than at the injection site  Hives or swelling other than at the injection site  Redness other than at the injection site  Difficulty breathing  Chest

## 2025-04-28 ENCOUNTER — OFFICE VISIT (OUTPATIENT)
Dept: DERMATOLOGY | Age: 65
End: 2025-04-28
Payer: MEDICAID

## 2025-04-28 VITALS
WEIGHT: 205.8 LBS | DIASTOLIC BLOOD PRESSURE: 68 MMHG | OXYGEN SATURATION: 92 % | HEART RATE: 80 BPM | SYSTOLIC BLOOD PRESSURE: 128 MMHG | RESPIRATION RATE: 18 BRPM | HEIGHT: 63 IN | BODY MASS INDEX: 36.46 KG/M2

## 2025-04-28 DIAGNOSIS — L81.0 POST-INFLAMMATORY HYPERPIGMENTATION: ICD-10-CM

## 2025-04-28 DIAGNOSIS — L66.81 CENTRAL CENTRIFUGAL SCARRING ALOPECIA: Primary | ICD-10-CM

## 2025-04-28 PROCEDURE — 99214 OFFICE O/P EST MOD 30 MIN: CPT | Performed by: DERMATOLOGY

## 2025-04-28 PROCEDURE — 11900 INJECT SKIN LESIONS </W 7: CPT | Performed by: DERMATOLOGY

## 2025-04-28 RX ORDER — PROGESTERONE 200 MG/1
CAPSULE ORAL
COMMUNITY
Start: 2025-04-24

## 2025-04-28 RX ORDER — CLOBETASOL PROPIONATE 0.5 MG/ML
SOLUTION TOPICAL
Qty: 50 ML | Refills: 2 | Status: SHIPPED | OUTPATIENT
Start: 2025-04-28

## 2025-04-28 RX ORDER — KETOCONAZOLE 20 MG/ML
SHAMPOO, SUSPENSION TOPICAL
Qty: 120 ML | Refills: 2 | Status: SHIPPED | OUTPATIENT
Start: 2025-04-28

## 2025-04-28 RX ORDER — CLOBETASOL PROPIONATE 0.5 MG/G
OINTMENT TOPICAL
Qty: 60 G | Refills: 2 | Status: SHIPPED | OUTPATIENT
Start: 2025-04-28

## 2025-04-28 NOTE — PATIENT INSTRUCTIONS
For CCCA  - continue to avoid picking  - we discussed that compounded solution will not help with regrowth but will help with itching, we discussed ordering through [facility] however will not include Gabapentin. Patient would like to proceed with this  - start compounded topical solution 3 times weekly, will send through [facility]  - start clobetasol solution/ointment on areas with the burning/tingling sensation 3x weekly, or up to daily for increased flares   - continue ketoconazole shampoo    Post inflammatory hyperpigmentation  Possibly previous eczematous patch  - resolved with only hyperpigmentation  - discussed that discoloration may take some time to resolve, she may use moisturizer or epsom salt bathes in the meantime

## 2025-04-29 ENCOUNTER — OFFICE VISIT (OUTPATIENT)
Dept: ALLERGY | Age: 65
End: 2025-04-29
Payer: MEDICAID

## 2025-04-29 ENCOUNTER — CLINICAL SUPPORT (OUTPATIENT)
Dept: ALLERGY | Age: 65
End: 2025-04-29

## 2025-04-29 ENCOUNTER — TELEPHONE (OUTPATIENT)
Dept: ALLERGY | Age: 65
End: 2025-04-29

## 2025-04-29 VITALS
BODY MASS INDEX: 36.14 KG/M2 | RESPIRATION RATE: 19 BRPM | OXYGEN SATURATION: 97 % | SYSTOLIC BLOOD PRESSURE: 135 MMHG | WEIGHT: 204 LBS | HEART RATE: 92 BPM | HEIGHT: 63 IN | DIASTOLIC BLOOD PRESSURE: 71 MMHG

## 2025-04-29 VITALS
SYSTOLIC BLOOD PRESSURE: 135 MMHG | DIASTOLIC BLOOD PRESSURE: 71 MMHG | HEART RATE: 92 BPM | OXYGEN SATURATION: 97 % | RESPIRATION RATE: 19 BRPM

## 2025-04-29 DIAGNOSIS — J45.40 MODERATE PERSISTENT ASTHMA WITHOUT COMPLICATION: ICD-10-CM

## 2025-04-29 DIAGNOSIS — J30.89 ALLERGIC RHINOCONJUNCTIVITIS, SEASONAL AND PERENNIAL: ICD-10-CM

## 2025-04-29 DIAGNOSIS — J30.1 NON-SEASONAL ALLERGIC RHINITIS DUE TO POLLEN: ICD-10-CM

## 2025-04-29 DIAGNOSIS — H10.10 ALLERGIC RHINOCONJUNCTIVITIS, SEASONAL AND PERENNIAL: ICD-10-CM

## 2025-04-29 DIAGNOSIS — J34.89 RHINORRHEA: ICD-10-CM

## 2025-04-29 DIAGNOSIS — J30.2 ALLERGIC RHINOCONJUNCTIVITIS, SEASONAL AND PERENNIAL: ICD-10-CM

## 2025-04-29 DIAGNOSIS — J30.9 CHRONIC ALLERGIC RHINITIS: Primary | ICD-10-CM

## 2025-04-29 DIAGNOSIS — J45.50 SEVERE PERSISTENT ASTHMA WITHOUT COMPLICATION (HCC): ICD-10-CM

## 2025-04-29 PROCEDURE — A4617 MOUTH PIECE: HCPCS | Performed by: NURSE PRACTITIONER

## 2025-04-29 PROCEDURE — 99214 OFFICE O/P EST MOD 30 MIN: CPT | Performed by: NURSE PRACTITIONER

## 2025-04-29 PROCEDURE — 1123F ACP DISCUSS/DSCN MKR DOCD: CPT | Performed by: NURSE PRACTITIONER

## 2025-04-29 RX ORDER — IPRATROPIUM BROMIDE 21 UG/1
2 SPRAY, METERED NASAL EVERY 12 HOURS
Qty: 30 ML | Refills: 11 | Status: SHIPPED | OUTPATIENT
Start: 2025-04-29

## 2025-04-29 RX ORDER — FLUTICASONE PROPIONATE 50 MCG
2 SPRAY, SUSPENSION (ML) NASAL DAILY PRN
Qty: 1 EACH | Refills: 11 | Status: SHIPPED | OUTPATIENT
Start: 2025-04-29

## 2025-04-29 RX ORDER — FEXOFENADINE HCL 180 MG/1
180 TABLET ORAL DAILY
Qty: 30 TABLET | Refills: 11 | Status: SHIPPED | OUTPATIENT
Start: 2025-04-29

## 2025-04-29 NOTE — PROGRESS NOTES
PATIENT HAS THE FOLLOWING DIAGNOSIS SUPPORTING ADMINISTRATION OF ALLERGY INJECTIONS: J30.1Allergic rhinitis due to pollen  AND 97217 MULTIPLE ALLERGY INJECTIONS FOR ALLERGY INJECTION ADMINISTRATION      Patient here for allergy injection today.  Patient was presented with the opportunity to speak with provider and ask questions regarding allergy injections.  Patient was also instructed they need to wait 30 minutes after receiving allergy injections. All risks associated with potential adverse effects have been explained to patient and patient handout was provided following allergy testing.    After consent obtained/verified, allergy injection subcutaneously given in back of arm(s).  Please see below for specific details of site injections, concentration of serum, and volume injected.    VIAL COLOR OF ALL VIALS TODAY IS red 1:1. Vial allergy w/v concentration today is: 1:1     ALLERGY INJECTION FROM VIAL A GIVEN right  UPPER ARM IN THE AMOUNT OF 0.40 ML    ALLERGY INJECTION FROM VIAL B GIVEN left lower ARM IN THE AMOUNT OF 0.40  ML    ALLERGY INJECTION FROM VIAL C GIVEN leftupper ARM IN THE AMOUNT OF 0.40 ML      Documentation of vial injection specific to arm(s) noted on Allergy Immunotherapy Administration Form.       Patient waited 30 minutes for observation.Yes  Patient has received information and verbalizes understanding of the potential  health risks associated with allergy injections . Patient understands risks including anaphylaxis and chooses not to wait 30 minutes after administration of allergy injection(s).    Patient tolerated well without adverse reaction while the patient was in the office.    SHOT REACTION TREATMENT INSTRUCTIONS    During the 30 minute wait after an allergy injection the following symptoms should be reported:    Itching other than at the injection site  Hives or swelling other than at the injection site  Redness other than at the injection site  Difficulty breathing  Chest

## 2025-04-29 NOTE — PROGRESS NOTES
@[email or web address]@    Allergy & Asthma   [address] [place] [address]  Ph:   [phone]  Fax:[phone]     Provider:  Dr. Narcisa Hsieh    Follow Up         Gely was seen today for asthma, follow-up and results.    Diagnoses and all orders for this visit:    Severe persistent asthma without complication (HCC)  -     omalizumab (XOLAIR) 300 MG/2ML SOAJ injection; Inject 300 mg into the skin every 28 days FOR ASTHMA    Rhinorrhea  -     ipratropium (ATROVENT) 0.03 % nasal spray; 2 sprays by Each Nostril route in the morning and 2 sprays in the evening.    Non-seasonal allergic rhinitis due to pollen  -     fexofenadine (ALLEGRA) 180 MG tablet; Take 1 tablet by mouth daily  -     fluticasone (FLONASE) 50 MCG/ACT nasal spray; 2 sprays by Each Nostril route daily as needed for Rhinitis (for nasal congestion)    Allergic rhinoconjunctivitis, seasonal and perennial  -     fexofenadine (ALLEGRA) 180 MG tablet; Take 1 tablet by mouth daily    Moderate persistent asthma without complication  -     mometasone-formoterol (DULERA) 100-5 MCG/ACT inhaler; 2 puffs inhaled twice daily.  Rinse mouth well after use.        CHIEF COMPLAINT:   Chief Complaint   Patient presents with    Asthma     6 month f/u xolair    Follow-up     6 month f/u allergy injections/allergy management    Results       HISTORY OF PRESENT ILLNESS: ESTABLISHED PATIENT HERE FOR EVALUATION    History of Present Illness                Gely Archer is a 64 y.o. female is here for immunotherapy follow-up. Patient has been in red color vial  once EVERY 1 WEEK.  Immunotherapy treatment has not been changed since starting date..  There have been  no local reactions to immunotherapy injections. There have been no systemic reactions to IT. On immunotherapy, patient has had a 100 reduction in allergy symptoms. Since beginning immunotherapy no new allergy symptoms have developed.  She does not suspect any new allergen sensitization.  During the last year

## 2025-04-30 NOTE — PROGRESS NOTES
Faxed to [facility]  
[Benzonatate] Hives    Amoxicillin-Pot Clavulanate Rash    Cefdinir Rash    Ibuprofen [Ibuprofen] Rash    Lisinopril Rash    Macrobid [Nitrofurantoin Monohydrate Macrocrystals] Rash    Macrodantin [Nitrofurantoin] Rash    Ranitidine Rash       SOCIAL HISTORY:  Social History     Tobacco Use    Smoking status: Light Smoker     Current packs/day: 0.50     Average packs/day: 0.6 packs/day for 65.2 years (40.3 ttl pk-yrs)     Types: Cigarettes     Start date: 6/1/1975     Passive exposure: Current    Smokeless tobacco: Never    Tobacco comments:     Started back but smoking less   Substance Use Topics    Alcohol use: Yes     Alcohol/week: 2.0 standard drinks of alcohol     Types: 2 Cans of beer per week     Comment: Once a month; stomach tolerates very little alcohol       Pertinent ROS:  Review of Systems  Skin: Denies any new changing, growing or bleeding lesions or rashes except as described in the HPI   Constitutional: Denies fevers, chills, and malaise.    PHYSICAL EXAM:   /68   Pulse 80   Resp 18   Ht 1.6 m (5' 3\")   Wt 93.4 kg (205 lb 12.8 oz)   SpO2 92%   BMI 36.46 kg/m²     The patient is generally well appearing, well nourished, alert and conversational. Affect is normal.    Cutaneous Exam:  Physical Exam  Focused exam of scalp and right lower leg was performed    Diagnoses/exam findings/medical history pertinent to this visit are listed below:    Assessment:   Diagnosis Orders   1. Central centrifugal scarring alopecia        2. 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 Apply to

## 2025-05-01 NOTE — TELEPHONE ENCOUNTER
Outcome  Approved on April 30 by [place] [employer] 2017  Your PA request for [id number] was approved for 365 days. The PA# assigned is [id number]. Approved medication: XOLAIR 300 MG/2 ML SYRINGE  Effective Date: 1/9/2025  Authorization Expiration Date: 1/8/2026    Informed [facility] speciality on approval.

## 2025-05-06 ENCOUNTER — CLINICAL SUPPORT (OUTPATIENT)
Dept: ALLERGY | Age: 65
End: 2025-05-06
Payer: MEDICAID

## 2025-05-06 ENCOUNTER — OFFICE VISIT (OUTPATIENT)
Dept: FAMILY MEDICINE CLINIC | Age: 65
End: 2025-05-06
Payer: MEDICAID

## 2025-05-06 VITALS
RESPIRATION RATE: 18 BRPM | SYSTOLIC BLOOD PRESSURE: 124 MMHG | OXYGEN SATURATION: 98 % | HEART RATE: 95 BPM | DIASTOLIC BLOOD PRESSURE: 90 MMHG

## 2025-05-06 VITALS
SYSTOLIC BLOOD PRESSURE: 118 MMHG | RESPIRATION RATE: 16 BRPM | HEART RATE: 93 BPM | WEIGHT: 203 LBS | DIASTOLIC BLOOD PRESSURE: 64 MMHG | TEMPERATURE: 97.8 F | BODY MASS INDEX: 35.96 KG/M2 | OXYGEN SATURATION: 97 %

## 2025-05-06 DIAGNOSIS — Z98.2 VP (VENTRICULOPERITONEAL) SHUNT STATUS: ICD-10-CM

## 2025-05-06 DIAGNOSIS — R42 DIZZINESS: ICD-10-CM

## 2025-05-06 DIAGNOSIS — E55.9 VITAMIN D DEFICIENCY: ICD-10-CM

## 2025-05-06 DIAGNOSIS — R51.9 CHRONIC DAILY HEADACHE: Primary | ICD-10-CM

## 2025-05-06 DIAGNOSIS — H61.23 BILATERAL IMPACTED CERUMEN: ICD-10-CM

## 2025-05-06 DIAGNOSIS — N32.81 OAB (OVERACTIVE BLADDER): ICD-10-CM

## 2025-05-06 DIAGNOSIS — K21.9 GASTROESOPHAGEAL REFLUX DISEASE, UNSPECIFIED WHETHER ESOPHAGITIS PRESENT: ICD-10-CM

## 2025-05-06 DIAGNOSIS — E11.42 DIABETIC POLYNEUROPATHY ASSOCIATED WITH TYPE 2 DIABETES MELLITUS (HCC): ICD-10-CM

## 2025-05-06 DIAGNOSIS — Z12.31 ENCOUNTER FOR SCREENING MAMMOGRAM FOR MALIGNANT NEOPLASM OF BREAST: ICD-10-CM

## 2025-05-06 DIAGNOSIS — E78.2 MIXED HYPERLIPIDEMIA: ICD-10-CM

## 2025-05-06 DIAGNOSIS — E03.9 HYPOTHYROIDISM, UNSPECIFIED TYPE: ICD-10-CM

## 2025-05-06 DIAGNOSIS — Z98.2 HISTORY OF BRAIN SHUNT: ICD-10-CM

## 2025-05-06 DIAGNOSIS — J30.1 NON-SEASONAL ALLERGIC RHINITIS DUE TO POLLEN: ICD-10-CM

## 2025-05-06 DIAGNOSIS — E11.65 TYPE 2 DIABETES MELLITUS WITH HYPERGLYCEMIA, UNSPECIFIED WHETHER LONG TERM INSULIN USE (HCC): ICD-10-CM

## 2025-05-06 DIAGNOSIS — Q07.00 ARNOLD-CHIARI MALFORMATION (HCC): ICD-10-CM

## 2025-05-06 DIAGNOSIS — F17.200 SMOKER: ICD-10-CM

## 2025-05-06 DIAGNOSIS — J44.9 CHRONIC OBSTRUCTIVE PULMONARY DISEASE, UNSPECIFIED COPD TYPE (HCC): ICD-10-CM

## 2025-05-06 DIAGNOSIS — F33.1 MODERATE EPISODE OF RECURRENT MAJOR DEPRESSIVE DISORDER (HCC): ICD-10-CM

## 2025-05-06 DIAGNOSIS — J82.83 EOSINOPHILIC ASTHMA: ICD-10-CM

## 2025-05-06 DIAGNOSIS — J30.9 CHRONIC ALLERGIC RHINITIS: Primary | ICD-10-CM

## 2025-05-06 DIAGNOSIS — I10 PRIMARY HYPERTENSION: ICD-10-CM

## 2025-05-06 DIAGNOSIS — L85.3 XEROSIS OF SKIN: ICD-10-CM

## 2025-05-06 PROCEDURE — 1123F ACP DISCUSS/DSCN MKR DOCD: CPT | Performed by: NURSE PRACTITIONER

## 2025-05-06 PROCEDURE — 95117 IMMUNOTHERAPY INJECTIONS: CPT | Performed by: NURSE PRACTITIONER

## 2025-05-06 PROCEDURE — 3044F HG A1C LEVEL LT 7.0%: CPT | Performed by: NURSE PRACTITIONER

## 2025-05-06 PROCEDURE — 3074F SYST BP LT 130 MM HG: CPT | Performed by: NURSE PRACTITIONER

## 2025-05-06 PROCEDURE — 3078F DIAST BP <80 MM HG: CPT | Performed by: NURSE PRACTITIONER

## 2025-05-06 PROCEDURE — 99214 OFFICE O/P EST MOD 30 MIN: CPT | Performed by: NURSE PRACTITIONER

## 2025-05-06 RX ORDER — HYOSCYAMINE SULFATE 0.38 MG/1
0.38 TABLET, EXTENDED RELEASE ORAL EVERY 12 HOURS PRN
Qty: 60 TABLET | Refills: 3 | Status: SHIPPED | OUTPATIENT
Start: 2025-05-06

## 2025-05-06 RX ORDER — DULOXETIN HYDROCHLORIDE 30 MG/1
30 CAPSULE, DELAYED RELEASE ORAL DAILY
Qty: 30 CAPSULE | Refills: 3 | Status: SHIPPED | OUTPATIENT
Start: 2025-05-06

## 2025-05-06 ASSESSMENT — ENCOUNTER SYMPTOMS
CHEST TIGHTNESS: 0
ABDOMINAL DISTENTION: 0
COUGH: 0
ABDOMINAL PAIN: 0
BACK PAIN: 0
WHEEZING: 0
SHORTNESS OF BREATH: 0

## 2025-05-06 NOTE — PROGRESS NOTES
Ceruminosis is noted on the right side.  Wax is removed by syringing and manual debridement. Instructions for home care to prevent wax buildup are given.   
capsule Take 1 capsule by mouth daily 90 capsule 3    betamethasone valerate (VALISONE) 0.1 % ointment Apply topically 2 times daily. 45 g 1    Blood Pressure KIT 1 each by Does not apply route Once a week at 5 PM 1 kit 0    esomeprazole (NEXIUM) 40 MG delayed release capsule TAKE 1 CAPSULE BY MOUTH IN THE MORNING AND 1 IN THE EVENING 180 capsule 0    albuterol sulfate HFA (PROVENTIL;VENTOLIN;PROAIR) 108 (90 Base) MCG/ACT inhaler INHALE 2 PUFFS BY MOUTH EVERY 4 HOURS AS NEEDED FOR WHEEZING **RESCUE  INHALER.  CARRY  IN  PURSE** 9 g 0    EPINEPHrine (EPIPEN) 0.3 MG/0.3ML SOAJ injection INJECT CONTENTS OF 1 PEN INTRAMUSCULARLY AS NEEDED FOR ALLERGIC REACTION 2 each 0    hydrocortisone (HYTONE) 2.5 % lotion APPLY  LOTION EXTERNALLY ONCE DAILY TO AFFECTED AREA 118 mL 5    levothyroxine (SYNTHROID) 88 MCG tablet TAKE 1 TABLET BY MOUTH ONCE DAILY ON AN EMPTY STOMACH WITH  WATER.  DO  NOT  EAT  OR  TAKE  ANY  OTHER  MEDICATIONS  FOR  30  MINUTES 90 tablet 3    atorvastatin (LIPITOR) 20 MG tablet Take 1 tablet by mouth once daily 90 tablet 3    oxyBUTYnin (DITROPAN-XL) 5 MG extended release tablet Take 1 tablet by mouth daily 90 tablet 3    NONFORMULARY Compounded cream for joint pain      clobetasol (TEMOVATE) 0.05 % external solution Apply to areas of tingling/burning on scalp 3x weekly. Can increase to daily use for flares that don't respond to 3x weekly use. 60 mL 11    RESTASIS 0.05 % ophthalmic emulsion Place 1 drop into both eyes in the morning and 1 drop in the evening.      amLODIPine (NORVASC) 10 MG tablet Take 1 tablet by mouth daily 90 tablet 3    albuterol (PROVENTIL) (2.5 MG/3ML) 0.083% nebulizer solution USE 1 VIAL IN NEBULIZER EVERY 6 HOURS AS NEEDED FOR WHEEZING (FOR ASTHMA) 375 mL 5    olopatadine (PATADAY) 0.2 % SOLN ophthalmic solution ONE DROP EACH EYE DAILY 1 each 11    Diclofenac Sodium POWD Apply topically as needed Compound- gabapentin, lidocaine, prilocaine 2%      linaclotide (LINZESS) 290 MCG

## 2025-05-06 NOTE — PROGRESS NOTES
PATIENT HAS THE FOLLOWING DIAGNOSIS SUPPORTING ADMINISTRATION OF ALLERGY INJECTIONS: J30.1Allergic rhinitis due to pollen  AND 31493 MULTIPLE ALLERGY INJECTIONS FOR ALLERGY INJECTION ADMINISTRATION      Patient here for allergy injection today.  Patient was presented with the opportunity to speak with provider and ask questions regarding allergy injections.  Patient was also instructed they need to wait 30 minutes after receiving allergy injections. All risks associated with potential adverse effects have been explained to patient and patient handout was provided following allergy testing.    After consent obtained/verified, allergy injection subcutaneously given in back of arm(s).  Please see below for specific details of site injections, concentration of serum, and volume injected.    VIAL COLOR OF ALL VIALS TODAY IS red 1:1. Vial allergy w/v concentration today is: 1:1     ALLERGY INJECTION FROM VIAL A GIVEN left  UPPER ARM IN THE AMOUNT OF 0.45 ML    ALLERGY INJECTION FROM VIAL B GIVEN right lower ARM IN THE AMOUNT OF 0.45  ML    ALLERGY INJECTION FROM VIAL C GIVEN rightupper ARM IN THE AMOUNT OF 0.45 ML      Documentation of vial injection specific to arm(s) noted on Allergy Immunotherapy Administration Form.       Patient waited 30 minutes for observation.No  Patient has received information and verbalizes understanding of the potential  health risks associated with allergy injections . Patient understands risks including anaphylaxis and chooses not to wait 30 minutes after administration of allergy injection(s).    Patient tolerated well without adverse reaction while the patient was in the office.    SHOT REACTION TREATMENT INSTRUCTIONS    During the 30 minute wait after an allergy injection the following symptoms should be reported:    Itching other than at the injection site  Hives or swelling other than at the injection site  Redness other than at the injection site  Difficulty breathing  Chest

## 2025-05-08 ENCOUNTER — HOSPITAL ENCOUNTER (OUTPATIENT)
Dept: WOMENS IMAGING | Age: 65
Discharge: HOME | End: 2025-05-08
Payer: MEDICAID

## 2025-05-08 DIAGNOSIS — Z12.31 ENCOUNTER FOR SCREENING MAMMOGRAM FOR MALIGNANT NEOPLASM OF BREAST: ICD-10-CM

## 2025-05-08 PROCEDURE — 77063 BREAST TOMOSYNTHESIS BI: CPT

## 2025-05-09 ENCOUNTER — PATIENT MESSAGE (OUTPATIENT)
Dept: FAMILY MEDICINE CLINIC | Age: 65
End: 2025-05-09

## 2025-05-09 NOTE — TELEPHONE ENCOUNTER
Recommend to decrease Jardiance dosage to 10 mg .  Take in the afternoon.  1 p.o. daily #30.  Samples are okay for trial

## 2025-05-12 ENCOUNTER — TELEPHONE (OUTPATIENT)
Dept: NEUROSURGERY | Age: 65
End: 2025-05-12

## 2025-05-12 RX ORDER — GLIMEPIRIDE 1 MG/1
1 TABLET ORAL EVERY MORNING
Qty: 30 TABLET | Refills: 3 | Status: SHIPPED | OUTPATIENT
Start: 2025-05-12

## 2025-05-12 NOTE — TELEPHONE ENCOUNTER
Patient is calling in regarding her 6/18/2025 appt with Dr Zhang.  She is asking if that appt needs rescheduled to after she sees Dr Falk on 8/27/2025 since she is also seeing him for the pituitary adenoma?  Please call her to advise.

## 2025-05-13 ENCOUNTER — CLINICAL SUPPORT (OUTPATIENT)
Dept: ALLERGY | Age: 65
End: 2025-05-13
Payer: MEDICAID

## 2025-05-13 VITALS
SYSTOLIC BLOOD PRESSURE: 124 MMHG | DIASTOLIC BLOOD PRESSURE: 83 MMHG | HEART RATE: 90 BPM | OXYGEN SATURATION: 97 % | RESPIRATION RATE: 18 BRPM

## 2025-05-13 DIAGNOSIS — J45.50 SEVERE PERSISTENT ASTHMA WITHOUT COMPLICATION (HCC): ICD-10-CM

## 2025-05-13 DIAGNOSIS — J30.1 NON-SEASONAL ALLERGIC RHINITIS DUE TO POLLEN: ICD-10-CM

## 2025-05-13 DIAGNOSIS — J30.9 CHRONIC ALLERGIC RHINITIS: Primary | ICD-10-CM

## 2025-05-13 PROCEDURE — 95117 IMMUNOTHERAPY INJECTIONS: CPT | Performed by: NURSE PRACTITIONER

## 2025-05-13 PROCEDURE — 96372 THER/PROPH/DIAG INJ SC/IM: CPT | Performed by: NURSE PRACTITIONER

## 2025-05-13 NOTE — PROGRESS NOTES
Patient here today to receive Xolair injection.    Patient does not report any previous reaction from Xolair injections.  Denies any nausea vomiting fever urticaria or angioedema.  Denies any recent exacerbation of asthma or asthma-like symptoms.  Patient was explained benefits and potential risks including anaphylaxis to patient.      Patient has been explained the risk and benefits including delayed anaphylaxis.      Patient has EpiPen and understands how to appropriately use? Yes      1ST DOSE OF XOLAIR 150 MG GIVEN subcutaneously in  left ABDOMEN  2ND DOSE OF XOLAIR 150 MG GIVEN subcutaneously in  right ABDOMEN  Total combined dose of 300 mg        APPROVED INJECTION SITES RIGHT  / LEFT UPPER ARM,  RIGHT  / LEFT FRONT OR MIDDLE OF THIGH, OR STOMACH         NDC 71865-447-57   Lot [id number]   Expires 01/31/2026    Patient observed for 30 minutes No      Medication was supplied by patient:  YES/NO: Yes      Medication was supplied as a sample:  YES/NO: No                   Prior to patient receiving medication the area was cleansed with alcohol swabs.  Following the administration no evidence of bleeding or bruising.    No adverse reactions reported.  Patient tolerated well without adverse reactions or side effects.  Patient to continue to receive Xolair injections as prescribed.  Will report any problems to this office.  No evidence of allergic reaction including anaphylaxis.     
tightness  Difficulty swallowing  Throat tightness    If these symptoms occur, NOTIFY PROVIDER and the following treatment should be administered:    1.  Epinephrine/Auvi Q 1:1000 IM - 0.3 ml if > 66 lbs or more, 0.15 ml if 33 - 63 lbs, or 0.1 ml if <33 lbs     2.  Diphenhydramine - give all intramuscular:     2 to <6 years (off-label use): 6.25 mg,    6 to <12 years: 12.5 to 25 mg;    >=12 years: 25-50 mg.    3.  Famotidine:  Adults 40 mg oral    Adolescents age 16 years and >88 lbs: 40 mg    Children and Adolescents <=16 years of age: Initial: 0.25 mg/kg/dose  every 12 hours (maximum daily dose: 40 mg/day)    Epi/Auvi Q dose may me repeated in 5-15 minutes if adequate resolution of symptoms does not occur    Patient should be observed for at least one hour after final Epi/Auvi Q dose and must be seen by provider.  Patients cannot drive themselves if they have received diphenhydramine.

## 2025-05-20 ENCOUNTER — CLINICAL SUPPORT (OUTPATIENT)
Dept: ALLERGY | Age: 65
End: 2025-05-20
Payer: MEDICAID

## 2025-05-20 VITALS
SYSTOLIC BLOOD PRESSURE: 118 MMHG | RESPIRATION RATE: 18 BRPM | DIASTOLIC BLOOD PRESSURE: 68 MMHG | HEART RATE: 86 BPM | OXYGEN SATURATION: 98 %

## 2025-05-20 DIAGNOSIS — J30.1 NON-SEASONAL ALLERGIC RHINITIS DUE TO POLLEN: ICD-10-CM

## 2025-05-20 DIAGNOSIS — J30.9 CHRONIC ALLERGIC RHINITIS: Primary | ICD-10-CM

## 2025-05-20 PROCEDURE — 95117 IMMUNOTHERAPY INJECTIONS: CPT | Performed by: NURSE PRACTITIONER

## 2025-05-20 NOTE — PROGRESS NOTES

## 2025-05-26 ENCOUNTER — HOSPITAL ENCOUNTER (EMERGENCY)
Age: 65
Discharge: HOME | End: 2025-05-26
Attending: EMERGENCY MEDICINE
Payer: MEDICAID

## 2025-05-26 ENCOUNTER — APPOINTMENT (OUTPATIENT)
Dept: GENERAL RADIOLOGY | Age: 65
End: 2025-05-26
Payer: MEDICAID

## 2025-05-26 VITALS
HEIGHT: 63 IN | DIASTOLIC BLOOD PRESSURE: 89 MMHG | OXYGEN SATURATION: 98 % | WEIGHT: 200 LBS | HEART RATE: 91 BPM | RESPIRATION RATE: 20 BRPM | SYSTOLIC BLOOD PRESSURE: 109 MMHG | TEMPERATURE: 98.2 F | BODY MASS INDEX: 35.44 KG/M2

## 2025-05-26 DIAGNOSIS — J45.41 MODERATE PERSISTENT ASTHMA WITH EXACERBATION: Primary | ICD-10-CM

## 2025-05-26 DIAGNOSIS — E13.9 OTHER SPECIFIED DIABETES MELLITUS WITHOUT COMPLICATION, WITHOUT LONG-TERM CURRENT USE OF INSULIN (HCC): ICD-10-CM

## 2025-05-26 DIAGNOSIS — I10 ESSENTIAL HYPERTENSION: ICD-10-CM

## 2025-05-26 LAB
ALBUMIN SERPL BCG-MCNC: 4.6 G/DL (ref 3.4–4.9)
ALP SERPL-CCNC: 137 U/L (ref 38–126)
ALT SERPL W/O P-5'-P-CCNC: 12 U/L (ref 10–35)
ANION GAP SERPL CALC-SCNC: 14 MEQ/L (ref 8–16)
AST SERPL-CCNC: 18 U/L (ref 10–35)
BASE EXCESS BLDA CALC-SCNC: 2.2 MMOL/L (ref -2–3)
BASOPHILS ABSOLUTE: 0 THOU/MM3 (ref 0–0.1)
BASOPHILS NFR BLD AUTO: 0.6 %
BILIRUB SERPL-MCNC: 0.3 MG/DL (ref 0.3–1.2)
BUN SERPL-MCNC: 11 MG/DL (ref 8–23)
CALCIUM SERPL-MCNC: 10.3 MG/DL (ref 8.8–10.2)
CHLORIDE SERPL-SCNC: 102 MEQ/L (ref 98–111)
CO2 SERPL-SCNC: 24 MEQ/L (ref 22–29)
COLLECTED BY:: ABNORMAL
CREAT SERPL-MCNC: 0.7 MG/DL (ref 0.5–0.9)
D DIMER PPP IA.FEU-MCNC: < 215 NG/ML FEU (ref 0–500)
DEPRECATED RDW RBC AUTO: 45.6 FL (ref 35–45)
DEVICE: ABNORMAL
EKG ATRIAL RATE: 112 BPM
EKG P AXIS: 69 DEGREES
EKG P-R INTERVAL: 162 MS
EKG Q-T INTERVAL: 324 MS
EKG QRS DURATION: 68 MS
EKG QTC CALCULATION (BAZETT): 442 MS
EKG R AXIS: 53 DEGREES
EKG T AXIS: 35 DEGREES
EKG VENTRICULAR RATE: 112 BPM
EOSINOPHIL NFR BLD AUTO: 2 %
EOSINOPHILS ABSOLUTE: 0.1 THOU/MM3 (ref 0–0.4)
ERYTHROCYTE [DISTWIDTH] IN BLOOD BY AUTOMATED COUNT: 14.3 % (ref 11.5–14.5)
FIO2 ON VENT O2 ANALYZER: 21 %
GFR SERPL CREATININE-BSD FRML MDRD: > 90 ML/MIN/1.73M2
GLUCOSE SERPL-MCNC: 114 MG/DL (ref 74–109)
HCO3 BLDA-SCNC: 26 MMOL/L (ref 23–28)
HCT VFR BLD AUTO: 43.2 % (ref 37–47)
HGB BLD-MCNC: 13.7 GM/DL (ref 12–16)
IMM GRANULOCYTES # BLD AUTO: 0.02 THOU/MM3 (ref 0–0.07)
IMM GRANULOCYTES NFR BLD AUTO: 0.3 %
LACTIC ACID, SEPSIS: 2.2 MMOL/L (ref 0.5–1.9)
LACTIC ACID, SEPSIS: 2.3 MMOL/L (ref 0.5–1.9)
LYMPHOCYTES ABSOLUTE: 2 THOU/MM3 (ref 1–4.8)
LYMPHOCYTES NFR BLD AUTO: 30.5 %
MAGNESIUM SERPL-MCNC: 2.2 MG/DL (ref 1.6–2.6)
MCH RBC QN AUTO: 28 PG (ref 26–33)
MCHC RBC AUTO-ENTMCNC: 31.7 GM/DL (ref 32.2–35.5)
MCV RBC AUTO: 88.3 FL (ref 81–99)
MONOCYTES ABSOLUTE: 0.4 THOU/MM3 (ref 0.4–1.3)
MONOCYTES NFR BLD AUTO: 5.3 %
NEUTROPHILS ABSOLUTE: 4.1 THOU/MM3 (ref 1.8–7.7)
NEUTROPHILS NFR BLD AUTO: 61.3 %
NRBC BLD AUTO-RTO: 0 /100 WBC
NT-PROBNP SERPL IA-MCNC: < 36 PG/ML (ref 0–124)
OSMOLALITY SERPL CALC.SUM OF ELEC: 279.7 MOSMOL/KG (ref 275–300)
PCO2 TEMP ADJ BLDMV: 38 MMHG (ref 41–51)
PH BLDMV: 7.45 [PH] (ref 7.31–7.41)
PLATELET # BLD AUTO: 352 THOU/MM3 (ref 130–400)
PMV BLD AUTO: 10.4 FL (ref 9.4–12.4)
PO2 BLDMV: 53 MMHG (ref 25–40)
POTASSIUM SERPL-SCNC: 4.1 MEQ/L (ref 3.5–5.2)
PROT SERPL-MCNC: 7.8 G/DL (ref 6.4–8.3)
RBC # BLD AUTO: 4.89 MILL/MM3 (ref 4.2–5.4)
REASON FOR REJECTION: NORMAL
REJECTED TEST: NORMAL
SAO2 % BLDMV: 89 %
SITE: ABNORMAL
SODIUM SERPL-SCNC: 140 MEQ/L (ref 135–145)
TROPONIN, HIGH SENSITIVITY: < 6 NG/L (ref 0–12)
VENTILATION MODE VENT: ABNORMAL
WBC # BLD AUTO: 6.7 THOU/MM3 (ref 4.8–10.8)

## 2025-05-26 PROCEDURE — 93005 ELECTROCARDIOGRAM TRACING: CPT

## 2025-05-26 PROCEDURE — 71045 X-RAY EXAM CHEST 1 VIEW: CPT

## 2025-05-26 PROCEDURE — 96375 TX/PRO/DX INJ NEW DRUG ADDON: CPT

## 2025-05-26 PROCEDURE — 94640 AIRWAY INHALATION TREATMENT: CPT

## 2025-05-26 PROCEDURE — 85379 FIBRIN DEGRADATION QUANT: CPT

## 2025-05-26 PROCEDURE — 6360000002 HC RX W HCPCS: Performed by: EMERGENCY MEDICINE

## 2025-05-26 PROCEDURE — 84484 ASSAY OF TROPONIN QUANT: CPT

## 2025-05-26 PROCEDURE — 85025 COMPLETE CBC W/AUTO DIFF WBC: CPT

## 2025-05-26 PROCEDURE — 36415 COLL VENOUS BLD VENIPUNCTURE: CPT

## 2025-05-26 PROCEDURE — 99285 EMERGENCY DEPT VISIT HI MDM: CPT

## 2025-05-26 PROCEDURE — 83735 ASSAY OF MAGNESIUM: CPT

## 2025-05-26 PROCEDURE — 83605 ASSAY OF LACTIC ACID: CPT

## 2025-05-26 PROCEDURE — 6370000000 HC RX 637 (ALT 250 FOR IP)

## 2025-05-26 PROCEDURE — 83880 ASSAY OF NATRIURETIC PEPTIDE: CPT

## 2025-05-26 PROCEDURE — 6360000002 HC RX W HCPCS

## 2025-05-26 PROCEDURE — 2500000003 HC RX 250 WO HCPCS

## 2025-05-26 PROCEDURE — 94761 N-INVAS EAR/PLS OXIMETRY MLT: CPT

## 2025-05-26 PROCEDURE — 96365 THER/PROPH/DIAG IV INF INIT: CPT

## 2025-05-26 PROCEDURE — 82803 BLOOD GASES ANY COMBINATION: CPT

## 2025-05-26 PROCEDURE — 80053 COMPREHEN METABOLIC PANEL: CPT

## 2025-05-26 RX ORDER — MAGNESIUM SULFATE IN WATER 40 MG/ML
2000 INJECTION, SOLUTION INTRAVENOUS ONCE
Status: COMPLETED | OUTPATIENT
Start: 2025-05-26 | End: 2025-05-26

## 2025-05-26 RX ORDER — IPRATROPIUM BROMIDE AND ALBUTEROL SULFATE 2.5; .5 MG/3ML; MG/3ML
3 SOLUTION RESPIRATORY (INHALATION) ONCE
Status: COMPLETED | OUTPATIENT
Start: 2025-05-26 | End: 2025-05-26

## 2025-05-26 RX ORDER — PREDNISONE 20 MG/1
40 TABLET ORAL DAILY
Qty: 10 TABLET | Refills: 0 | Status: SHIPPED | OUTPATIENT
Start: 2025-05-26 | End: 2025-05-31

## 2025-05-26 RX ORDER — AMLODIPINE BESYLATE 5 MG/1
5 TABLET ORAL 2 TIMES DAILY
Qty: 60 TABLET | Refills: 0 | Status: SHIPPED | OUTPATIENT
Start: 2025-05-26 | End: 2025-06-25

## 2025-05-26 RX ORDER — ADHESIVE BANDAGE 3/4"
1 BANDAGE TOPICAL 3 TIMES DAILY
Qty: 1 EACH | Refills: 0 | Status: SHIPPED | OUTPATIENT
Start: 2025-05-26 | End: 2025-05-27 | Stop reason: SDUPTHER

## 2025-05-26 RX ORDER — MIDAZOLAM HYDROCHLORIDE 1 MG/ML
0.5 INJECTION, SOLUTION INTRAMUSCULAR; INTRAVENOUS ONCE
Status: COMPLETED | OUTPATIENT
Start: 2025-05-26 | End: 2025-05-26

## 2025-05-26 RX ADMIN — IPRATROPIUM BROMIDE AND ALBUTEROL SULFATE 3 DOSE: .5; 3 SOLUTION RESPIRATORY (INHALATION) at 08:15

## 2025-05-26 RX ADMIN — MIDAZOLAM 0.5 MG: 1 INJECTION INTRAMUSCULAR; INTRAVENOUS at 09:15

## 2025-05-26 RX ADMIN — MAGNESIUM SULFATE HEPTAHYDRATE 2000 MG: 40 INJECTION, SOLUTION INTRAVENOUS at 09:11

## 2025-05-26 RX ADMIN — WATER 125 MG: 1 INJECTION INTRAMUSCULAR; INTRAVENOUS; SUBCUTANEOUS at 08:15

## 2025-05-26 ASSESSMENT — PAIN - FUNCTIONAL ASSESSMENT
PAIN_FUNCTIONAL_ASSESSMENT: NONE - DENIES PAIN

## 2025-05-26 NOTE — ED NOTES
Ambulatory pulse ox noted to be 96% with ambulation. Pt states she feels SOB and dizziness with ambulation. Provider made aware.

## 2025-05-26 NOTE — ED TRIAGE NOTES
Pt presents to the ED by EMS from home with c/c SOB. Pt reports that symptoms started this morning upon waking. Pt reports hx asthma. Pt given 2 albuterol treatments en route to facility. EKG completed on arrival to ED

## 2025-05-26 NOTE — DISCHARGE INSTRUCTIONS
You were seen evaluated emergency department today for concern of shortness of breath.  Your laboratories and EKG chest x-ray were stable.  Please follow prescription medication changes including amlodipine metformin and prednisone.  Discontinue glimepiride.    Follow-up your primary care provider within 48 hours further evaluation management.    Return to ED at any time for severe chest pain, shortness of breath, loss consciousness, any other worrisome changes or concerns.

## 2025-05-26 NOTE — ED NOTES
Offered pt cab. Pt refused, states she wants to walk. D/C instructions reviewed. Verbalized understanding.

## 2025-05-26 NOTE — ED PROVIDER NOTES
[facility] EMERGENCY DEPARTMENT  EMERGENCY DEPARTMENT ENCOUNTER          Pt Name: Gely Archer  MRN: [id number]  Birthdate 1960  Date of evaluation: 2025  Resident Physician: Chris Kelly MD EM Resident PGY-3  Attending Physician: Jordan Jerome DO      CHIEF COMPLAINT       Chief Complaint   Patient presents with    Shortness of Breath         HISTORY OF PRESENT ILLNESS    HPI  Gely Archer is a 64 y.o. female who presents to the emergency department from home, by EMS for evaluation of shortness of breath.    Patient initially arrived to ED with appearance of respiratory distress, bilateral wheezing, diminished breath sounds, significant work of breathing and anxiety.  Patient states that she has a history of COPD does not use oxygen at home.  Patient was given breathing treatment by squad prior to arrival started on DuoNeb x 3 in ED Solu-Medrol 125, 2 g of magnesium sulfate over 20 minutes and Versed for anxiety.      The patient has no other acute complaints at this time.    ROS negative except as stated above.    PAST MEDICAL AND SURGICAL HISTORY     Past Medical History:   Diagnosis Date    Allergic rhinitis     Anxiety     Arnold-Chiari malformation (HCC)     Asthma     Chronic bronchitis (HCC)     Fibromyalgia     GERD (gastroesophageal reflux disease)     Headache(784.0)     Hyperlipidemia     Neuropathy     Osteoarthritis     Sinusitis     Type II or unspecified type diabetes mellitus without mention of complication, not stated as uncontrolled      Past Surgical History:   Procedure Laterality Date    APPENDECTOMY       SECTION      x2    COLONOSCOPY  2010    CSF SHUNT  2007    Cervical syrinx to subarachnoid shunt; thoracic syrinx to subarachnoid shunt (2 separate dural openings)    CYST REMOVAL      extradural cysts at thoracic 2-3    ECTOPIC PREGNANCY SURGERY      HEMICOLECTOMY      HERNIA REPAIR      inguinal     HYSTERECTOMY (CERVIX STATUS UNKNOWN)

## 2025-05-27 ENCOUNTER — PATIENT MESSAGE (OUTPATIENT)
Dept: FAMILY MEDICINE CLINIC | Age: 65
End: 2025-05-27

## 2025-05-27 DIAGNOSIS — I10 ESSENTIAL HYPERTENSION: ICD-10-CM

## 2025-05-27 RX ORDER — ADHESIVE BANDAGE 3/4"
1 BANDAGE TOPICAL 3 TIMES DAILY
Qty: 1 EACH | Refills: 0 | Status: SHIPPED | OUTPATIENT
Start: 2025-05-27

## 2025-05-28 ENCOUNTER — TELEPHONE (OUTPATIENT)
Dept: FAMILY MEDICINE CLINIC | Age: 65
End: 2025-05-28

## 2025-05-28 DIAGNOSIS — Z29.89 IMMUNOTHERAPY: ICD-10-CM

## 2025-05-28 DIAGNOSIS — J45.50 SEVERE PERSISTENT ASTHMA WITHOUT COMPLICATION (HCC): ICD-10-CM

## 2025-05-28 DIAGNOSIS — J30.9 CHRONIC ALLERGIC RHINITIS: Primary | ICD-10-CM

## 2025-05-28 PROCEDURE — 95165 ANTIGEN THERAPY SERVICES: CPT | Performed by: NURSE PRACTITIONER

## 2025-05-28 RX ORDER — ALBUTEROL SULFATE 0.83 MG/ML
SOLUTION RESPIRATORY (INHALATION)
Qty: 375 ML | Refills: 4 | Status: SHIPPED | OUTPATIENT
Start: 2025-05-28

## 2025-05-28 RX ORDER — KETOCONAZOLE 20 MG/G
CREAM TOPICAL
Qty: 30 G | Refills: 1 | Status: SHIPPED | OUTPATIENT
Start: 2025-05-28

## 2025-05-28 RX ORDER — ESOMEPRAZOLE MAGNESIUM 40 MG/1
CAPSULE, DELAYED RELEASE ORAL
Qty: 180 CAPSULE | Refills: 2 | Status: SHIPPED | OUTPATIENT
Start: 2025-05-28

## 2025-05-28 NOTE — TELEPHONE ENCOUNTER
IF THE PATIENT HAS NOT BEEN SEEN DURING THE LAST YEAR, THE MUST HAVE AN APPT TO BE SEEN AND I WILL ONLD SEND IN 90 DAYS ONE TIME.    DID THE PATIENT MISS THE LAST APPT AS A NO SHOW?  no.  IF THE PATIENT WAS A NO SHOW I WILL NOT FILL THE MEDICATION. PATIENTS WILL NEED AN APPT.    __________________________________________________________________________________________________________________________________    Pharmacy requests the following medication to be refilled:    NAME OF MEDICATION:  albuterol   DOSE OF MEDICATION REQUESTED:  2.5 MG/3ML) 0.083% nebulizer solution   FREQUENCY OF MEDICATION: USE 1 VIAL IN NEBULIZER EVERY 6 HOURS AS NEEDED FOR WHEEZING (FOR ASTHMA)   NUMBER OF MEDICATION FILL DAYS:  30     NAME AND LOCATION OF PHARMACY:  [facility]     MEDICATION LAST FILLED: 9/4/24    NUMBER OF REFILLS LAST PROVIDED: 5  (If refills are current the patient does not need another refill)    Last appointment: 4/29/25      Next appt : 11/25/25

## 2025-05-28 NOTE — TELEPHONE ENCOUNTER
RECEIVED fax from [employer] regarding needing face-to-face notes including \"needs to monitor BP daily\". Please advise, fax to [facility] when completed.

## 2025-06-02 DIAGNOSIS — J45.50 SEVERE PERSISTENT ASTHMA WITHOUT COMPLICATION (HCC): ICD-10-CM

## 2025-06-02 RX ORDER — ALBUTEROL SULFATE 90 UG/1
INHALANT RESPIRATORY (INHALATION)
Qty: 9 G | Refills: 2 | Status: SHIPPED | OUTPATIENT
Start: 2025-06-02

## 2025-06-02 NOTE — TELEPHONE ENCOUNTER
IF THE PATIENT HAS NOT BEEN SEEN DURING THE LAST YEAR, THE MUST HAVE AN APPT TO BE SEEN AND I WILL ONLD SEND IN 90 DAYS ONE TIME.     DID THE PATIENT MISS THE LAST APPT AS A NO SHOW?  no.  IF THE PATIENT WAS A NO SHOW I WILL NOT FILL THE MEDICATION. PATIENTS WILL NEED AN APPT.     __________________________________________________________________________________________________________________________________     Pharmacy requests the following medication to be refilled:     NAME OF MEDICATION:  albuterol   DOSE OF MEDICATION REQUESTED:  108/90 mcg  FREQUENCY OF MEDICATION: INHALE 2 PUFFS BY MOUTH EVERY 4 HOURS AS NEEDED FOR WHEEZING **RESCUE  INHALER.   NUMBER OF MEDICATION FILL DAYS:  30      NAME AND LOCATION OF PHARMACY:  [facility]      MEDICATION LAST FILLED: 1/6/2025     NUMBER OF REFILLS LAST PROVIDED: 0  (If refills are current the patient does not need another refill)     Last appointment: 4/29/25        Next appt : 11/25/25

## 2025-06-03 ENCOUNTER — CLINICAL SUPPORT (OUTPATIENT)
Dept: ALLERGY | Age: 65
End: 2025-06-03
Payer: MEDICAID

## 2025-06-03 VITALS
OXYGEN SATURATION: 98 % | SYSTOLIC BLOOD PRESSURE: 140 MMHG | RESPIRATION RATE: 18 BRPM | DIASTOLIC BLOOD PRESSURE: 86 MMHG | HEART RATE: 84 BPM

## 2025-06-03 DIAGNOSIS — J30.9 CHRONIC ALLERGIC RHINITIS: Primary | ICD-10-CM

## 2025-06-03 DIAGNOSIS — J30.1 NON-SEASONAL ALLERGIC RHINITIS DUE TO POLLEN: ICD-10-CM

## 2025-06-03 PROCEDURE — 95117 IMMUNOTHERAPY INJECTIONS: CPT | Performed by: NURSE PRACTITIONER

## 2025-06-04 ENCOUNTER — HOSPITAL ENCOUNTER (OUTPATIENT)
Age: 65
Discharge: HOME | End: 2025-06-04
Payer: MEDICAID

## 2025-06-04 DIAGNOSIS — I10 PRIMARY HYPERTENSION: ICD-10-CM

## 2025-06-04 DIAGNOSIS — E11.65 TYPE 2 DIABETES MELLITUS WITH HYPERGLYCEMIA, UNSPECIFIED WHETHER LONG TERM INSULIN USE (HCC): ICD-10-CM

## 2025-06-04 DIAGNOSIS — E78.2 MIXED HYPERLIPIDEMIA: ICD-10-CM

## 2025-06-04 DIAGNOSIS — E03.9 HYPOTHYROIDISM, UNSPECIFIED TYPE: ICD-10-CM

## 2025-06-04 LAB
ALBUMIN SERPL BCG-MCNC: 4.3 G/DL (ref 3.4–4.9)
ALP SERPL-CCNC: 111 U/L (ref 38–126)
ALT SERPL W/O P-5'-P-CCNC: 10 U/L (ref 10–35)
ANION GAP SERPL CALC-SCNC: 14 MEQ/L (ref 8–16)
AST SERPL-CCNC: 12 U/L (ref 10–35)
BASOPHILS ABSOLUTE: 0 THOU/MM3 (ref 0–0.1)
BASOPHILS NFR BLD AUTO: 0.5 %
BILIRUB SERPL-MCNC: 0.5 MG/DL (ref 0.3–1.2)
BUN SERPL-MCNC: 11 MG/DL (ref 8–23)
CALCIUM SERPL-MCNC: 10 MG/DL (ref 8.8–10.2)
CHLORIDE SERPL-SCNC: 101 MEQ/L (ref 98–111)
CHOLEST SERPL-MCNC: 211 MG/DL (ref 100–199)
CO2 SERPL-SCNC: 26 MEQ/L (ref 22–29)
CREAT SERPL-MCNC: 0.8 MG/DL (ref 0.5–0.9)
CREAT UR-MCNC: 255 MG/DL
DEPRECATED MEAN GLUCOSE BLD GHB EST-ACNC: 126 MG/DL (ref 70–126)
DEPRECATED RDW RBC AUTO: 47.2 FL (ref 35–45)
EOSINOPHIL NFR BLD AUTO: 1.1 %
EOSINOPHILS ABSOLUTE: 0.1 THOU/MM3 (ref 0–0.4)
ERYTHROCYTE [DISTWIDTH] IN BLOOD BY AUTOMATED COUNT: 14.6 % (ref 11.5–14.5)
GFR SERPL CREATININE-BSD FRML MDRD: 81 ML/MIN/1.73M2
GLUCOSE SERPL-MCNC: 107 MG/DL (ref 74–109)
HBA1C MFR BLD HPLC: 6.2 % (ref 4–6)
HCT VFR BLD AUTO: 41.3 % (ref 37–47)
HDLC SERPL-MCNC: 61 MG/DL
HGB BLD-MCNC: 13.4 GM/DL (ref 12–16)
IMM GRANULOCYTES # BLD AUTO: 0.12 THOU/MM3 (ref 0–0.07)
IMM GRANULOCYTES NFR BLD AUTO: 1.2 %
LDLC SERPL CALC-MCNC: 103 MG/DL
LYMPHOCYTES ABSOLUTE: 2.7 THOU/MM3 (ref 1–4.8)
LYMPHOCYTES NFR BLD AUTO: 27.4 %
MCH RBC QN AUTO: 28.8 PG (ref 26–33)
MCHC RBC AUTO-ENTMCNC: 32.4 GM/DL (ref 32.2–35.5)
MCV RBC AUTO: 88.8 FL (ref 81–99)
MICROALBUMIN UR-MCNC: 2.06 MG/DL
MICROALBUMIN/CREAT RATIO PNL UR: 8 MG/G (ref 0–30)
MONOCYTES ABSOLUTE: 0.5 THOU/MM3 (ref 0.4–1.3)
MONOCYTES NFR BLD AUTO: 5.2 %
NEUTROPHILS ABSOLUTE: 6.4 THOU/MM3 (ref 1.8–7.7)
NEUTROPHILS NFR BLD AUTO: 64.6 %
NRBC BLD AUTO-RTO: 0 /100 WBC
PLATELET # BLD AUTO: 358 THOU/MM3 (ref 130–400)
PMV BLD AUTO: 9.9 FL (ref 9.4–12.4)
POTASSIUM SERPL-SCNC: 3.9 MEQ/L (ref 3.5–5.2)
PROT SERPL-MCNC: 7 G/DL (ref 6.4–8.3)
RBC # BLD AUTO: 4.65 MILL/MM3 (ref 4.2–5.4)
SODIUM SERPL-SCNC: 141 MEQ/L (ref 135–145)
T4 FREE SERPL-MCNC: 1.3 NG/DL (ref 0.92–1.68)
TRIGL SERPL-MCNC: 236 MG/DL (ref 0–199)
TSH SERPL DL<=0.05 MIU/L-ACNC: 2.46 UIU/ML (ref 0.27–4.2)
WBC # BLD AUTO: 9.9 THOU/MM3 (ref 4.8–10.8)

## 2025-06-04 PROCEDURE — 80053 COMPREHEN METABOLIC PANEL: CPT

## 2025-06-04 PROCEDURE — 80061 LIPID PANEL: CPT

## 2025-06-04 PROCEDURE — 85025 COMPLETE CBC W/AUTO DIFF WBC: CPT

## 2025-06-04 PROCEDURE — 36415 COLL VENOUS BLD VENIPUNCTURE: CPT

## 2025-06-04 PROCEDURE — 83036 HEMOGLOBIN GLYCOSYLATED A1C: CPT

## 2025-06-04 PROCEDURE — 84439 ASSAY OF FREE THYROXINE: CPT

## 2025-06-04 PROCEDURE — 84443 ASSAY THYROID STIM HORMONE: CPT

## 2025-06-04 PROCEDURE — 82043 UR ALBUMIN QUANTITATIVE: CPT

## 2025-06-05 ENCOUNTER — RESULTS FOLLOW-UP (OUTPATIENT)
Dept: FAMILY MEDICINE CLINIC | Age: 65
End: 2025-06-05

## 2025-06-05 ENCOUNTER — OFFICE VISIT (OUTPATIENT)
Dept: FAMILY MEDICINE CLINIC | Age: 65
End: 2025-06-05
Payer: MEDICAID

## 2025-06-05 VITALS
DIASTOLIC BLOOD PRESSURE: 64 MMHG | WEIGHT: 206 LBS | SYSTOLIC BLOOD PRESSURE: 112 MMHG | BODY MASS INDEX: 34.32 KG/M2 | HEIGHT: 65 IN

## 2025-06-05 DIAGNOSIS — Z98.2 VP (VENTRICULOPERITONEAL) SHUNT STATUS: ICD-10-CM

## 2025-06-05 DIAGNOSIS — D35.2 PITUITARY ADENOMA (HCC): ICD-10-CM

## 2025-06-05 DIAGNOSIS — E03.9 HYPOTHYROIDISM, UNSPECIFIED TYPE: ICD-10-CM

## 2025-06-05 DIAGNOSIS — E11.65 TYPE 2 DIABETES MELLITUS WITH HYPERGLYCEMIA, UNSPECIFIED WHETHER LONG TERM INSULIN USE (HCC): Primary | ICD-10-CM

## 2025-06-05 DIAGNOSIS — R51.9 CHRONIC DAILY HEADACHE: ICD-10-CM

## 2025-06-05 DIAGNOSIS — F17.200 SMOKER: ICD-10-CM

## 2025-06-05 DIAGNOSIS — R42 DIZZINESS: ICD-10-CM

## 2025-06-05 DIAGNOSIS — Q07.00 ARNOLD-CHIARI MALFORMATION (HCC): ICD-10-CM

## 2025-06-05 DIAGNOSIS — I10 ESSENTIAL HYPERTENSION: ICD-10-CM

## 2025-06-05 DIAGNOSIS — J44.9 CHRONIC OBSTRUCTIVE PULMONARY DISEASE, UNSPECIFIED COPD TYPE (HCC): ICD-10-CM

## 2025-06-05 PROCEDURE — 99214 OFFICE O/P EST MOD 30 MIN: CPT | Performed by: NURSE PRACTITIONER

## 2025-06-05 PROCEDURE — 3078F DIAST BP <80 MM HG: CPT | Performed by: NURSE PRACTITIONER

## 2025-06-05 PROCEDURE — 3074F SYST BP LT 130 MM HG: CPT | Performed by: NURSE PRACTITIONER

## 2025-06-05 PROCEDURE — 3044F HG A1C LEVEL LT 7.0%: CPT | Performed by: NURSE PRACTITIONER

## 2025-06-05 PROCEDURE — 1123F ACP DISCUSS/DSCN MKR DOCD: CPT | Performed by: NURSE PRACTITIONER

## 2025-06-05 RX ORDER — LOSARTAN POTASSIUM 25 MG/1
25 TABLET ORAL DAILY
Qty: 30 TABLET | Refills: 2 | Status: SHIPPED | OUTPATIENT
Start: 2025-06-05

## 2025-06-05 ASSESSMENT — ENCOUNTER SYMPTOMS
BACK PAIN: 0
WHEEZING: 0
ABDOMINAL PAIN: 0
SHORTNESS OF BREATH: 0
ABDOMINAL DISTENTION: 0
CHEST TIGHTNESS: 0
COUGH: 0

## 2025-06-05 NOTE — PROGRESS NOTES
[facility]  [facility]  [address] [address]  Dept: [phone]  Dept Fax: [phone]  Loc: [phone]  PROGRESS NOTE      VisitDate: 6/5/2025    Gely Archer is a 64 y.o. female who presents today for:     Chief Complaint   Patient presents with    ER follow up     Follow up from [facility] ER on -- Moderate persistent asthma with exacerbation. The ER doctor feels she needs to be on something other than Amlodipine. She still feels queasy, dizzy just not herself.  ER put her back on Metformin. She has been holding Amlodipine for 3-4 days.          Subjective:  ER follow-up exacerbation asthma.  Patient has been holding her amlodipine for the past 4 days due to complaint of dizziness.  Patient was also started back on her metformin.  Currently consulting with neurosurgery regarding pituitary enlargement/pituitary adenoma.  Scheduled to consult with endocrinology in August.  Patient does report sweating throughout the evening.  Reports that she feels dehydrated in the morning.  Medical history reviewed.  Patient continues to smoke.  Reports breathing stable.  Denies chest pain.          Review of Systems   Constitutional:  Positive for fatigue. Negative for activity change, appetite change, chills and fever.   Eyes:  Negative for visual disturbance.   Respiratory:  Negative for cough, chest tightness, shortness of breath and wheezing.    Cardiovascular:  Negative for chest pain, palpitations and leg swelling.   Gastrointestinal:  Negative for abdominal distention and abdominal pain.   Genitourinary:  Negative for dysuria.   Musculoskeletal:  Negative for arthralgias, back pain and neck pain.   Skin: Negative.  Negative for rash.   Neurological:  Positive for dizziness. Negative for light-headedness and headaches.   Hematological:  Negative for adenopathy.   Psychiatric/Behavioral:  Negative for decreased concentration and dysphoric mood.

## 2025-06-10 ENCOUNTER — PATIENT MESSAGE (OUTPATIENT)
Dept: FAMILY MEDICINE CLINIC | Age: 65
End: 2025-06-10

## 2025-06-10 ENCOUNTER — CLINICAL SUPPORT (OUTPATIENT)
Dept: ALLERGY | Age: 65
End: 2025-06-10
Payer: MEDICAID

## 2025-06-10 VITALS — HEART RATE: 92 BPM | DIASTOLIC BLOOD PRESSURE: 92 MMHG | SYSTOLIC BLOOD PRESSURE: 154 MMHG | OXYGEN SATURATION: 96 %

## 2025-06-10 DIAGNOSIS — J30.9 CHRONIC ALLERGIC RHINITIS: Primary | ICD-10-CM

## 2025-06-10 DIAGNOSIS — J30.1 NON-SEASONAL ALLERGIC RHINITIS DUE TO POLLEN: ICD-10-CM

## 2025-06-10 PROCEDURE — 95117 IMMUNOTHERAPY INJECTIONS: CPT | Performed by: NURSE PRACTITIONER

## 2025-06-10 NOTE — TELEPHONE ENCOUNTER
Patient had stopped oxybutynin. Patient is going to get BP cuff to monitor; reading was near 154/92 (patient can't be sure). Advised to increase Losartan to twice daily. Will monitor and notify the office of any issues

## 2025-06-10 NOTE — TELEPHONE ENCOUNTER
Please verify the patient stopped here bladder medication oxybutynin.  Can we verify what her blood pressure has been?  I would recommend that she increase her losartan to twice daily dosing continue to monitor.

## 2025-06-12 NOTE — TELEPHONE ENCOUNTER
Please advise that patient should be taking two losartan 25 mg together daily. Patient still reports feeling dizzy.

## 2025-06-16 ENCOUNTER — APPOINTMENT (OUTPATIENT)
Dept: CT IMAGING | Age: 65
End: 2025-06-16
Payer: MEDICAID

## 2025-06-16 ENCOUNTER — HOSPITAL ENCOUNTER (EMERGENCY)
Age: 65
Discharge: HOME | End: 2025-06-16
Attending: EMERGENCY MEDICINE
Payer: MEDICAID

## 2025-06-16 ENCOUNTER — APPOINTMENT (OUTPATIENT)
Dept: GENERAL RADIOLOGY | Age: 65
End: 2025-06-16
Payer: MEDICAID

## 2025-06-16 VITALS
RESPIRATION RATE: 24 BRPM | TEMPERATURE: 98.6 F | BODY MASS INDEX: 36.32 KG/M2 | OXYGEN SATURATION: 96 % | WEIGHT: 205 LBS | DIASTOLIC BLOOD PRESSURE: 92 MMHG | HEIGHT: 63 IN | SYSTOLIC BLOOD PRESSURE: 134 MMHG | HEART RATE: 92 BPM

## 2025-06-16 DIAGNOSIS — R69 CHRONIC DISEASE: Primary | ICD-10-CM

## 2025-06-16 DIAGNOSIS — I10 HYPERTENSION, UNSPECIFIED TYPE: ICD-10-CM

## 2025-06-16 DIAGNOSIS — R42 DIZZINESS: ICD-10-CM

## 2025-06-16 LAB
ALBUMIN SERPL BCG-MCNC: 4.5 G/DL (ref 3.4–4.9)
ALP SERPL-CCNC: 127 U/L (ref 38–126)
ALT SERPL W/O P-5'-P-CCNC: 13 U/L (ref 10–35)
ANION GAP SERPL CALC-SCNC: 16 MEQ/L (ref 8–16)
AST SERPL-CCNC: 16 U/L (ref 10–35)
BASOPHILS ABSOLUTE: 0 THOU/MM3 (ref 0–0.1)
BASOPHILS NFR BLD AUTO: 0.5 %
BILIRUB SERPL-MCNC: 0.3 MG/DL (ref 0.3–1.2)
BILIRUB UR QL STRIP.AUTO: NEGATIVE
BUN SERPL-MCNC: 7 MG/DL (ref 8–23)
CALCIUM SERPL-MCNC: 10.3 MG/DL (ref 8.8–10.2)
CHARACTER UR: CLEAR
CHLORIDE SERPL-SCNC: 103 MEQ/L (ref 98–111)
CHP ED QC CHECK: 138
CO2 SERPL-SCNC: 20 MEQ/L (ref 22–29)
COLOR, UA: YELLOW
CREAT SERPL-MCNC: 0.8 MG/DL (ref 0.5–0.9)
DEPRECATED RDW RBC AUTO: 47 FL (ref 35–45)
EKG ATRIAL RATE: 105 BPM
EKG P AXIS: 80 DEGREES
EKG P-R INTERVAL: 140 MS
EKG Q-T INTERVAL: 346 MS
EKG QRS DURATION: 76 MS
EKG QTC CALCULATION (BAZETT): 457 MS
EKG R AXIS: 60 DEGREES
EKG T AXIS: 74 DEGREES
EKG VENTRICULAR RATE: 105 BPM
EOSINOPHIL NFR BLD AUTO: 0.8 %
EOSINOPHILS ABSOLUTE: 0.1 THOU/MM3 (ref 0–0.4)
ERYTHROCYTE [DISTWIDTH] IN BLOOD BY AUTOMATED COUNT: 14.3 % (ref 11.5–14.5)
GFR SERPL CREATININE-BSD FRML MDRD: 81 ML/MIN/1.73M2
GLUCOSE BLD STRIP.AUTO-MCNC: 139 MG/DL (ref 70–108)
GLUCOSE SERPL-MCNC: 125 MG/DL (ref 74–109)
GLUCOSE UR QL STRIP.AUTO: NEGATIVE MG/DL
HCT VFR BLD AUTO: 41.4 % (ref 37–47)
HGB BLD-MCNC: 13.1 GM/DL (ref 12–16)
HGB UR QL STRIP.AUTO: NEGATIVE
IMM GRANULOCYTES # BLD AUTO: 0.03 THOU/MM3 (ref 0–0.07)
IMM GRANULOCYTES NFR BLD AUTO: 0.4 %
KETONES UR QL STRIP.AUTO: NEGATIVE
LYMPHOCYTES ABSOLUTE: 2 THOU/MM3 (ref 1–4.8)
LYMPHOCYTES NFR BLD AUTO: 26.5 %
MAGNESIUM SERPL-MCNC: 1.9 MG/DL (ref 1.6–2.6)
MCH RBC QN AUTO: 28.4 PG (ref 26–33)
MCHC RBC AUTO-ENTMCNC: 31.6 GM/DL (ref 32.2–35.5)
MCV RBC AUTO: 89.6 FL (ref 81–99)
MONOCYTES ABSOLUTE: 0.3 THOU/MM3 (ref 0.4–1.3)
MONOCYTES NFR BLD AUTO: 4.5 %
NEUTROPHILS ABSOLUTE: 5.1 THOU/MM3 (ref 1.8–7.7)
NEUTROPHILS NFR BLD AUTO: 67.3 %
NITRITE UR QL STRIP: NEGATIVE
NRBC BLD AUTO-RTO: 0 /100 WBC
NT-PROBNP SERPL IA-MCNC: < 36 PG/ML (ref 0–124)
OSMOLALITY SERPL CALC.SUM OF ELEC: 277 MOSMOL/KG (ref 275–300)
PH UR STRIP.AUTO: 6.5 [PH] (ref 5–9)
PLATELET # BLD AUTO: 362 THOU/MM3 (ref 130–400)
PMV BLD AUTO: 10.3 FL (ref 9.4–12.4)
POTASSIUM SERPL-SCNC: 3.8 MEQ/L (ref 3.5–5.2)
PROT SERPL-MCNC: 7.7 G/DL (ref 6.4–8.3)
PROT UR STRIP.AUTO-MCNC: NEGATIVE MG/DL
RBC # BLD AUTO: 4.62 MILL/MM3 (ref 4.2–5.4)
SODIUM SERPL-SCNC: 139 MEQ/L (ref 135–145)
SP GR UR REFRACT.AUTO: < 1.005 (ref 1–1.03)
TROPONIN, HIGH SENSITIVITY: < 6 NG/L (ref 0–12)
TSH SERPL DL<=0.05 MIU/L-ACNC: 1.33 UIU/ML (ref 0.27–4.2)
UROBILINOGEN, URINE: 0.2 EU/DL (ref 0–1)
WBC # BLD AUTO: 7.6 THOU/MM3 (ref 4.8–10.8)
WBC #/AREA URNS HPF: NEGATIVE /[HPF]

## 2025-06-16 PROCEDURE — 84443 ASSAY THYROID STIM HORMONE: CPT

## 2025-06-16 PROCEDURE — 96374 THER/PROPH/DIAG INJ IV PUSH: CPT

## 2025-06-16 PROCEDURE — 6360000004 HC RX CONTRAST MEDICATION: Performed by: EMERGENCY MEDICINE

## 2025-06-16 PROCEDURE — 36415 COLL VENOUS BLD VENIPUNCTURE: CPT

## 2025-06-16 PROCEDURE — 99285 EMERGENCY DEPT VISIT HI MDM: CPT

## 2025-06-16 PROCEDURE — 81003 URINALYSIS AUTO W/O SCOPE: CPT

## 2025-06-16 PROCEDURE — 70496 CT ANGIOGRAPHY HEAD: CPT

## 2025-06-16 PROCEDURE — 6360000002 HC RX W HCPCS: Performed by: EMERGENCY MEDICINE

## 2025-06-16 PROCEDURE — 80053 COMPREHEN METABOLIC PANEL: CPT

## 2025-06-16 PROCEDURE — 70250 X-RAY EXAM OF SKULL: CPT

## 2025-06-16 PROCEDURE — 83880 ASSAY OF NATRIURETIC PEPTIDE: CPT

## 2025-06-16 PROCEDURE — 83735 ASSAY OF MAGNESIUM: CPT

## 2025-06-16 PROCEDURE — 70498 CT ANGIOGRAPHY NECK: CPT

## 2025-06-16 PROCEDURE — 85025 COMPLETE CBC W/AUTO DIFF WBC: CPT

## 2025-06-16 PROCEDURE — 84484 ASSAY OF TROPONIN QUANT: CPT

## 2025-06-16 PROCEDURE — 70450 CT HEAD/BRAIN W/O DYE: CPT

## 2025-06-16 PROCEDURE — 93005 ELECTROCARDIOGRAM TRACING: CPT | Performed by: EMERGENCY MEDICINE

## 2025-06-16 PROCEDURE — 93010 ELECTROCARDIOGRAM REPORT: CPT | Performed by: INTERNAL MEDICINE

## 2025-06-16 PROCEDURE — 82948 REAGENT STRIP/BLOOD GLUCOSE: CPT

## 2025-06-16 RX ORDER — IOPAMIDOL 755 MG/ML
80 INJECTION, SOLUTION INTRAVASCULAR
Status: COMPLETED | OUTPATIENT
Start: 2025-06-16 | End: 2025-06-16

## 2025-06-16 RX ORDER — DROPERIDOL 2.5 MG/ML
1.25 INJECTION, SOLUTION INTRAMUSCULAR; INTRAVENOUS ONCE
Status: COMPLETED | OUTPATIENT
Start: 2025-06-16 | End: 2025-06-16

## 2025-06-16 RX ADMIN — IOPAMIDOL 80 ML: 755 INJECTION, SOLUTION INTRAVENOUS at 13:57

## 2025-06-16 RX ADMIN — DROPERIDOL 1.25 MG: 2.5 INJECTION, SOLUTION INTRAMUSCULAR; INTRAVENOUS at 12:53

## 2025-06-16 ASSESSMENT — PAIN SCALES - GENERAL: PAINLEVEL_OUTOF10: 3

## 2025-06-16 ASSESSMENT — PAIN - FUNCTIONAL ASSESSMENT: PAIN_FUNCTIONAL_ASSESSMENT: 0-10

## 2025-06-16 NOTE — DISCHARGE INSTRUCTIONS
Please follow-up with your specialists and your PCP.  If you develop any numbness, weakness, new or concerning symptoms please return to the emergency room.  Otherwise this time please follow-up with your specialists and primary care providers for your chronic high blood pressure and dizziness.

## 2025-06-16 NOTE — ED PROVIDER NOTES
ATTENDING NOTE:    I supervised and discussed the history, physical exam and the management of this patient with the resident. I reviewed the resident's note and agree with the documented findings and plan of care.  Please see my additional note.    I personally saw and examined the patient.  I have reviewed and agree with the resident's findings, including all diagnostic interpretations and treatment plans as written.  I was present for the key portion of any procedures performed and the inclusive time noted in any critical care statement.    Electronically verified by Alexa Mckeon MD  06/17/25 0399    
the morning and at bedtime, Disp-180 capsule, R-1, DAWNormal      vitamin D (VITAMIN D3) 50 MCG (2000 UT) CAPS capsule Take 1 capsule by mouth daily, Disp-90 capsule, R-3Normal      EPINEPHrine (EPIPEN) 0.3 MG/0.3ML SOAJ injection INJECT CONTENTS OF 1 PEN INTRAMUSCULARLY AS NEEDED FOR ALLERGIC REACTION, Disp-2 each, R-0Normal      hydrocortisone (HYTONE) 2.5 % lotion APPLY  LOTION EXTERNALLY ONCE DAILY TO AFFECTED AREA, Disp-118 mL, R-5, Normal      levothyroxine (SYNTHROID) 88 MCG tablet TAKE 1 TABLET BY MOUTH ONCE DAILY ON AN EMPTY STOMACH WITH  WATER.  DO  NOT  EAT  OR  TAKE  ANY  OTHER  MEDICATIONS  FOR  30  MINUTES, Disp-90 tablet, R-3Normal      atorvastatin (LIPITOR) 20 MG tablet Take 1 tablet by mouth once daily, Disp-90 tablet, R-3Normal      NONFORMULARY Compounded cream for joint painHistorical Med      !! clobetasol (TEMOVATE) 0.05 % external solution Apply to areas of tingling/burning on scalp 3x weekly. Can increase to daily use for flares that don't respond to 3x weekly use., Disp-60 mL, R-11, Normal      RESTASIS 0.05 % ophthalmic emulsion Place 1 drop into both eyes in the morning and 1 drop in the evening., DAWHistorical Med      olopatadine (PATADAY) 0.2 % SOLN ophthalmic solution ONE DROP EACH EYE DAILY, Disp-1 each, R-11Normal      Diclofenac Sodium POWD Apply topically as needed Compound- gabapentin, lidocaine, prilocaine 2%Historical Med      linaclotide (LINZESS) 290 MCG CAPS capsule Take 1 capsule by mouth every morning (before breakfast), Disp-16 capsule, [id number] 11/24 AllerganSample      clotrimazole-betamethasone (LOTRISONE) 1-0.05 % cream Apply topically 2 times daily Apply topically 2 times daily., Topical, 2 TIMES DAILY, Historical Med      naloxone 4 MG/0.1ML LIQD nasal spray 1 spray by Nasal route as needed for Opioid ReversalHistorical Med      Hypertonic Nasal Wash (SINUS RINSE BOTTLE KIT) PACK Use with distilled water and salt packet once daily, Disp-1 each, R-11NO PRINT

## 2025-06-16 NOTE — ED NOTES
Pt arrives to ED from home with c/o ongoing dizziness  Pt states it has been getting worse over the last year   She has been seen by her PCP and had some recent changes to her BP mediations  Pt reports chronic nerve pain her feet related to diabetes  BG on arrival is 138, IV established, blood work collected, EKG completed  Pt is Aox4, VSS on arrival   Orthostatics charted in flow sheets

## 2025-06-17 ENCOUNTER — CLINICAL SUPPORT (OUTPATIENT)
Dept: ALLERGY | Age: 65
End: 2025-06-17
Payer: MEDICAID

## 2025-06-17 VITALS — HEART RATE: 95 BPM | SYSTOLIC BLOOD PRESSURE: 120 MMHG | OXYGEN SATURATION: 95 % | DIASTOLIC BLOOD PRESSURE: 92 MMHG

## 2025-06-17 DIAGNOSIS — J45.50 SEVERE PERSISTENT ASTHMA WITHOUT COMPLICATION (HCC): Primary | ICD-10-CM

## 2025-06-17 DIAGNOSIS — J30.1 NON-SEASONAL ALLERGIC RHINITIS DUE TO POLLEN: ICD-10-CM

## 2025-06-17 DIAGNOSIS — J30.9 CHRONIC ALLERGIC RHINITIS: ICD-10-CM

## 2025-06-17 PROCEDURE — 96372 THER/PROPH/DIAG INJ SC/IM: CPT | Performed by: NURSE PRACTITIONER

## 2025-06-17 PROCEDURE — 95117 IMMUNOTHERAPY INJECTIONS: CPT | Performed by: NURSE PRACTITIONER

## 2025-06-17 NOTE — PROGRESS NOTES
Discharge you I am sofia morataya PATIENT HAS THE FOLLOWING DIAGNOSIS SUPPORTING ADMINISTRATION OF ALLERGY INJECTIONS: J30.1Allergic rhinitis due to pollen  AND 99905 MULTIPLE ALLERGY INJECTIONS FOR ALLERGY INJECTION ADMINISTRATION      Patient here for allergy injection today.  Patient was presented with the opportunity to speak with provider and ask questions regarding allergy injections.  Patient was also instructed they need to wait 30 minutes after receiving allergy injections. All risks associated with potential adverse effects have been explained to patient and patient handout was provided following allergy testing.    After consent obtained/verified, allergy injection subcutaneously given in back of arm(s).  Please see below for specific details of site injections, concentration of serum, and volume injected.    VIAL COLOR OF ALL VIALS TODAY IS red 1:1 concentration    ALLERGY INJECTION FROM VIAL A GIVEN right  UPPER ARM IN THE AMOUNT OF 0.35 ML    ALLERGY INJECTION FROM VIAL B GIVEN left upper ARM IN THE AMOUNT OF 0.35  ML    ALLERGY INJECTION FROM VIAL C GIVEN leftlower ARM IN THE AMOUNT OF 0.35 ML    Documentation of vial injection specific to arm(s) noted on Allergy Immunotherapy Administration Form.       Patient waited 30 minutes for observation.No  Patient has received information and verbalizes understanding of the potential  health risks associated with allergy injections . Patient understands risks including anaphylaxis and chooses not to wait 30 minutes after administration of allergy injection(s).    Patient tolerated well without adverse reaction while the patient was in the office.    SHOT REACTION TREATMENT INSTRUCTIONS    During the 30 minute wait after an allergy injection the following symptoms should be reported:    Itching other than at the injection site  Hives or swelling other than at the injection site  Redness other than at the injection site  Difficulty breathing  Chest  This encounter was created in error - please disregard.

## 2025-06-17 NOTE — PROGRESS NOTES
Patient here today to receive Xolair injection.    Patient does not report any previous reaction from Xolair injections.  Denies any nausea vomiting fever urticaria or angioedema.  Denies any recent exacerbation of asthma or asthma-like symptoms.  Patient was explained benefits and potential risks including anaphylaxis to patient.      Patient has been explained the risk and benefits including delayed anaphylaxis.      Patient has EpiPen and understands how to appropriately use? Yes      DOSE OF XOLAIR 300 MG GIVEN subcutaneously in  left ABDOMEN        APPROVED INJECTION SITES RIGHT  / LEFT UPPER ARM,  RIGHT  / LEFT FRONT OR MIDDLE OF THIGH, OR STOMACH         NDC 11201-274-90   Lot [id number]   Expires 06/30/26    Patient observed for 30 minutes No      Medication was supplied by patient:  YES/NO: Yes      Medication was supplied as a sample:  YES/NO: No                   Prior to patient receiving medication the area was cleansed with alcohol swabs.  Following the administration no evidence of bleeding or bruising.    No adverse reactions reported.  Patient tolerated well without adverse reactions or side effects.  Patient to continue to receive Xolair injections as prescribed.  Will report any problems to this office.  No evidence of allergic reaction including anaphylaxis.

## 2025-06-24 ENCOUNTER — CLINICAL SUPPORT (OUTPATIENT)
Dept: ALLERGY | Age: 65
End: 2025-06-24
Payer: MEDICAID

## 2025-06-24 ENCOUNTER — TELEPHONE (OUTPATIENT)
Dept: ALLERGY | Age: 65
End: 2025-06-24

## 2025-06-24 VITALS
RESPIRATION RATE: 18 BRPM | HEART RATE: 97 BPM | OXYGEN SATURATION: 98 % | DIASTOLIC BLOOD PRESSURE: 78 MMHG | SYSTOLIC BLOOD PRESSURE: 126 MMHG

## 2025-06-24 DIAGNOSIS — J30.9 CHRONIC ALLERGIC RHINITIS: Primary | ICD-10-CM

## 2025-06-24 DIAGNOSIS — J30.1 NON-SEASONAL ALLERGIC RHINITIS DUE TO POLLEN: ICD-10-CM

## 2025-06-24 PROCEDURE — 95117 IMMUNOTHERAPY INJECTIONS: CPT | Performed by: NURSE PRACTITIONER

## 2025-06-24 NOTE — PROGRESS NOTES
PATIENT HAS THE FOLLOWING DIAGNOSIS SUPPORTING ADMINISTRATION OF ALLERGY INJECTIONS: J30.1Allergic rhinitis due to pollen  AND 33473 MULTIPLE ALLERGY INJECTIONS FOR ALLERGY INJECTION ADMINISTRATION      Patient here for allergy injection today.  Patient was presented with the opportunity to speak with provider and ask questions regarding allergy injections.  Patient was also instructed they need to wait 30 minutes after receiving allergy injections. All risks associated with potential adverse effects have been explained to patient and patient handout was provided following allergy testing.    After consent obtained/verified, allergy injection subcutaneously given in back of arm(s).  Please see below for specific details of site injections, concentration of serum, and volume injected.    VIAL COLOR OF ALL VIALS TODAY IS red 1:1 concentration    ALLERGY INJECTION FROM VIAL A GIVEN left  UPPER ARM IN THE AMOUNT OF 0.40 ML    ALLERGY INJECTION FROM VIAL B GIVEN right upper ARM IN THE AMOUNT OF 0.40  ML    ALLERGY INJECTION FROM VIAL C GIVEN rightlower ARM IN THE AMOUNT OF 0.40 ML    Documentation of vial injection specific to arm(s) noted on Allergy Immunotherapy Administration Form.       Patient waited 30 minutes for observation.No  Patient has received information and verbalizes understanding of the potential  health risks associated with allergy injections . Patient understands risks including anaphylaxis and chooses not to wait 30 minutes after administration of allergy injection(s).    Patient tolerated well without adverse reaction while the patient was in the office.    SHOT REACTION TREATMENT INSTRUCTIONS    During the 30 minute wait after an allergy injection the following symptoms should be reported:    Itching other than at the injection site  Hives or swelling other than at the injection site  Redness other than at the injection site  Difficulty breathing  Chest tightness  Difficulty swallowing  Throat

## 2025-06-24 NOTE — TELEPHONE ENCOUNTER
Attempted to call patient regarding allergy immunotherapy and her concerns.  Milagro Dinero registered nurse spoke with the patient at length discussing appropriate protocol and procedure for given allergy shots.  She made recommendations for the patient to make appointment come and discuss concerns with provider otherwise she would pass along the patient's concerns to the provider.  She did express apologies if the patient was upset however due to being fair to all patients we take them according to appointment time first, arrival time second and patient preference third the patient wanted to know if they could request a certain staff member.  Due to our very small office size of 2 staff this is a difficult accommodation to request however the patient is willing to wait for certain staff member then that could be accommodated but it does not take priority over other patients.  Patient did express understanding and agreement with this.  Will try to reach patient to discuss her concerns again.  I did leave a voicemail that she could call me back

## 2025-06-26 ENCOUNTER — OFFICE VISIT (OUTPATIENT)
Dept: ENT CLINIC | Age: 65
End: 2025-06-26
Payer: MEDICAID

## 2025-06-26 VITALS
TEMPERATURE: 97.1 F | DIASTOLIC BLOOD PRESSURE: 78 MMHG | HEIGHT: 63 IN | RESPIRATION RATE: 18 BRPM | HEART RATE: 97 BPM | WEIGHT: 200.1 LBS | BODY MASS INDEX: 35.45 KG/M2 | OXYGEN SATURATION: 97 % | SYSTOLIC BLOOD PRESSURE: 122 MMHG

## 2025-06-26 DIAGNOSIS — R42 DIZZINESS AND GIDDINESS: Primary | ICD-10-CM

## 2025-06-26 PROCEDURE — 99212 OFFICE O/P EST SF 10 MIN: CPT | Performed by: OTOLARYNGOLOGY

## 2025-06-26 PROCEDURE — 1123F ACP DISCUSS/DSCN MKR DOCD: CPT | Performed by: OTOLARYNGOLOGY

## 2025-06-26 NOTE — PROGRESS NOTES
[facility] [place] [facility]  [facility]  [address]  [address]  [facility] [address]  Dept: [phone]  Dept Fax: [phone]  Loc: [phone]    Gely Archer is a 64 y.o. female who was referred byNo ref. provider found for:  Chief Complaint   Patient presents with    Follow-up     Patient is here for f/u and she is doing well.    .    HPI:     Gely Archer is a 64 y.o. female who presents today for dizziness. Th Pt reports experiencing intermittent ear pain, hearing crackling, and popping sounds in both ears. She does report experiencing dizziness/imbalance, and occasional tinnitus. Hearing test 2021 showed mild SNHL bilaterally.    History:     Allergies   Allergen Reactions    Bactrim [Sulfamethoxazole-Trimethoprim]     Flexeril [Cyclobenzaprine]     Montelukast      Makes her feel bad    Morphine Other (See Comments)     \"I hallucinate\"    Peanut-Containing Drug Products     Tessalon [Benzonatate] Hives    Amoxicillin-Pot Clavulanate Rash    Cefdinir Rash    Ibuprofen [Ibuprofen] Rash    Lisinopril Rash    Macrobid [Nitrofurantoin Monohydrate Macrocrystals] Rash    Macrodantin [Nitrofurantoin] Rash    Ranitidine Rash     Current Outpatient Medications   Medication Sig Dispense Refill    losartan (COZAAR) 25 MG tablet Take 1 tablet by mouth daily 30 tablet 2    Semaglutide 3 MG TABS Take 3 mg by mouth daily 30 tablet 2    albuterol sulfate HFA (PROVENTIL;VENTOLIN;PROAIR) 108 (90 Base) MCG/ACT inhaler INHALE 2 PUFFS BY MOUTH EVERY 4 HOURS AS NEEDED FOR  WHEEZING.  RESCUE  INHALER  TO  CARRY  IN  PURSE 9 g 2    betamethasone valerate (VALISONE) 0.1 % ointment APPLY  OINTMENT TO AFFECTED AREA TWICE DAILY 45 g 0    albuterol (PROVENTIL) (2.5 MG/3ML) 0.083% nebulizer solution USE 1 VIAL IN NEBULIZER EVERY 6 HOURS AS NEEDED FOR WHEEZING FOR ASTHMA 375 mL 4    esomeprazole (NEXIUM) 40 MG delayed release capsule TAKE 1 CAPSULE BY MOUTH TWICE DAILY (MORNING  AND

## 2025-07-01 ENCOUNTER — TELEPHONE (OUTPATIENT)
Dept: FAMILY MEDICINE CLINIC | Age: 65
End: 2025-07-01

## 2025-07-01 ENCOUNTER — CLINICAL SUPPORT (OUTPATIENT)
Dept: ALLERGY | Age: 65
End: 2025-07-01
Payer: MEDICAID

## 2025-07-01 VITALS
RESPIRATION RATE: 18 BRPM | HEART RATE: 81 BPM | SYSTOLIC BLOOD PRESSURE: 134 MMHG | DIASTOLIC BLOOD PRESSURE: 82 MMHG | OXYGEN SATURATION: 98 %

## 2025-07-01 DIAGNOSIS — J30.1 NON-SEASONAL ALLERGIC RHINITIS DUE TO POLLEN: ICD-10-CM

## 2025-07-01 DIAGNOSIS — J30.9 CHRONIC ALLERGIC RHINITIS: Primary | ICD-10-CM

## 2025-07-01 PROCEDURE — 95117 IMMUNOTHERAPY INJECTIONS: CPT | Performed by: NURSE PRACTITIONER

## 2025-07-01 NOTE — TELEPHONE ENCOUNTER
[place] [employer] has denied Rybelsus for the following reasons:     Coverage is provided when the member meets all the followin. Member has a history of at least 120 days of therapy with THREE preferred medications in this UPDL category and indicated for diagnosis, if available. ONE of the 120 day trials must be with a preferred drug in the same drug class, Victoza (18 MG/3 ML PEN) (Brand name is preferred by the plan) or Trulicity (0.75 mg, 1.5 mg, 3 mg, and 4.5 mg). Other trials may include but are not limited to: Farxiga 5 and 10 mg (Brand name is preferred by the plan), Glimepiride 1,2,4 mg, Glipizide, Januvia, Jardiance 10 and 25 mg and Metformin  mg, 850 mg, 1000 mg and ER (generics of Glucophage); AND 2. Member has had an inadequate clinical response (the inability to reach A1C goal (less than 7%) after at least 120 days of current regimen, with use of two or more drugs concomitantly per ADA (American Diabetes Association) guidelines; AND 3. Member has documented adherence and appropriate dose escalation (must achieve maximum recommended dose or document that maximum recommended dose is not tolerated or is clinically inappropriate).      Please advise.

## 2025-07-01 NOTE — PROGRESS NOTES
PATIENT HAS THE FOLLOWING DIAGNOSIS SUPPORTING ADMINISTRATION OF ALLERGY INJECTIONS: J30.1Allergic rhinitis due to pollen  AND 69314 MULTIPLE ALLERGY INJECTIONS FOR ALLERGY INJECTION ADMINISTRATION      Patient here for allergy injection today.  Patient was presented with the opportunity to speak with provider and ask questions regarding allergy injections.  Patient was also instructed they need to wait 30 minutes after receiving allergy injections. All risks associated with potential adverse effects have been explained to patient and patient handout was provided following allergy testing.    After consent obtained/verified, allergy injection subcutaneously given in back of arm(s).  Please see below for specific details of site injections, concentration of serum, and volume injected.    VIAL COLOR OF ALL VIALS TODAY IS red 1:1 concentration    ALLERGY INJECTION FROM VIAL A GIVEN right  UPPER ARM IN THE AMOUNT OF 0.45 ML    ALLERGY INJECTION FROM VIAL B GIVEN left lower ARM IN THE AMOUNT OF 0.45  ML    ALLERGY INJECTION FROM VIAL C GIVEN leftupper ARM IN THE AMOUNT OF 0.45 ML    Documentation of vial injection specific to arm(s) noted on Allergy Immunotherapy Administration Form.       Patient waited 30 minutes for observation.No  Patient has received information and verbalizes understanding of the potential  health risks associated with allergy injections . Patient understands risks including anaphylaxis and chooses not to wait 30 minutes after administration of allergy injection(s).    Patient tolerated well without adverse reaction while the patient was in the office.    SHOT REACTION TREATMENT INSTRUCTIONS    During the 30 minute wait after an allergy injection the following symptoms should be reported:    Itching other than at the injection site  Hives or swelling other than at the injection site  Redness other than at the injection site  Difficulty breathing  Chest tightness  Difficulty swallowing  Throat

## 2025-07-01 NOTE — TELEPHONE ENCOUNTER
I would recommend a trial of Trulicity if patient willing once a week injection.  Since her Medicaid is dictating her healthcare.  0.75 mg injection weekly

## 2025-07-02 NOTE — TELEPHONE ENCOUNTER
Spoke to pt, she does not want to do an injectable, she is not comfortable injecting herself. She is open to trying other things though. Please advise

## 2025-07-03 RX ORDER — LOSARTAN POTASSIUM 25 MG/1
25 TABLET ORAL 2 TIMES DAILY
Qty: 180 TABLET | Refills: 1 | Status: SHIPPED | OUTPATIENT
Start: 2025-07-03

## 2025-07-03 NOTE — TELEPHONE ENCOUNTER
Spoke to pt regarding medications. She is still not comfortable injecting herself at this time. She is going to call her insurance and request a state hearing, which is something she says she has done before. If it is not approved during the state hearing pt may change her mind and try an injectable

## 2025-07-07 ENCOUNTER — HOSPITAL ENCOUNTER (OUTPATIENT)
Dept: CT IMAGING | Age: 65
Discharge: HOME | End: 2025-07-07
Payer: MEDICAID

## 2025-07-07 DIAGNOSIS — Z87.891 PERSONAL HISTORY OF TOBACCO USE: ICD-10-CM

## 2025-07-07 PROCEDURE — 71271 CT THORAX LUNG CANCER SCR C-: CPT

## 2025-07-08 ENCOUNTER — CLINICAL SUPPORT (OUTPATIENT)
Dept: ALLERGY | Age: 65
End: 2025-07-08

## 2025-07-08 VITALS
DIASTOLIC BLOOD PRESSURE: 87 MMHG | SYSTOLIC BLOOD PRESSURE: 125 MMHG | HEART RATE: 81 BPM | OXYGEN SATURATION: 98 % | RESPIRATION RATE: 18 BRPM

## 2025-07-08 DIAGNOSIS — J30.9 CHRONIC ALLERGIC RHINITIS: ICD-10-CM

## 2025-07-08 DIAGNOSIS — J30.1 NON-SEASONAL ALLERGIC RHINITIS DUE TO POLLEN: Primary | ICD-10-CM

## 2025-07-08 NOTE — PROGRESS NOTES
PATIENT HAS THE FOLLOWING DIAGNOSIS SUPPORTING ADMINISTRATION OF ALLERGY INJECTIONS: J30.1Allergic rhinitis due to pollen  AND 79224 MULTIPLE ALLERGY INJECTIONS FOR ALLERGY INJECTION ADMINISTRATION      Patient here for allergy injection today.  Patient was presented with the opportunity to speak with provider and ask questions regarding allergy injections.  Patient was also instructed they need to wait 30 minutes after receiving allergy injections. All risks associated with potential adverse effects have been explained to patient and patient handout was provided following allergy testing.    After consent obtained/verified, allergy injection subcutaneously given in back of arm(s).  Please see below for specific details of site injections, concentration of serum, and volume injected.    VIAL COLOR OF ALL VIALS TODAY IS red 1:1 concentration    ALLERGY INJECTION FROM VIAL A GIVEN left  UPPER ARM IN THE AMOUNT OF 0.50 ML    ALLERGY INJECTION FROM VIAL B GIVEN right lower ARM IN THE AMOUNT OF 0.50  ML    ALLERGY INJECTION FROM VIAL C GIVEN rightupper ARM IN THE AMOUNT OF 0.50 ML    Documentation of vial injection specific to arm(s) noted on Allergy Immunotherapy Administration Form.       Patient waited 30 minutes for observation.No  Patient has received information and verbalizes understanding of the potential  health risks associated with allergy injections . Patient understands risks including anaphylaxis and chooses not to wait 30 minutes after administration of allergy injection(s).    Patient tolerated well without adverse reaction while the patient was in the office.    SHOT REACTION TREATMENT INSTRUCTIONS    During the 30 minute wait after an allergy injection the following symptoms should be reported:    Itching other than at the injection site  Hives or swelling other than at the injection site  Redness other than at the injection site  Difficulty breathing  Chest tightness  Difficulty swallowing  Throat

## 2025-07-14 ENCOUNTER — HOSPITAL ENCOUNTER (OUTPATIENT)
Dept: PHYSICAL THERAPY | Age: 65
Setting detail: THERAPIES SERIES
Discharge: HOME | End: 2025-07-14
Payer: MEDICAID

## 2025-07-14 PROCEDURE — 97162 PT EVAL MOD COMPLEX 30 MIN: CPT

## 2025-07-14 NOTE — PROGRESS NOTES
(NORFLEX) 100 MG tablet, Take 1 tablet by mouth 2 times daily as needed, Disp: , Rfl:       Functional Outcome Measure Used [facility]   Functional Outcome Score 32 (7/14/25)       Insurance: Primary: Payor: MEDICAID [place] /  /  / ,   Secondary:    Authorization Information INSURANCE THERAPY BENEFIT: No additional authorization required until after 30th visit of PT/OT/ST EACH every 12 months.  30 visits is soft max.  Authorization is required after 30 visits. Benefit will not cover maintenance or preventative treatment.  AQUATIC THERAPY COVERED:   Yes  MODALITIES COVERED:  Yes with the exception of Hot/Cold Packs   Initial CPT Codes Requested 27707 - Therapeutic Exercise, 52440 - Therapeutic Exercise  , 67438 - Manual Therapy , 11904 - Aquatic Therapeutic Exercise, 09885 - Neuromuscular Re-Education , 33911 - Gait Training, 98721 - Therapeutic Activities, 65023 - Canalith Repositioning Procedure, and 35486 - Massage Therapeutic   Progress Note Counter 1/7 for progress note (Reporting Period: 7/14/25 to     )   Recertification Date 10/06/25   Physician Follow-Up Per patient   Physician Orders    History of Present Illness Gely is retired from the [employer].  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  Denies having any other neck surgery or having any geovanna surgery.  Denies FALLS in the past year.      States one of her physicians recommended she be assessed at the [facility] in [facility] because \"they think that my ARNOLD-CHIARI may be causing my dizziness\".  States it has been determined that [facility] would be best for assessing her ARNOLD-CHIARI and shunt.  States then her physician Dr. Estrada decided she should not go to [facility], and will do further assessment.      States she also has a PITUITARY MASS that is to be assessed further, although

## 2025-07-15 ENCOUNTER — TELEPHONE (OUTPATIENT)
Dept: NEUROSURGERY | Age: 65
End: 2025-07-15

## 2025-07-15 NOTE — TELEPHONE ENCOUNTER
Patient is scheduled for an appointment with Dr. Zhang on 07/16/2025. Dr. Zhang is going to be out of the office in the morning. Dr. Zhang is more than happy to see the patient at 1:00 pm tomorrow afternoon. Office attempted to call the number ending in [phone]. After ringing, the patient was unavailable. Office left a message. Office attempted to call the alternate phone number ending in [phone]. Went straight to Voicemail. A voicemail was left as well on this phone with the information provided above. Per patient request, a [employer] message was also sent to her regarding the above information.

## 2025-07-15 NOTE — PROGRESS NOTES
[facility]    Patient: MACHELLE JONAS, 64 y.o.   : 1960  2025      Patient of Dr. Love     Assessment/Plan      Diagnosis Orders   1. Moderate persistent asthma without complication  [facility] Referral to Clinical Pharmacy (Smoking Cessation) - [place]    Spirometry With Bronchodilator      2. Centrilobular emphysema (HCC)  [facility] Referral to Clinical Pharmacy (Smoking Cessation) - [place]    Spirometry With Bronchodilator      3. Tobacco abuse  [facility] Referral to Clinical Pharmacy (Smoking Cessation) - [place]    Spirometry With Bronchodilator      4. Personal history of tobacco use  [place] VISIT TO DISCUSS LUNG CA SCREEN W LDCT    CT Lung Screen (Annual)      5. Pulmonary nodules        6. Obesity (BMI 30-39.9)        7. Elevated IgE level           -Personally reviewed recent CT lung screening and discussed results with patient showing 2 very small stable pulmonary nodules without significant change from previous imaging.  -Repeat CT lung screening in 1 year  -Patient referred to smoking cessation clinic, states that she may not be able to begin immediately but is very interested in smoking cessation clinic in 3 to 4 months once some of her other medical conditions are under better control  -Update spirometry with bronchodilator in 1 year  -Continue treating asthma with Dulera as previously ordered  -Continue to follow with Narcisa Hsieh in allergy clinic as well as medications ordered by her including Allegra, Flonase, nasal rinses, Atrovent, Xolair  -Encouraged to stay up to date on any indicated vaccinations I.e. influenza, pneumonia, RSV, and covid-19   -Discussed with patient smoking cessation techniques including patches and gum patient reports has used in the past and did not help, reinforced to patient the importance of quitting smoking to prevent further damage to lung function, patient verbalized understanding. (Approximately 11mins)

## 2025-07-17 ENCOUNTER — OFFICE VISIT (OUTPATIENT)
Dept: PULMONOLOGY | Age: 65
End: 2025-07-17

## 2025-07-17 VITALS
HEART RATE: 91 BPM | OXYGEN SATURATION: 98 % | WEIGHT: 204 LBS | HEIGHT: 63 IN | TEMPERATURE: 97.8 F | BODY MASS INDEX: 36.14 KG/M2 | DIASTOLIC BLOOD PRESSURE: 78 MMHG | SYSTOLIC BLOOD PRESSURE: 124 MMHG

## 2025-07-17 DIAGNOSIS — R76.8 ELEVATED IGE LEVEL: ICD-10-CM

## 2025-07-17 DIAGNOSIS — Z87.891 PERSONAL HISTORY OF TOBACCO USE: ICD-10-CM

## 2025-07-17 DIAGNOSIS — R91.8 PULMONARY NODULES: ICD-10-CM

## 2025-07-17 DIAGNOSIS — J43.2 CENTRILOBULAR EMPHYSEMA (HCC): ICD-10-CM

## 2025-07-17 DIAGNOSIS — J45.40 MODERATE PERSISTENT ASTHMA WITHOUT COMPLICATION: Primary | ICD-10-CM

## 2025-07-17 DIAGNOSIS — E66.9 OBESITY (BMI 30-39.9): ICD-10-CM

## 2025-07-17 DIAGNOSIS — Z72.0 TOBACCO ABUSE: ICD-10-CM

## 2025-07-17 ASSESSMENT — ENCOUNTER SYMPTOMS
CHEST TIGHTNESS: 1
SINUS PRESSURE: 0
SINUS PAIN: 0
COUGH: 1
EYE ITCHING: 1
SHORTNESS OF BREATH: 1
RHINORRHEA: 1
WHEEZING: 1

## 2025-07-23 ENCOUNTER — HOSPITAL ENCOUNTER (OUTPATIENT)
Dept: PHYSICAL THERAPY | Age: 65
Setting detail: THERAPIES SERIES
Discharge: HOME | End: 2025-07-23
Payer: MEDICAID

## 2025-07-23 PROCEDURE — 97530 THERAPEUTIC ACTIVITIES: CPT

## 2025-07-23 PROCEDURE — 97112 NEUROMUSCULAR REEDUCATION: CPT

## 2025-07-23 NOTE — PROGRESS NOTES
[facility]  PHYSICAL THERAPY  [] EVALUATION  [x] DAILY NOTE (LAND) [] DAILY NOTE (AQUATIC ) [] PROGRESS NOTE [] DISCHARGE NOTE    [x] OUTPATIENT REHABILITATION CENTER [facility]   [] [facility]    [] [facility]   [] [facility]    Date: 2025  Patient Name:  Gely Archer  : 1960  MRN: [id number]  [facility]: [id number]    Referring Practitioner Yuni Faustin, APRN - CNP [id number]      Diagnosis  Diagnoses       R42 (ICD-10-CM) - Dizziness and giddiness           Treatment Diagnosis R26.81  Unsteadiness on feet     Date of Evaluation 25   Additional Pertinent History      Gely Archer has a past medical history of Allergic rhinitis, Anxiety, Arnold-Chiari malformation (HCC), Asthma, Chronic bronchitis (HCC), Fibromyalgia, GERD (gastroesophageal reflux disease), Headache(784.0), Hyperlipidemia, Neuropathy, Osteoarthritis, Sinusitis, and Type II or unspecified type diabetes mellitus without mention of complication, not stated as uncontrolled.  she has a past surgical history that includes Hysterectomy;  section; Ectopic pregnancy surgery; Appendectomy; hernia repair; hemicolectomy; Colonoscopy (2010); csf shunt (2007); spinal cord decompression (2007); cyst removal; Tooth Extraction (2017); laryngoscopy (N/A, 2021); other surgical history (2020); Toe Surgery (); [facility] US GUIDED BREAST BIOPSY W LOC DEVICE 1ST LESION LEFT (Left, 2024); and Toe Surgery ().     Allergies Allergies   Allergen Reactions    Bactrim [Sulfamethoxazole-Trimethoprim]     Flexeril [Cyclobenzaprine]     Montelukast      Makes her feel bad    Morphine Other (See Comments)     \"I hallucinate\"    Peanut-Containing Drug Products     Tessalon [Benzonatate] Hives    Amoxicillin-Pot Clavulanate Rash    Cefdinir Rash    Ibuprofen [Ibuprofen] Rash    Lisinopril Rash    Macrobid [Nitrofurantoin Monohydrate Macrocrystals] Rash

## 2025-07-28 DIAGNOSIS — L66.81 CENTRAL CENTRIFUGAL SCARRING ALOPECIA: ICD-10-CM

## 2025-07-28 RX ORDER — CLOBETASOL PROPIONATE 0.5 MG/G
OINTMENT TOPICAL
Qty: 60 G | Refills: 0 | Status: SHIPPED | OUTPATIENT
Start: 2025-07-28

## 2025-07-28 RX ORDER — KETOCONAZOLE 20 MG/G
CREAM TOPICAL
Qty: 30 G | Refills: 5 | Status: SHIPPED | OUTPATIENT
Start: 2025-07-28

## 2025-07-29 ENCOUNTER — CLINICAL SUPPORT (OUTPATIENT)
Dept: ALLERGY | Age: 65
End: 2025-07-29
Payer: MEDICAID

## 2025-07-29 VITALS
HEART RATE: 83 BPM | SYSTOLIC BLOOD PRESSURE: 138 MMHG | DIASTOLIC BLOOD PRESSURE: 91 MMHG | RESPIRATION RATE: 18 BRPM | OXYGEN SATURATION: 100 %

## 2025-07-29 DIAGNOSIS — J30.1 NON-SEASONAL ALLERGIC RHINITIS DUE TO POLLEN: ICD-10-CM

## 2025-07-29 DIAGNOSIS — J45.50 SEVERE PERSISTENT ASTHMA WITHOUT COMPLICATION (HCC): Primary | ICD-10-CM

## 2025-07-29 DIAGNOSIS — J30.9 CHRONIC ALLERGIC RHINITIS: ICD-10-CM

## 2025-07-29 PROCEDURE — 95117 IMMUNOTHERAPY INJECTIONS: CPT | Performed by: NURSE PRACTITIONER

## 2025-07-29 PROCEDURE — 96372 THER/PROPH/DIAG INJ SC/IM: CPT | Performed by: NURSE PRACTITIONER

## 2025-07-29 RX ORDER — KETOCONAZOLE 20 MG/ML
SHAMPOO, SUSPENSION TOPICAL
Qty: 120 ML | Refills: 0 | Status: SHIPPED | OUTPATIENT
Start: 2025-07-29

## 2025-07-29 RX ORDER — FLUCONAZOLE 150 MG/1
150 TABLET ORAL EVERY OTHER DAY
COMMUNITY
Start: 2025-07-01

## 2025-07-29 RX ORDER — PREDNISONE 20 MG/1
20 TABLET ORAL DAILY
COMMUNITY

## 2025-07-29 NOTE — PROGRESS NOTES
PATIENT HAS THE FOLLOWING DIAGNOSIS SUPPORTING ADMINISTRATION OF ALLERGY INJECTIONS: J30.1Allergic rhinitis due to pollen  AND 57931 MULTIPLE ALLERGY INJECTIONS FOR ALLERGY INJECTION ADMINISTRATION      Patient here for allergy injection today.  Patient was presented with the opportunity to speak with provider and ask questions regarding allergy injections.  Patient was also instructed they need to wait 30 minutes after receiving allergy injections. All risks associated with potential adverse effects have been explained to patient and patient handout was provided following allergy testing.    After consent obtained/verified, allergy injection subcutaneously given in back of arm(s).  Please see below for specific details of site injections, concentration of serum, and volume injected.    VIAL COLOR OF ALL VIALS TODAY IS red 1:1 concentration    ALLERGY INJECTION FROM VIAL A GIVEN right  UPPER ARM IN THE AMOUNT OF 0.50 ML    ALLERGY INJECTION FROM VIAL B GIVEN left upper ARM IN THE AMOUNT OF 0.50  ML    ALLERGY INJECTION FROM VIAL C GIVEN leftlower ARM IN THE AMOUNT OF 0.50 ML    Documentation of vial injection specific to arm(s) noted on Allergy Immunotherapy Administration Form.       Patient waited 30 minutes for observation.No  Patient has received information and verbalizes understanding of the potential  health risks associated with allergy injections . Patient understands risks including anaphylaxis and chooses not to wait 30 minutes after administration of allergy injection(s).    Patient tolerated well without adverse reaction while the patient was in the office.    SHOT REACTION TREATMENT INSTRUCTIONS    During the 30 minute wait after an allergy injection the following symptoms should be reported:    Itching other than at the injection site  Hives or swelling other than at the injection site  Redness other than at the injection site  Difficulty breathing  Chest tightness  Difficulty swallowing  Throat

## 2025-07-29 NOTE — PROGRESS NOTES
Patient here today to receive Xolair injection.    Patient does not report any previous reaction from Xolair injections.  Denies any nausea vomiting fever urticaria or angioedema.  Denies any recent exacerbation of asthma or asthma-like symptoms.  Patient was explained benefits and potential risks including anaphylaxis to patient.      Patient has been explained the risk and benefits including delayed anaphylaxis.      Patient has EpiPen and understands how to appropriately use? Yes      1ST DOSE OF XOLAIR 300 MG GIVEN subcutaneously in  right ABDOMEN    Total combined dose of 300 mg        APPROVED INJECTION SITES RIGHT  / LEFT UPPER ARM,  RIGHT  / LEFT FRONT OR MIDDLE OF THIGH, OR STOMACH         NDC 88666-346-39   Lot [id number]   Expires 06/30/2026    Patient observed for 30 minutes No      Medication was supplied by patient:  YES/NO: Yes      Medication was supplied as a sample:  YES/NO: No                   Prior to patient receiving medication the area was cleansed with alcohol swabs.  Following the administration no evidence of bleeding or bruising.    No adverse reactions reported.  Patient tolerated well without adverse reactions or side effects.  Patient to continue to receive Xolair injections as prescribed.  Will report any problems to this office.  No evidence of allergic reaction including anaphylaxis.

## 2025-07-30 ENCOUNTER — TELEPHONE (OUTPATIENT)
Age: 65
End: 2025-07-30

## 2025-07-30 NOTE — TELEPHONE ENCOUNTER
Referral to [facility] received from YANETH Vidal CNP for Smoking Cessation Counseling and Medication Management per Consult Agreement.      Called patient.  She states that she is very busy with other healthcare appointments right now.  She would like a call back mid-October and plans to schedule with this clinic sometime in November.

## 2025-08-04 ENCOUNTER — HOSPITAL ENCOUNTER (OUTPATIENT)
Dept: AUDIOLOGY | Age: 65
Discharge: HOME | End: 2025-08-04
Payer: MEDICAID

## 2025-08-04 PROCEDURE — 92537 CALORIC VSTBLR TEST W/REC: CPT | Performed by: AUDIOLOGIST

## 2025-08-04 PROCEDURE — 92557 COMPREHENSIVE HEARING TEST: CPT | Performed by: AUDIOLOGIST

## 2025-08-04 PROCEDURE — 92567 TYMPANOMETRY: CPT | Performed by: AUDIOLOGIST

## 2025-08-04 PROCEDURE — 92540 BASIC VESTIBULAR EVALUATION: CPT | Performed by: AUDIOLOGIST

## 2025-08-07 ENCOUNTER — APPOINTMENT (OUTPATIENT)
Dept: PHYSICAL THERAPY | Age: 65
End: 2025-08-07
Payer: MEDICAID

## 2025-08-07 ENCOUNTER — OFFICE VISIT (OUTPATIENT)
Dept: ENT CLINIC | Age: 65
End: 2025-08-07
Payer: MEDICAID

## 2025-08-07 VITALS
HEART RATE: 84 BPM | DIASTOLIC BLOOD PRESSURE: 74 MMHG | BODY MASS INDEX: 36.16 KG/M2 | TEMPERATURE: 97.4 F | SYSTOLIC BLOOD PRESSURE: 146 MMHG | HEIGHT: 63 IN | WEIGHT: 204.1 LBS | OXYGEN SATURATION: 97 %

## 2025-08-07 DIAGNOSIS — R42 DIZZINESS AND GIDDINESS: ICD-10-CM

## 2025-08-07 DIAGNOSIS — H90.3 SENSORINEURAL HEARING LOSS (SNHL) OF BOTH EARS: Primary | ICD-10-CM

## 2025-08-07 PROCEDURE — 99212 OFFICE O/P EST SF 10 MIN: CPT | Performed by: OTOLARYNGOLOGY

## 2025-08-07 PROCEDURE — 1123F ACP DISCUSS/DSCN MKR DOCD: CPT | Performed by: OTOLARYNGOLOGY

## 2025-08-13 ENCOUNTER — CLINICAL SUPPORT (OUTPATIENT)
Dept: ALLERGY | Age: 65
End: 2025-08-13

## 2025-08-13 ENCOUNTER — HOSPITAL ENCOUNTER (OUTPATIENT)
Dept: PHYSICAL THERAPY | Age: 65
Setting detail: THERAPIES SERIES
Discharge: HOME | End: 2025-08-13
Payer: MEDICAID

## 2025-08-13 VITALS
HEART RATE: 92 BPM | OXYGEN SATURATION: 98 % | DIASTOLIC BLOOD PRESSURE: 80 MMHG | RESPIRATION RATE: 18 BRPM | SYSTOLIC BLOOD PRESSURE: 137 MMHG

## 2025-08-13 DIAGNOSIS — J30.9 CHRONIC ALLERGIC RHINITIS: ICD-10-CM

## 2025-08-13 DIAGNOSIS — J30.1 NON-SEASONAL ALLERGIC RHINITIS DUE TO POLLEN: Primary | ICD-10-CM

## 2025-08-13 PROCEDURE — 97112 NEUROMUSCULAR REEDUCATION: CPT

## 2025-08-18 ENCOUNTER — HOSPITAL ENCOUNTER (OUTPATIENT)
Dept: PHYSICAL THERAPY | Age: 65
Setting detail: THERAPIES SERIES
Discharge: HOME | End: 2025-08-18
Payer: MEDICAID

## 2025-08-18 PROCEDURE — 97110 THERAPEUTIC EXERCISES: CPT

## 2025-08-19 ENCOUNTER — CLINICAL SUPPORT (OUTPATIENT)
Dept: ALLERGY | Age: 65
End: 2025-08-19
Payer: MEDICAID

## 2025-08-19 VITALS
RESPIRATION RATE: 18 BRPM | SYSTOLIC BLOOD PRESSURE: 140 MMHG | DIASTOLIC BLOOD PRESSURE: 76 MMHG | HEART RATE: 85 BPM | OXYGEN SATURATION: 98 %

## 2025-08-19 DIAGNOSIS — J30.9 CHRONIC ALLERGIC RHINITIS: ICD-10-CM

## 2025-08-19 DIAGNOSIS — J30.1 NON-SEASONAL ALLERGIC RHINITIS DUE TO POLLEN: Primary | ICD-10-CM

## 2025-08-19 PROCEDURE — 95117 IMMUNOTHERAPY INJECTIONS: CPT | Performed by: NURSE PRACTITIONER

## 2025-08-20 ENCOUNTER — HOSPITAL ENCOUNTER (OUTPATIENT)
Dept: PHYSICAL THERAPY | Age: 65
Setting detail: THERAPIES SERIES
Discharge: HOME | End: 2025-08-20
Payer: MEDICAID

## 2025-08-20 PROCEDURE — 97110 THERAPEUTIC EXERCISES: CPT

## 2025-08-25 ENCOUNTER — TELEPHONE (OUTPATIENT)
Dept: NEUROSURGERY | Age: 65
End: 2025-08-25

## 2025-08-25 DIAGNOSIS — F41.9 ANXIETY DISORDER, UNSPECIFIED TYPE: Primary | ICD-10-CM

## 2025-08-25 RX ORDER — DIAZEPAM 5 MG/1
5 TABLET ORAL 2 TIMES DAILY PRN
Qty: 2 TABLET | Refills: 0 | Status: SHIPPED | OUTPATIENT
Start: 2025-08-25 | End: 2025-08-26

## 2025-08-26 ENCOUNTER — CLINICAL SUPPORT (OUTPATIENT)
Dept: ALLERGY | Age: 65
End: 2025-08-26
Payer: MEDICAID

## 2025-08-26 VITALS
HEART RATE: 89 BPM | SYSTOLIC BLOOD PRESSURE: 156 MMHG | OXYGEN SATURATION: 98 % | RESPIRATION RATE: 18 BRPM | DIASTOLIC BLOOD PRESSURE: 84 MMHG

## 2025-08-26 DIAGNOSIS — J30.9 CHRONIC ALLERGIC RHINITIS: ICD-10-CM

## 2025-08-26 DIAGNOSIS — J30.1 NON-SEASONAL ALLERGIC RHINITIS DUE TO POLLEN: Primary | ICD-10-CM

## 2025-08-26 PROCEDURE — 95117 IMMUNOTHERAPY INJECTIONS: CPT | Performed by: NURSE PRACTITIONER

## 2025-08-27 ENCOUNTER — OFFICE VISIT (OUTPATIENT)
Age: 65
End: 2025-08-27
Payer: MEDICAID

## 2025-08-27 VITALS
WEIGHT: 203.25 LBS | SYSTOLIC BLOOD PRESSURE: 138 MMHG | HEIGHT: 63 IN | BODY MASS INDEX: 36.01 KG/M2 | DIASTOLIC BLOOD PRESSURE: 80 MMHG | HEART RATE: 90 BPM

## 2025-08-27 DIAGNOSIS — Z29.89 IMMUNOTHERAPY: ICD-10-CM

## 2025-08-27 DIAGNOSIS — J30.9 CHRONIC ALLERGIC RHINITIS: Primary | ICD-10-CM

## 2025-08-27 DIAGNOSIS — E83.52 HYPERCALCEMIA: ICD-10-CM

## 2025-08-27 DIAGNOSIS — L66.81 CENTRAL CENTRIFUGAL SCARRING ALOPECIA: ICD-10-CM

## 2025-08-27 DIAGNOSIS — E23.6 PITUITARY MASS: Primary | ICD-10-CM

## 2025-08-27 PROCEDURE — 99214 OFFICE O/P EST MOD 30 MIN: CPT | Performed by: INTERNAL MEDICINE

## 2025-08-27 PROCEDURE — 95165 ANTIGEN THERAPY SERVICES: CPT | Performed by: NURSE PRACTITIONER

## 2025-08-27 PROCEDURE — 1123F ACP DISCUSS/DSCN MKR DOCD: CPT | Performed by: INTERNAL MEDICINE

## 2025-08-27 RX ORDER — DULOXETIN HYDROCHLORIDE 30 MG/1
30 CAPSULE, DELAYED RELEASE ORAL DAILY
Qty: 30 CAPSULE | Refills: 0 | Status: SHIPPED | OUTPATIENT
Start: 2025-08-27

## 2025-08-27 RX ORDER — KETOCONAZOLE 20 MG/ML
SHAMPOO, SUSPENSION TOPICAL
Qty: 120 ML | Refills: 0 | Status: SHIPPED | OUTPATIENT
Start: 2025-08-27

## 2025-08-27 RX ORDER — CLOBETASOL PROPIONATE 0.5 MG/G
OINTMENT TOPICAL
Qty: 60 G | Refills: 0 | Status: SHIPPED | OUTPATIENT
Start: 2025-08-27

## 2025-08-28 ENCOUNTER — HOSPITAL ENCOUNTER (OUTPATIENT)
Dept: MRI IMAGING | Age: 65
Discharge: HOME | End: 2025-08-28
Attending: NEUROLOGICAL SURGERY
Payer: MEDICAID

## 2025-08-28 DIAGNOSIS — D35.2 PITUITARY ADENOMA (HCC): ICD-10-CM

## 2025-08-28 PROCEDURE — A9579 GAD-BASE MR CONTRAST NOS,1ML: HCPCS | Performed by: NEUROLOGICAL SURGERY

## 2025-08-28 PROCEDURE — 6360000004 HC RX CONTRAST MEDICATION: Performed by: NEUROLOGICAL SURGERY

## 2025-08-28 PROCEDURE — 70553 MRI BRAIN STEM W/O & W/DYE: CPT

## 2025-08-28 RX ORDER — GADOTERIDOL 279.3 MG/ML
20 INJECTION INTRAVENOUS
Status: COMPLETED | OUTPATIENT
Start: 2025-08-28 | End: 2025-08-28

## 2025-08-28 RX ADMIN — GADOTERIDOL 20 ML: 279.3 INJECTION, SOLUTION INTRAVENOUS at 06:57

## 2025-08-30 DIAGNOSIS — L85.3 XEROSIS OF SKIN: ICD-10-CM

## 2025-09-02 ENCOUNTER — CLINICAL SUPPORT (OUTPATIENT)
Dept: ALLERGY | Age: 65
End: 2025-09-02
Payer: MEDICAID

## 2025-09-02 VITALS
SYSTOLIC BLOOD PRESSURE: 141 MMHG | HEART RATE: 79 BPM | DIASTOLIC BLOOD PRESSURE: 86 MMHG | OXYGEN SATURATION: 98 % | RESPIRATION RATE: 18 BRPM

## 2025-09-02 DIAGNOSIS — J30.1 NON-SEASONAL ALLERGIC RHINITIS DUE TO POLLEN: ICD-10-CM

## 2025-09-02 DIAGNOSIS — J30.9 CHRONIC ALLERGIC RHINITIS: ICD-10-CM

## 2025-09-02 DIAGNOSIS — J45.50 SEVERE PERSISTENT ASTHMA WITHOUT COMPLICATION (HCC): Primary | ICD-10-CM

## 2025-09-02 PROCEDURE — 95117 IMMUNOTHERAPY INJECTIONS: CPT | Performed by: NURSE PRACTITIONER

## 2025-09-02 PROCEDURE — 96372 THER/PROPH/DIAG INJ SC/IM: CPT | Performed by: NURSE PRACTITIONER

## 2025-09-02 RX ORDER — HYDROCORTISONE 25 MG/ML
LOTION TOPICAL
Qty: 118 ML | Refills: 0 | Status: SHIPPED | OUTPATIENT
Start: 2025-09-02

## 2025-09-02 RX ORDER — DULOXETIN HYDROCHLORIDE 30 MG/1
30 CAPSULE, DELAYED RELEASE ORAL DAILY
Qty: 30 CAPSULE | Refills: 0 | Status: SHIPPED | OUTPATIENT
Start: 2025-09-02

## 2025-09-03 ENCOUNTER — OFFICE VISIT (OUTPATIENT)
Dept: NEUROSURGERY | Age: 65
End: 2025-09-03

## 2025-09-03 VITALS
DIASTOLIC BLOOD PRESSURE: 82 MMHG | WEIGHT: 203 LBS | HEART RATE: 90 BPM | BODY MASS INDEX: 35.97 KG/M2 | SYSTOLIC BLOOD PRESSURE: 138 MMHG | HEIGHT: 63 IN

## 2025-09-03 DIAGNOSIS — D35.2 PITUITARY ADENOMA (HCC): Primary | ICD-10-CM

## 2025-09-03 DIAGNOSIS — G93.5 CHIARI MALFORMATION TYPE I (HCC): ICD-10-CM

## (undated) DEVICE — HYPODERMIC SAFETY NEEDLE: Brand: MAGELLAN

## (undated) DEVICE — TOWEL,OR,DSP,ST,WHITE,DLX,XR,4/PK,20PK/C: Brand: MEDLINE

## (undated) DEVICE — Device

## (undated) DEVICE — YANKAUER,BULB TIP,W/O VENT,RIGID,STERILE: Brand: MEDLINE

## (undated) DEVICE — PACK-MAJOR

## (undated) DEVICE — GLOVE SURG SZ 65 THK91MIL LTX FREE SYN POLYISOPRENE

## (undated) DEVICE — 4-PORT MANIFOLD: Brand: NEPTUNE 2

## (undated) DEVICE — PAD,NON-ADHERENT,3X8,STERILE,LF,1/PK: Brand: MEDLINE

## (undated) DEVICE — COVER ARMBRD W13XL28.5IN IMPERV BLU FOR OP RM

## (undated) DEVICE — INTENDED FOR TISSUE SEPARATION, AND OTHER PROCEDURES THAT REQUIRE A SHARP SURGICAL BLADE TO PUNCTURE OR CUT.: Brand: BARD-PARKER ® CARBON RIB-BACK BLADES

## (undated) DEVICE — LINER SUCT CANSTR 1500CC SEMI RIG W/ POR HYDROPHOBIC SHUT

## (undated) DEVICE — CODMAN® SURGICAL PATTIES 1/2" X 1/2" (1.27CM X 1.27CM): Brand: CODMAN®

## (undated) DEVICE — KIT,ANTI FOG,W/SPONGE & FLUID,SOFT PACK: Brand: MEDLINE

## (undated) DEVICE — GOWN,SIRUS,NON REINFRCD,LARGE,SET IN SL: Brand: MEDLINE

## (undated) DEVICE — PACK PROCEDURE SURG SET UP SRMC

## (undated) DEVICE — GAUZE,SPONGE,8"X4",12PLY,XRAY,STRL,LF: Brand: MEDLINE

## (undated) DEVICE — GLOVE SURG SZ 75 L12IN FNGR THK94MIL WHT POLYMER LTX BEAD

## (undated) DEVICE — GOWN,SIRUS,NONRNF,SETINSLV,XL,20/CS: Brand: MEDLINE